# Patient Record
Sex: MALE | Race: WHITE | Employment: OTHER | URBAN - METROPOLITAN AREA
[De-identification: names, ages, dates, MRNs, and addresses within clinical notes are randomized per-mention and may not be internally consistent; named-entity substitution may affect disease eponyms.]

---

## 2017-02-02 DIAGNOSIS — I10 ESSENTIAL HYPERTENSION: ICD-10-CM

## 2017-02-02 RX ORDER — METOPROLOL TARTRATE 100 MG/1
TABLET ORAL
Qty: 180 TABLET | Refills: 3 | Status: SHIPPED | OUTPATIENT
Start: 2017-02-02 | End: 2018-07-17 | Stop reason: SDUPTHER

## 2017-02-07 ENCOUNTER — OFFICE VISIT (OUTPATIENT)
Dept: FAMILY MEDICINE CLINIC | Age: 67
End: 2017-02-07

## 2017-02-07 VITALS
DIASTOLIC BLOOD PRESSURE: 80 MMHG | HEART RATE: 59 BPM | BODY MASS INDEX: 32.47 KG/M2 | WEIGHT: 219.2 LBS | OXYGEN SATURATION: 99 % | SYSTOLIC BLOOD PRESSURE: 142 MMHG | RESPIRATION RATE: 17 BRPM | HEIGHT: 69 IN

## 2017-02-07 DIAGNOSIS — E11.9 TYPE 2 DIABETES MELLITUS WITHOUT COMPLICATION, WITHOUT LONG-TERM CURRENT USE OF INSULIN (HCC): Primary | ICD-10-CM

## 2017-02-07 DIAGNOSIS — I10 ESSENTIAL HYPERTENSION: ICD-10-CM

## 2017-02-07 LAB — HBA1C MFR BLD: 6.7 %

## 2017-02-07 PROCEDURE — G8598 ASA/ANTIPLAT THER USED: HCPCS | Performed by: FAMILY MEDICINE

## 2017-02-07 PROCEDURE — 4040F PNEUMOC VAC/ADMIN/RCVD: CPT | Performed by: FAMILY MEDICINE

## 2017-02-07 PROCEDURE — 3017F COLORECTAL CA SCREEN DOC REV: CPT | Performed by: FAMILY MEDICINE

## 2017-02-07 PROCEDURE — G8419 CALC BMI OUT NRM PARAM NOF/U: HCPCS | Performed by: FAMILY MEDICINE

## 2017-02-07 PROCEDURE — 3044F HG A1C LEVEL LT 7.0%: CPT | Performed by: FAMILY MEDICINE

## 2017-02-07 PROCEDURE — 1123F ACP DISCUSS/DSCN MKR DOCD: CPT | Performed by: FAMILY MEDICINE

## 2017-02-07 PROCEDURE — G8484 FLU IMMUNIZE NO ADMIN: HCPCS | Performed by: FAMILY MEDICINE

## 2017-02-07 PROCEDURE — 99213 OFFICE O/P EST LOW 20 MIN: CPT | Performed by: FAMILY MEDICINE

## 2017-02-07 PROCEDURE — G8427 DOCREV CUR MEDS BY ELIG CLIN: HCPCS | Performed by: FAMILY MEDICINE

## 2017-02-07 PROCEDURE — 83036 HEMOGLOBIN GLYCOSYLATED A1C: CPT | Performed by: FAMILY MEDICINE

## 2017-02-07 PROCEDURE — 1036F TOBACCO NON-USER: CPT | Performed by: FAMILY MEDICINE

## 2017-02-07 RX ORDER — LISINOPRIL 40 MG/1
40 TABLET ORAL DAILY
Qty: 90 TABLET | Refills: 3 | Status: SHIPPED | OUTPATIENT
Start: 2017-02-07 | End: 2018-03-13 | Stop reason: SDUPTHER

## 2017-02-07 RX ORDER — CLONIDINE HYDROCHLORIDE 0.3 MG/1
0.3 TABLET ORAL DAILY
Qty: 90 TABLET | Refills: 3 | Status: SHIPPED | OUTPATIENT
Start: 2017-02-07 | End: 2018-02-14 | Stop reason: SDUPTHER

## 2017-02-07 RX ORDER — HYDROCHLOROTHIAZIDE 25 MG/1
25 TABLET ORAL DAILY
Qty: 90 TABLET | Refills: 3 | Status: SHIPPED | OUTPATIENT
Start: 2017-02-07 | End: 2018-02-14 | Stop reason: SDUPTHER

## 2017-05-11 ENCOUNTER — OFFICE VISIT (OUTPATIENT)
Dept: FAMILY MEDICINE CLINIC | Age: 67
End: 2017-05-11

## 2017-05-11 VITALS
RESPIRATION RATE: 15 BRPM | HEART RATE: 94 BPM | OXYGEN SATURATION: 98 % | WEIGHT: 212 LBS | DIASTOLIC BLOOD PRESSURE: 74 MMHG | BODY MASS INDEX: 31.4 KG/M2 | SYSTOLIC BLOOD PRESSURE: 124 MMHG | HEIGHT: 69 IN

## 2017-05-11 DIAGNOSIS — E11.9 TYPE 2 DIABETES MELLITUS WITHOUT COMPLICATION, WITHOUT LONG-TERM CURRENT USE OF INSULIN (HCC): Primary | ICD-10-CM

## 2017-05-11 DIAGNOSIS — I10 ESSENTIAL HYPERTENSION: ICD-10-CM

## 2017-05-11 DIAGNOSIS — E08.21 DIABETES MELLITUS DUE TO UNDERLYING CONDITION WITH DIABETIC NEPHROPATHY, WITHOUT LONG-TERM CURRENT USE OF INSULIN (HCC): ICD-10-CM

## 2017-05-11 LAB — HBA1C MFR BLD: 6.9 %

## 2017-05-11 PROCEDURE — G8598 ASA/ANTIPLAT THER USED: HCPCS | Performed by: FAMILY MEDICINE

## 2017-05-11 PROCEDURE — 3017F COLORECTAL CA SCREEN DOC REV: CPT | Performed by: FAMILY MEDICINE

## 2017-05-11 PROCEDURE — 1123F ACP DISCUSS/DSCN MKR DOCD: CPT | Performed by: FAMILY MEDICINE

## 2017-05-11 PROCEDURE — 3044F HG A1C LEVEL LT 7.0%: CPT | Performed by: FAMILY MEDICINE

## 2017-05-11 PROCEDURE — 4040F PNEUMOC VAC/ADMIN/RCVD: CPT | Performed by: FAMILY MEDICINE

## 2017-05-11 PROCEDURE — 1036F TOBACCO NON-USER: CPT | Performed by: FAMILY MEDICINE

## 2017-05-11 PROCEDURE — G8417 CALC BMI ABV UP PARAM F/U: HCPCS | Performed by: FAMILY MEDICINE

## 2017-05-11 PROCEDURE — G8427 DOCREV CUR MEDS BY ELIG CLIN: HCPCS | Performed by: FAMILY MEDICINE

## 2017-05-11 PROCEDURE — 83036 HEMOGLOBIN GLYCOSYLATED A1C: CPT | Performed by: FAMILY MEDICINE

## 2017-05-11 PROCEDURE — 99213 OFFICE O/P EST LOW 20 MIN: CPT | Performed by: FAMILY MEDICINE

## 2017-05-11 ASSESSMENT — PATIENT HEALTH QUESTIONNAIRE - PHQ9
2. FEELING DOWN, DEPRESSED OR HOPELESS: 0
SUM OF ALL RESPONSES TO PHQ QUESTIONS 1-9: 0
SUM OF ALL RESPONSES TO PHQ9 QUESTIONS 1 & 2: 0
1. LITTLE INTEREST OR PLEASURE IN DOING THINGS: 0

## 2017-07-31 ENCOUNTER — CARE COORDINATION (OUTPATIENT)
Dept: CARE COORDINATION | Age: 67
End: 2017-07-31

## 2017-08-09 DIAGNOSIS — E11.9 TYPE 2 DIABETES MELLITUS WITHOUT COMPLICATION, WITHOUT LONG-TERM CURRENT USE OF INSULIN (HCC): ICD-10-CM

## 2017-08-18 ENCOUNTER — OFFICE VISIT (OUTPATIENT)
Dept: FAMILY MEDICINE CLINIC | Age: 67
End: 2017-08-18

## 2017-08-18 VITALS
HEART RATE: 66 BPM | DIASTOLIC BLOOD PRESSURE: 70 MMHG | OXYGEN SATURATION: 99 % | BODY MASS INDEX: 31.99 KG/M2 | RESPIRATION RATE: 14 BRPM | WEIGHT: 216 LBS | SYSTOLIC BLOOD PRESSURE: 130 MMHG | HEIGHT: 69 IN

## 2017-08-18 DIAGNOSIS — Z12.5 PROSTATE CANCER SCREENING: ICD-10-CM

## 2017-08-18 DIAGNOSIS — E78.2 MIXED HYPERLIPIDEMIA: ICD-10-CM

## 2017-08-18 DIAGNOSIS — Z11.59 NEED FOR HEPATITIS C SCREENING TEST: ICD-10-CM

## 2017-08-18 DIAGNOSIS — I10 ESSENTIAL HYPERTENSION: ICD-10-CM

## 2017-08-18 DIAGNOSIS — E11.9 TYPE 2 DIABETES MELLITUS WITHOUT COMPLICATION, WITHOUT LONG-TERM CURRENT USE OF INSULIN (HCC): Primary | ICD-10-CM

## 2017-08-18 LAB — HBA1C MFR BLD: 6.6 %

## 2017-08-18 PROCEDURE — 3046F HEMOGLOBIN A1C LEVEL >9.0%: CPT | Performed by: FAMILY MEDICINE

## 2017-08-18 PROCEDURE — 1036F TOBACCO NON-USER: CPT | Performed by: FAMILY MEDICINE

## 2017-08-18 PROCEDURE — 3017F COLORECTAL CA SCREEN DOC REV: CPT | Performed by: FAMILY MEDICINE

## 2017-08-18 PROCEDURE — G8598 ASA/ANTIPLAT THER USED: HCPCS | Performed by: FAMILY MEDICINE

## 2017-08-18 PROCEDURE — 4040F PNEUMOC VAC/ADMIN/RCVD: CPT | Performed by: FAMILY MEDICINE

## 2017-08-18 PROCEDURE — G8427 DOCREV CUR MEDS BY ELIG CLIN: HCPCS | Performed by: FAMILY MEDICINE

## 2017-08-18 PROCEDURE — 99213 OFFICE O/P EST LOW 20 MIN: CPT | Performed by: FAMILY MEDICINE

## 2017-08-18 PROCEDURE — G8417 CALC BMI ABV UP PARAM F/U: HCPCS | Performed by: FAMILY MEDICINE

## 2017-08-18 PROCEDURE — 83036 HEMOGLOBIN GLYCOSYLATED A1C: CPT | Performed by: FAMILY MEDICINE

## 2017-08-18 PROCEDURE — 1123F ACP DISCUSS/DSCN MKR DOCD: CPT | Performed by: FAMILY MEDICINE

## 2017-10-11 DIAGNOSIS — E08.00 DIABETES MELLITUS DUE TO UNDERLYING CONDITION WITH HYPEROSMOLARITY WITHOUT COMA (HCC): ICD-10-CM

## 2017-10-12 RX ORDER — GLYBURIDE 5 MG/1
TABLET ORAL
Qty: 360 TABLET | Refills: 3 | Status: SHIPPED | OUTPATIENT
Start: 2017-10-12 | End: 2018-10-15 | Stop reason: SDUPTHER

## 2017-10-12 NOTE — TELEPHONE ENCOUNTER
LOV 08/18/17   LR 09/29/16 #360 3 refills    Lab Results   Component Value Date    LABA1C 6.6 08/18/2017     Lab Results   Component Value Date    .4 11/01/2016

## 2017-12-01 ENCOUNTER — OFFICE VISIT (OUTPATIENT)
Dept: FAMILY MEDICINE CLINIC | Age: 67
End: 2017-12-01

## 2017-12-01 VITALS
HEIGHT: 69 IN | DIASTOLIC BLOOD PRESSURE: 80 MMHG | SYSTOLIC BLOOD PRESSURE: 120 MMHG | BODY MASS INDEX: 32.29 KG/M2 | OXYGEN SATURATION: 98 % | WEIGHT: 218 LBS | HEART RATE: 55 BPM | RESPIRATION RATE: 14 BRPM

## 2017-12-01 DIAGNOSIS — I10 ESSENTIAL HYPERTENSION: ICD-10-CM

## 2017-12-01 DIAGNOSIS — E78.2 MIXED HYPERLIPIDEMIA: ICD-10-CM

## 2017-12-01 DIAGNOSIS — Z11.59 NEED FOR HEPATITIS C SCREENING TEST: ICD-10-CM

## 2017-12-01 DIAGNOSIS — E11.9 TYPE 2 DIABETES MELLITUS WITHOUT COMPLICATION, WITHOUT LONG-TERM CURRENT USE OF INSULIN (HCC): Primary | ICD-10-CM

## 2017-12-01 DIAGNOSIS — Z12.5 PROSTATE CANCER SCREENING: ICD-10-CM

## 2017-12-01 DIAGNOSIS — E11.9 TYPE 2 DIABETES MELLITUS WITHOUT COMPLICATION, WITHOUT LONG-TERM CURRENT USE OF INSULIN (HCC): ICD-10-CM

## 2017-12-01 DIAGNOSIS — Z12.11 COLON CANCER SCREENING: ICD-10-CM

## 2017-12-01 LAB
A/G RATIO: 1.6 (ref 1.1–2.2)
ALBUMIN SERPL-MCNC: 4.6 G/DL (ref 3.4–5)
ALP BLD-CCNC: 81 U/L (ref 40–129)
ALT SERPL-CCNC: 29 U/L (ref 10–40)
ANION GAP SERPL CALCULATED.3IONS-SCNC: 18 MMOL/L (ref 3–16)
AST SERPL-CCNC: 21 U/L (ref 15–37)
BASOPHILS ABSOLUTE: 0.1 K/UL (ref 0–0.2)
BASOPHILS RELATIVE PERCENT: 1.2 %
BILIRUB SERPL-MCNC: 0.4 MG/DL (ref 0–1)
BUN BLDV-MCNC: 49 MG/DL (ref 7–20)
CALCIUM SERPL-MCNC: 9.8 MG/DL (ref 8.3–10.6)
CHLORIDE BLD-SCNC: 106 MMOL/L (ref 99–110)
CHOLESTEROL, TOTAL: 268 MG/DL (ref 0–199)
CO2: 20 MMOL/L (ref 21–32)
CREAT SERPL-MCNC: 1.6 MG/DL (ref 0.8–1.3)
CREATININE URINE POCT: NORMAL
EOSINOPHILS ABSOLUTE: 0.3 K/UL (ref 0–0.6)
EOSINOPHILS RELATIVE PERCENT: 4.6 %
GFR AFRICAN AMERICAN: 52
GFR NON-AFRICAN AMERICAN: 43
GLOBULIN: 2.9 G/DL
GLUCOSE BLD-MCNC: 128 MG/DL (ref 70–99)
HCT VFR BLD CALC: 44.2 % (ref 40.5–52.5)
HDLC SERPL-MCNC: 30 MG/DL (ref 40–60)
HEMOGLOBIN: 13.9 G/DL (ref 13.5–17.5)
HEPATITIS C ANTIBODY INTERPRETATION: NORMAL
LDL CHOLESTEROL CALCULATED: 193 MG/DL
LYMPHOCYTES ABSOLUTE: 1.5 K/UL (ref 1–5.1)
LYMPHOCYTES RELATIVE PERCENT: 21.2 %
MCH RBC QN AUTO: 28.2 PG (ref 26–34)
MCHC RBC AUTO-ENTMCNC: 31.5 G/DL (ref 31–36)
MCV RBC AUTO: 89.6 FL (ref 80–100)
MICROALBUMIN/CREAT 24H UR: NORMAL MG/G{CREAT}
MICROALBUMIN/CREAT UR-RTO: NORMAL
MONOCYTES ABSOLUTE: 0.8 K/UL (ref 0–1.3)
MONOCYTES RELATIVE PERCENT: 10.9 %
NEUTROPHILS ABSOLUTE: 4.4 K/UL (ref 1.7–7.7)
NEUTROPHILS RELATIVE PERCENT: 62.1 %
PDW BLD-RTO: 16.1 % (ref 12.4–15.4)
PLATELET # BLD: 150 K/UL (ref 135–450)
PMV BLD AUTO: 10.3 FL (ref 5–10.5)
POTASSIUM SERPL-SCNC: 5.2 MMOL/L (ref 3.5–5.1)
PROSTATE SPECIFIC ANTIGEN: 1.94 NG/ML (ref 0–4)
RBC # BLD: 4.93 M/UL (ref 4.2–5.9)
SODIUM BLD-SCNC: 144 MMOL/L (ref 136–145)
TOTAL PROTEIN: 7.5 G/DL (ref 6.4–8.2)
TRIGL SERPL-MCNC: 223 MG/DL (ref 0–150)
TSH REFLEX: 1.37 UIU/ML (ref 0.27–4.2)
VLDLC SERPL CALC-MCNC: 45 MG/DL
WBC # BLD: 7 K/UL (ref 4–11)

## 2017-12-01 PROCEDURE — 3044F HG A1C LEVEL LT 7.0%: CPT | Performed by: FAMILY MEDICINE

## 2017-12-01 PROCEDURE — 4040F PNEUMOC VAC/ADMIN/RCVD: CPT | Performed by: FAMILY MEDICINE

## 2017-12-01 PROCEDURE — 99214 OFFICE O/P EST MOD 30 MIN: CPT | Performed by: FAMILY MEDICINE

## 2017-12-01 PROCEDURE — 82044 UR ALBUMIN SEMIQUANTITATIVE: CPT | Performed by: FAMILY MEDICINE

## 2017-12-01 PROCEDURE — G8482 FLU IMMUNIZE ORDER/ADMIN: HCPCS | Performed by: FAMILY MEDICINE

## 2017-12-01 PROCEDURE — G8417 CALC BMI ABV UP PARAM F/U: HCPCS | Performed by: FAMILY MEDICINE

## 2017-12-01 PROCEDURE — 1036F TOBACCO NON-USER: CPT | Performed by: FAMILY MEDICINE

## 2017-12-01 PROCEDURE — G8427 DOCREV CUR MEDS BY ELIG CLIN: HCPCS | Performed by: FAMILY MEDICINE

## 2017-12-01 PROCEDURE — G8598 ASA/ANTIPLAT THER USED: HCPCS | Performed by: FAMILY MEDICINE

## 2017-12-01 PROCEDURE — 3017F COLORECTAL CA SCREEN DOC REV: CPT | Performed by: FAMILY MEDICINE

## 2017-12-01 PROCEDURE — 1123F ACP DISCUSS/DSCN MKR DOCD: CPT | Performed by: FAMILY MEDICINE

## 2017-12-01 NOTE — PROGRESS NOTES
SUBJECTIVE:  79 y.o. male for follow up of diabetes. medication compliance:  compliant all of the time, diabetic diet compliance:  compliant all of the time, home glucose monitoring: are not performed  Exercise:ADL's  Other symptoms and concerns: none feels well. Hypertension: Patient here for follow-up of elevated blood pressure. Blood pressure is not checked at home. Patient denies chest pain, dyspnea and irregular heart beat. He denies side effects from medication. Dyslipidemia: Patient presents for evaluation of lipids. Compliance with treatment thus far has been good. A repeat fasting lipid profile was done today.      Prostate Check: no issues PSA is pending  No components found for: CHLPL  Lab Results   Component Value Date    TRIG 276 (H) 11/01/2016    TRIG 335 (H) 07/20/2015    TRIG 280 (H) 08/22/2014     Lab Results   Component Value Date    HDL 24 (L) 11/01/2016    HDL 25 (L) 07/20/2015    HDL 27 (L) 08/22/2014     Lab Results   Component Value Date    LDLCALC 179 (H) 11/01/2016    LDLCALC see below 07/20/2015    LDLCALC 169 (H) 08/22/2014     Lab Results   Component Value Date    LABVLDL 55 11/01/2016    LABVLDL see below 07/20/2015     Lab Results   Component Value Date    ALT 23 11/01/2016    AST 20 11/01/2016                Outpatient Prescriptions Marked as Taking for the 12/1/17 encounter (Office Visit) with Amelia Donovan MD   Medication Sig Dispense Refill    glyBURIDE (DIABETA) 5 MG tablet TAKE TWO TABLETS BY MOUTHTWICE DAILY 360 tablet 3    metFORMIN (GLUCOPHAGE) 1000 MG tablet TAKE 1 TABLET BY MOUTH TWICE DAILY WITH MEALS 180 tablet 3    hydrochlorothiazide (HYDRODIURIL) 25 MG tablet Take 1 tablet by mouth daily 90 tablet 3    lisinopril (PRINIVIL;ZESTRIL) 40 MG tablet Take 1 tablet by mouth daily 90 tablet 3    cloNIDine (CATAPRES) 0.3 MG tablet Take 1 tablet by mouth daily 90 tablet 3    metoprolol (LOPRESSOR) 100 MG tablet TAKE 1 TABLET BY MOUTH 2 TIMES DAILY 180 tablet 3   

## 2017-12-01 NOTE — PATIENT INSTRUCTIONS
Patient Education        Learning About Diabetes Food Guidelines  Your Care Instructions  Meal planning is important to manage diabetes. It helps keep your blood sugar at a target level (which you set with your doctor). You don't have to eat special foods. You can eat what your family eats, including sweets once in a while. But you do have to pay attention to how often you eat and how much you eat of certain foods. You may want to work with a dietitian or a certified diabetes educator (CDE) to help you plan meals and snacks. A dietitian or CDE can also help you lose weight if that is one of your goals. What should you know about eating carbs? Managing the amount of carbohydrate (carbs) you eat is an important part of healthy meals when you have diabetes. Carbohydrate is found in many foods. · Learn which foods have carbs. And learn the amounts of carbs in different foods. ¨ Bread, cereal, pasta, and rice have about 15 grams of carbs in a serving. A serving is 1 slice of bread (1 ounce), ½ cup of cooked cereal, or 1/3 cup of cooked pasta or rice. ¨ Fruits have 15 grams of carbs in a serving. A serving is 1 small fresh fruit, such as an apple or orange; ½ of a banana; ½ cup of cooked or canned fruit; ½ cup of fruit juice; 1 cup of melon or raspberries; or 2 tablespoons of dried fruit. ¨ Milk and no-sugar-added yogurt have 15 grams of carbs in a serving. A serving is 1 cup of milk or 2/3 cup of no-sugar-added yogurt. ¨ Starchy vegetables have 15 grams of carbs in a serving. A serving is ½ cup of mashed potatoes or sweet potato; 1 cup winter squash; ½ of a small baked potato; ½ cup of cooked beans; or ½ cup cooked corn or green peas. · Learn how much carbs to eat each day and at each meal. A dietitian or CDE can teach you how to keep track of the amount of carbs you eat. This is called carbohydrate counting. · If you are not sure how to count carbohydrate grams, use the Plate Method to plan meals.  It is a good, quick way to make sure that you have a balanced meal. It also helps you spread carbs throughout the day. ¨ Divide your plate by types of foods. Put non-starchy vegetables on half the plate, meat or other protein food on one-quarter of the plate, and a grain or starchy vegetable in the final quarter of the plate. To this you can add a small piece of fruit and 1 cup of milk or yogurt, depending on how many carbs you are supposed to eat at a meal.  · Try to eat about the same amount of carbs at each meal. Do not \"save up\" your daily allowance of carbs to eat at one meal.  · Proteins have very little or no carbs per serving. Examples of proteins are beef, chicken, turkey, fish, eggs, tofu, cheese, cottage cheese, and peanut butter. A serving size of meat is 3 ounces, which is about the size of a deck of cards. Examples of meat substitute serving sizes (equal to 1 ounce of meat) are 1/4 cup of cottage cheese, 1 egg, 1 tablespoon of peanut butter, and ½ cup of tofu. How can you eat out and still eat healthy? · Learn to estimate the serving sizes of foods that have carbohydrate. If you measure food at home, it will be easier to estimate the amount in a serving of restaurant food. · If the meal you order has too much carbohydrate (such as potatoes, corn, or baked beans), ask to have a low-carbohydrate food instead. Ask for a salad or green vegetables. · If you use insulin, check your blood sugar before and after eating out to help you plan how much to eat in the future. · If you eat more carbohydrate at a meal than you had planned, take a walk or do other exercise. This will help lower your blood sugar. What else should you know? · Limit saturated fat, such as the fat from meat and dairy products. This is a healthy choice because people who have diabetes are at higher risk of heart disease. So choose lean cuts of meat and nonfat or low-fat dairy products.  Use olive or canola oil instead of butter or shortening

## 2017-12-02 LAB
ESTIMATED AVERAGE GLUCOSE: 151.3 MG/DL
HBA1C MFR BLD: 6.9 %

## 2018-02-14 DIAGNOSIS — I10 ESSENTIAL HYPERTENSION: ICD-10-CM

## 2018-02-14 RX ORDER — CLONIDINE HYDROCHLORIDE 0.3 MG/1
TABLET ORAL
Qty: 90 TABLET | Refills: 3 | Status: SHIPPED | OUTPATIENT
Start: 2018-02-14 | End: 2019-03-06 | Stop reason: SDUPTHER

## 2018-02-14 RX ORDER — HYDROCHLOROTHIAZIDE 25 MG/1
25 TABLET ORAL DAILY
Qty: 90 TABLET | Refills: 3 | Status: SHIPPED | OUTPATIENT
Start: 2018-02-14 | End: 2019-02-09 | Stop reason: SDUPTHER

## 2018-03-01 ENCOUNTER — OFFICE VISIT (OUTPATIENT)
Dept: FAMILY MEDICINE CLINIC | Age: 68
End: 2018-03-01

## 2018-03-01 VITALS
SYSTOLIC BLOOD PRESSURE: 120 MMHG | WEIGHT: 217.8 LBS | OXYGEN SATURATION: 98 % | BODY MASS INDEX: 32.16 KG/M2 | DIASTOLIC BLOOD PRESSURE: 82 MMHG | HEART RATE: 68 BPM

## 2018-03-01 DIAGNOSIS — E78.2 MIXED HYPERLIPIDEMIA: ICD-10-CM

## 2018-03-01 DIAGNOSIS — I10 ESSENTIAL HYPERTENSION: ICD-10-CM

## 2018-03-01 DIAGNOSIS — E11.9 TYPE 2 DIABETES MELLITUS WITHOUT COMPLICATION, WITHOUT LONG-TERM CURRENT USE OF INSULIN (HCC): Primary | ICD-10-CM

## 2018-03-01 LAB — HBA1C MFR BLD: 6.5 %

## 2018-03-01 PROCEDURE — G8417 CALC BMI ABV UP PARAM F/U: HCPCS | Performed by: FAMILY MEDICINE

## 2018-03-01 PROCEDURE — 1123F ACP DISCUSS/DSCN MKR DOCD: CPT | Performed by: FAMILY MEDICINE

## 2018-03-01 PROCEDURE — G8427 DOCREV CUR MEDS BY ELIG CLIN: HCPCS | Performed by: FAMILY MEDICINE

## 2018-03-01 PROCEDURE — G8482 FLU IMMUNIZE ORDER/ADMIN: HCPCS | Performed by: FAMILY MEDICINE

## 2018-03-01 PROCEDURE — 4040F PNEUMOC VAC/ADMIN/RCVD: CPT | Performed by: FAMILY MEDICINE

## 2018-03-01 PROCEDURE — 99214 OFFICE O/P EST MOD 30 MIN: CPT | Performed by: FAMILY MEDICINE

## 2018-03-01 PROCEDURE — 83036 HEMOGLOBIN GLYCOSYLATED A1C: CPT | Performed by: FAMILY MEDICINE

## 2018-03-01 PROCEDURE — 1036F TOBACCO NON-USER: CPT | Performed by: FAMILY MEDICINE

## 2018-03-01 PROCEDURE — G8598 ASA/ANTIPLAT THER USED: HCPCS | Performed by: FAMILY MEDICINE

## 2018-03-01 PROCEDURE — 3288F FALL RISK ASSESSMENT DOCD: CPT | Performed by: FAMILY MEDICINE

## 2018-03-01 PROCEDURE — 3044F HG A1C LEVEL LT 7.0%: CPT | Performed by: FAMILY MEDICINE

## 2018-03-01 PROCEDURE — 3017F COLORECTAL CA SCREEN DOC REV: CPT | Performed by: FAMILY MEDICINE

## 2018-03-13 DIAGNOSIS — I10 ESSENTIAL HYPERTENSION: ICD-10-CM

## 2018-03-13 DIAGNOSIS — E11.9 TYPE 2 DIABETES MELLITUS WITHOUT COMPLICATION, WITHOUT LONG-TERM CURRENT USE OF INSULIN (HCC): ICD-10-CM

## 2018-03-13 RX ORDER — LISINOPRIL 40 MG/1
TABLET ORAL
Qty: 90 TABLET | Refills: 3 | Status: SHIPPED | OUTPATIENT
Start: 2018-03-13 | End: 2019-03-06 | Stop reason: SDUPTHER

## 2018-06-25 ENCOUNTER — OFFICE VISIT (OUTPATIENT)
Dept: FAMILY MEDICINE CLINIC | Age: 68
End: 2018-06-25

## 2018-06-25 VITALS
WEIGHT: 209.8 LBS | DIASTOLIC BLOOD PRESSURE: 66 MMHG | HEART RATE: 79 BPM | OXYGEN SATURATION: 98 % | SYSTOLIC BLOOD PRESSURE: 110 MMHG | BODY MASS INDEX: 30.98 KG/M2

## 2018-06-25 DIAGNOSIS — I10 ESSENTIAL HYPERTENSION: ICD-10-CM

## 2018-06-25 DIAGNOSIS — Z12.11 ENCOUNTER FOR SCREENING COLONOSCOPY: ICD-10-CM

## 2018-06-25 DIAGNOSIS — E11.9 TYPE 2 DIABETES MELLITUS WITHOUT COMPLICATION, WITHOUT LONG-TERM CURRENT USE OF INSULIN (HCC): Primary | ICD-10-CM

## 2018-06-25 LAB — HBA1C MFR BLD: 6.8 %

## 2018-06-25 PROCEDURE — 83036 HEMOGLOBIN GLYCOSYLATED A1C: CPT | Performed by: FAMILY MEDICINE

## 2018-06-25 PROCEDURE — G8510 SCR DEP NEG, NO PLAN REQD: HCPCS | Performed by: FAMILY MEDICINE

## 2018-06-25 PROCEDURE — 4040F PNEUMOC VAC/ADMIN/RCVD: CPT | Performed by: FAMILY MEDICINE

## 2018-06-25 PROCEDURE — 1123F ACP DISCUSS/DSCN MKR DOCD: CPT | Performed by: FAMILY MEDICINE

## 2018-06-25 PROCEDURE — 3044F HG A1C LEVEL LT 7.0%: CPT | Performed by: FAMILY MEDICINE

## 2018-06-25 PROCEDURE — 3017F COLORECTAL CA SCREEN DOC REV: CPT | Performed by: FAMILY MEDICINE

## 2018-06-25 PROCEDURE — G8427 DOCREV CUR MEDS BY ELIG CLIN: HCPCS | Performed by: FAMILY MEDICINE

## 2018-06-25 PROCEDURE — 99213 OFFICE O/P EST LOW 20 MIN: CPT | Performed by: FAMILY MEDICINE

## 2018-06-25 PROCEDURE — 1036F TOBACCO NON-USER: CPT | Performed by: FAMILY MEDICINE

## 2018-06-25 PROCEDURE — G8598 ASA/ANTIPLAT THER USED: HCPCS | Performed by: FAMILY MEDICINE

## 2018-06-25 PROCEDURE — G8417 CALC BMI ABV UP PARAM F/U: HCPCS | Performed by: FAMILY MEDICINE

## 2018-06-25 PROCEDURE — 2022F DILAT RTA XM EVC RTNOPTHY: CPT | Performed by: FAMILY MEDICINE

## 2018-06-25 ASSESSMENT — PATIENT HEALTH QUESTIONNAIRE - PHQ9
SUM OF ALL RESPONSES TO PHQ QUESTIONS 1-9: 0
SUM OF ALL RESPONSES TO PHQ9 QUESTIONS 1 & 2: 0
1. LITTLE INTEREST OR PLEASURE IN DOING THINGS: 0
2. FEELING DOWN, DEPRESSED OR HOPELESS: 0

## 2018-07-17 DIAGNOSIS — I10 ESSENTIAL HYPERTENSION: ICD-10-CM

## 2018-07-17 RX ORDER — METOPROLOL TARTRATE 100 MG/1
TABLET ORAL
Qty: 180 TABLET | Refills: 3 | Status: SHIPPED | OUTPATIENT
Start: 2018-07-17 | End: 2020-01-13

## 2018-07-27 ENCOUNTER — TELEPHONE (OUTPATIENT)
Dept: FAMILY MEDICINE CLINIC | Age: 68
End: 2018-07-27

## 2018-07-27 NOTE — TELEPHONE ENCOUNTER
The patient received a FIT test on 06/25/18, in which he has not completed and returned to the office. I reached out to him as a reminder to take this test. He states that he has it and plans on completing it, but does not know when he will be able to.

## 2018-08-31 ENCOUNTER — TELEPHONE (OUTPATIENT)
Dept: FAMILY MEDICINE CLINIC | Age: 68
End: 2018-08-31

## 2018-09-27 ENCOUNTER — OFFICE VISIT (OUTPATIENT)
Dept: FAMILY MEDICINE CLINIC | Age: 68
End: 2018-09-27
Payer: MEDICARE

## 2018-09-27 VITALS
OXYGEN SATURATION: 64 % | DIASTOLIC BLOOD PRESSURE: 78 MMHG | SYSTOLIC BLOOD PRESSURE: 126 MMHG | HEART RATE: 99 BPM | BODY MASS INDEX: 31.57 KG/M2 | WEIGHT: 213.8 LBS

## 2018-09-27 DIAGNOSIS — E11.9 TYPE 2 DIABETES MELLITUS WITHOUT COMPLICATION, WITHOUT LONG-TERM CURRENT USE OF INSULIN (HCC): Primary | ICD-10-CM

## 2018-09-27 DIAGNOSIS — Z12.5 PROSTATE CANCER SCREENING: ICD-10-CM

## 2018-09-27 DIAGNOSIS — I10 ESSENTIAL HYPERTENSION: ICD-10-CM

## 2018-09-27 DIAGNOSIS — E78.2 MIXED HYPERLIPIDEMIA: ICD-10-CM

## 2018-09-27 LAB — HBA1C MFR BLD: 6.5 %

## 2018-09-27 PROCEDURE — 1123F ACP DISCUSS/DSCN MKR DOCD: CPT | Performed by: FAMILY MEDICINE

## 2018-09-27 PROCEDURE — G8417 CALC BMI ABV UP PARAM F/U: HCPCS | Performed by: FAMILY MEDICINE

## 2018-09-27 PROCEDURE — 3017F COLORECTAL CA SCREEN DOC REV: CPT | Performed by: FAMILY MEDICINE

## 2018-09-27 PROCEDURE — 2022F DILAT RTA XM EVC RTNOPTHY: CPT | Performed by: FAMILY MEDICINE

## 2018-09-27 PROCEDURE — 3044F HG A1C LEVEL LT 7.0%: CPT | Performed by: FAMILY MEDICINE

## 2018-09-27 PROCEDURE — 99213 OFFICE O/P EST LOW 20 MIN: CPT | Performed by: FAMILY MEDICINE

## 2018-09-27 PROCEDURE — 1036F TOBACCO NON-USER: CPT | Performed by: FAMILY MEDICINE

## 2018-09-27 PROCEDURE — 4040F PNEUMOC VAC/ADMIN/RCVD: CPT | Performed by: FAMILY MEDICINE

## 2018-09-27 PROCEDURE — 83036 HEMOGLOBIN GLYCOSYLATED A1C: CPT | Performed by: FAMILY MEDICINE

## 2018-09-27 PROCEDURE — G8598 ASA/ANTIPLAT THER USED: HCPCS | Performed by: FAMILY MEDICINE

## 2018-09-27 PROCEDURE — 1101F PT FALLS ASSESS-DOCD LE1/YR: CPT | Performed by: FAMILY MEDICINE

## 2018-09-27 PROCEDURE — G8427 DOCREV CUR MEDS BY ELIG CLIN: HCPCS | Performed by: FAMILY MEDICINE

## 2018-09-27 NOTE — PROGRESS NOTES
use of insulin (HCC)  -     POCT glycosylated hemoglobin (Hb A1C)  -     Hemoglobin A1C; Future  Stable/Improved  Continue current treatment   Life Style Modification with diet,exercise, weight control   Essential hypertension  -     CBC with Differential; Future  -     TSH with Reflex; Future  Stable/Controlled  Continue current treatment     Mixed hyperlipidemia  -     Lipid Panel; Future  -     Comprehensive Metabolic Panel; Future    Prostate cancer screening  -     Psa screening; Future     Health Maintenance reviewed with the patient: Shingrix was discussed and recommended. He declines colonoscopy, understanding the risks of by not doing,early colon cancer or even later stage colon cancer could go undetected  which could lead to significant morbidity and death in the future.  FOB given to him in lieu of above and also realizes this is not a substitute for colonoscopy

## 2018-09-27 NOTE — PATIENT INSTRUCTIONS
when cooking. · Don't skip meals. Your blood sugar may drop too low if you skip meals and take insulin or certain medicines for diabetes. · Check with your doctor before you drink alcohol. Alcohol can cause your blood sugar to drop too low. Alcohol can also cause a bad reaction if you take certain diabetes medicines. Follow-up care is a key part of your treatment and safety. Be sure to make and go to all appointments, and call your doctor if you are having problems. It's also a good idea to know your test results and keep a list of the medicines you take. Where can you learn more? Go to https://Macrotherapypepiceweb."[employer]". org and sign in to your [employer] account. Enter J188 in the Mapittrackit box to learn more about \"Learning About Diabetes Food Guidelines. \"     If you do not have an account, please click on the \"Sign Up Now\" link. Current as of: December 7, 2017  Content Version: 11.7  © 8170-8175 for; to [employer]. Care instructions adapted under license by [facility] ([facility]). If you have questions about a medical condition or this instruction, always ask your healthcare professional. [place] [address] any warranty or liability for your use of this information.

## 2018-10-15 DIAGNOSIS — E08.00 DIABETES MELLITUS DUE TO UNDERLYING CONDITION WITH HYPEROSMOLARITY WITHOUT COMA (HCC): ICD-10-CM

## 2018-10-15 RX ORDER — GLYBURIDE 5 MG/1
TABLET ORAL
Qty: 360 TABLET | Refills: 3 | Status: SHIPPED | OUTPATIENT
Start: 2018-10-15 | End: 2019-12-10 | Stop reason: SDUPTHER

## 2018-10-19 ENCOUNTER — TELEPHONE (OUTPATIENT)
Dept: FAMILY MEDICINE CLINIC | Age: 68
End: 2018-10-19

## 2018-11-16 ENCOUNTER — TELEPHONE (OUTPATIENT)
Dept: FAMILY MEDICINE CLINIC | Age: 68
End: 2018-11-16

## 2018-12-11 ENCOUNTER — OFFICE VISIT (OUTPATIENT)
Dept: FAMILY MEDICINE CLINIC | Age: 68
End: 2018-12-11
Payer: MEDICARE

## 2018-12-11 ENCOUNTER — TELEPHONE (OUTPATIENT)
Dept: FAMILY MEDICINE CLINIC | Age: 68
End: 2018-12-11

## 2018-12-11 VITALS
BODY MASS INDEX: 30.48 KG/M2 | HEIGHT: 69 IN | OXYGEN SATURATION: 99 % | SYSTOLIC BLOOD PRESSURE: 140 MMHG | HEART RATE: 83 BPM | WEIGHT: 205.8 LBS | DIASTOLIC BLOOD PRESSURE: 90 MMHG

## 2018-12-11 DIAGNOSIS — I10 ESSENTIAL HYPERTENSION: ICD-10-CM

## 2018-12-11 DIAGNOSIS — E11.9 TYPE 2 DIABETES MELLITUS WITHOUT COMPLICATION, WITHOUT LONG-TERM CURRENT USE OF INSULIN (HCC): ICD-10-CM

## 2018-12-11 DIAGNOSIS — N18.30 CHRONIC KIDNEY DISEASE, STAGE III (MODERATE) (HCC): ICD-10-CM

## 2018-12-11 DIAGNOSIS — E16.2 HYPOGLYCEMIA: Primary | ICD-10-CM

## 2018-12-11 LAB — HBA1C MFR BLD: 6.6 %

## 2018-12-11 PROCEDURE — 3044F HG A1C LEVEL LT 7.0%: CPT | Performed by: FAMILY MEDICINE

## 2018-12-11 PROCEDURE — 1036F TOBACCO NON-USER: CPT | Performed by: FAMILY MEDICINE

## 2018-12-11 PROCEDURE — 1123F ACP DISCUSS/DSCN MKR DOCD: CPT | Performed by: FAMILY MEDICINE

## 2018-12-11 PROCEDURE — 1101F PT FALLS ASSESS-DOCD LE1/YR: CPT | Performed by: FAMILY MEDICINE

## 2018-12-11 PROCEDURE — G8598 ASA/ANTIPLAT THER USED: HCPCS | Performed by: FAMILY MEDICINE

## 2018-12-11 PROCEDURE — 83036 HEMOGLOBIN GLYCOSYLATED A1C: CPT | Performed by: FAMILY MEDICINE

## 2018-12-11 PROCEDURE — 2022F DILAT RTA XM EVC RTNOPTHY: CPT | Performed by: FAMILY MEDICINE

## 2018-12-11 PROCEDURE — G8484 FLU IMMUNIZE NO ADMIN: HCPCS | Performed by: FAMILY MEDICINE

## 2018-12-11 PROCEDURE — 4040F PNEUMOC VAC/ADMIN/RCVD: CPT | Performed by: FAMILY MEDICINE

## 2018-12-11 PROCEDURE — 3017F COLORECTAL CA SCREEN DOC REV: CPT | Performed by: FAMILY MEDICINE

## 2018-12-11 PROCEDURE — G8427 DOCREV CUR MEDS BY ELIG CLIN: HCPCS | Performed by: FAMILY MEDICINE

## 2018-12-11 PROCEDURE — 99214 OFFICE O/P EST MOD 30 MIN: CPT | Performed by: FAMILY MEDICINE

## 2018-12-11 PROCEDURE — G8417 CALC BMI ABV UP PARAM F/U: HCPCS | Performed by: FAMILY MEDICINE

## 2018-12-11 ASSESSMENT — ENCOUNTER SYMPTOMS
RESPIRATORY NEGATIVE: 1
GASTROINTESTINAL NEGATIVE: 1

## 2018-12-11 NOTE — PROGRESS NOTES
Therefore if he takes this he must eat. Return 6 weeks  Type 2 diabetes mellitus without complication, without long-term current use of insulin (HCC)  -     POCT microalbumin  -     POCT glycosylated hemoglobin (Hb A1C)  Well controlled and essentially stable. Continue current medication with the above caveats. I did tell him to hold his glyburide for the next 2-3 days. 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 Continue current treatment plan and follow up at least yearly. Essential hypertension  Blood pressure was somewhat borderline today possibly in reaction to his hypoglycemic episode. He was advised to check his blood pressure at home and record. He should return in 6 weeks unless his blood pressure is significantly elevated and then should return sooner. Continue current treatment for now. Lifestyle modification. CKD:  Await lab to determine stability. Lab work is due at the end of December and has been ordered prior to his next visit. Visit Dx:  N18.3 - Chronic kidney disease, stage III (moderate) ([facility] [address]  Assessment and plan:  Stable based upon symptoms and exam. Continue current treatment plan and follow up at least yearly. Last edited 12/11/18 15:50 EST by Graciela Batista MD    Discussed and answered all questions from the patient, his wife, and his daughter. Office visit greater than 25 minutes of which greater than 50% was spent in face-to-face counseling and coronation of care.     Graciela Batista MD

## 2019-01-18 DIAGNOSIS — Z12.5 PROSTATE CANCER SCREENING: ICD-10-CM

## 2019-01-18 DIAGNOSIS — E78.2 MIXED HYPERLIPIDEMIA: ICD-10-CM

## 2019-01-18 DIAGNOSIS — E11.9 TYPE 2 DIABETES MELLITUS WITHOUT COMPLICATION, WITHOUT LONG-TERM CURRENT USE OF INSULIN (HCC): ICD-10-CM

## 2019-01-18 DIAGNOSIS — I10 ESSENTIAL HYPERTENSION: ICD-10-CM

## 2019-01-18 LAB
A/G RATIO: 2 (ref 1.1–2.2)
ALBUMIN SERPL-MCNC: 4.5 G/DL (ref 3.4–5)
ALP BLD-CCNC: 97 U/L (ref 40–129)
ALT SERPL-CCNC: 24 U/L (ref 10–40)
ANION GAP SERPL CALCULATED.3IONS-SCNC: 15 MMOL/L (ref 3–16)
AST SERPL-CCNC: 18 U/L (ref 15–37)
BILIRUB SERPL-MCNC: 0.8 MG/DL (ref 0–1)
BUN BLDV-MCNC: 30 MG/DL (ref 7–20)
CALCIUM SERPL-MCNC: 9.2 MG/DL (ref 8.3–10.6)
CHLORIDE BLD-SCNC: 103 MMOL/L (ref 99–110)
CHOLESTEROL, TOTAL: 249 MG/DL (ref 0–199)
CO2: 22 MMOL/L (ref 21–32)
CREAT SERPL-MCNC: 1.6 MG/DL (ref 0.8–1.3)
ESTIMATED AVERAGE GLUCOSE: 188.6 MG/DL
GFR AFRICAN AMERICAN: 52
GFR NON-AFRICAN AMERICAN: 43
GLOBULIN: 2.2 G/DL
GLUCOSE BLD-MCNC: 215 MG/DL (ref 70–99)
HBA1C MFR BLD: 8.2 %
HDLC SERPL-MCNC: 30 MG/DL (ref 40–60)
LDL CHOLESTEROL CALCULATED: 181 MG/DL
POTASSIUM SERPL-SCNC: 4.9 MMOL/L (ref 3.5–5.1)
PROSTATE SPECIFIC ANTIGEN: 2.31 NG/ML (ref 0–4)
SODIUM BLD-SCNC: 140 MMOL/L (ref 136–145)
TOTAL PROTEIN: 6.7 G/DL (ref 6.4–8.2)
TRIGL SERPL-MCNC: 189 MG/DL (ref 0–150)
TSH REFLEX: 1.45 UIU/ML (ref 0.27–4.2)
VLDLC SERPL CALC-MCNC: 38 MG/DL

## 2019-01-19 LAB
BASOPHILS ABSOLUTE: 0 K/UL (ref 0–0.2)
BASOPHILS RELATIVE PERCENT: 0.7 %
EOSINOPHILS ABSOLUTE: 0.3 K/UL (ref 0–0.6)
EOSINOPHILS RELATIVE PERCENT: 5.2 %
HCT VFR BLD CALC: 40.3 % (ref 40.5–52.5)
HEMOGLOBIN: 12.8 G/DL (ref 13.5–17.5)
LYMPHOCYTES ABSOLUTE: 1.1 K/UL (ref 1–5.1)
LYMPHOCYTES RELATIVE PERCENT: 17.5 %
MCH RBC QN AUTO: 27.9 PG (ref 26–34)
MCHC RBC AUTO-ENTMCNC: 31.6 G/DL (ref 31–36)
MCV RBC AUTO: 88.3 FL (ref 80–100)
MONOCYTES ABSOLUTE: 0.6 K/UL (ref 0–1.3)
MONOCYTES RELATIVE PERCENT: 9 %
NEUTROPHILS ABSOLUTE: 4.2 K/UL (ref 1.7–7.7)
NEUTROPHILS RELATIVE PERCENT: 67.6 %
PDW BLD-RTO: 17 % (ref 12.4–15.4)
PLATELET # BLD: 95 K/UL (ref 135–450)
PLATELET SLIDE REVIEW: ABNORMAL
PMV BLD AUTO: 9.8 FL (ref 5–10.5)
RBC # BLD: 4.57 M/UL (ref 4.2–5.9)
WBC # BLD: 6.3 K/UL (ref 4–11)

## 2019-01-25 ENCOUNTER — OFFICE VISIT (OUTPATIENT)
Dept: FAMILY MEDICINE CLINIC | Age: 69
End: 2019-01-25
Payer: MEDICARE

## 2019-01-25 VITALS
SYSTOLIC BLOOD PRESSURE: 138 MMHG | OXYGEN SATURATION: 99 % | WEIGHT: 204.2 LBS | HEART RATE: 65 BPM | BODY MASS INDEX: 30.16 KG/M2 | DIASTOLIC BLOOD PRESSURE: 80 MMHG

## 2019-01-25 DIAGNOSIS — I10 ESSENTIAL HYPERTENSION: ICD-10-CM

## 2019-01-25 DIAGNOSIS — L98.9 SKIN LESION: ICD-10-CM

## 2019-01-25 DIAGNOSIS — E78.2 MIXED HYPERLIPIDEMIA: ICD-10-CM

## 2019-01-25 DIAGNOSIS — Z12.5 PROSTATE CANCER SCREENING: ICD-10-CM

## 2019-01-25 DIAGNOSIS — E11.21 TYPE 2 DIABETES MELLITUS WITH DIABETIC NEPHROPATHY, WITHOUT LONG-TERM CURRENT USE OF INSULIN (HCC): Primary | ICD-10-CM

## 2019-01-25 LAB
CREATININE URINE POCT: 300
MICROALBUMIN/CREAT 24H UR: 150 MG/G{CREAT}
MICROALBUMIN/CREAT UR-RTO: NORMAL

## 2019-01-25 PROCEDURE — 1101F PT FALLS ASSESS-DOCD LE1/YR: CPT | Performed by: FAMILY MEDICINE

## 2019-01-25 PROCEDURE — 82044 UR ALBUMIN SEMIQUANTITATIVE: CPT | Performed by: FAMILY MEDICINE

## 2019-01-25 PROCEDURE — 1123F ACP DISCUSS/DSCN MKR DOCD: CPT | Performed by: FAMILY MEDICINE

## 2019-01-25 PROCEDURE — G8417 CALC BMI ABV UP PARAM F/U: HCPCS | Performed by: FAMILY MEDICINE

## 2019-01-25 PROCEDURE — 3017F COLORECTAL CA SCREEN DOC REV: CPT | Performed by: FAMILY MEDICINE

## 2019-01-25 PROCEDURE — 2022F DILAT RTA XM EVC RTNOPTHY: CPT | Performed by: FAMILY MEDICINE

## 2019-01-25 PROCEDURE — G8510 SCR DEP NEG, NO PLAN REQD: HCPCS | Performed by: FAMILY MEDICINE

## 2019-01-25 PROCEDURE — 99214 OFFICE O/P EST MOD 30 MIN: CPT | Performed by: FAMILY MEDICINE

## 2019-01-25 PROCEDURE — G8484 FLU IMMUNIZE NO ADMIN: HCPCS | Performed by: FAMILY MEDICINE

## 2019-01-25 PROCEDURE — 1036F TOBACCO NON-USER: CPT | Performed by: FAMILY MEDICINE

## 2019-01-25 PROCEDURE — G8598 ASA/ANTIPLAT THER USED: HCPCS | Performed by: FAMILY MEDICINE

## 2019-01-25 PROCEDURE — G0102 PROSTATE CA SCREENING; DRE: HCPCS | Performed by: FAMILY MEDICINE

## 2019-01-25 PROCEDURE — G8427 DOCREV CUR MEDS BY ELIG CLIN: HCPCS | Performed by: FAMILY MEDICINE

## 2019-01-25 PROCEDURE — 4040F PNEUMOC VAC/ADMIN/RCVD: CPT | Performed by: FAMILY MEDICINE

## 2019-01-25 PROCEDURE — 3045F PR MOST RECENT HEMOGLOBIN A1C LEVEL 7.0-9.0%: CPT | Performed by: FAMILY MEDICINE

## 2019-01-25 ASSESSMENT — PATIENT HEALTH QUESTIONNAIRE - PHQ9
SUM OF ALL RESPONSES TO PHQ QUESTIONS 1-9: 0
1. LITTLE INTEREST OR PLEASURE IN DOING THINGS: 0
2. FEELING DOWN, DEPRESSED OR HOPELESS: 0
SUM OF ALL RESPONSES TO PHQ9 QUESTIONS 1 & 2: 0
SUM OF ALL RESPONSES TO PHQ QUESTIONS 1-9: 0

## 2019-02-09 DIAGNOSIS — I10 ESSENTIAL HYPERTENSION: ICD-10-CM

## 2019-02-11 RX ORDER — HYDROCHLOROTHIAZIDE 25 MG/1
TABLET ORAL
Qty: 90 TABLET | Refills: 3 | Status: SHIPPED | OUTPATIENT
Start: 2019-02-11 | End: 2020-02-07

## 2019-03-06 DIAGNOSIS — I10 ESSENTIAL HYPERTENSION: ICD-10-CM

## 2019-03-06 DIAGNOSIS — E11.9 TYPE 2 DIABETES MELLITUS WITHOUT COMPLICATION, WITHOUT LONG-TERM CURRENT USE OF INSULIN (HCC): ICD-10-CM

## 2019-03-06 RX ORDER — CLONIDINE HYDROCHLORIDE 0.3 MG/1
TABLET ORAL
Qty: 90 TABLET | Refills: 3 | Status: SHIPPED | OUTPATIENT
Start: 2019-03-06 | End: 2020-03-05

## 2019-03-06 RX ORDER — LISINOPRIL 40 MG/1
TABLET ORAL
Qty: 90 TABLET | Refills: 3 | Status: SHIPPED | OUTPATIENT
Start: 2019-03-06 | End: 2020-03-05

## 2019-04-12 ENCOUNTER — TELEPHONE (OUTPATIENT)
Dept: FAMILY MEDICINE CLINIC | Age: 69
End: 2019-04-12

## 2019-04-12 NOTE — TELEPHONE ENCOUNTER
The patient received a FIT test on 06/25/18, in which he has not completed and returned to the office. I reached out to him as a reminder to take this test. I left a message, asking him to call the office. This was our third attempt to reach on him this topic.

## 2019-05-09 ENCOUNTER — OFFICE VISIT (OUTPATIENT)
Dept: FAMILY MEDICINE CLINIC | Age: 69
End: 2019-05-09
Payer: MEDICARE

## 2019-05-09 VITALS
WEIGHT: 198.6 LBS | SYSTOLIC BLOOD PRESSURE: 98 MMHG | HEART RATE: 63 BPM | OXYGEN SATURATION: 98 % | BODY MASS INDEX: 29.33 KG/M2 | DIASTOLIC BLOOD PRESSURE: 62 MMHG

## 2019-05-09 DIAGNOSIS — E11.9 TYPE 2 DIABETES MELLITUS WITHOUT COMPLICATION, WITHOUT LONG-TERM CURRENT USE OF INSULIN (HCC): Primary | ICD-10-CM

## 2019-05-09 DIAGNOSIS — I10 ESSENTIAL HYPERTENSION: ICD-10-CM

## 2019-05-09 DIAGNOSIS — E11.9 TYPE 2 DIABETES MELLITUS WITHOUT COMPLICATION, WITHOUT LONG-TERM CURRENT USE OF INSULIN (HCC): ICD-10-CM

## 2019-05-09 DIAGNOSIS — E78.2 MIXED HYPERLIPIDEMIA: ICD-10-CM

## 2019-05-09 LAB — HBA1C MFR BLD: 14 %

## 2019-05-09 PROCEDURE — 83036 HEMOGLOBIN GLYCOSYLATED A1C: CPT | Performed by: FAMILY MEDICINE

## 2019-05-09 PROCEDURE — 4040F PNEUMOC VAC/ADMIN/RCVD: CPT | Performed by: FAMILY MEDICINE

## 2019-05-09 PROCEDURE — 1036F TOBACCO NON-USER: CPT | Performed by: FAMILY MEDICINE

## 2019-05-09 PROCEDURE — 3017F COLORECTAL CA SCREEN DOC REV: CPT | Performed by: FAMILY MEDICINE

## 2019-05-09 PROCEDURE — 99214 OFFICE O/P EST MOD 30 MIN: CPT | Performed by: FAMILY MEDICINE

## 2019-05-09 PROCEDURE — 1123F ACP DISCUSS/DSCN MKR DOCD: CPT | Performed by: FAMILY MEDICINE

## 2019-05-09 PROCEDURE — G8427 DOCREV CUR MEDS BY ELIG CLIN: HCPCS | Performed by: FAMILY MEDICINE

## 2019-05-09 PROCEDURE — G8417 CALC BMI ABV UP PARAM F/U: HCPCS | Performed by: FAMILY MEDICINE

## 2019-05-09 PROCEDURE — G8598 ASA/ANTIPLAT THER USED: HCPCS | Performed by: FAMILY MEDICINE

## 2019-05-09 PROCEDURE — 3046F HEMOGLOBIN A1C LEVEL >9.0%: CPT | Performed by: FAMILY MEDICINE

## 2019-05-09 PROCEDURE — 2022F DILAT RTA XM EVC RTNOPTHY: CPT | Performed by: FAMILY MEDICINE

## 2019-05-09 NOTE — PROGRESS NOTES
SUBJECTIVE:  71 y.o. male for follow up of diabetes. medication compliance:  compliant most of the time, diabetic diet compliance:  compliant most of the time, home glucose monitoring: are not performed  Exercise:ADL's   Other symptoms and concerns: No further episodes of hypoglycemia. Denies any significant polyuria and polydipsia  Hypertension: Patient here for follow-up of elevated blood pressure.  Blood pressure is not checked at home. Patient denies chest pain, dyspnea and irregular heart beat. He denies side effects from medication. Dyslipidemia: Patient presents for evaluation of lipids. he is intolerant to statin medication and on no medication        Outpatient Medications Marked as Taking for the 5/9/19 encounter (Office Visit) with Rebeca Gomes MD   Medication Sig Dispense Refill    cloNIDine (CATAPRES) 0.3 MG tablet TAKE ONE TABLET BY MOUTH DAILY 90 tablet 3    lisinopril (PRINIVIL;ZESTRIL) 40 MG tablet TAKE ONE TABLET BY MOUTH DAILY 90 tablet 3    hydrochlorothiazide (HYDRODIURIL) 25 MG tablet TAKE ONE TABLET BY MOUTH DAILY 90 tablet 3    glyBURIDE (DIABETA) 5 MG tablet TAKE TWO TABLETS BY MOUTHTWICE DAILY 360 tablet 3    metFORMIN (GLUCOPHAGE) 1000 MG tablet TAKE 1 TABLET BY MOUTH TWICE DAILY WITH MEALS 180 tablet 3    metoprolol (LOPRESSOR) 100 MG tablet TAKE 1 TABLET BY MOUTH 2 TIMES DAILY 180 tablet 3    aspirin 81 MG EC tablet Take 81 mg by mouth daily. Lab Results   Component Value Date    LABA1C > 14 05/09/2019       OBJECTIVE:   BP 98/62   Pulse 63   Wt 198 lb 9.6 oz (90.1 kg)   SpO2 98%   BMI 29.33 kg/m²    Appearance alert, well appearing, and in no distress. Neck: No JVD, or bruits  Lungs: Chest is clear; no wheezes or rales. Heart: S1 and S2 normal, no murmurs, clicks, gallops or rubs. Regular rate and rhythm. Ext: No edema. Diabetic foot check per nurses note. Lab review: HbA1c as above. Assessment/Plan:    Debby Houston was seen today for follow-up.     Diagnoses and all orders for this visit:    Type 2 diabetes mellitus without complication, without long-term current use of insulin ([facility])  -     POCT glycosylated hemoglobin (Hb A1C): > 14. It seems surprising that his hemoglobin A1c would have jumped so dramatically in the last 3 months particularly since he does not seem to have any significant symptoms. I will recheck a venipuncture hemoglobin A1c. In the event that his A1c truly is this high we discussed treatment. He is opposed to injectable medication as well as brand name. We discussed any further oral medication such as Januvia would be brand name. He will check with his insurance to see what DPP-4 agents might be covered. -     Hemoglobin A1C; Future  Return visit depends upon repeat value of A1c and medication added. I discussed with him we would discuss this over the phone when the lab comes back. Essential hypertension  Stable/Controlled  Continue current treatment   Encouraged home blood pressure checks again. Return 3 months  Mixed hyperlipidemia  Probably unchanged. He has not changed his diet or exercise habits. He is intolerant to medication. I suspect if his hemoglobin A1c is as high as it is his lipids have probably elevated as well.

## 2019-05-09 NOTE — PATIENT INSTRUCTIONS
Patient Education        Home Blood Pressure Test: About This Test  What is it? A home blood pressure test allows you to keep track of your blood pressure at home. Blood pressure is a measure of the force of blood against the walls of your arteries. Blood pressure readings include two numbers, such as 130/80 (say \"130 over 80\"). The first number is the systolic pressure. The second number is the diastolic pressure. Why is this test done? You may do this test at home to:  · Find out if you have high blood pressure. · Track your blood pressure if you have high blood pressure. · Track how well medicine is working to reduce high blood pressure. · Check how lifestyle changes, such as weight loss and exercise, are affecting blood pressure. How can you prepare for the test?  · Do not use caffeine, tobacco, or medicines known to raise blood pressure (such as nasal decongestant sprays) for at least 30 minutes before taking your blood pressure. · Do not exercise for at least 30 minutes before taking your blood pressure. What happens before the test?  Take your blood pressure while you feel comfortable and relaxed. Sit quietly with both feet on the floor for at least 5 minutes before the test.  What happens during the test?  · Sit with your arm slightly bent and resting on a table so that your upper arm is at the same level as your heart. · Roll up your sleeve or take off your shirt to expose your upper arm. · Wrap the blood pressure cuff around your upper arm so that the lower edge of the cuff is about 1 inch above the bend of your elbow. Proceed with the following steps depending on if you are using an automatic or manual pressure monitor. Automatic blood pressure monitors  · Press the on/off button on the automatic monitor and wait until the ready-to-measure \"heart\" symbol appears next to zero in the display window. · Press the start button. The cuff will inflate and deflate by itself.   · Your blood July 22, 2018  Content Version: 12.0  © 4299-6315 [employer]wise, Incorporated. Care instructions adapted under license by [facility] ([facility]). If you have questions about a medical condition or this instruction, always ask your healthcare professional. [address] any warranty or liability for your use of this information.

## 2019-05-10 LAB
ESTIMATED AVERAGE GLUCOSE: 380.9 MG/DL
HBA1C MFR BLD: 14.9 %

## 2019-05-13 ENCOUNTER — TELEPHONE (OUTPATIENT)
Dept: FAMILY MEDICINE CLINIC | Age: 69
End: 2019-05-13

## 2019-06-10 ENCOUNTER — OFFICE VISIT (OUTPATIENT)
Dept: FAMILY MEDICINE CLINIC | Age: 69
End: 2019-06-10
Payer: MEDICARE

## 2019-06-10 VITALS
DIASTOLIC BLOOD PRESSURE: 74 MMHG | SYSTOLIC BLOOD PRESSURE: 122 MMHG | OXYGEN SATURATION: 99 % | WEIGHT: 207.4 LBS | HEART RATE: 59 BPM | BODY MASS INDEX: 30.63 KG/M2

## 2019-06-10 DIAGNOSIS — E11.9 TYPE 2 DIABETES MELLITUS WITHOUT COMPLICATION, WITHOUT LONG-TERM CURRENT USE OF INSULIN (HCC): Primary | ICD-10-CM

## 2019-06-10 LAB — HBA1C MFR BLD: 10.5 %

## 2019-06-10 PROCEDURE — 2022F DILAT RTA XM EVC RTNOPTHY: CPT | Performed by: FAMILY MEDICINE

## 2019-06-10 PROCEDURE — G8417 CALC BMI ABV UP PARAM F/U: HCPCS | Performed by: FAMILY MEDICINE

## 2019-06-10 PROCEDURE — 1123F ACP DISCUSS/DSCN MKR DOCD: CPT | Performed by: FAMILY MEDICINE

## 2019-06-10 PROCEDURE — G8598 ASA/ANTIPLAT THER USED: HCPCS | Performed by: FAMILY MEDICINE

## 2019-06-10 PROCEDURE — 3017F COLORECTAL CA SCREEN DOC REV: CPT | Performed by: FAMILY MEDICINE

## 2019-06-10 PROCEDURE — 99213 OFFICE O/P EST LOW 20 MIN: CPT | Performed by: FAMILY MEDICINE

## 2019-06-10 PROCEDURE — G8427 DOCREV CUR MEDS BY ELIG CLIN: HCPCS | Performed by: FAMILY MEDICINE

## 2019-06-10 PROCEDURE — 83036 HEMOGLOBIN GLYCOSYLATED A1C: CPT | Performed by: FAMILY MEDICINE

## 2019-06-10 PROCEDURE — 3046F HEMOGLOBIN A1C LEVEL >9.0%: CPT | Performed by: FAMILY MEDICINE

## 2019-06-10 PROCEDURE — 4040F PNEUMOC VAC/ADMIN/RCVD: CPT | Performed by: FAMILY MEDICINE

## 2019-06-10 PROCEDURE — 1036F TOBACCO NON-USER: CPT | Performed by: FAMILY MEDICINE

## 2019-06-10 ASSESSMENT — ENCOUNTER SYMPTOMS
SHORTNESS OF BREATH: 0
GASTROINTESTINAL NEGATIVE: 1

## 2019-06-10 NOTE — PROGRESS NOTES
Subjective:      Patient ID: Kriss Mckenna is a 71 y.o. male. HPI  Patient follows up for poorly controlled diabetes with a hemoglobin A1c of 14.9 on his last visit. This was a dramatic jump from hemoglobin A1c of 8.2 in January and in December his hemoglobin A1c was 6.6. He states he has not done anything differently with diet or exercise. Has not lost weight and in fact has gained  Review of Systems   Constitutional: Negative. Negative for activity change. Respiratory: Negative for shortness of breath. Cardiovascular: Negative for chest pain and leg swelling. Gastrointestinal: Negative. Endocrine: Negative for polydipsia, polyphagia and polyuria. Genitourinary: Negative. Negative for frequency. Musculoskeletal: Negative. Objective:   Physical Exam   Constitutional: He is oriented to person, place, and time. No distress. Neck: Neck supple. Carotid bruit is not present. No thyromegaly present. Cardiovascular: Normal rate and regular rhythm. Pulmonary/Chest: Effort normal and breath sounds normal. He has no wheezes. He has no rales. Musculoskeletal: He exhibits no edema. Lymphadenopathy:     He has no cervical adenopathy. Neurological: He is alert and oriented to person, place, and time. Skin: Skin is warm and dry. Psychiatric: He has a normal mood and affect. His behavior is normal. Judgment and thought content normal.       Assessment/Plan:    Fransisca Avalos was seen today for follow-up.     Diagnoses and all orders for this visit:    Type 2 diabetes mellitus without complication, without long-term current use of insulin ([facility])  -     POCT glycosylated hemoglobin (Hb A1C)  Improvement in HbA1c to 10.5   Continue current treatment   Return 2 months   Life Style Modification with diet,exercise, weight control          Judy Zeng MD

## 2019-06-26 ENCOUNTER — CARE COORDINATION (OUTPATIENT)
Dept: CARE COORDINATION | Age: 69
End: 2019-06-26

## 2019-06-26 NOTE — CARE COORDINATION
Pt is not active with ACM. Pt mailed pt Free DM Education Class Flyers for July at the following locations: [address] [place] [address] Care for July dates/times. Pt can call if interested to review and attend if interested. Last A1c:    Results for Airam [place] (MRN [id number]) as of 6/26/2019 14:25   Ref.  Range 6/10/2019 09:34   Hemoglobin A1C Latest Units: % 10.5       Graciela HANKSN, RN Care Manager  [address] [place] [address] &  [facility]   [phone]

## 2019-08-15 ENCOUNTER — OFFICE VISIT (OUTPATIENT)
Dept: FAMILY MEDICINE CLINIC | Age: 69
End: 2019-08-15
Payer: MEDICARE

## 2019-08-15 VITALS
OXYGEN SATURATION: 98 % | HEART RATE: 69 BPM | BODY MASS INDEX: 32.02 KG/M2 | WEIGHT: 216.8 LBS | SYSTOLIC BLOOD PRESSURE: 160 MMHG | DIASTOLIC BLOOD PRESSURE: 90 MMHG

## 2019-08-15 DIAGNOSIS — I10 ESSENTIAL HYPERTENSION: ICD-10-CM

## 2019-08-15 DIAGNOSIS — E11.21 TYPE 2 DIABETES MELLITUS WITH DIABETIC NEPHROPATHY, WITHOUT LONG-TERM CURRENT USE OF INSULIN (HCC): Primary | ICD-10-CM

## 2019-08-15 LAB — HBA1C MFR BLD: 6.7 %

## 2019-08-15 PROCEDURE — 1036F TOBACCO NON-USER: CPT | Performed by: FAMILY MEDICINE

## 2019-08-15 PROCEDURE — G8427 DOCREV CUR MEDS BY ELIG CLIN: HCPCS | Performed by: FAMILY MEDICINE

## 2019-08-15 PROCEDURE — 3017F COLORECTAL CA SCREEN DOC REV: CPT | Performed by: FAMILY MEDICINE

## 2019-08-15 PROCEDURE — G8417 CALC BMI ABV UP PARAM F/U: HCPCS | Performed by: FAMILY MEDICINE

## 2019-08-15 PROCEDURE — 3044F HG A1C LEVEL LT 7.0%: CPT | Performed by: FAMILY MEDICINE

## 2019-08-15 PROCEDURE — 83036 HEMOGLOBIN GLYCOSYLATED A1C: CPT | Performed by: FAMILY MEDICINE

## 2019-08-15 PROCEDURE — 99214 OFFICE O/P EST MOD 30 MIN: CPT | Performed by: FAMILY MEDICINE

## 2019-08-15 PROCEDURE — 4040F PNEUMOC VAC/ADMIN/RCVD: CPT | Performed by: FAMILY MEDICINE

## 2019-08-15 PROCEDURE — 1123F ACP DISCUSS/DSCN MKR DOCD: CPT | Performed by: FAMILY MEDICINE

## 2019-08-15 PROCEDURE — G8598 ASA/ANTIPLAT THER USED: HCPCS | Performed by: FAMILY MEDICINE

## 2019-08-15 PROCEDURE — 2022F DILAT RTA XM EVC RTNOPTHY: CPT | Performed by: FAMILY MEDICINE

## 2019-08-18 DIAGNOSIS — E11.9 TYPE 2 DIABETES MELLITUS WITHOUT COMPLICATION, WITHOUT LONG-TERM CURRENT USE OF INSULIN (HCC): ICD-10-CM

## 2019-11-07 ENCOUNTER — OFFICE VISIT (OUTPATIENT)
Dept: FAMILY MEDICINE CLINIC | Age: 69
End: 2019-11-07
Payer: MEDICARE

## 2019-11-07 ENCOUNTER — TELEPHONE (OUTPATIENT)
Dept: FAMILY MEDICINE CLINIC | Age: 69
End: 2019-11-07

## 2019-11-07 VITALS
BODY MASS INDEX: 32.5 KG/M2 | HEIGHT: 69 IN | SYSTOLIC BLOOD PRESSURE: 130 MMHG | HEART RATE: 73 BPM | OXYGEN SATURATION: 98 % | DIASTOLIC BLOOD PRESSURE: 70 MMHG | WEIGHT: 219.4 LBS

## 2019-11-07 DIAGNOSIS — Z00.00 MEDICARE ANNUAL WELLNESS VISIT, INITIAL: ICD-10-CM

## 2019-11-07 DIAGNOSIS — K21.9 GASTROESOPHAGEAL REFLUX DISEASE WITHOUT ESOPHAGITIS: Primary | ICD-10-CM

## 2019-11-07 DIAGNOSIS — R01.1 HEART MURMUR: ICD-10-CM

## 2019-11-07 DIAGNOSIS — Z12.5 PROSTATE CANCER SCREENING: ICD-10-CM

## 2019-11-07 DIAGNOSIS — Z00.00 ROUTINE GENERAL MEDICAL EXAMINATION AT A HEALTH CARE FACILITY: ICD-10-CM

## 2019-11-07 DIAGNOSIS — Z12.11 COLON CANCER SCREENING: ICD-10-CM

## 2019-11-07 DIAGNOSIS — I10 ESSENTIAL HYPERTENSION: ICD-10-CM

## 2019-11-07 DIAGNOSIS — E78.2 MIXED HYPERLIPIDEMIA: ICD-10-CM

## 2019-11-07 DIAGNOSIS — N18.30 CHRONIC KIDNEY DISEASE, STAGE 3 (MODERATE): ICD-10-CM

## 2019-11-07 DIAGNOSIS — E11.21 TYPE 2 DIABETES MELLITUS WITH DIABETIC NEPHROPATHY, WITHOUT LONG-TERM CURRENT USE OF INSULIN (HCC): Primary | ICD-10-CM

## 2019-11-07 LAB — HBA1C MFR BLD: 6.7 %

## 2019-11-07 PROCEDURE — 4040F PNEUMOC VAC/ADMIN/RCVD: CPT | Performed by: FAMILY MEDICINE

## 2019-11-07 PROCEDURE — 3044F HG A1C LEVEL LT 7.0%: CPT | Performed by: FAMILY MEDICINE

## 2019-11-07 PROCEDURE — G8598 ASA/ANTIPLAT THER USED: HCPCS | Performed by: FAMILY MEDICINE

## 2019-11-07 PROCEDURE — G8482 FLU IMMUNIZE ORDER/ADMIN: HCPCS | Performed by: FAMILY MEDICINE

## 2019-11-07 PROCEDURE — 83036 HEMOGLOBIN GLYCOSYLATED A1C: CPT | Performed by: FAMILY MEDICINE

## 2019-11-07 PROCEDURE — 3017F COLORECTAL CA SCREEN DOC REV: CPT | Performed by: FAMILY MEDICINE

## 2019-11-07 PROCEDURE — G0438 PPPS, INITIAL VISIT: HCPCS | Performed by: FAMILY MEDICINE

## 2019-11-07 PROCEDURE — 1123F ACP DISCUSS/DSCN MKR DOCD: CPT | Performed by: FAMILY MEDICINE

## 2019-11-07 RX ORDER — OMEPRAZOLE 20 MG/1
20 CAPSULE, DELAYED RELEASE ORAL
Qty: 30 CAPSULE | Refills: 5 | Status: SHIPPED | OUTPATIENT
Start: 2019-11-07 | End: 2020-01-01 | Stop reason: SDUPTHER

## 2019-11-07 ASSESSMENT — LIFESTYLE VARIABLES: HOW OFTEN DO YOU HAVE A DRINK CONTAINING ALCOHOL: 0

## 2019-11-07 ASSESSMENT — PATIENT HEALTH QUESTIONNAIRE - PHQ9
SUM OF ALL RESPONSES TO PHQ QUESTIONS 1-9: 0
SUM OF ALL RESPONSES TO PHQ QUESTIONS 1-9: 0

## 2019-12-10 DIAGNOSIS — E08.00 DIABETES MELLITUS DUE TO UNDERLYING CONDITION WITH HYPEROSMOLARITY WITHOUT COMA (HCC): ICD-10-CM

## 2019-12-10 RX ORDER — GLYBURIDE 5 MG/1
TABLET ORAL
Qty: 360 TABLET | Refills: 3 | Status: SHIPPED | OUTPATIENT
Start: 2019-12-10 | End: 2020-01-01

## 2020-01-01 ENCOUNTER — OFFICE VISIT (OUTPATIENT)
Dept: ENT CLINIC | Age: 70
End: 2020-01-01

## 2020-01-01 ENCOUNTER — OFFICE VISIT (OUTPATIENT)
Dept: FAMILY MEDICINE CLINIC | Age: 70
End: 2020-01-01
Payer: MEDICARE

## 2020-01-01 ENCOUNTER — OFFICE VISIT (OUTPATIENT)
Dept: ENT CLINIC | Age: 70
End: 2020-01-01
Payer: MEDICARE

## 2020-01-01 ENCOUNTER — OFFICE VISIT (OUTPATIENT)
Dept: PRIMARY CARE CLINIC | Age: 70
End: 2020-01-01
Payer: MEDICARE

## 2020-01-01 ENCOUNTER — ANESTHESIA EVENT (OUTPATIENT)
Dept: OPERATING ROOM | Age: 70
End: 2020-01-01
Payer: MEDICARE

## 2020-01-01 ENCOUNTER — TELEPHONE (OUTPATIENT)
Dept: FAMILY MEDICINE CLINIC | Age: 70
End: 2020-01-01

## 2020-01-01 ENCOUNTER — TELEPHONE (OUTPATIENT)
Dept: ENT CLINIC | Age: 70
End: 2020-01-01

## 2020-01-01 ENCOUNTER — HOSPITAL ENCOUNTER (OUTPATIENT)
Age: 70
Setting detail: OUTPATIENT SURGERY
Discharge: HOME | End: 2020-07-20
Attending: OTOLARYNGOLOGY | Admitting: OTOLARYNGOLOGY
Payer: MEDICARE

## 2020-01-01 ENCOUNTER — ANESTHESIA (OUTPATIENT)
Dept: OPERATING ROOM | Age: 70
End: 2020-01-01
Payer: MEDICARE

## 2020-01-01 VITALS
SYSTOLIC BLOOD PRESSURE: 160 MMHG | WEIGHT: 219 LBS | HEART RATE: 85 BPM | BODY MASS INDEX: 32.44 KG/M2 | DIASTOLIC BLOOD PRESSURE: 90 MMHG | HEIGHT: 69 IN | TEMPERATURE: 98.9 F

## 2020-01-01 VITALS
HEART RATE: 55 BPM | SYSTOLIC BLOOD PRESSURE: 136 MMHG | DIASTOLIC BLOOD PRESSURE: 84 MMHG | WEIGHT: 213.8 LBS | BODY MASS INDEX: 31.57 KG/M2 | OXYGEN SATURATION: 99 %

## 2020-01-01 VITALS
HEIGHT: 69 IN | TEMPERATURE: 97 F | BODY MASS INDEX: 32.44 KG/M2 | DIASTOLIC BLOOD PRESSURE: 86 MMHG | WEIGHT: 219 LBS | HEART RATE: 70 BPM | RESPIRATION RATE: 17 BRPM | OXYGEN SATURATION: 95 % | SYSTOLIC BLOOD PRESSURE: 135 MMHG

## 2020-01-01 VITALS
BODY MASS INDEX: 31.75 KG/M2 | TEMPERATURE: 98.4 F | DIASTOLIC BLOOD PRESSURE: 78 MMHG | SYSTOLIC BLOOD PRESSURE: 142 MMHG | HEART RATE: 77 BPM | OXYGEN SATURATION: 98 % | WEIGHT: 215 LBS

## 2020-01-01 VITALS
TEMPERATURE: 97.2 F | OXYGEN SATURATION: 99 % | RESPIRATION RATE: 7 BRPM | SYSTOLIC BLOOD PRESSURE: 100 MMHG | DIASTOLIC BLOOD PRESSURE: 52 MMHG

## 2020-01-01 VITALS
SYSTOLIC BLOOD PRESSURE: 166 MMHG | HEART RATE: 74 BPM | WEIGHT: 219 LBS | HEIGHT: 69 IN | TEMPERATURE: 98.9 F | DIASTOLIC BLOOD PRESSURE: 85 MMHG | BODY MASS INDEX: 32.44 KG/M2

## 2020-01-01 VITALS
WEIGHT: 219 LBS | DIASTOLIC BLOOD PRESSURE: 86 MMHG | WEIGHT: 219 LBS | HEART RATE: 70 BPM | SYSTOLIC BLOOD PRESSURE: 175 MMHG | BODY MASS INDEX: 32.44 KG/M2 | DIASTOLIC BLOOD PRESSURE: 90 MMHG | TEMPERATURE: 98.2 F | HEIGHT: 69 IN | SYSTOLIC BLOOD PRESSURE: 155 MMHG | HEIGHT: 69 IN | BODY MASS INDEX: 32.44 KG/M2 | HEART RATE: 71 BPM | TEMPERATURE: 98.6 F

## 2020-01-01 VITALS
HEART RATE: 74 BPM | WEIGHT: 218.4 LBS | SYSTOLIC BLOOD PRESSURE: 154 MMHG | DIASTOLIC BLOOD PRESSURE: 86 MMHG | BODY MASS INDEX: 32.25 KG/M2 | OXYGEN SATURATION: 97 %

## 2020-01-01 VITALS
BODY MASS INDEX: 32.44 KG/M2 | HEART RATE: 77 BPM | HEIGHT: 69 IN | DIASTOLIC BLOOD PRESSURE: 84 MMHG | DIASTOLIC BLOOD PRESSURE: 93 MMHG | SYSTOLIC BLOOD PRESSURE: 155 MMHG | WEIGHT: 219 LBS | TEMPERATURE: 98.7 F | HEART RATE: 91 BPM | TEMPERATURE: 98.6 F | SYSTOLIC BLOOD PRESSURE: 158 MMHG

## 2020-01-01 VITALS
WEIGHT: 219 LBS | SYSTOLIC BLOOD PRESSURE: 201 MMHG | TEMPERATURE: 97.5 F | HEIGHT: 69 IN | HEART RATE: 68 BPM | DIASTOLIC BLOOD PRESSURE: 91 MMHG | BODY MASS INDEX: 32.44 KG/M2

## 2020-01-01 VITALS — DIASTOLIC BLOOD PRESSURE: 90 MMHG | TEMPERATURE: 98.2 F | SYSTOLIC BLOOD PRESSURE: 170 MMHG | HEART RATE: 75 BPM

## 2020-01-01 VITALS
BODY MASS INDEX: 32.44 KG/M2 | HEIGHT: 69 IN | DIASTOLIC BLOOD PRESSURE: 85 MMHG | TEMPERATURE: 97.3 F | WEIGHT: 219 LBS | HEART RATE: 57 BPM | SYSTOLIC BLOOD PRESSURE: 161 MMHG

## 2020-01-01 VITALS
WEIGHT: 219 LBS | BODY MASS INDEX: 32.44 KG/M2 | DIASTOLIC BLOOD PRESSURE: 90 MMHG | HEIGHT: 69 IN | HEART RATE: 62 BPM | SYSTOLIC BLOOD PRESSURE: 156 MMHG | TEMPERATURE: 98 F

## 2020-01-01 VITALS — SYSTOLIC BLOOD PRESSURE: 136 MMHG | TEMPERATURE: 97.7 F | HEART RATE: 94 BPM | DIASTOLIC BLOOD PRESSURE: 99 MMHG

## 2020-01-01 VITALS — HEART RATE: 81 BPM | SYSTOLIC BLOOD PRESSURE: 150 MMHG | DIASTOLIC BLOOD PRESSURE: 71 MMHG | TEMPERATURE: 98.2 F

## 2020-01-01 LAB
ANION GAP SERPL CALCULATED.3IONS-SCNC: 11 MMOL/L (ref 3–16)
BUN BLDV-MCNC: 37 MG/DL (ref 7–20)
CALCIUM SERPL-MCNC: 9.2 MG/DL (ref 8.3–10.6)
CHLORIDE BLD-SCNC: 106 MMOL/L (ref 99–110)
CO2: 22 MMOL/L (ref 21–32)
CREAT SERPL-MCNC: 1.5 MG/DL (ref 0.8–1.3)
EKG ATRIAL RATE: 70 BPM
EKG DIAGNOSIS: NORMAL
EKG P AXIS: 53 DEGREES
EKG P-R INTERVAL: 160 MS
EKG Q-T INTERVAL: 402 MS
EKG QRS DURATION: 98 MS
EKG QTC CALCULATION (BAZETT): 434 MS
EKG R AXIS: -16 DEGREES
EKG T AXIS: 242 DEGREES
EKG VENTRICULAR RATE: 70 BPM
GFR AFRICAN AMERICAN: 56
GFR NON-AFRICAN AMERICAN: 46
GLUCOSE BLD-MCNC: 104 MG/DL (ref 70–99)
GLUCOSE BLD-MCNC: 111 MG/DL (ref 70–99)
GLUCOSE BLD-MCNC: 118 MG/DL (ref 70–99)
HBA1C MFR BLD: 6.5 %
HBA1C MFR BLD: 6.7 %
HCT VFR BLD CALC: 38.8 % (ref 40.5–52.5)
HEMOGLOBIN: 12.2 G/DL (ref 13.5–17.5)
MCH RBC QN AUTO: 27.1 PG (ref 26–34)
MCHC RBC AUTO-ENTMCNC: 31.5 G/DL (ref 31–36)
MCV RBC AUTO: 86 FL (ref 80–100)
PDW BLD-RTO: 16.7 % (ref 12.4–15.4)
PERFORMED ON: ABNORMAL
PERFORMED ON: ABNORMAL
PLATELET # BLD: 153 K/UL (ref 135–450)
PMV BLD AUTO: 9.2 FL (ref 5–10.5)
POTASSIUM SERPL-SCNC: 4.9 MMOL/L (ref 3.5–5.1)
RBC # BLD: 4.51 M/UL (ref 4.2–5.9)
SARS-COV-2: NOT DETECTED
SODIUM BLD-SCNC: 139 MMOL/L (ref 136–145)
SOURCE: NORMAL
WBC # BLD: 5.9 K/UL (ref 4–11)

## 2020-01-01 PROCEDURE — 99214 OFFICE O/P EST MOD 30 MIN: CPT | Performed by: FAMILY MEDICINE

## 2020-01-01 PROCEDURE — 3017F COLORECTAL CA SCREEN DOC REV: CPT | Performed by: FAMILY MEDICINE

## 2020-01-01 PROCEDURE — 3700000000 HC ANESTHESIA ATTENDED CARE: Performed by: OTOLARYNGOLOGY

## 2020-01-01 PROCEDURE — 99024 POSTOP FOLLOW-UP VISIT: CPT | Performed by: OTOLARYNGOLOGY

## 2020-01-01 PROCEDURE — 7100000000 HC PACU RECOVERY - FIRST 15 MIN: Performed by: OTOLARYNGOLOGY

## 2020-01-01 PROCEDURE — 3600000002 HC SURGERY LEVEL 2 BASE: Performed by: OTOLARYNGOLOGY

## 2020-01-01 PROCEDURE — 3600000012 HC SURGERY LEVEL 2 ADDTL 15MIN: Performed by: OTOLARYNGOLOGY

## 2020-01-01 PROCEDURE — G8417 CALC BMI ABV UP PARAM F/U: HCPCS | Performed by: FAMILY MEDICINE

## 2020-01-01 PROCEDURE — G8427 DOCREV CUR MEDS BY ELIG CLIN: HCPCS | Performed by: OTOLARYNGOLOGY

## 2020-01-01 PROCEDURE — G8427 DOCREV CUR MEDS BY ELIG CLIN: HCPCS | Performed by: FAMILY MEDICINE

## 2020-01-01 PROCEDURE — 99203 OFFICE O/P NEW LOW 30 MIN: CPT | Performed by: OTOLARYNGOLOGY

## 2020-01-01 PROCEDURE — 88331 PATH CONSLTJ SURG 1 BLK 1SPC: CPT

## 2020-01-01 PROCEDURE — 3044F HG A1C LEVEL LT 7.0%: CPT | Performed by: FAMILY MEDICINE

## 2020-01-01 PROCEDURE — 2500000003 HC RX 250 WO HCPCS: Performed by: NURSE ANESTHETIST, CERTIFIED REGISTERED

## 2020-01-01 PROCEDURE — 1123F ACP DISCUSS/DSCN MKR DOCD: CPT | Performed by: FAMILY MEDICINE

## 2020-01-01 PROCEDURE — 1036F TOBACCO NON-USER: CPT | Performed by: OTOLARYNGOLOGY

## 2020-01-01 PROCEDURE — G8417 CALC BMI ABV UP PARAM F/U: HCPCS | Performed by: OTOLARYNGOLOGY

## 2020-01-01 PROCEDURE — 2022F DILAT RTA XM EVC RTNOPTHY: CPT | Performed by: FAMILY MEDICINE

## 2020-01-01 PROCEDURE — 88332 PATH CONSLTJ SURG EA ADD BLK: CPT

## 2020-01-01 PROCEDURE — G0008 ADMIN INFLUENZA VIRUS VAC: HCPCS | Performed by: FAMILY MEDICINE

## 2020-01-01 PROCEDURE — 3700000001 HC ADD 15 MINUTES (ANESTHESIA): Performed by: OTOLARYNGOLOGY

## 2020-01-01 PROCEDURE — 90694 VACC AIIV4 NO PRSRV 0.5ML IM: CPT | Performed by: FAMILY MEDICINE

## 2020-01-01 PROCEDURE — 3017F COLORECTAL CA SCREEN DOC REV: CPT | Performed by: OTOLARYNGOLOGY

## 2020-01-01 PROCEDURE — 2500000003 HC RX 250 WO HCPCS: Performed by: OTOLARYNGOLOGY

## 2020-01-01 PROCEDURE — 2580000003 HC RX 258: Performed by: ANESTHESIOLOGY

## 2020-01-01 PROCEDURE — 7100000001 HC PACU RECOVERY - ADDTL 15 MIN: Performed by: OTOLARYNGOLOGY

## 2020-01-01 PROCEDURE — 6360000002 HC RX W HCPCS: Performed by: NURSE ANESTHETIST, CERTIFIED REGISTERED

## 2020-01-01 PROCEDURE — 83036 HEMOGLOBIN GLYCOSYLATED A1C: CPT | Performed by: FAMILY MEDICINE

## 2020-01-01 PROCEDURE — 1036F TOBACCO NON-USER: CPT | Performed by: FAMILY MEDICINE

## 2020-01-01 PROCEDURE — 93010 ELECTROCARDIOGRAM REPORT: CPT | Performed by: INTERNAL MEDICINE

## 2020-01-01 PROCEDURE — 2709999900 HC NON-CHARGEABLE SUPPLY: Performed by: OTOLARYNGOLOGY

## 2020-01-01 PROCEDURE — 1123F ACP DISCUSS/DSCN MKR DOCD: CPT | Performed by: OTOLARYNGOLOGY

## 2020-01-01 PROCEDURE — 4040F PNEUMOC VAC/ADMIN/RCVD: CPT | Performed by: FAMILY MEDICINE

## 2020-01-01 PROCEDURE — 7100000011 HC PHASE II RECOVERY - ADDTL 15 MIN: Performed by: OTOLARYNGOLOGY

## 2020-01-01 PROCEDURE — 93005 ELECTROCARDIOGRAM TRACING: CPT | Performed by: ANESTHESIOLOGY

## 2020-01-01 PROCEDURE — 85027 COMPLETE CBC AUTOMATED: CPT

## 2020-01-01 PROCEDURE — 88305 TISSUE EXAM BY PATHOLOGIST: CPT

## 2020-01-01 PROCEDURE — G0102 PROSTATE CA SCREENING; DRE: HCPCS | Performed by: FAMILY MEDICINE

## 2020-01-01 PROCEDURE — 99211 OFF/OP EST MAY X REQ PHY/QHP: CPT | Performed by: NURSE PRACTITIONER

## 2020-01-01 PROCEDURE — 14060 TIS TRNFR E/N/E/L 10 SQ CM/<: CPT | Performed by: OTOLARYNGOLOGY

## 2020-01-01 PROCEDURE — 7100000010 HC PHASE II RECOVERY - FIRST 15 MIN: Performed by: OTOLARYNGOLOGY

## 2020-01-01 PROCEDURE — 80048 BASIC METABOLIC PNL TOTAL CA: CPT

## 2020-01-01 PROCEDURE — 4040F PNEUMOC VAC/ADMIN/RCVD: CPT | Performed by: OTOLARYNGOLOGY

## 2020-01-01 PROCEDURE — G0439 PPPS, SUBSEQ VISIT: HCPCS | Performed by: FAMILY MEDICINE

## 2020-01-01 PROCEDURE — G8484 FLU IMMUNIZE NO ADMIN: HCPCS | Performed by: FAMILY MEDICINE

## 2020-01-01 RX ORDER — CEPHALEXIN 500 MG/1
500 CAPSULE ORAL 3 TIMES DAILY
Qty: 21 CAPSULE | Refills: 0 | Status: SHIPPED | OUTPATIENT
Start: 2020-01-01 | End: 2020-01-01

## 2020-01-01 RX ORDER — EPHEDRINE SULFATE/0.9% NACL/PF 50 MG/5 ML
SYRINGE (ML) INTRAVENOUS PRN
Status: DISCONTINUED | OUTPATIENT
Start: 2020-01-01 | End: 2020-01-01 | Stop reason: SDUPTHER

## 2020-01-01 RX ORDER — OMEPRAZOLE 40 MG/1
40 CAPSULE, DELAYED RELEASE ORAL
Qty: 30 CAPSULE | Refills: 5 | Status: SHIPPED | OUTPATIENT
Start: 2020-01-01 | End: 2021-01-01

## 2020-01-01 RX ORDER — SODIUM CHLORIDE 0.9 % (FLUSH) 0.9 %
10 SYRINGE (ML) INJECTION EVERY 12 HOURS SCHEDULED
Status: DISCONTINUED | OUTPATIENT
Start: 2020-01-01 | End: 2020-01-01 | Stop reason: HOSPADM

## 2020-01-01 RX ORDER — SODIUM CHLORIDE 0.9 % (FLUSH) 0.9 %
10 SYRINGE (ML) INJECTION PRN
Status: DISCONTINUED | OUTPATIENT
Start: 2020-01-01 | End: 2020-01-01 | Stop reason: HOSPADM

## 2020-01-01 RX ORDER — GLYCOPYRROLATE 1 MG/5 ML
SYRINGE (ML) INTRAVENOUS PRN
Status: DISCONTINUED | OUTPATIENT
Start: 2020-01-01 | End: 2020-01-01 | Stop reason: SDUPTHER

## 2020-01-01 RX ORDER — HYDROMORPHONE HCL 110MG/55ML
PATIENT CONTROLLED ANALGESIA SYRINGE INTRAVENOUS PRN
Status: DISCONTINUED | OUTPATIENT
Start: 2020-01-01 | End: 2020-01-01 | Stop reason: SDUPTHER

## 2020-01-01 RX ORDER — ONDANSETRON 2 MG/ML
4 INJECTION INTRAMUSCULAR; INTRAVENOUS EVERY 6 HOURS PRN
Status: CANCELLED | OUTPATIENT
Start: 2020-01-01

## 2020-01-01 RX ORDER — CLINDAMYCIN HYDROCHLORIDE 300 MG/1
300 CAPSULE ORAL 3 TIMES DAILY
Qty: 21 CAPSULE | Refills: 0 | Status: SHIPPED | OUTPATIENT
Start: 2020-01-01 | End: 2020-01-01

## 2020-01-01 RX ORDER — LIDOCAINE HYDROCHLORIDE 20 MG/ML
INJECTION, SOLUTION EPIDURAL; INFILTRATION; INTRACAUDAL; PERINEURAL PRN
Status: DISCONTINUED | OUTPATIENT
Start: 2020-01-01 | End: 2020-01-01 | Stop reason: SDUPTHER

## 2020-01-01 RX ORDER — PHENYLEPHRINE HCL IN 0.9% NACL 1 MG/10 ML
SYRINGE (ML) INTRAVENOUS PRN
Status: DISCONTINUED | OUTPATIENT
Start: 2020-01-01 | End: 2020-01-01 | Stop reason: SDUPTHER

## 2020-01-01 RX ORDER — LIDOCAINE HYDROCHLORIDE 10 MG/ML
1 INJECTION, SOLUTION EPIDURAL; INFILTRATION; INTRACAUDAL; PERINEURAL
Status: DISCONTINUED | OUTPATIENT
Start: 2020-01-01 | End: 2020-01-01 | Stop reason: HOSPADM

## 2020-01-01 RX ORDER — LABETALOL HYDROCHLORIDE 5 MG/ML
5 INJECTION, SOLUTION INTRAVENOUS EVERY 10 MIN PRN
Status: DISCONTINUED | OUTPATIENT
Start: 2020-01-01 | End: 2020-01-01 | Stop reason: HOSPADM

## 2020-01-01 RX ORDER — DEXAMETHASONE SODIUM PHOSPHATE 4 MG/ML
INJECTION, SOLUTION INTRA-ARTICULAR; INTRALESIONAL; INTRAMUSCULAR; INTRAVENOUS; SOFT TISSUE PRN
Status: DISCONTINUED | OUTPATIENT
Start: 2020-01-01 | End: 2020-01-01 | Stop reason: SDUPTHER

## 2020-01-01 RX ORDER — PROMETHAZINE HYDROCHLORIDE 25 MG/1
12.5 TABLET ORAL EVERY 6 HOURS PRN
Status: CANCELLED | OUTPATIENT
Start: 2020-01-01

## 2020-01-01 RX ORDER — ONDANSETRON 2 MG/ML
4 INJECTION INTRAMUSCULAR; INTRAVENOUS
Status: DISCONTINUED | OUTPATIENT
Start: 2020-01-01 | End: 2020-01-01 | Stop reason: HOSPADM

## 2020-01-01 RX ORDER — HYDROCODONE BITARTRATE AND ACETAMINOPHEN 5; 325 MG/1; MG/1
1 TABLET ORAL EVERY 4 HOURS PRN
Qty: 30 TABLET | Refills: 0 | Status: SHIPPED | OUTPATIENT
Start: 2020-01-01 | End: 2020-01-01

## 2020-01-01 RX ORDER — CLINDAMYCIN HYDROCHLORIDE 300 MG/1
300 CAPSULE ORAL 3 TIMES DAILY
Qty: 15 CAPSULE | Refills: 0 | Status: SHIPPED | OUTPATIENT
Start: 2020-01-01 | End: 2020-01-01

## 2020-01-01 RX ORDER — SODIUM CHLORIDE 0.9 % (FLUSH) 0.9 %
10 SYRINGE (ML) INJECTION PRN
Status: CANCELLED | OUTPATIENT
Start: 2020-01-01

## 2020-01-01 RX ORDER — PROPOFOL 10 MG/ML
INJECTION, EMULSION INTRAVENOUS PRN
Status: DISCONTINUED | OUTPATIENT
Start: 2020-01-01 | End: 2020-01-01 | Stop reason: SDUPTHER

## 2020-01-01 RX ORDER — LIDOCAINE HYDROCHLORIDE AND EPINEPHRINE 10; 10 MG/ML; UG/ML
INJECTION, SOLUTION INFILTRATION; PERINEURAL
Status: COMPLETED | OUTPATIENT
Start: 2020-01-01 | End: 2020-01-01

## 2020-01-01 RX ORDER — SODIUM CHLORIDE, SODIUM LACTATE, POTASSIUM CHLORIDE, CALCIUM CHLORIDE 600; 310; 30; 20 MG/100ML; MG/100ML; MG/100ML; MG/100ML
INJECTION, SOLUTION INTRAVENOUS CONTINUOUS
Status: DISCONTINUED | OUTPATIENT
Start: 2020-01-01 | End: 2020-01-01 | Stop reason: HOSPADM

## 2020-01-01 RX ORDER — ONDANSETRON 2 MG/ML
INJECTION INTRAMUSCULAR; INTRAVENOUS PRN
Status: DISCONTINUED | OUTPATIENT
Start: 2020-01-01 | End: 2020-01-01 | Stop reason: SDUPTHER

## 2020-01-01 RX ORDER — HYDRALAZINE HYDROCHLORIDE 20 MG/ML
5 INJECTION INTRAMUSCULAR; INTRAVENOUS EVERY 10 MIN PRN
Status: DISCONTINUED | OUTPATIENT
Start: 2020-01-01 | End: 2020-01-01 | Stop reason: HOSPADM

## 2020-01-01 RX ORDER — GLYBURIDE 5 MG/1
TABLET ORAL
Qty: 360 TABLET | Refills: 3 | Status: SHIPPED
Start: 2020-01-01 | End: 2021-01-01 | Stop reason: CLARIF

## 2020-01-01 RX ORDER — OMEPRAZOLE 20 MG/1
20 CAPSULE, DELAYED RELEASE ORAL
Qty: 30 CAPSULE | Refills: 5 | Status: SHIPPED | OUTPATIENT
Start: 2020-01-01 | End: 2020-01-01

## 2020-01-01 RX ORDER — SODIUM CHLORIDE 0.9 % (FLUSH) 0.9 %
10 SYRINGE (ML) INJECTION EVERY 12 HOURS SCHEDULED
Status: CANCELLED | OUTPATIENT
Start: 2020-01-01

## 2020-01-01 RX ORDER — CLINDAMYCIN HYDROCHLORIDE 300 MG/1
300 CAPSULE ORAL 3 TIMES DAILY
Qty: 21 CAPSULE | Refills: 0 | Status: SHIPPED | OUTPATIENT
Start: 2020-01-01 | End: 2021-01-01 | Stop reason: ALTCHOICE

## 2020-01-01 RX ADMIN — Medication 100 MCG: at 09:26

## 2020-01-01 RX ADMIN — Medication 200 MCG: at 09:36

## 2020-01-01 RX ADMIN — Medication 100 MCG: at 10:24

## 2020-01-01 RX ADMIN — Medication 300 MCG: at 09:50

## 2020-01-01 RX ADMIN — LIDOCAINE HYDROCHLORIDE 100 MG: 20 INJECTION, SOLUTION EPIDURAL; INFILTRATION; INTRACAUDAL; PERINEURAL at 09:16

## 2020-01-01 RX ADMIN — SODIUM CHLORIDE, POTASSIUM CHLORIDE, SODIUM LACTATE AND CALCIUM CHLORIDE: 600; 310; 30; 20 INJECTION, SOLUTION INTRAVENOUS at 07:43

## 2020-01-01 RX ADMIN — Medication 100 MCG: at 09:45

## 2020-01-01 RX ADMIN — Medication 100 MCG: at 09:40

## 2020-01-01 RX ADMIN — ONDANSETRON 4 MG: 2 INJECTION INTRAMUSCULAR; INTRAVENOUS at 09:35

## 2020-01-01 RX ADMIN — Medication 0.2 MG: at 09:38

## 2020-01-01 RX ADMIN — SODIUM CHLORIDE, POTASSIUM CHLORIDE, SODIUM LACTATE AND CALCIUM CHLORIDE: 600; 310; 30; 20 INJECTION, SOLUTION INTRAVENOUS at 10:07

## 2020-01-01 RX ADMIN — HYDROMORPHONE HYDROCHLORIDE 0.5 MG: 2 INJECTION, SOLUTION INTRAMUSCULAR; INTRAVENOUS; SUBCUTANEOUS at 09:54

## 2020-01-01 RX ADMIN — Medication 5 MG: at 10:00

## 2020-01-01 RX ADMIN — Medication 100 MCG: at 10:18

## 2020-01-01 RX ADMIN — DEXAMETHASONE SODIUM PHOSPHATE 12 MG: 4 INJECTION, SOLUTION INTRAMUSCULAR; INTRAVENOUS at 09:20

## 2020-01-01 RX ADMIN — PROPOFOL 150 MG: 10 INJECTION, EMULSION INTRAVENOUS at 09:16

## 2020-01-01 RX ADMIN — Medication 100 MCG: at 10:30

## 2020-01-01 RX ADMIN — Medication 10 MG: at 10:07

## 2020-01-01 RX ADMIN — Medication 100 MCG: at 10:37

## 2020-01-01 RX ADMIN — Medication 5 MG: at 09:50

## 2020-01-01 RX ADMIN — Medication 100 MCG: at 10:00

## 2020-01-01 ASSESSMENT — PULMONARY FUNCTION TESTS
PIF_VALUE: 11
PIF_VALUE: 12
PIF_VALUE: 11
PIF_VALUE: 12
PIF_VALUE: 1
PIF_VALUE: 12
PIF_VALUE: 11
PIF_VALUE: 4
PIF_VALUE: 12
PIF_VALUE: 11
PIF_VALUE: 4
PIF_VALUE: 11
PIF_VALUE: 11
PIF_VALUE: 12
PIF_VALUE: 12
PIF_VALUE: 1
PIF_VALUE: 12
PIF_VALUE: 2
PIF_VALUE: 11
PIF_VALUE: 3
PIF_VALUE: 12
PIF_VALUE: 4
PIF_VALUE: 4
PIF_VALUE: 12
PIF_VALUE: 11
PIF_VALUE: 12
PIF_VALUE: 11
PIF_VALUE: 11
PIF_VALUE: 12
PIF_VALUE: 4
PIF_VALUE: 12
PIF_VALUE: 11
PIF_VALUE: 3
PIF_VALUE: 11
PIF_VALUE: 12
PIF_VALUE: 7
PIF_VALUE: 11
PIF_VALUE: 12
PIF_VALUE: 11
PIF_VALUE: 4
PIF_VALUE: 12
PIF_VALUE: 5
PIF_VALUE: 0
PIF_VALUE: 12
PIF_VALUE: 4
PIF_VALUE: 12
PIF_VALUE: 2
PIF_VALUE: 5
PIF_VALUE: 12
PIF_VALUE: 4
PIF_VALUE: 12
PIF_VALUE: 3
PIF_VALUE: 12
PIF_VALUE: 11
PIF_VALUE: 12
PIF_VALUE: 2
PIF_VALUE: 4
PIF_VALUE: 4
PIF_VALUE: 12
PIF_VALUE: 4
PIF_VALUE: 11
PIF_VALUE: 12
PIF_VALUE: 4
PIF_VALUE: 12

## 2020-01-01 ASSESSMENT — PAIN SCALES - GENERAL
PAINLEVEL_OUTOF10: 0

## 2020-01-01 ASSESSMENT — PATIENT HEALTH QUESTIONNAIRE - PHQ9
SUM OF ALL RESPONSES TO PHQ QUESTIONS 1-9: 0
SUM OF ALL RESPONSES TO PHQ9 QUESTIONS 1 & 2: 0
2. FEELING DOWN, DEPRESSED OR HOPELESS: 0
1. LITTLE INTEREST OR PLEASURE IN DOING THINGS: 0

## 2020-01-01 ASSESSMENT — LIFESTYLE VARIABLES
HOW OFTEN DO YOU HAVE A DRINK CONTAINING ALCOHOL: 0
SMOKING_STATUS: 0

## 2020-01-01 ASSESSMENT — ENCOUNTER SYMPTOMS
ALLERGIC/IMMUNOLOGIC NEGATIVE: 1
EYES NEGATIVE: 1
FACIAL SWELLING: 0
RESPIRATORY NEGATIVE: 1

## 2020-01-01 ASSESSMENT — PAIN - FUNCTIONAL ASSESSMENT: PAIN_FUNCTIONAL_ASSESSMENT: 0-10

## 2020-02-04 DIAGNOSIS — N18.30 CHRONIC KIDNEY DISEASE, STAGE 3 (MODERATE): ICD-10-CM

## 2020-02-04 DIAGNOSIS — I10 ESSENTIAL HYPERTENSION: ICD-10-CM

## 2020-02-04 DIAGNOSIS — Z12.5 PROSTATE CANCER SCREENING: ICD-10-CM

## 2020-02-04 DIAGNOSIS — E11.21 TYPE 2 DIABETES MELLITUS WITH DIABETIC NEPHROPATHY, WITHOUT LONG-TERM CURRENT USE OF INSULIN (HCC): ICD-10-CM

## 2020-02-04 DIAGNOSIS — E78.2 MIXED HYPERLIPIDEMIA: ICD-10-CM

## 2020-02-04 LAB
A/G RATIO: 1.4 (ref 1.1–2.2)
ALBUMIN SERPL-MCNC: 4.2 G/DL (ref 3.4–5)
ALP BLD-CCNC: 87 U/L (ref 40–129)
ALT SERPL-CCNC: 29 U/L (ref 10–40)
ANION GAP SERPL CALCULATED.3IONS-SCNC: 14 MMOL/L (ref 3–16)
AST SERPL-CCNC: 20 U/L (ref 15–37)
BASOPHILS ABSOLUTE: 0.1 K/UL (ref 0–0.2)
BASOPHILS RELATIVE PERCENT: 0.8 %
BILIRUB SERPL-MCNC: 0.5 MG/DL (ref 0–1)
BUN BLDV-MCNC: 32 MG/DL (ref 7–20)
CALCIUM SERPL-MCNC: 9.7 MG/DL (ref 8.3–10.6)
CHLORIDE BLD-SCNC: 107 MMOL/L (ref 99–110)
CHOLESTEROL, TOTAL: 273 MG/DL (ref 0–199)
CO2: 23 MMOL/L (ref 21–32)
CREAT SERPL-MCNC: 1.6 MG/DL (ref 0.8–1.3)
EOSINOPHILS ABSOLUTE: 0.5 K/UL (ref 0–0.6)
EOSINOPHILS RELATIVE PERCENT: 7.1 %
ESTIMATED AVERAGE GLUCOSE: 145.6 MG/DL
GFR AFRICAN AMERICAN: 52
GFR NON-AFRICAN AMERICAN: 43
GLOBULIN: 2.9 G/DL
GLUCOSE BLD-MCNC: 85 MG/DL (ref 70–99)
HBA1C MFR BLD: 6.7 %
HCT VFR BLD CALC: 40 % (ref 40.5–52.5)
HDLC SERPL-MCNC: 31 MG/DL (ref 40–60)
HEMOGLOBIN: 12.7 G/DL (ref 13.5–17.5)
LDL CHOLESTEROL CALCULATED: 206 MG/DL
LYMPHOCYTES ABSOLUTE: 1.2 K/UL (ref 1–5.1)
LYMPHOCYTES RELATIVE PERCENT: 19.4 %
MCH RBC QN AUTO: 28.2 PG (ref 26–34)
MCHC RBC AUTO-ENTMCNC: 31.7 G/DL (ref 31–36)
MCV RBC AUTO: 89.1 FL (ref 80–100)
MONOCYTES ABSOLUTE: 0.7 K/UL (ref 0–1.3)
MONOCYTES RELATIVE PERCENT: 10.3 %
NEUTROPHILS ABSOLUTE: 4 K/UL (ref 1.7–7.7)
NEUTROPHILS RELATIVE PERCENT: 62.4 %
PDW BLD-RTO: 17.7 % (ref 12.4–15.4)
PLATELET # BLD: 141 K/UL (ref 135–450)
PMV BLD AUTO: 9.8 FL (ref 5–10.5)
POTASSIUM SERPL-SCNC: 5.7 MMOL/L (ref 3.5–5.1)
PROSTATE SPECIFIC ANTIGEN: 2.27 NG/ML (ref 0–4)
RBC # BLD: 4.49 M/UL (ref 4.2–5.9)
SODIUM BLD-SCNC: 144 MMOL/L (ref 136–145)
TOTAL PROTEIN: 7.1 G/DL (ref 6.4–8.2)
TRIGL SERPL-MCNC: 178 MG/DL (ref 0–150)
TSH REFLEX: 1.24 UIU/ML (ref 0.27–4.2)
VLDLC SERPL CALC-MCNC: 36 MG/DL
WBC # BLD: 6.4 K/UL (ref 4–11)

## 2020-02-17 ENCOUNTER — OFFICE VISIT (OUTPATIENT)
Dept: FAMILY MEDICINE CLINIC | Age: 70
End: 2020-02-17
Payer: MEDICARE

## 2020-02-17 VITALS
WEIGHT: 217.6 LBS | SYSTOLIC BLOOD PRESSURE: 148 MMHG | BODY MASS INDEX: 32.13 KG/M2 | HEART RATE: 74 BPM | DIASTOLIC BLOOD PRESSURE: 80 MMHG | OXYGEN SATURATION: 96 %

## 2020-02-17 PROCEDURE — G8510 SCR DEP NEG, NO PLAN REQD: HCPCS | Performed by: FAMILY MEDICINE

## 2020-02-17 PROCEDURE — G8427 DOCREV CUR MEDS BY ELIG CLIN: HCPCS | Performed by: FAMILY MEDICINE

## 2020-02-17 PROCEDURE — 99214 OFFICE O/P EST MOD 30 MIN: CPT | Performed by: FAMILY MEDICINE

## 2020-02-17 PROCEDURE — 1036F TOBACCO NON-USER: CPT | Performed by: FAMILY MEDICINE

## 2020-02-17 PROCEDURE — 4040F PNEUMOC VAC/ADMIN/RCVD: CPT | Performed by: FAMILY MEDICINE

## 2020-02-17 PROCEDURE — 1123F ACP DISCUSS/DSCN MKR DOCD: CPT | Performed by: FAMILY MEDICINE

## 2020-02-17 PROCEDURE — 3017F COLORECTAL CA SCREEN DOC REV: CPT | Performed by: FAMILY MEDICINE

## 2020-02-17 PROCEDURE — 3044F HG A1C LEVEL LT 7.0%: CPT | Performed by: FAMILY MEDICINE

## 2020-02-17 PROCEDURE — 2022F DILAT RTA XM EVC RTNOPTHY: CPT | Performed by: FAMILY MEDICINE

## 2020-02-17 PROCEDURE — G8417 CALC BMI ABV UP PARAM F/U: HCPCS | Performed by: FAMILY MEDICINE

## 2020-02-17 PROCEDURE — G8482 FLU IMMUNIZE ORDER/ADMIN: HCPCS | Performed by: FAMILY MEDICINE

## 2020-02-17 ASSESSMENT — PATIENT HEALTH QUESTIONNAIRE - PHQ9
SUM OF ALL RESPONSES TO PHQ QUESTIONS 1-9: 0
SUM OF ALL RESPONSES TO PHQ9 QUESTIONS 1 & 2: 0
2. FEELING DOWN, DEPRESSED OR HOPELESS: 0
1. LITTLE INTEREST OR PLEASURE IN DOING THINGS: 0
SUM OF ALL RESPONSES TO PHQ QUESTIONS 1-9: 0

## 2020-02-17 NOTE — PROGRESS NOTES
SUBJECTIVE:  79 y.o. male for follow up of diabetes. no TIA's, no chest pain at rest or on exertion, no SOB or dyspnea on exertion, no swelling of ankles or feet. medication compliance:  compliant most of the time, diabetic diet compliance:  compliant most of the time, home glucose monitoring: are not performed  Exercise:none  Other symptoms and concerns: Feels well. Hypertension: Patient here for follow-up of elevated blood pressure. Blood pressure is not checked at home. Patient denies chest pain, dyspnea and irregular heart beat. He denies side effects from medication. CKD: Patient is asymptomatic. His creatinine and GFR have remained stable at 1.6 and 43 respectively for the last several years. Dyslipidemia: Patient presents for evaluation of lipids. The patient is unable to take medication due to side effects.     No components found for: CHLPL  Lab Results   Component Value Date    TRIG 178 (H) 02/04/2020    TRIG 189 (H) 01/18/2019    TRIG 223 (H) 12/01/2017     Lab Results   Component Value Date    HDL 31 (L) 02/04/2020    HDL 30 (L) 01/18/2019    HDL 30 (L) 12/01/2017     Lab Results   Component Value Date    LDLCALC 206 (H) 02/04/2020    LDLCALC 181 (H) 01/18/2019    LDLCALC 193 (H) 12/01/2017     Lab Results   Component Value Date    LABVLDL 36 02/04/2020    LABVLDL 38 01/18/2019    LABVLDL 45 12/01/2017     Lab Results   Component Value Date    ALT 29 02/04/2020    AST 20 02/04/2020              Outpatient Medications Marked as Taking for the 2/17/20 encounter (Office Visit) with Conor Quinonez MD   Medication Sig Dispense Refill    hydrochlorothiazide (HYDRODIURIL) 25 MG tablet TAKE ONE TABLET BY MOUTH DAILY 90 tablet 3    metoprolol (LOPRESSOR) 100 MG tablet TAKE ONE TABLET BY MOUTH TWICE DAILY  180 tablet 3    glyBURIDE (DIABETA) 5 MG tablet TAKE TWO TABLETS BY MOUTH TWICE DAILY  360 tablet 3    omeprazole (PRILOSEC) 20 MG delayed release capsule Take 1 capsule by mouth every morning (before breakfast) 30 capsule 5    metFORMIN (GLUCOPHAGE) 1000 MG tablet TAKE ONE TABLET BY MOUTH TWICE A DAY WITH MEALS  180 tablet 3    cloNIDine (CATAPRES) 0.3 MG tablet TAKE ONE TABLET BY MOUTH DAILY 90 tablet 3    lisinopril (PRINIVIL;ZESTRIL) 40 MG tablet TAKE ONE TABLET BY MOUTH DAILY 90 tablet 3    aspirin 81 MG EC tablet Take 81 mg by mouth daily. Lab Results   Component Value Date    LABA1C 6.7 02/04/2020       OBJECTIVE:   BP (!) 140/80   Pulse 74   Wt 217 lb 9.6 oz (98.7 kg)   SpO2 96%   BMI 32.13 kg/m²    Appearance alert and oriented and in no distress. Skin: warm and dry  Eyes: PERRL  Neck: No JVD, or bruits. No adenopathy or thyromegaly  Lungs: Chest is clear; no wheezes or rales. Heart: S1 and S2 normal, no murmurs, clicks, gallops or rubs. Regular rate and rhythm. Ext: No edema. Diabetic foot check per nurses note. Lab review: 2/4/2020. Assessment/Plan:    Jennifer Murrell was seen today for diabetes. Diagnoses and all orders for this visit:    Type 2 diabetes mellitus with diabetic nephropathy, without long-term current use of insulin (HCC)  Stable/Controlled  Continue current treatment   Life Style Modification with diet,exercise, weight control   Return 3 months   He is due his retinal exam.  Essential hypertension  Borderline. Continue current treatment. Return 3 months. Home blood pressure checks again discussed with the patient. Lifestyle modification  Mixed hyperlipidemia  Since patient is unable to take medication and does not want to retry anything else. Lifestyle modification was stressed with him. Chronic kidney disease, stage 3 (moderate) (HCC)  We discussed the importance of blood pressure and diabetic control. He will avoid nephrotoxic medications such as NSAIDs, etc.  He did not see the need to see a nephrologist as we have discussed in the past.  Colon cancer screening  Patient did receive his Cologuard several months ago and states he will do it.

## 2020-02-17 NOTE — PATIENT INSTRUCTIONS
Patient Education        Learning About Meal Planning for Diabetes  Why plan your meals? Meal planning can be a key part of managing diabetes. Planning meals and snacks with the right balance of carbohydrate, protein, and fat can help you keep your blood sugar at the target level you set with your doctor. You don't have to eat special foods. You can eat what your family eats, including sweets once in a while. But you do have to pay attention to how often you eat and how much you eat of certain foods. You may want to work with a dietitian or a certified diabetes educator. He or she can give you tips and meal ideas and can answer your questions about meal planning. This health professional can also help you reach a healthy weight if that is one of your goals. What plan is right for you? Your dietitian or diabetes educator may suggest that you start with the plate format or carbohydrate counting. The plate format  The plate format is a simple way to help you manage how you eat. You plan meals by learning how much space each food should take on a plate. Using the plate format helps you spread carbohydrate throughout the day. It can make it easier to keep your blood sugar level within your target range. It also helps you see if you're eating healthy portion sizes. To use the plate format, you put non-starchy vegetables on half your plate. Add meat or meat substitutes on one-quarter of the plate. Put a grain or starchy vegetable (such as brown rice or a potato) on the final quarter of the plate. You can add a small piece of fruit and some low-fat or fat-free milk or yogurt, depending on your carbohydrate goal for each meal.  Here are some tips for using the plate format:  · Make sure that you are not using an oversized plate. A 9-inch plate is best. Many restaurants use larger plates. · Get used to using the plate format at home. Then you can use it when you eat out.   · Write down your questions about using rapid-acting insulin to take before you eat. If you use an insulin pump, you get a constant rate of insulin during the day. So the pump must be programmed at meals to give you extra insulin to cover the rise in blood sugar after meals. When you know how much carbohydrate you will eat, you can take the right amount of insulin. Or, if you always use the same amount of insulin, you need to make sure that you eat the same amount of carbohydrate at meals. If you need more help to understand carbohydrate counting and food labels, ask your doctor, dietitian, or diabetes educator. How do you get started with meal planning? Here are some tips to get started:  · Plan your meals a week at a time. Don't forget to include snacks too. · Use cookbooks or online recipes to plan several main meals. Plan some quick meals for busy nights. You also can double some recipes that freeze well. Then you can save half for other busy nights when you don't have time to cook. · Make sure you have the ingredients you need for your recipes. If you're running low on basic items, put these items on your shopping list too. · List foods that you use to make breakfasts, lunches, and snacks. List plenty of fruits and vegetables. · Post this list on the refrigerator. Add to it as you think of more things you need. · Take the list to the store to do your weekly shopping. Follow-up care is a key part of your treatment and safety. Be sure to make and go to all appointments, and call your doctor if you are having problems. It's also a good idea to know your test results and keep a list of the medicines you take. Where can you learn more? Go to [email or web address] and sign in to your [employer] account. Enter Z851 in the [employer] box to learn more about \"Learning About Meal Planning for Diabetes. \"     If you do not have an account, please click on the \"Sign Up Now\" link.   Current as of: April 16, 2019  Content

## 2020-03-05 RX ORDER — LISINOPRIL 40 MG/1
TABLET ORAL
Qty: 90 TABLET | Refills: 3 | Status: SHIPPED | OUTPATIENT
Start: 2020-03-05 | End: 2021-01-01

## 2020-03-05 RX ORDER — CLONIDINE HYDROCHLORIDE 0.3 MG/1
TABLET ORAL
Qty: 90 TABLET | Refills: 3 | Status: SHIPPED | OUTPATIENT
Start: 2020-03-05 | End: 2021-01-01

## 2020-04-29 ENCOUNTER — TELEPHONE (OUTPATIENT)
Dept: FAMILY MEDICINE CLINIC | Age: 70
End: 2020-04-29

## 2020-06-16 NOTE — PROGRESS NOTES
SUBJECTIVE:  79 y.o. male for follow up of diabetes. no TIA's, no chest pain at rest or on exertion, no SOB or dyspnea on exertion, no swelling of ankles or feet. medication compliance:  compliant most of the time, diabetic diet compliance:  compliant most of the time, home glucose monitoring: are not performed  Exercise:none  Other symptoms and concerns: Feels well. Hypertension: Patient here for follow-up of elevated blood pressure. Blood pressure is not checked at home. Patient denies chest pain, dyspnea and irregular heart beat. He denies side effects from medication. CKD: Patient is asymptomatic. His creatinine and GFR have remained stable at 1.6 and 43 respectively for the last several years. GERD: states the 20 mg Omeprazole does not last throughout the day and by dinner has symptoms. He does not see a Nephrologist.  Skin Lesion: The patient states he has not seen ENT regarding lesion behind his L ear      Outpatient Medications Marked as Taking for the 6/16/20 encounter (Office Visit) with Kailash Gaming MD   Medication Sig Dispense Refill    cloNIDine (CATAPRES) 0.3 MG tablet TAKE ONE TABLET BY MOUTH DAILY 90 tablet 3    lisinopril (PRINIVIL;ZESTRIL) 40 MG tablet TAKE ONE TABLET BY MOUTH DAILY 90 tablet 3    hydrochlorothiazide (HYDRODIURIL) 25 MG tablet TAKE ONE TABLET BY MOUTH DAILY 90 tablet 3    metoprolol (LOPRESSOR) 100 MG tablet TAKE ONE TABLET BY MOUTH TWICE DAILY  180 tablet 3    glyBURIDE (DIABETA) 5 MG tablet TAKE TWO TABLETS BY MOUTH TWICE DAILY  360 tablet 3    omeprazole (PRILOSEC) 20 MG delayed release capsule Take 1 capsule by mouth every morning (before breakfast) 30 capsule 5    metFORMIN (GLUCOPHAGE) 1000 MG tablet TAKE ONE TABLET BY MOUTH TWICE A DAY WITH MEALS  180 tablet 3    aspirin 81 MG EC tablet Take 81 mg by mouth daily.           Lab Results   Component Value Date    LABA1C 6.7 06/16/2020       OBJECTIVE:   BP (!) 142/78   Pulse 77   Temp 98.4 °F (36.9 °C)   Wt

## 2020-06-23 NOTE — TELEPHONE ENCOUNTER
Medication:   Requested Prescriptions     Pending Prescriptions Disp Refills    omeprazole (PRILOSEC) 20 MG delayed release capsule 30 capsule 5     Sig: Take 1 capsule by mouth every morning (before breakfast)        Last Filled:  11/17/19 #30 5rf    Patient Phone Number: [phone] (home) [phone] (work)    Last appt: 6/16/2020   Next appt: 9/21/2020    Last OARRS: No flowsheet data found.

## 2020-06-23 NOTE — TELEPHONE ENCOUNTER
Medication and Quantity requested:  omeprazole (PRILOSEC) 40 MG delayed release capsule       Last Visit  06/16/20 - Dr Ivett Silva and phone number updated in EPIC:  Yes    Ph[facility]    Pt needs this medication increased to 40 MG.

## 2020-07-07 NOTE — PROGRESS NOTES
SUBJECTIVE:    Chief Complaint   Patient presents with    Other     [place] cell on the Lt side of his ear. Zahra Corley is a 79 y.o. male    Over the course the past couple of years, the patient has been noticing an lesion behind his left ear which is been gradually increasing in size and occasionally bleeding. It requires covering with a dressing. No particular pain is been noted. He has had no other treatment to it. He is diabetic under oral medication treatment. He does not smoke. No constitutional symptoms including fevers been noted. Past Medical History:   Diagnosis Date    CAD (coronary artery disease)     Diabetes mellitus ([address]     Elevated LFT's     Hyperlipidemia     Hypertension     Post poliomyelitis syndrome     Type II or unspecified type diabetes mellitus without mention of complication, not stated as uncontrolled       No past surgical history on file. Family History   Problem Relation Age of Onset    High Blood Pressure Mother     Diabetes Mother     High Blood Pressure Father     High Blood Pressure Brother       Social History     Tobacco Use    Smoking status: Never Smoker    Smokeless tobacco: Never Used   Substance Use Topics    Alcohol use: No     Alcohol/week: 0.0 standard drinks        Review of Systems:  Review of Systems   Constitutional: Negative. Negative for fever and unexpected weight change. HENT: Negative. Negative for ear pain and facial swelling. Eyes: Negative. Respiratory: Negative. Cardiovascular: Negative. Endocrine: Negative. Skin:        Skin lesion present behind left ear   Allergic/Immunologic: Negative. Neurological: Negative. Hematological: Negative. OBJECTIVE:  BP (!) 170/90   Pulse 75   Temp 98.2 °F (36.8 °C)   Physical Exam  Vitals signs and nursing note reviewed. Constitutional:       General: He is not in acute distress. Appearance: Normal appearance. He is normal weight.  He is not

## 2020-07-13 NOTE — PATIENT INSTRUCTIONS
You have received a viral test for COVID-19. Below is education on quarantine per the CDC guidelines. For any symptoms, seek care from your PCP, call [phone] to establish care with a doctor, or go directly to an urgent care or the emergency room. Test results will take 2-7 days and will be sent to you in your [employer] account. If you test positive, you will be contacted via phone. If you test negative, the ONLY communication will be through [address] GO TO [employer] AND SIGN UP FOR [employer]  (LOWER LEFT OF THE HOME PAGE)  No test is 100%. If you have symptoms, you should follow the guidance of quarantine as previously stated. You can still be contagious if you have symptoms. Your [facility] will reach out to you if you have a positive result. They will provide you with a return to work date and note. If you were tested for a pre-op, then you should remain in quarantine until your procedure. How do I know if I need to be in quarantine? If you live in a community where COVID-19 is or might be spreading (currently, that is virtually everywhere in the [place])  Be alert for symptoms. Watch for fever, cough, shortness of breath, or other symptoms of COVID-19.  Take your temperature if symptoms develop.  Practice social distancing. Maintain 6 feet of distance from others and stay out of crowded places.  Follow CDC guidance if symptoms develop. If you feel healthy but:   Recently had close contact with a person with COVID-19 you need to Quarantine:   Stay home until 14 days after your last exposure.  Check your temperature twice a day and watch for symptoms of COVID-19.  If possible, stay away from people who are at higher-risk for getting very sick from COVID-19.   Stay Home and Monitor Your Health if you:   Have been diagnosed with COVID-19, or   Are waiting for test results, or   Have cough, fever, or shortness of breath, or symptoms of COVID-19      When You Can

## 2020-07-13 NOTE — PROGRESS NOTES
Celia Bolivar received a viral test for COVID-19. They were educated on isolation and quarantine as appropriate. For any symptoms, they were directed to seek care from their PCP, given contact information to establish with a doctor, directed to an urgent care or the emergency room. Patient was seen today for pre op Covid testing.

## 2020-07-16 NOTE — PROGRESS NOTES
Name_______________________________________Printed:____________________  Date and time of surgery________________________Arrival Time:____7-20-20 0715 [facility]____________   1. The instructions given regarding when and if a patient needs to stop oral intake prior to surgery varies. Follow the specific instructions you were given                  __X_Nothing to eat or to drink after Midnight the night before.                             ____Endoscopy patient follow your DRS instructions-generally you will be doing a part of the prep after Midnight                   ____Carbo loading or ERAS instructions will be given to select patients-if you have been given those instructions -please do the following                           The evening before your surgery after dinner before midnight drink 40 ounces of gatorade. If you are diabetic use sugar free. The morning of surgery drink 40 ounces of water. This needs to be finished 3 hours prior to your surgery start time. 2. Take the following pills with a small sip of water on the morning of surgery_____METOPROLOL, OMEPRAZOLE______________________________________________                  Do not take blood pressure medications ending in pril or sartan the ron prior to surgery or the morning of surgery_   3. Aspirin, Ibuprofen, Advil, Naproxen, Vitamin E and other Anti-inflammatory products and supplements should be stopped for 5 -7days before surgery or as directed by your physician. 4. Check with your Doctor regarding stopping Plavix, Coumadin,Eliquis, Lovenox,Effient,Pradaxa,Xarelto, Fragmin or other blood thinners and follow their instructions. 5. Do not smoke, and do not drink any alcoholic beverages 24 hours prior to surgery. This includes NA Beer. Refrain from the usage of any recreational drugs. 6. You may brush your teeth and gargle the morning of surgery. DO NOT SWALLOW WATER   7.  You MUST make arrangements for a responsible adult to stay on site while you are here and take you home after your surgery. You will not be allowed to leave alone or drive yourself home. It is strongly suggested someone stay with you the first 24 hrs. Your surgery will be cancelled if you do not have a ride home. 8. A parent/legal guardian must accompany a child scheduled for surgery and plan to stay at the hospital until the child is discharged. Please do not bring other children with you. 9. Please wear simple, loose fitting clothing to the hospital.  Mishel Porch not bring valuables (money, credit cards, checkbooks, etc.) Do not wear any makeup (including no eye makeup) or nail polish on your fingers or toes. 10. DO NOT wear any jewelry or piercings on day of surgery. All body piercing jewelry must be removed. 11. If you have ___dentures, they will be removed before going to the OR; we will provide you a container. If you wear ___contact lenses or ___glasses, they will be removed; please bring a case for them. 12. Please see your family doctor/pediatrician for a history & physical and/or concerning medications. Bring any test results/reports from your physician's office. PCP__________________Phone___________H&P Appt. Date________             13 If you  have a Living Will and Durable Power of  for Healthcare, please bring in a copy. 15. Notify your Surgeon if you develop any illness between now and surgery  time, cough, cold, fever, sore throat, nausea, vomiting, etc.  Please notify your surgeon if you experience dizziness, shortness of breath or blurred vision between now & the time of your surgery             15. DO NOT shave your operative site 96 hours prior to surgery. For face & neck surgery, men may use an electric razor 48 hours prior to surgery. 16. Shower the night before or morning of surgery using an antibacterial soap or as you have been instructed.              17. To provide excellent care visitors will be [facility]       All above information reviewed with patient in person or by phone. Patient verbalizes understanding. All questions and concerns addressed.                                                                                                  Patient/Rep_PT___________________                                                                                                                                    PRE OP INSTRUCTIONS

## 2020-07-20 NOTE — PROGRESS NOTES
Received in PACU Phase 1 from OR. Asleep. Respirations easy. Airway in place. O2 continued at 6L/mask. VSS.

## 2020-07-20 NOTE — BRIEF OP NOTE
Brief Postoperative Note      Patient: Tim Amen  YOB: 1950  MRN: [id number]    Date of Procedure: 7/20/2020    Pre-Op Diagnosis: L98.8  LEFT SKIN LESION, POSTAURICULAR    Post-Op Diagnosis: 4 cm. Basal cell carcinoma       Procedure(s):  EXCISE SKIN LESION LEFT POST AURICULAR WITH FLAP, FROZEN SECTIONS    Surgeon(s):  Torey Ulloa MD    Assistant:  First Assistant: Champ Mcdonald RN; Lisandra Postal    Anesthesia: General    Estimated Blood Loss (mL): 50 ml. Complications: none    Specimens:   ID Type Source Tests Collected by Time Destination   A : A. LEFT POSTAURICULAR LESION, STITCH MARKS MEDIAL MARGIN Tissue Tissue SURGICAL PATHOLOGY Torey Ulloa MD 7/20/2020 1705    B : B.  LEFT POSTAURICULAR SKIN LESION, STITCH MARKS DEEP MARGIN Tissue Tissue SURGICAL PATHOLOGY Torey Ulloa MD 7/20/2020 3978        Implants:  none      Drains: none    Findings: as above    Electronically signed by Johnathan Lester MD on 7/20/2020 at 10:54 AM

## 2020-07-20 NOTE — PROGRESS NOTES
Dr. Rhea Valdez to see patient. Tegaderm dressing reinforced with 4x4s. Doctor states drainage to be expected. Follow up appointment for 7/21 instead of 7/28. Patient instructed to call doctor if any concerns prior to appointment.

## 2020-07-20 NOTE — PROGRESS NOTES
Doing well. Taking ice chips without difficulty. VSS. Respirations easy. Denies any discomfort. Moved to Phase 2 Care.

## 2020-07-20 NOTE — PROGRESS NOTES
Wife assisted patient to dress. Discharged per wheelchair with dentures, instructions and belongings. Has prescriptions for pain and antibiotic. All questions answered.

## 2020-07-20 NOTE — PROGRESS NOTES
Dr. Jessika Garland in to see patient and talk to patient and patient's wife. Patient to follow up tomorrow or Wednesday. Per MD reinforced oozing incision with 4x4 and tape. Discharge instructions reviewed with patient and patient's wife. Prescriptions with patient.     Electronically signed by Mayur Melton RN on 7/20/2020 at 12:19 PM

## 2020-07-20 NOTE — ANESTHESIA POSTPROCEDURE EVALUATION
Department of Anesthesiology  Postprocedure Note    Patient: Ant Vides  MRN: [id number]  YOB: 1950  Date of evaluation: 7/20/2020  Time:  11:15 AM     Procedure Summary     Date:  07/20/20 Room / Location:  [address]    Anesthesia Start:  6318 Anesthesia Stop:  1059    Procedure:  EXCISE SKIN LESION LEFT POST AURICULAR WITH FLAP, FROZEN SECTIONS (Left Ear) Diagnosis:  (L98.8  LEFT SKIN LESION, POSTAURICULAR)    Surgeon:  Don Stern MD Responsible Provider:  Nicole Santiago MD    Anesthesia Type:  general ASA Status:  3          Anesthesia Type: general    Paige Phase I: Paige Score: 8    Paige Phase II:      Last vitals: Reviewed and per EMR flowsheets.        Anesthesia Post Evaluation    Patient location during evaluation: PACU  Patient participation: complete - patient participated  Level of consciousness: awake and alert  Airway patency: patent  Nausea & Vomiting: no vomiting and no nausea  Complications: no  Cardiovascular status: hemodynamically stable  Respiratory status: acceptable  Hydration status: stable

## 2020-07-20 NOTE — ANESTHESIA PRE PROCEDURE
Department of Anesthesiology  Preprocedure Note       Name:  Frantz Stevenson   Age:  79 y.o.  :  1950                                          MRN:  [id number]         Date:  2020      Surgeon: Renata Jaffe):  Jonas Pepper MD    Procedure: Procedure(s):  EXCISE SKIN LESION LEFT POST AURICULAR; FROZEN SECTIONS    Medications prior to admission:   Prior to Admission medications    Medication Sig Start Date End Date Taking? Authorizing Provider   omeprazole (PRILOSEC) 40 MG delayed release capsule Take 1 capsule by mouth every morning (before breakfast) 20   Alonzo Prasad MD   cloNIDine (CATAPRES) 0.3 MG tablet TAKE ONE TABLET BY MOUTH DAILY  Patient taking differently: TAKES AT HS 3/5/20   Alonzo Prasad MD   lisinopril (PRINIVIL;ZESTRIL) 40 MG tablet TAKE ONE TABLET BY MOUTH DAILY 3/5/20   Alonzo Prasad MD   hydrochlorothiazide (HYDRODIURIL) 25 MG tablet TAKE ONE TABLET BY MOUTH DAILY 20   Alonzo Prasad MD   metoprolol (LOPRESSOR) 100 MG tablet TAKE ONE TABLET BY MOUTH TWICE DAILY  20   Alonzo Prasad MD   glyBURIDE (DIABETA) 5 MG tablet TAKE TWO TABLETS BY MOUTH TWICE DAILY  12/10/19   Alonzo Prasad MD   metFORMIN (GLUCOPHAGE) 1000 MG tablet TAKE ONE TABLET BY MOUTH TWICE A DAY WITH MEALS  19   Alonzo Prasad MD   aspirin 81 MG EC tablet Take 81 mg by mouth daily. Historical Provider, MD       Current medications:    Current Facility-Administered Medications   Medication Dose Route Frequency Provider Last Rate Last Dose    ondansetron (ZOFRAN) injection 4 mg  4 mg Intravenous Once PRN Sophia Medrano MD        labetalol (NORMODYNE;TRANDATE) injection 5 mg  5 mg Intravenous Q10 Min PRN Sophia Medrano MD        hydrALAZINE (APRESOLINE) injection 5 mg  5 mg Intravenous Q10 Min PRN Sophia Medrano MD           Allergies:     Allergies   Allergen Reactions    Livalo [Pitavastatin Calcium]      Muscle weakness       Problem List:    Patient Active Problem List Diagnosis Code    CAD (coronary artery disease) I25.10    Elevated LFTs R94.5    Post poliomyelitis syndrome G14    Gout M10.9    Type 2 diabetes mellitus with diabetic nephropathy, without long-term current use of insulin ([facility]) E11.21    Essential hypertension I10    Mixed hyperlipidemia E78.2       Past Medical History:        Diagnosis Date    CAD (coronary artery disease)     Diabetes mellitus ([facility] [address]     Elevated LFT's     Hyperlipidemia     Hypertension     Post poliomyelitis syndrome     Type II or unspecified type diabetes mellitus without mention of complication, not stated as uncontrolled        Past Surgical History:        Procedure Laterality Date    CLEFT PALATE REPAIR         Social History:    Social History     Tobacco Use    Smoking status: Never Smoker    Smokeless tobacco: Never Used   Substance Use Topics    Alcohol use: No     Alcohol/week: 0.0 standard drinks                                Counseling given: Not Answered      Vital Signs (Current):   Vitals:    07/16/20 1231   Weight: 217 lb (98.4 kg)   Height: 5' 9\" (1.753 m)                                              BP Readings from Last 3 Encounters:   07/07/20 (!) 170/90   06/16/20 (!) 142/78   02/17/20 (!) 148/80       NPO Status:                                                                                 BMI:   Wt Readings from Last 3 Encounters:   07/16/20 217 lb (98.4 kg)   06/16/20 215 lb (97.5 kg)   02/17/20 217 lb 9.6 oz (98.7 kg)     Body mass index is 32.05 kg/m².     CBC:   Lab Results   Component Value Date    WBC 6.4 02/04/2020    RBC 4.49 02/04/2020    HGB 12.7 02/04/2020    HCT 40.0 02/04/2020    MCV 89.1 02/04/2020    RDW 17.7 02/04/2020     02/04/2020       CMP:   Lab Results   Component Value Date     02/04/2020    K 5.7 02/04/2020     02/04/2020    CO2 23 02/04/2020    BUN 32 02/04/2020    CREATININE 1.6 02/04/2020    GFRAA 52 02/04/2020    AGRATIO 1.4 02/04/2020    LABGLOM 43 02/04/2020    LABGLOM 52 08/22/2014    GLUCOSE 85 02/04/2020    GLUCOSE 124 05/05/2012    PROT 7.1 02/04/2020    PROT 7.3 11/21/2012    CALCIUM 9.7 02/04/2020    BILITOT 0.5 02/04/2020    ALKPHOS 87 02/04/2020    AST 20 02/04/2020    ALT 29 02/04/2020       POC Tests: No results for input(s): POCGLU, POCNA, POCK, POCCL, POCBUN, POCHEMO, POCHCT in the last 72 hours.     Coags: No results found for: PROTIME, INR, APTT    HCG (If Applicable): No results found for: PREGTESTUR, PREGSERUM, HCG, HCGQUANT     ABGs: No results found for: PHART, PO2ART, AJG4SWO, FCI2WJJ, BEART, M1FWYYCW     Type & Screen (If Applicable):  No results found for: LABABO, LABRH    Drug/Infectious Status (If Applicable):  No results found for: HIV, HEPCAB    COVID-19 Screening (If Applicable):   Lab Results   Component Value Date    COVID19 Not Detected 07/13/2020         Anesthesia Evaluation  Patient summary reviewed and Nursing notes reviewed no history of anesthetic complications:   Airway: Mallampati: III  TM distance: >3 FB   Neck ROM: full  Mouth opening: > = 3 FB Dental:    (+) upper dentures and lower dentures      Pulmonary:normal exam  breath sounds clear to auscultation  (+) sleep apnea (poss dx, snores a lot per pt):      (-) COPD, asthma and not a current smoker                           Cardiovascular:    (+) hypertension:, past MI (per pt, MI approx 12 yrs ago):, CAD (per pt, neg cath at time of MI, does not follow cards, no recent issues/symptoms/ntg use):, hyperlipidemia    (-) CABG/stent, dysrhythmias,  angina and  CHF    ECG reviewed  Rhythm: regular  Rate: normal           Beta Blocker:  Dose within 24 Hrs         Neuro/Psych:   Negative Neuro/Psych ROS     (-) seizures, TIA and CVA           GI/Hepatic/Renal:   (+) GERD: well controlled, renal disease: CRI,      (-) liver disease       Endo/Other:    (+) Diabetes (a1c 6.7)Type II DM, , .    (-) hypothyroidism, hyperthyroidism               Abdominal:   (+) obese, Vascular:                                      Anesthesia Plan      general     ASA 3       Induction: intravenous. MIPS: Postoperative opioids intended and Prophylactic antiemetics administered. Anesthetic plan and risks discussed with patient. Plan discussed with CRNA.                 Skye Pope MD   7/20/2020

## 2020-07-20 NOTE — PROGRESS NOTES
Patient underwent excision of a basal cell carcinoma by Dr. Jessika Garland. He is concerned regarding bleeding from the operative site. On examination, there was no active bleeding. Dressing over the post auricular suboccipital area was not disturbed. Neck incisions and repair appeared to be intact. Mastoid dressing left intact. Guaze pad over neck incisions removed and there was no active bleeding. Impression: Normal postoperative course with minor bleeding. Plan: Follow-up with Dr. Jessika Garland per his instructions.

## 2020-07-20 NOTE — PROGRESS NOTES
Increased pink, red drainage under tegaderm. Will have Dr. Joy Carver check before patient discharge.

## 2020-07-21 NOTE — OP NOTE
[place] [address]                     [facility], [address] [place] [address]                                OPERATIVE REPORT    PATIENT NAME: Kylah Mendez                    :        1950  MED REC NO:   [id number]                          ROOM:  ACCOUNT NO:   [de-identified]                           ADMIT DATE: 2020  PROVIDER:     Robert Martinez. Rhea Valdez MD    DATE OF PROCEDURE:  2020    PREOPERATIVE DIAGNOSIS:  A 4 cm mass, left postauricular. POSTOPERATIVE DIAGNOSIS:  A 4 cm mass, left postauricular with basal  cell carcinoma as diagnosis. OPERATION PERFORMED:  Wide excision of 4 cm mass, left postauricular  with rotational flap superiorly based and plastic repair. SURGEON:  Robert Martinez. Rhea Valdez MD    ANESTHESIA:   General with endotracheal tube. OPERATIVE PROCEDURE:  The adequately premedicated patient was brought to  the operating room and placed in the supine position. Anesthesia was  induced to the satisfactory level with endotracheal tube in position and  the patient was draped in the usual fashion. The patient has a 4 cm  mass that was bloody and growing and exophytic. The area was injected  with 1% Xylocaine with epinephrine 1:100,000 with 10 mL utilized. The  area was then shaved, prepped and draped in the usual fashion. Planned  excision was to give wide margins with some extension noted superiorly,  the widest part was anterior and posterior. This was outlined as  described with methylene blue corresponding to skin crease line as much  as possible and considering rotational flap either inferiorly or  superiorly based. The wide excision was then accomplished with good  margins including a deep margin. The specimen was then marked with  medial margin and sent to Pathology for frozen section. A deep tissue  biopsy was likewise sent for frozen section as a deep margin.   The  specimen having been removed, went all the way down to the muscles of  the posterior portion of the neck. Any bleeding that was encountered  was treated with electrocoagulation. With the frozen section returning  a basal cell carcinoma with good margins and the deep margin being  negative, it was then determined that a flap needed to be used to close  the defect. After consideration, it was determined that a superiorly  based flap on the occipital artery would be best and this was outlined  with methylene blue and then raised. It was then swung into position to  close mostly the upper and mid portions. This closure was then  accomplished with interrupted 4-0 Vicryl subcutaneous sutures. All of  the defect was then closed with the same and also closed with 4-0 nylon. A dry sterile fluff pressure dressing was then fashioned after topping  the defect with Xeroform and the dry fluff pressure dressing was applied  with mastoid type anchoring. There was a small segment inferiorly that  was covered with Tegaderm. The patient tolerated the procedure well. Estimated blood loss was 50 mL. He was sent to the recovery room in  good condition.         Cristina Londono MD    D: 07/20/2020 11:09:40       T: 07/20/2020 12:23:16     [email or web address]  Job#: [id number]     Doc#: [id number]    CC:

## 2020-07-22 NOTE — PROGRESS NOTES
Patient is here following extensive removal of a large basal cell carcinoma the postauricular area requiring flap repair on Monday. The dressing is removed. Healing looks fairly good except for the very distal end of the flap looks tenuous to some degree about an inch from the distal end. It is all cleaned carefully, and Xeroform gauze applied. Tegaderm dressing is applied. He is instructed to use local ice therapy and avoid lying on it. I will be seeing him again on Tuesday. If there is any problem he is to contact the office. He will complete his current course of medication.

## 2020-07-31 NOTE — PROGRESS NOTES
I have changed the bandage. The superior portion of the flap is somewhat necrotic and I have debrided it. However, the area is much smaller in size and is healing by secondary intention. It was redressed with Xeroform and a dry sterile dressing over the areas involved which require little further healing. I will see him again on Tuesday. He will continue clindamycin.

## 2020-08-04 NOTE — PROGRESS NOTES
Subjectively, the patient is doing well with no fever. The distal end of the flap is debrided and the dogear is likewise treated and and debrided. There is some drainage underneath dogear that is cleaned. Iodoform was placed into the area and iodoform was placed over the debrided tissue from the distal portion of the flap. Xeroform gauze is placed over this and a dry sterile dressing applied. He will continue the clindamycin. I will see him again on Friday for further treatment.

## 2020-08-07 NOTE — TELEPHONE ENCOUNTER
Patient called, forget to ask if patient should continue taking clindamycin, patient has one last capsule left. please advise.

## 2020-08-07 NOTE — PROGRESS NOTES
There seems to be reasonable healing occurring. Some further debridement is performed. Iodoform is placed once again and a dry dressing applied. Patient will continue his clindamycin until I see him on Tuesday.

## 2020-08-11 NOTE — PROGRESS NOTES
The wound seems to be healing in by secondary intention appropriately. Replaced iodoform dressing and dry sterile dressing in place. I will see him once again on Friday.

## 2020-08-14 NOTE — PROGRESS NOTES
Healing is continuing by secondary intention quite nicely. No infection is apparent. The wound is cleaned and redressed. He will return on Tuesday. We will continue the clindamycin for now.

## 2020-08-18 NOTE — PROGRESS NOTES
Patient is making a lot of progress now. The area is granulating in quite nicely. Is now to be treated with cleaning with peroxide and following with Polysporin ointment once a day with dry sterile dressing to be applied by his wife. I will see him again on Friday. He stopped his antibiotics today.

## 2020-09-18 NOTE — PROGRESS NOTES
Patient is here following checkup for healing of his removal of large basal cell carcinoma behind his left ear. It is now healed in completely. I have asked that he return in 6 months or sooner if something occurs. There are no other lesions on his face that are consistent with carcinoma.

## 2020-09-21 NOTE — PROGRESS NOTES
Vaccine Information Sheet, \"Influenza - Inactivated\"  given to Hanane Garcia, or parent/legal guardian of  Hanane Garcia and verbalized understanding. Patient responses:    Have you ever had a reaction to a flu vaccine? No  Do you have any current illness? No  Have you ever had Guillian [place] Syndrome? No  Do you have a serious allergy to any of the follow: Neomycin, Polymyxin, Thimerosal, eggs or egg products? No    Flu vaccine given per order. Please see immunization tab. Risks and benefits explained. Current VIS given.

## 2020-09-21 NOTE — PROGRESS NOTES
SUBJECTIVE:  79 y.o. male for follow up of diabetes. no TIA's, no chest pain at rest or on exertion, no SOB or dyspnea on exertion, no swelling of ankles or feet. medication compliance:  compliant most of the time, diabetic diet compliance:  compliant most of the time, home glucose monitoring: are not performed  Exercise:none  Other symptoms and concerns: Feels well. He states he quit his job 3 days ago due to lack of social distancing  Hypertension: Patient here for follow-up of elevated blood pressure.  Blood pressure is not checked at home but states he has seen Dr. Scot Olvera several times and it has been checked there. Patient denies chest pain, dyspnea and irregular heart beat. He denies side effects from medication. CKD: Patient is asymptomatic.  His creatinine and GFR done on  were 1.5 and 46 which is generally where the patient has resided for the last several years. As noted in the past he does not see a nephrologist.  GERD:  Due to persistent symptoms his omeprazole was increased to 40 mg on the last visit. He states   Skin Lesion. Patient states he finally had this addressed in late July and subsequently turned out to be a basal cell. Colon Cancer Screen : The patient states he received a Cologuard several months ago but has never done this. He will check to see if this is  and if not he states he will do it however if it is  he will call me and I will reorder another test.  He states he is not going to do a colonoscopy however.         Outpatient Medications Marked as Taking for the 20 encounter (Office Visit) with Baljinder Masters MD   Medication Sig Dispense Refill    clindamycin (CLEOCIN) 300 MG capsule Take 1 capsule by mouth 3 times daily 21 capsule 0    metFORMIN (GLUCOPHAGE) 1000 MG tablet TAKE 1 TABLET BY MOUTH TWICE A DAY WITH MEALS 180 tablet 3    omeprazole (PRILOSEC) 40 MG delayed release capsule Take 1 capsule by mouth every morning (before breakfast) 30 capsule 5  cloNIDine (CATAPRES) 0.3 MG tablet TAKE ONE TABLET BY MOUTH DAILY (Patient taking differently: TAKES AT HS) 90 tablet 3    lisinopril (PRINIVIL;ZESTRIL) 40 MG tablet TAKE ONE TABLET BY MOUTH DAILY 90 tablet 3    hydrochlorothiazide (HYDRODIURIL) 25 MG tablet TAKE ONE TABLET BY MOUTH DAILY 90 tablet 3    metoprolol (LOPRESSOR) 100 MG tablet TAKE ONE TABLET BY MOUTH TWICE DAILY  180 tablet 3    glyBURIDE (DIABETA) 5 MG tablet TAKE TWO TABLETS BY MOUTH TWICE DAILY  360 tablet 3    aspirin 81 MG EC tablet Take 81 mg by mouth daily. Lab Results   Component Value Date    LABA1C 6.5 09/21/2020       OBJECTIVE:   /84   Pulse 55   Wt 213 lb 12.8 oz (97 kg)   SpO2 99%   BMI 31.57 kg/m²    /90  Appearance alert and oriented and in no distress. Skin: warm and dry  Eyes: PERRL  Neck: No JVD, or bruits. No adenopathy or thyromegaly  Lungs: Chest is clear; no wheezes or rales. Heart: S1 and S2 normal, no murmurs, clicks, gallops or rubs. Regular rate and rhythm. Ext: No edema. Diabetic foot check per nurses note. Lab review: HbA1c as above. Assessment/Plan:    Bianca Cuevas was seen today for diabetes and hypertension. Diagnoses and all orders for this visit:    Type 2 diabetes mellitus without complication, without long-term current use of insulin (HCC)  -     POCT glycosylated hemoglobin (Hb A1C)  Stable with even slight improvement from 6.7 --> 6.5  Continue current treatment. Lifestyle modification recommended again. Essential hypertension  Borderline/reasonable control. Continue current treatment. Home blood pressure checks. Return after 11/7 for Medicare wellness  Chronic kidney disease, stage 3 (moderate) ([facility])  Generally stable. He declined seeing a nephrologist  Gastroesophageal reflux disease without esophagitis  Patient states improvement after doubling the omeprazole. Skin lesion  He finally had this removed. This turned out to be a basal cell carcinoma.   He remains under the care of Dr. Blake Mendez cancer screening  See above discussion  Need for vaccination  -     INFLUENZA, QUADV, ADJUVANTED, 72 YRS =, IM, PF, PREFILL SYR, 0.5ML (FLUAD)

## 2020-10-22 NOTE — TELEPHONE ENCOUNTER
Pt is keeping appt for 11/9 and he is aware of the cologuard he would like for Dr. Edmund Tam to reorder it at his upcoming appt

## 2020-10-22 NOTE — TELEPHONE ENCOUNTER
Tried to call patient to remind him to have his cologuard test done. Unable to leave a message at this time. Will try again later.

## 2020-10-22 NOTE — TELEPHONE ENCOUNTER
----- Message from Ismael Doyle sent at 10/22/2020 12:47 PM EDT -----  Subject: Message to Provider    QUESTIONS  Information for Provider? Patient is wanting to reschedule awv appt that   is scheduled for 11/9    tried to schedule into the beginning of the year there was no   availability .   ---------------------------------------------------------------------------  --------------  CALL BACK INFO  What is the best way for the office to contact you? OK to leave message on   voicemail  Preferred Call Back Phone Number? [phone]  ---------------------------------------------------------------------------  --------------  SCRIPT ANSWERS  Relationship to Patient?  Self

## 2020-11-09 NOTE — PROGRESS NOTES
Surgical History:   Procedure Laterality Date    CLEFT PALATE REPAIR      SKIN BIOPSY Left 7/20/2020    EXCISE SKIN LESION LEFT POST AURICULAR WITH FLAP, FROZEN SECTIONS performed by Kevin Harada, MD at [facility]           Family History   Problem Relation Age of Onset    High Blood Pressure Mother     Diabetes Mother     High Blood Pressure Father     High Blood Pressure Brother        CareTeam (Including outside providers/suppliers regularly involved in providing care):   Patient Care Team:  Natalia Morse MD as PCP - General (Family Medicine)  Natalia Morse MD as PCP - [facility] Provider    Wt Readings from Last 3 Encounters:   11/09/20 218 lb 6.4 oz (99.1 kg)   09/21/20 213 lb 12.8 oz (97 kg)   08/21/20 219 lb (99.3 kg)     Vitals:    11/09/20 1021 11/09/20 1028   BP: (!) 154/86 (!) 154/86   Pulse: 74    SpO2: 97%    Weight: 218 lb 6.4 oz (99.1 kg)      Body mass index is 32.25 kg/m². Based upon direct observation of the patient, evaluation of cognition reveals recent and remote memory intact. Vitals:    11/09/20 1021 11/09/20 1028   BP: (!) 154/86 (!) 154/86   Pulse: 74    SpO2: 97%    Weight: 218 lb 6.4 oz (99.1 kg)      Body mass index is 32.25 kg/m².    General Appearance: alert and oriented, in no acute distress  Skin: warm and dry, no rash or erythema  Head: normocephalic and atraumatic  Eyes: pupils equal, round, and reactive to light, extraocular eye movements intact, conjunctivae normal  ENT: tympanic membrane, external ear and ear canal normal bilaterally, nose without deformity,   Neck: supple and non-tender without mass, no thyromegaly or thyroid nodules, no cervical lymphadenopathy  Pulmonary/Chest: clear to auscultation bilaterally- no wheezes, rales or rhonchi, normal air movement, no respiratory distress  Cardiovascular: normal rate, regular rhythm, normal S1 and S2, no rubs, clicks, or gallops, Grade 2/6 systolic murmur no carotid bruits Abdomen: soft, non-tender, non-distended, normal bowel sounds, no masses or organomegaly  Genitourinary/Rectal: genitals normal without hernia or inguinal adenopathy, rectal normal without masses, prostate normal in size without masses or nodules   Extremities: no cyanosis, clubbing or edema. Bursal cyst on Left elbow  Neurologic: reflexes normal and symmetric, no cranial nerve deficit,speech normal      Patient's complete Health Risk Assessment and screening values have been reviewed and are found in Flowsheets. The following problems were reviewed today and where indicated follow up appointments were made and/or referrals ordered. Positive Risk Factor Screenings with Interventions:       General Health and ACP:  General  In general, how would you say your health is?: Good  In the past 7 days, have you experienced any of the following?  New or Increased Pain, New or Increased Fatigue, Loneliness, Social Isolation, Stress or Anger?: None of These  Do you get the social and emotional support that you need?: Yes  Do you have a Living Will?: (!) No  Advance Directives     Power of [address] [place] [address] Will ACP-Advance Directive ACP-Power of     Not on File Not on File Not on File Not on File      General Health Risk Interventions:  · no new issues    Health Habits/Nutrition:  Health Habits/Nutrition  Do you exercise for at least 20 minutes 2-3 times per week?: (!) No  Have you lost any weight without trying in the past 3 months?: (!) Yes  Do you eat fewer than 2 meals per day?: No  Have you seen a dentist within the past year?: (!) No     Health Habits/Nutrition Interventions:  · walking intermittently but essentially no formal exercise    Safety:  Safety  Do you have working smoke detectors?: (!) No  Have all throw rugs been removed or fastened?: (!) No  Do you have non-slip mats or surfaces in all bathtubs/showers?: (!) No  Do all of your stairways have a railing or banister?: Yes  Are your doorways, halls EXAM  -     Hemoglobin A1C;  HbA1c was stable on last visit   Continue current treatment    Return 3 months   Life Style Modification with diet,exercise, weight control     Essential hypertension  -     CBC Auto Differential; Future  -     Comprehensive Metabolic Panel; Future  -     TSH with Reflex; Future  Stable/Controlled  Continue current treatment  Home BP checks  Return 3 months    Life Style Modification with diet,exercise, weight control     Mixed hyperlipidemia  -     Comprehensive Metabolic Panel; Future  -     Lipid Panel; Future  Lab in February. The patient has been intolerant to statins  Gastroesophageal reflux disease without esophagitis  Continue current treatment  with omeprazole  Prostate cancer screening  -     PA PROSTATE CA SCREENING; [place]  -     Psa screening; Future    Colon cancer screening  -     Cologuard (For External Results Only); Future          Health Maintenance reviewed with the patient: Shingrix was discussed and recommended.

## 2020-11-10 NOTE — TELEPHONE ENCOUNTER
Office has been notified that pt is requiring Prior Authorization for the following medication:  -- glyburide 5 mg    Please initiate this request through CoverMyMeds, contacting the following Payor/Insurance:  -- dx [id number]    Please see below, or the documentation attached to this encounter for any additional information that may assist in processing PA:  -- form scanned    Thank you!

## 2020-11-11 NOTE — TELEPHONE ENCOUNTER
The pharmacy is correct, there are tons of diabetic medications. However his glyburide is in a specific class called sulfonylureas Therefore if this sulfonylurea i.e. glyburide is not covered, then which sulfonylurea is covered. Rather than have me guess what sulfonylurea to pick if they eliminated glyburide from his plan then another 1 most likely has been added. Which 1 would that be? If the pharmacy is not helpful with knowing what is now covered on his plan then the patient may need to check with his insurance to see what they substitute for the glyburide in the sulfonylurea class.

## 2020-11-11 NOTE — TELEPHONE ENCOUNTER
Submitted PA for glyberide  Via CMM Key: [id number]  STATUS: denied letter attached    . Please notify patient, thank you.

## 2020-11-11 NOTE — TELEPHONE ENCOUNTER
Spoke with pharmacy and they states there are many diabetes medication and Dr Noelle Plascencia have to decide which one.  Also spoke with patient and he states he still have enough of medication and will call when needed, he also want to call back tomorrow and speak with XANDER FRANKS

## 2020-11-12 NOTE — TELEPHONE ENCOUNTER
Let him know the glipizide is similar enough to glyburide and if that is the one that will be covered that would be okay to switch to.

## 2020-11-12 NOTE — TELEPHONE ENCOUNTER
Spoke with pt he states he called his insurance and was told glipizide would be covered.  Pt says he has refills on his glyburide and is good till March 2021 and will talk to you about the glipizide at his appt in Feb, 2021

## 2020-11-12 NOTE — TELEPHONE ENCOUNTER
Patient has been informed of previous message and will contact his insurance company. However, he has several other questions and would like to speak with Umberto Maurice.

## 2020-12-07 NOTE — TELEPHONE ENCOUNTER
Medication:   Requested Prescriptions     Pending Prescriptions Disp Refills    glyBURIDE (DIABETA) 5 MG tablet [Pharmacy Med Name: glyBURIDE Oral Tablet 5 MG] 360 tablet 0     Sig: TAKE TWO TABLETS BY MOUTH TWICE DAILY       Last Filled:  12/10/19 #360, 3 RF     Patient Phone Number: [phone] (home) [phone] (work)    Last appt: 11/9/2020 AWV   Next appt: 2/11/2021    Last Labs DM:   Lab Results   Component Value Date    LABA1C 6.5 09/21/2020

## 2021-01-01 ENCOUNTER — OFFICE VISIT (OUTPATIENT)
Dept: FAMILY MEDICINE CLINIC | Age: 71
End: 2021-01-01
Payer: MEDICARE

## 2021-01-01 ENCOUNTER — APPOINTMENT (OUTPATIENT)
Dept: GENERAL RADIOLOGY | Age: 71
DRG: 233 | End: 2021-01-01
Payer: MEDICARE

## 2021-01-01 ENCOUNTER — HOSPITAL ENCOUNTER (INPATIENT)
Age: 71
LOS: 24 days | DRG: 233 | End: 2021-06-14
Attending: INTERNAL MEDICINE | Admitting: INTERNAL MEDICINE
Payer: MEDICARE

## 2021-01-01 ENCOUNTER — APPOINTMENT (OUTPATIENT)
Dept: ULTRASOUND IMAGING | Age: 71
DRG: 233 | End: 2021-01-01
Payer: MEDICARE

## 2021-01-01 ENCOUNTER — APPOINTMENT (OUTPATIENT)
Dept: CT IMAGING | Age: 71
DRG: 233 | End: 2021-01-01
Payer: MEDICARE

## 2021-01-01 ENCOUNTER — OFFICE VISIT (OUTPATIENT)
Dept: ENT CLINIC | Age: 71
End: 2021-01-01

## 2021-01-01 ENCOUNTER — ANESTHESIA (OUTPATIENT)
Dept: OPERATING ROOM | Age: 71
DRG: 233 | End: 2021-01-01
Payer: MEDICARE

## 2021-01-01 ENCOUNTER — TELEPHONE (OUTPATIENT)
Dept: CARDIOLOGY CLINIC | Age: 71
End: 2021-01-01

## 2021-01-01 ENCOUNTER — ANESTHESIA EVENT (OUTPATIENT)
Dept: OPERATING ROOM | Age: 71
DRG: 233 | End: 2021-01-01
Payer: MEDICARE

## 2021-01-01 ENCOUNTER — TELEPHONE (OUTPATIENT)
Dept: FAMILY MEDICINE CLINIC | Age: 71
End: 2021-01-01

## 2021-01-01 VITALS
HEART RATE: 74 BPM | DIASTOLIC BLOOD PRESSURE: 95 MMHG | TEMPERATURE: 97.1 F | WEIGHT: 216 LBS | BODY MASS INDEX: 31.9 KG/M2 | SYSTOLIC BLOOD PRESSURE: 177 MMHG

## 2021-01-01 VITALS
WEIGHT: 223.6 LBS | SYSTOLIC BLOOD PRESSURE: 134 MMHG | DIASTOLIC BLOOD PRESSURE: 80 MMHG | OXYGEN SATURATION: 99 % | BODY MASS INDEX: 33.02 KG/M2 | HEART RATE: 77 BPM

## 2021-01-01 VITALS
SYSTOLIC BLOOD PRESSURE: 100 MMHG | OXYGEN SATURATION: 93 % | RESPIRATION RATE: 12 BRPM | TEMPERATURE: 99 F | DIASTOLIC BLOOD PRESSURE: 50 MMHG

## 2021-01-01 VITALS
BODY MASS INDEX: 32.02 KG/M2 | SYSTOLIC BLOOD PRESSURE: 132 MMHG | HEART RATE: 74 BPM | DIASTOLIC BLOOD PRESSURE: 84 MMHG | OXYGEN SATURATION: 93 % | WEIGHT: 216.8 LBS

## 2021-01-01 VITALS
BODY MASS INDEX: 35.89 KG/M2 | TEMPERATURE: 98.3 F | HEIGHT: 69 IN | WEIGHT: 242.29 LBS | SYSTOLIC BLOOD PRESSURE: 118 MMHG | OXYGEN SATURATION: 93 % | DIASTOLIC BLOOD PRESSURE: 64 MMHG

## 2021-01-01 DIAGNOSIS — E78.2 MIXED HYPERLIPIDEMIA: ICD-10-CM

## 2021-01-01 DIAGNOSIS — E11.9 TYPE 2 DIABETES MELLITUS WITHOUT COMPLICATION, WITHOUT LONG-TERM CURRENT USE OF INSULIN (HCC): ICD-10-CM

## 2021-01-01 DIAGNOSIS — E11.9 TYPE 2 DIABETES MELLITUS WITHOUT COMPLICATION, WITHOUT LONG-TERM CURRENT USE OF INSULIN (HCC): Primary | ICD-10-CM

## 2021-01-01 DIAGNOSIS — R07.89 OTHER CHEST PAIN: ICD-10-CM

## 2021-01-01 DIAGNOSIS — I10 ESSENTIAL HYPERTENSION: ICD-10-CM

## 2021-01-01 DIAGNOSIS — C44.211 BASAL CELL CARCINOMA (BCC) OF SKIN OF EAR, UNSPECIFIED LATERALITY: Primary | ICD-10-CM

## 2021-01-01 DIAGNOSIS — R07.9 CHEST PAIN, UNSPECIFIED TYPE: Primary | ICD-10-CM

## 2021-01-01 DIAGNOSIS — N18.31 STAGE 3A CHRONIC KIDNEY DISEASE (HCC): ICD-10-CM

## 2021-01-01 DIAGNOSIS — R06.02 SOB (SHORTNESS OF BREATH): ICD-10-CM

## 2021-01-01 DIAGNOSIS — Z12.5 PROSTATE CANCER SCREENING: ICD-10-CM

## 2021-01-01 LAB
A/G RATIO: 1.3 (ref 1.1–2.2)
A/G RATIO: 1.3 (ref 1.1–2.2)
A/G RATIO: 1.5 (ref 1.1–2.2)
A/G RATIO: 1.6 (ref 1.1–2.2)
A/G RATIO: 1.8 (ref 1.1–2.2)
A/G RATIO: 1.9 (ref 1.1–2.2)
A/G RATIO: 2 (ref 1.1–2.2)
A/G RATIO: 2 (ref 1.1–2.2)
A/G RATIO: 2.1 (ref 1.1–2.2)
A/G RATIO: 2.2 (ref 1.1–2.2)
A/G RATIO: 2.3 (ref 1.1–2.2)
ABO/RH: NORMAL
ACTIVATED CLOTTING TIME: 109 SEC (ref 99–130)
ACTIVATED CLOTTING TIME: 111 SEC (ref 99–130)
ACTIVATED CLOTTING TIME: 403 SEC (ref 99–130)
ACTIVATED CLOTTING TIME: 417 SEC (ref 99–130)
ACTIVATED CLOTTING TIME: 456 SEC (ref 99–130)
ACTIVATED CLOTTING TIME: 456 SEC (ref 99–130)
ACTIVATED CLOTTING TIME: 473 SEC (ref 99–130)
ACTIVATED CLOTTING TIME: 481 SEC (ref 99–130)
ACTIVATED CLOTTING TIME: 493 SEC (ref 99–130)
ACTIVATED CLOTTING TIME: 494 SEC (ref 99–130)
ACTIVATED CLOTTING TIME: 501 SEC (ref 99–130)
ALBUMIN SERPL-MCNC: 2.8 G/DL (ref 3.4–5)
ALBUMIN SERPL-MCNC: 2.8 G/DL (ref 3.4–5)
ALBUMIN SERPL-MCNC: 2.9 G/DL (ref 3.4–5)
ALBUMIN SERPL-MCNC: 2.9 G/DL (ref 3.4–5)
ALBUMIN SERPL-MCNC: 3 G/DL (ref 3.4–5)
ALBUMIN SERPL-MCNC: 3 G/DL (ref 3.4–5)
ALBUMIN SERPL-MCNC: 3.1 G/DL (ref 3.4–5)
ALBUMIN SERPL-MCNC: 3.1 G/DL (ref 3.4–5)
ALBUMIN SERPL-MCNC: 3.2 G/DL (ref 3.4–5)
ALBUMIN SERPL-MCNC: 3.4 G/DL (ref 3.4–5)
ALBUMIN SERPL-MCNC: 3.5 G/DL (ref 3.4–5)
ALBUMIN SERPL-MCNC: 3.5 G/DL (ref 3.4–5)
ALBUMIN SERPL-MCNC: 3.7 G/DL (ref 3.4–5)
ALBUMIN SERPL-MCNC: 3.8 G/DL (ref 3.4–5)
ALBUMIN SERPL-MCNC: 3.9 G/DL (ref 3.4–5)
ALBUMIN SERPL-MCNC: 4 G/DL (ref 3.4–5)
ALBUMIN SERPL-MCNC: 4.3 G/DL (ref 3.4–5)
ALBUMIN SERPL-MCNC: 4.4 G/DL (ref 3.4–5)
ALP BLD-CCNC: 190 U/L (ref 40–129)
ALP BLD-CCNC: 190 U/L (ref 40–129)
ALP BLD-CCNC: 193 U/L (ref 40–129)
ALP BLD-CCNC: 227 U/L (ref 40–129)
ALP BLD-CCNC: 237 U/L (ref 40–129)
ALP BLD-CCNC: 240 U/L (ref 40–129)
ALP BLD-CCNC: 243 U/L (ref 40–129)
ALP BLD-CCNC: 246 U/L (ref 40–129)
ALP BLD-CCNC: 251 U/L (ref 40–129)
ALP BLD-CCNC: 253 U/L (ref 40–129)
ALP BLD-CCNC: 256 U/L (ref 40–129)
ALP BLD-CCNC: 266 U/L (ref 40–129)
ALP BLD-CCNC: 273 U/L (ref 40–129)
ALP BLD-CCNC: 277 U/L (ref 40–129)
ALP BLD-CCNC: 280 U/L (ref 40–129)
ALP BLD-CCNC: 310 U/L (ref 40–129)
ALP BLD-CCNC: 329 U/L (ref 40–129)
ALP BLD-CCNC: 330 U/L (ref 40–129)
ALP BLD-CCNC: 333 U/L (ref 40–129)
ALP BLD-CCNC: 85 U/L (ref 40–129)
ALP BLD-CCNC: 88 U/L (ref 40–129)
ALP BLD-CCNC: 91 U/L (ref 40–129)
ALT SERPL-CCNC: 142 U/L (ref 10–40)
ALT SERPL-CCNC: 165 U/L (ref 10–40)
ALT SERPL-CCNC: 192 U/L (ref 10–40)
ALT SERPL-CCNC: 296 U/L (ref 10–40)
ALT SERPL-CCNC: 31 U/L (ref 10–40)
ALT SERPL-CCNC: 31 U/L (ref 10–40)
ALT SERPL-CCNC: 317 U/L (ref 10–40)
ALT SERPL-CCNC: 32 U/L (ref 10–40)
ALT SERPL-CCNC: 350 U/L (ref 10–40)
ALT SERPL-CCNC: 430 U/L (ref 10–40)
ALT SERPL-CCNC: 450 U/L (ref 10–40)
ALT SERPL-CCNC: 485 U/L (ref 10–40)
ALT SERPL-CCNC: 546 U/L (ref 10–40)
ALT SERPL-CCNC: 623 U/L (ref 10–40)
ALT SERPL-CCNC: 657 U/L (ref 10–40)
ALT SERPL-CCNC: 685 U/L (ref 10–40)
ALT SERPL-CCNC: 698 U/L (ref 10–40)
ALT SERPL-CCNC: 775 U/L (ref 10–40)
ALT SERPL-CCNC: 84 U/L (ref 10–40)
ALT SERPL-CCNC: 88 U/L (ref 10–40)
ALT SERPL-CCNC: 933 U/L (ref 10–40)
ALT SERPL-CCNC: 944 U/L (ref 10–40)
ANION GAP SERPL CALCULATED.3IONS-SCNC: 10 MMOL/L (ref 3–16)
ANION GAP SERPL CALCULATED.3IONS-SCNC: 11 MMOL/L (ref 3–16)
ANION GAP SERPL CALCULATED.3IONS-SCNC: 12 MMOL/L (ref 3–16)
ANION GAP SERPL CALCULATED.3IONS-SCNC: 13 MMOL/L (ref 3–16)
ANION GAP SERPL CALCULATED.3IONS-SCNC: 14 MMOL/L (ref 3–16)
ANION GAP SERPL CALCULATED.3IONS-SCNC: 15 MMOL/L (ref 3–16)
ANION GAP SERPL CALCULATED.3IONS-SCNC: 15 MMOL/L (ref 3–16)
ANION GAP SERPL CALCULATED.3IONS-SCNC: 16 MMOL/L (ref 3–16)
ANION GAP SERPL CALCULATED.3IONS-SCNC: 17 MMOL/L (ref 3–16)
ANION GAP SERPL CALCULATED.3IONS-SCNC: 19 MMOL/L (ref 3–16)
ANION GAP SERPL CALCULATED.3IONS-SCNC: 20 MMOL/L (ref 3–16)
ANION GAP SERPL CALCULATED.3IONS-SCNC: 21 MMOL/L (ref 3–16)
ANION GAP SERPL CALCULATED.3IONS-SCNC: 21 MMOL/L (ref 3–16)
ANION GAP SERPL CALCULATED.3IONS-SCNC: 28 MMOL/L (ref 3–16)
ANION GAP SERPL CALCULATED.3IONS-SCNC: 30 MMOL/L (ref 3–16)
ANION GAP SERPL CALCULATED.3IONS-SCNC: 8 MMOL/L (ref 3–16)
ANION GAP SERPL CALCULATED.3IONS-SCNC: 9 MMOL/L (ref 3–16)
ANISOCYTOSIS: ABNORMAL
ANTIBODY SCREEN: NORMAL
APTT: 30.6 SEC (ref 24.2–36.2)
APTT: 44.5 SEC (ref 24.2–36.2)
APTT: 52.4 SEC (ref 24.2–36.2)
APTT: 52.9 SEC (ref 24.2–36.2)
APTT: 54 SEC (ref 24.2–36.2)
APTT: 54.2 SEC (ref 24.2–36.2)
APTT: 54.5 SEC (ref 24.2–36.2)
APTT: 57.2 SEC (ref 24.2–36.2)
APTT: 60.4 SEC (ref 24.2–36.2)
APTT: 60.5 SEC (ref 24.2–36.2)
APTT: 61 SEC (ref 24.2–36.2)
APTT: 61.1 SEC (ref 24.2–36.2)
APTT: 64.6 SEC (ref 24.2–36.2)
APTT: 67.6 SEC (ref 24.2–36.2)
APTT: 68.7 SEC (ref 24.2–36.2)
APTT: 69.8 SEC (ref 24.2–36.2)
APTT: 71.9 SEC (ref 24.2–36.2)
APTT: 73.5 SEC (ref 24.2–36.2)
APTT: 76.7 SEC (ref 24.2–36.2)
APTT: 78.1 SEC (ref 24.2–36.2)
APTT: 78.7 SEC (ref 24.2–36.2)
APTT: 80.9 SEC (ref 24.2–36.2)
APTT: 88.7 SEC (ref 24.2–36.2)
AST SERPL-CCNC: 1008 U/L (ref 15–37)
AST SERPL-CCNC: 104 U/L (ref 15–37)
AST SERPL-CCNC: 112 U/L (ref 15–37)
AST SERPL-CCNC: 120 U/L (ref 15–37)
AST SERPL-CCNC: 127 U/L (ref 15–37)
AST SERPL-CCNC: 144 U/L (ref 15–37)
AST SERPL-CCNC: 1537 U/L (ref 15–37)
AST SERPL-CCNC: 1626 U/L (ref 15–37)
AST SERPL-CCNC: 194 U/L (ref 15–37)
AST SERPL-CCNC: 2097 U/L (ref 15–37)
AST SERPL-CCNC: 216 U/L (ref 15–37)
AST SERPL-CCNC: 2282 U/L (ref 15–37)
AST SERPL-CCNC: 267 U/L (ref 15–37)
AST SERPL-CCNC: 27 U/L (ref 15–37)
AST SERPL-CCNC: 28 U/L (ref 15–37)
AST SERPL-CCNC: 30 U/L (ref 15–37)
AST SERPL-CCNC: 3249 U/L (ref 15–37)
AST SERPL-CCNC: 3613 U/L (ref 15–37)
AST SERPL-CCNC: 372 U/L (ref 15–37)
AST SERPL-CCNC: 401 U/L (ref 15–37)
AST SERPL-CCNC: 536 U/L (ref 15–37)
AST SERPL-CCNC: 709 U/L (ref 15–37)
BASE EXCESS ARTERIAL: -1 (ref -3–3)
BASE EXCESS ARTERIAL: -10 (ref -3–3)
BASE EXCESS ARTERIAL: -10 (ref -3–3)
BASE EXCESS ARTERIAL: -11 (ref -3–3)
BASE EXCESS ARTERIAL: -14 (ref -3–3)
BASE EXCESS ARTERIAL: -16 (ref -3–3)
BASE EXCESS ARTERIAL: -2 (ref -3–3)
BASE EXCESS ARTERIAL: -2.1 MMOL/L (ref -3–3)
BASE EXCESS ARTERIAL: -3 (ref -3–3)
BASE EXCESS ARTERIAL: -4 (ref -3–3)
BASE EXCESS ARTERIAL: -5 (ref -3–3)
BASE EXCESS ARTERIAL: -6 (ref -3–3)
BASE EXCESS ARTERIAL: -6 (ref -3–3)
BASE EXCESS ARTERIAL: -7 (ref -3–3)
BASE EXCESS ARTERIAL: -7 (ref -3–3)
BASE EXCESS ARTERIAL: -8 (ref -3–3)
BASE EXCESS ARTERIAL: -9.6 MMOL/L (ref -3–3)
BASE EXCESS ARTERIAL: 0 (ref -3–3)
BASE EXCESS ARTERIAL: 0 (ref -3–3)
BASE EXCESS ARTERIAL: 1 (ref -3–3)
BASE EXCESS ARTERIAL: 1 (ref -3–3)
BASE EXCESS ARTERIAL: 2 (ref -3–3)
BASE EXCESS ARTERIAL: 3 (ref -3–3)
BASE EXCESS ARTERIAL: 5 (ref -3–3)
BASE EXCESS VENOUS: -2 (ref -3–3)
BASOPHILS ABSOLUTE: 0 K/UL (ref 0–0.2)
BASOPHILS ABSOLUTE: 0 K/UL (ref 0–0.2)
BASOPHILS ABSOLUTE: 0.1 K/UL (ref 0–0.2)
BASOPHILS RELATIVE PERCENT: 0 %
BASOPHILS RELATIVE PERCENT: 0.7 %
BASOPHILS RELATIVE PERCENT: 0.9 %
BILIRUB SERPL-MCNC: 0.4 MG/DL (ref 0–1)
BILIRUB SERPL-MCNC: 0.5 MG/DL (ref 0–1)
BILIRUB SERPL-MCNC: 0.6 MG/DL (ref 0–1)
BILIRUB SERPL-MCNC: 10.2 MG/DL (ref 0–1)
BILIRUB SERPL-MCNC: 10.4 MG/DL (ref 0–1)
BILIRUB SERPL-MCNC: 10.7 MG/DL (ref 0–1)
BILIRUB SERPL-MCNC: 11 MG/DL (ref 0–1)
BILIRUB SERPL-MCNC: 11.4 MG/DL (ref 0–1)
BILIRUB SERPL-MCNC: 4.1 MG/DL (ref 0–1)
BILIRUB SERPL-MCNC: 4.6 MG/DL (ref 0–1)
BILIRUB SERPL-MCNC: 7 MG/DL (ref 0–1)
BILIRUB SERPL-MCNC: 7.4 MG/DL (ref 0–1)
BILIRUB SERPL-MCNC: 7.8 MG/DL (ref 0–1)
BILIRUB SERPL-MCNC: 7.9 MG/DL (ref 0–1)
BILIRUB SERPL-MCNC: 8 MG/DL (ref 0–1)
BILIRUB SERPL-MCNC: 8.4 MG/DL (ref 0–1)
BILIRUB SERPL-MCNC: 9.5 MG/DL (ref 0–1)
BILIRUB SERPL-MCNC: 9.6 MG/DL (ref 0–1)
BILIRUB SERPL-MCNC: 9.6 MG/DL (ref 0–1)
BILIRUB SERPL-MCNC: 9.9 MG/DL (ref 0–1)
BILIRUBIN DIRECT: 1.5 MG/DL (ref 0–0.3)
BILIRUBIN DIRECT: 6.8 MG/DL (ref 0–0.3)
BILIRUBIN DIRECT: 7.1 MG/DL (ref 0–0.3)
BILIRUBIN DIRECT: 7.5 MG/DL (ref 0–0.3)
BILIRUBIN DIRECT: 7.8 MG/DL (ref 0–0.3)
BILIRUBIN DIRECT: <0.2 MG/DL (ref 0–0.3)
BILIRUBIN URINE: NEGATIVE
BILIRUBIN URINE: NEGATIVE
BILIRUBIN, INDIRECT: 2.1 MG/DL (ref 0–1)
BILIRUBIN, INDIRECT: 2.4 MG/DL (ref 0–1)
BILIRUBIN, INDIRECT: 2.6 MG/DL (ref 0–1)
BILIRUBIN, INDIRECT: 2.9 MG/DL (ref 0–1)
BILIRUBIN, INDIRECT: 4.6 MG/DL (ref 0–1)
BILIRUBIN, INDIRECT: NORMAL MG/DL (ref 0–1)
BLOOD BANK DISPENSE STATUS: NORMAL
BLOOD BANK PRODUCT CODE: NORMAL
BLOOD CULTURE, ROUTINE: ABNORMAL
BLOOD CULTURE, ROUTINE: ABNORMAL
BLOOD CULTURE, ROUTINE: NORMAL
BLOOD, URINE: ABNORMAL
BLOOD, URINE: NEGATIVE
BPU ID: NORMAL
BUN BLDV-MCNC: 101 MG/DL (ref 7–20)
BUN BLDV-MCNC: 104 MG/DL (ref 7–20)
BUN BLDV-MCNC: 105 MG/DL (ref 7–20)
BUN BLDV-MCNC: 106 MG/DL (ref 7–20)
BUN BLDV-MCNC: 11 MG/DL (ref 7–20)
BUN BLDV-MCNC: 118 MG/DL (ref 7–20)
BUN BLDV-MCNC: 12 MG/DL (ref 7–20)
BUN BLDV-MCNC: 120 MG/DL (ref 7–20)
BUN BLDV-MCNC: 122 MG/DL (ref 7–20)
BUN BLDV-MCNC: 124 MG/DL (ref 7–20)
BUN BLDV-MCNC: 125 MG/DL (ref 7–20)
BUN BLDV-MCNC: 14 MG/DL (ref 7–20)
BUN BLDV-MCNC: 15 MG/DL (ref 7–20)
BUN BLDV-MCNC: 16 MG/DL (ref 7–20)
BUN BLDV-MCNC: 19 MG/DL (ref 7–20)
BUN BLDV-MCNC: 21 MG/DL (ref 7–20)
BUN BLDV-MCNC: 23 MG/DL (ref 7–20)
BUN BLDV-MCNC: 23 MG/DL (ref 7–20)
BUN BLDV-MCNC: 27 MG/DL (ref 7–20)
BUN BLDV-MCNC: 27 MG/DL (ref 7–20)
BUN BLDV-MCNC: 30 MG/DL (ref 7–20)
BUN BLDV-MCNC: 30 MG/DL (ref 7–20)
BUN BLDV-MCNC: 32 MG/DL (ref 7–20)
BUN BLDV-MCNC: 35 MG/DL (ref 7–20)
BUN BLDV-MCNC: 37 MG/DL (ref 7–20)
BUN BLDV-MCNC: 38 MG/DL (ref 7–20)
BUN BLDV-MCNC: 39 MG/DL (ref 7–20)
BUN BLDV-MCNC: 41 MG/DL (ref 7–20)
BUN BLDV-MCNC: 44 MG/DL (ref 7–20)
BUN BLDV-MCNC: 59 MG/DL (ref 7–20)
BUN BLDV-MCNC: 61 MG/DL (ref 7–20)
BUN BLDV-MCNC: 61 MG/DL (ref 7–20)
BUN BLDV-MCNC: 67 MG/DL (ref 7–20)
BUN BLDV-MCNC: 70 MG/DL (ref 7–20)
BUN BLDV-MCNC: 74 MG/DL (ref 7–20)
BUN BLDV-MCNC: 75 MG/DL (ref 7–20)
BUN BLDV-MCNC: 75 MG/DL (ref 7–20)
BUN BLDV-MCNC: 91 MG/DL (ref 7–20)
BURR CELLS: ABNORMAL
CALCIUM IONIZED: 0.87 MMOL/L (ref 1.12–1.32)
CALCIUM IONIZED: 0.89 MMOL/L (ref 1.12–1.32)
CALCIUM IONIZED: 0.97 MMOL/L (ref 1.12–1.32)
CALCIUM IONIZED: 0.98 MMOL/L (ref 1.12–1.32)
CALCIUM IONIZED: 0.98 MMOL/L (ref 1.12–1.32)
CALCIUM IONIZED: 0.99 MMOL/L (ref 1.12–1.32)
CALCIUM IONIZED: 1 MMOL/L (ref 1.12–1.32)
CALCIUM IONIZED: 1.01 MMOL/L (ref 1.12–1.32)
CALCIUM IONIZED: 1.02 MMOL/L (ref 1.12–1.32)
CALCIUM IONIZED: 1.03 MMOL/L (ref 1.12–1.32)
CALCIUM IONIZED: 1.04 MMOL/L (ref 1.12–1.32)
CALCIUM IONIZED: 1.05 MMOL/L (ref 1.12–1.32)
CALCIUM IONIZED: 1.06 MMOL/L (ref 1.12–1.32)
CALCIUM IONIZED: 1.07 MMOL/L (ref 1.12–1.32)
CALCIUM IONIZED: 1.08 MMOL/L (ref 1.12–1.32)
CALCIUM IONIZED: 1.08 MMOL/L (ref 1.12–1.32)
CALCIUM IONIZED: 1.09 MMOL/L (ref 1.12–1.32)
CALCIUM IONIZED: 1.1 MMOL/L (ref 1.12–1.32)
CALCIUM IONIZED: 1.1 MMOL/L (ref 1.12–1.32)
CALCIUM IONIZED: 1.11 MMOL/L (ref 1.12–1.32)
CALCIUM IONIZED: 1.12 MMOL/L (ref 1.12–1.32)
CALCIUM IONIZED: 1.13 MMOL/L (ref 1.12–1.32)
CALCIUM IONIZED: 1.14 MMOL/L (ref 1.12–1.32)
CALCIUM IONIZED: 1.15 MMOL/L (ref 1.12–1.32)
CALCIUM IONIZED: 1.16 MMOL/L (ref 1.12–1.32)
CALCIUM IONIZED: 1.17 MMOL/L (ref 1.12–1.32)
CALCIUM IONIZED: 1.18 MMOL/L (ref 1.12–1.32)
CALCIUM IONIZED: 1.2 MMOL/L (ref 1.12–1.32)
CALCIUM IONIZED: 1.28 MMOL/L (ref 1.12–1.32)
CALCIUM SERPL-MCNC: 6.2 MG/DL (ref 8.3–10.6)
CALCIUM SERPL-MCNC: 7.7 MG/DL (ref 8.3–10.6)
CALCIUM SERPL-MCNC: 7.8 MG/DL (ref 8.3–10.6)
CALCIUM SERPL-MCNC: 7.9 MG/DL (ref 8.3–10.6)
CALCIUM SERPL-MCNC: 8 MG/DL (ref 8.3–10.6)
CALCIUM SERPL-MCNC: 8.1 MG/DL (ref 8.3–10.6)
CALCIUM SERPL-MCNC: 8.1 MG/DL (ref 8.3–10.6)
CALCIUM SERPL-MCNC: 8.2 MG/DL (ref 8.3–10.6)
CALCIUM SERPL-MCNC: 8.3 MG/DL (ref 8.3–10.6)
CALCIUM SERPL-MCNC: 8.4 MG/DL (ref 8.3–10.6)
CALCIUM SERPL-MCNC: 8.5 MG/DL (ref 8.3–10.6)
CALCIUM SERPL-MCNC: 8.7 MG/DL (ref 8.3–10.6)
CALCIUM SERPL-MCNC: 8.8 MG/DL (ref 8.3–10.6)
CALCIUM SERPL-MCNC: 8.8 MG/DL (ref 8.3–10.6)
CALCIUM SERPL-MCNC: 9 MG/DL (ref 8.3–10.6)
CALCIUM SERPL-MCNC: 9.1 MG/DL (ref 8.3–10.6)
CALCIUM SERPL-MCNC: 9.2 MG/DL (ref 8.3–10.6)
CALCIUM SERPL-MCNC: 9.2 MG/DL (ref 8.3–10.6)
CALCIUM SERPL-MCNC: 9.3 MG/DL (ref 8.3–10.6)
CALCIUM SERPL-MCNC: 9.5 MG/DL (ref 8.3–10.6)
CALCIUM SERPL-MCNC: 9.7 MG/DL (ref 8.3–10.6)
CARBOXYHEMOGLOBIN ARTERIAL: 0.9 % (ref 0–1.5)
CARBOXYHEMOGLOBIN ARTERIAL: 1.3 % (ref 0–1.5)
CHLORIDE BLD-SCNC: 100 MMOL/L (ref 99–110)
CHLORIDE BLD-SCNC: 101 MMOL/L (ref 99–110)
CHLORIDE BLD-SCNC: 102 MMOL/L (ref 99–110)
CHLORIDE BLD-SCNC: 103 MMOL/L (ref 99–110)
CHLORIDE BLD-SCNC: 104 MMOL/L (ref 99–110)
CHLORIDE BLD-SCNC: 105 MMOL/L (ref 99–110)
CHLORIDE BLD-SCNC: 106 MMOL/L (ref 99–110)
CHLORIDE BLD-SCNC: 107 MMOL/L (ref 99–110)
CHLORIDE BLD-SCNC: 108 MMOL/L (ref 99–110)
CHLORIDE BLD-SCNC: 111 MMOL/L (ref 99–110)
CHLORIDE BLD-SCNC: 115 MMOL/L (ref 99–110)
CHLORIDE BLD-SCNC: 87 MMOL/L (ref 99–110)
CHLORIDE BLD-SCNC: 87 MMOL/L (ref 99–110)
CHLORIDE BLD-SCNC: 88 MMOL/L (ref 99–110)
CHLORIDE BLD-SCNC: 89 MMOL/L (ref 99–110)
CHLORIDE BLD-SCNC: 91 MMOL/L (ref 99–110)
CHLORIDE BLD-SCNC: 91 MMOL/L (ref 99–110)
CHLORIDE BLD-SCNC: 92 MMOL/L (ref 99–110)
CHLORIDE BLD-SCNC: 92 MMOL/L (ref 99–110)
CHLORIDE BLD-SCNC: 95 MMOL/L (ref 99–110)
CHLORIDE BLD-SCNC: 97 MMOL/L (ref 99–110)
CHLORIDE BLD-SCNC: 98 MMOL/L (ref 99–110)
CHLORIDE BLD-SCNC: 98 MMOL/L (ref 99–110)
CHLORIDE BLD-SCNC: 99 MMOL/L (ref 99–110)
CHOLESTEROL, TOTAL: 254 MG/DL (ref 0–199)
CHOLESTEROL, TOTAL: 291 MG/DL (ref 0–199)
CLARITY: CLEAR
CLARITY: CLEAR
CO2: 13 MMOL/L (ref 21–32)
CO2: 14 MMOL/L (ref 21–32)
CO2: 16 MMOL/L (ref 21–32)
CO2: 18 MMOL/L (ref 21–32)
CO2: 19 MMOL/L (ref 21–32)
CO2: 19 MMOL/L (ref 21–32)
CO2: 20 MMOL/L (ref 21–32)
CO2: 21 MMOL/L (ref 21–32)
CO2: 22 MMOL/L (ref 21–32)
CO2: 22 MMOL/L (ref 21–32)
CO2: 23 MMOL/L (ref 21–32)
CO2: 24 MMOL/L (ref 21–32)
CO2: 25 MMOL/L (ref 21–32)
CO2: 26 MMOL/L (ref 21–32)
COLOR: YELLOW
COLOR: YELLOW
CREAT SERPL-MCNC: 0.6 MG/DL (ref 0.8–1.3)
CREAT SERPL-MCNC: 0.6 MG/DL (ref 0.8–1.3)
CREAT SERPL-MCNC: 0.7 MG/DL (ref 0.8–1.3)
CREAT SERPL-MCNC: 0.7 MG/DL (ref 0.8–1.3)
CREAT SERPL-MCNC: 0.9 MG/DL (ref 0.8–1.3)
CREAT SERPL-MCNC: 1 MG/DL (ref 0.8–1.3)
CREAT SERPL-MCNC: 1.2 MG/DL (ref 0.8–1.3)
CREAT SERPL-MCNC: 1.3 MG/DL (ref 0.8–1.3)
CREAT SERPL-MCNC: 1.4 MG/DL (ref 0.8–1.3)
CREAT SERPL-MCNC: 1.4 MG/DL (ref 0.8–1.3)
CREAT SERPL-MCNC: 1.5 MG/DL (ref 0.8–1.3)
CREAT SERPL-MCNC: 1.6 MG/DL (ref 0.8–1.3)
CREAT SERPL-MCNC: 1.7 MG/DL (ref 0.8–1.3)
CREAT SERPL-MCNC: 1.8 MG/DL (ref 0.8–1.3)
CREAT SERPL-MCNC: 1.9 MG/DL (ref 0.8–1.3)
CREAT SERPL-MCNC: 2.1 MG/DL (ref 0.8–1.3)
CREAT SERPL-MCNC: 2.2 MG/DL (ref 0.8–1.3)
CREAT SERPL-MCNC: 2.4 MG/DL (ref 0.8–1.3)
CREAT SERPL-MCNC: 3 MG/DL (ref 0.8–1.3)
CREAT SERPL-MCNC: 3.2 MG/DL (ref 0.8–1.3)
CREAT SERPL-MCNC: 3.3 MG/DL (ref 0.8–1.3)
CREAT SERPL-MCNC: 3.4 MG/DL (ref 0.8–1.3)
CREAT SERPL-MCNC: 3.4 MG/DL (ref 0.8–1.3)
CREAT SERPL-MCNC: 3.6 MG/DL (ref 0.8–1.3)
CREAT SERPL-MCNC: 3.9 MG/DL (ref 0.8–1.3)
CREAT SERPL-MCNC: 4 MG/DL (ref 0.8–1.3)
CREAT SERPL-MCNC: 4 MG/DL (ref 0.8–1.3)
CULTURE CATHETER TIP: NORMAL
CULTURE, BLOOD 2: NORMAL
CULTURE, BLOOD 2: NORMAL
CULTURE, RESPIRATORY: ABNORMAL
CULTURE, RESPIRATORY: ABNORMAL
DESCRIPTION BLOOD BANK: NORMAL
EKG ATRIAL RATE: 81 BPM
EKG DIAGNOSIS: NORMAL
EKG P AXIS: 28 DEGREES
EKG P-R INTERVAL: 122 MS
EKG Q-T INTERVAL: 372 MS
EKG QRS DURATION: 104 MS
EKG QTC CALCULATION (BAZETT): 432 MS
EKG R AXIS: -18 DEGREES
EKG T AXIS: 173 DEGREES
EKG VENTRICULAR RATE: 81 BPM
EOSINOPHILS ABSOLUTE: 0.2 K/UL (ref 0–0.6)
EOSINOPHILS ABSOLUTE: 0.2 K/UL (ref 0–0.6)
EOSINOPHILS ABSOLUTE: 0.5 K/UL (ref 0–0.6)
EOSINOPHILS RELATIVE PERCENT: 2 %
EOSINOPHILS RELATIVE PERCENT: 3.7 %
EOSINOPHILS RELATIVE PERCENT: 7.5 %
EPITHELIAL CELLS, UA: 0 /HPF (ref 0–5)
EPITHELIAL CELLS, UA: 1 /HPF (ref 0–5)
ESTIMATED AVERAGE GLUCOSE: 157.1 MG/DL
ESTIMATED AVERAGE GLUCOSE: 171.4 MG/DL
FIBRINOGEN: 191 MG/DL (ref 200–397)
GFR AFRICAN AMERICAN: 18
GFR AFRICAN AMERICAN: 18
GFR AFRICAN AMERICAN: 19
GFR AFRICAN AMERICAN: 20
GFR AFRICAN AMERICAN: 22
GFR AFRICAN AMERICAN: 23
GFR AFRICAN AMERICAN: 25
GFR AFRICAN AMERICAN: 32
GFR AFRICAN AMERICAN: 36
GFR AFRICAN AMERICAN: 38
GFR AFRICAN AMERICAN: 42
GFR AFRICAN AMERICAN: 45
GFR AFRICAN AMERICAN: 48
GFR AFRICAN AMERICAN: 52
GFR AFRICAN AMERICAN: 56
GFR AFRICAN AMERICAN: >60
GFR NON-AFRICAN AMERICAN: 15
GFR NON-AFRICAN AMERICAN: 17
GFR NON-AFRICAN AMERICAN: 18
GFR NON-AFRICAN AMERICAN: 18
GFR NON-AFRICAN AMERICAN: 19
GFR NON-AFRICAN AMERICAN: 19
GFR NON-AFRICAN AMERICAN: 21
GFR NON-AFRICAN AMERICAN: 27
GFR NON-AFRICAN AMERICAN: 30
GFR NON-AFRICAN AMERICAN: 31
GFR NON-AFRICAN AMERICAN: 35
GFR NON-AFRICAN AMERICAN: 37
GFR NON-AFRICAN AMERICAN: 40
GFR NON-AFRICAN AMERICAN: 43
GFR NON-AFRICAN AMERICAN: 46
GFR NON-AFRICAN AMERICAN: 50
GFR NON-AFRICAN AMERICAN: 50
GFR NON-AFRICAN AMERICAN: 54
GFR NON-AFRICAN AMERICAN: 60
GFR NON-AFRICAN AMERICAN: >60
GLOBULIN: 1.7 G/DL
GLOBULIN: 1.8 G/DL
GLOBULIN: 1.8 G/DL
GLOBULIN: 1.9 G/DL
GLOBULIN: 2 G/DL
GLOBULIN: 2.1 G/DL
GLOBULIN: 2.7 G/DL
GLOBULIN: 2.9 G/DL
GLUCOSE BLD-MCNC: 100 MG/DL (ref 70–99)
GLUCOSE BLD-MCNC: 102 MG/DL (ref 70–99)
GLUCOSE BLD-MCNC: 103 MG/DL (ref 70–99)
GLUCOSE BLD-MCNC: 104 MG/DL (ref 70–99)
GLUCOSE BLD-MCNC: 105 MG/DL (ref 70–99)
GLUCOSE BLD-MCNC: 106 MG/DL (ref 70–99)
GLUCOSE BLD-MCNC: 107 MG/DL (ref 70–99)
GLUCOSE BLD-MCNC: 108 MG/DL (ref 70–99)
GLUCOSE BLD-MCNC: 108 MG/DL (ref 70–99)
GLUCOSE BLD-MCNC: 110 MG/DL (ref 70–99)
GLUCOSE BLD-MCNC: 111 MG/DL (ref 70–99)
GLUCOSE BLD-MCNC: 112 MG/DL (ref 70–99)
GLUCOSE BLD-MCNC: 112 MG/DL (ref 70–99)
GLUCOSE BLD-MCNC: 113 MG/DL (ref 70–99)
GLUCOSE BLD-MCNC: 115 MG/DL (ref 70–99)
GLUCOSE BLD-MCNC: 117 MG/DL (ref 70–99)
GLUCOSE BLD-MCNC: 117 MG/DL (ref 70–99)
GLUCOSE BLD-MCNC: 118 MG/DL (ref 70–99)
GLUCOSE BLD-MCNC: 118 MG/DL (ref 70–99)
GLUCOSE BLD-MCNC: 119 MG/DL (ref 70–99)
GLUCOSE BLD-MCNC: 120 MG/DL (ref 70–99)
GLUCOSE BLD-MCNC: 120 MG/DL (ref 70–99)
GLUCOSE BLD-MCNC: 121 MG/DL (ref 70–99)
GLUCOSE BLD-MCNC: 122 MG/DL (ref 70–99)
GLUCOSE BLD-MCNC: 122 MG/DL (ref 70–99)
GLUCOSE BLD-MCNC: 123 MG/DL (ref 70–99)
GLUCOSE BLD-MCNC: 125 MG/DL (ref 70–99)
GLUCOSE BLD-MCNC: 126 MG/DL (ref 70–99)
GLUCOSE BLD-MCNC: 128 MG/DL (ref 70–99)
GLUCOSE BLD-MCNC: 128 MG/DL (ref 70–99)
GLUCOSE BLD-MCNC: 130 MG/DL (ref 70–99)
GLUCOSE BLD-MCNC: 132 MG/DL (ref 70–99)
GLUCOSE BLD-MCNC: 132 MG/DL (ref 70–99)
GLUCOSE BLD-MCNC: 134 MG/DL (ref 70–99)
GLUCOSE BLD-MCNC: 135 MG/DL (ref 70–99)
GLUCOSE BLD-MCNC: 136 MG/DL (ref 70–99)
GLUCOSE BLD-MCNC: 137 MG/DL (ref 70–99)
GLUCOSE BLD-MCNC: 137 MG/DL (ref 70–99)
GLUCOSE BLD-MCNC: 138 MG/DL (ref 70–99)
GLUCOSE BLD-MCNC: 139 MG/DL (ref 70–99)
GLUCOSE BLD-MCNC: 139 MG/DL (ref 70–99)
GLUCOSE BLD-MCNC: 140 MG/DL (ref 70–99)
GLUCOSE BLD-MCNC: 141 MG/DL (ref 70–99)
GLUCOSE BLD-MCNC: 142 MG/DL (ref 70–99)
GLUCOSE BLD-MCNC: 144 MG/DL (ref 70–99)
GLUCOSE BLD-MCNC: 147 MG/DL (ref 70–99)
GLUCOSE BLD-MCNC: 148 MG/DL (ref 70–99)
GLUCOSE BLD-MCNC: 149 MG/DL (ref 70–99)
GLUCOSE BLD-MCNC: 150 MG/DL (ref 70–99)
GLUCOSE BLD-MCNC: 153 MG/DL (ref 70–99)
GLUCOSE BLD-MCNC: 154 MG/DL (ref 70–99)
GLUCOSE BLD-MCNC: 156 MG/DL (ref 70–99)
GLUCOSE BLD-MCNC: 158 MG/DL (ref 70–99)
GLUCOSE BLD-MCNC: 158 MG/DL (ref 70–99)
GLUCOSE BLD-MCNC: 159 MG/DL (ref 70–99)
GLUCOSE BLD-MCNC: 160 MG/DL (ref 70–99)
GLUCOSE BLD-MCNC: 161 MG/DL (ref 70–99)
GLUCOSE BLD-MCNC: 161 MG/DL (ref 70–99)
GLUCOSE BLD-MCNC: 163 MG/DL (ref 70–99)
GLUCOSE BLD-MCNC: 164 MG/DL (ref 70–99)
GLUCOSE BLD-MCNC: 165 MG/DL (ref 70–99)
GLUCOSE BLD-MCNC: 166 MG/DL (ref 70–99)
GLUCOSE BLD-MCNC: 167 MG/DL (ref 70–99)
GLUCOSE BLD-MCNC: 168 MG/DL (ref 70–99)
GLUCOSE BLD-MCNC: 169 MG/DL (ref 70–99)
GLUCOSE BLD-MCNC: 169 MG/DL (ref 70–99)
GLUCOSE BLD-MCNC: 172 MG/DL (ref 70–99)
GLUCOSE BLD-MCNC: 174 MG/DL (ref 70–99)
GLUCOSE BLD-MCNC: 175 MG/DL (ref 70–99)
GLUCOSE BLD-MCNC: 177 MG/DL (ref 70–99)
GLUCOSE BLD-MCNC: 177 MG/DL (ref 70–99)
GLUCOSE BLD-MCNC: 178 MG/DL (ref 70–99)
GLUCOSE BLD-MCNC: 180 MG/DL (ref 70–99)
GLUCOSE BLD-MCNC: 181 MG/DL (ref 70–99)
GLUCOSE BLD-MCNC: 181 MG/DL (ref 70–99)
GLUCOSE BLD-MCNC: 182 MG/DL (ref 70–99)
GLUCOSE BLD-MCNC: 183 MG/DL (ref 70–99)
GLUCOSE BLD-MCNC: 184 MG/DL (ref 70–99)
GLUCOSE BLD-MCNC: 186 MG/DL (ref 70–99)
GLUCOSE BLD-MCNC: 186 MG/DL (ref 70–99)
GLUCOSE BLD-MCNC: 188 MG/DL (ref 70–99)
GLUCOSE BLD-MCNC: 189 MG/DL (ref 70–99)
GLUCOSE BLD-MCNC: 190 MG/DL (ref 70–99)
GLUCOSE BLD-MCNC: 190 MG/DL (ref 70–99)
GLUCOSE BLD-MCNC: 191 MG/DL (ref 70–99)
GLUCOSE BLD-MCNC: 192 MG/DL (ref 70–99)
GLUCOSE BLD-MCNC: 199 MG/DL (ref 70–99)
GLUCOSE BLD-MCNC: 200 MG/DL (ref 70–99)
GLUCOSE BLD-MCNC: 201 MG/DL (ref 70–99)
GLUCOSE BLD-MCNC: 201 MG/DL (ref 70–99)
GLUCOSE BLD-MCNC: 203 MG/DL (ref 70–99)
GLUCOSE BLD-MCNC: 205 MG/DL (ref 70–99)
GLUCOSE BLD-MCNC: 205 MG/DL (ref 70–99)
GLUCOSE BLD-MCNC: 208 MG/DL (ref 70–99)
GLUCOSE BLD-MCNC: 210 MG/DL (ref 70–99)
GLUCOSE BLD-MCNC: 210 MG/DL (ref 70–99)
GLUCOSE BLD-MCNC: 212 MG/DL (ref 70–99)
GLUCOSE BLD-MCNC: 242 MG/DL (ref 70–99)
GLUCOSE BLD-MCNC: 243 MG/DL (ref 70–99)
GLUCOSE BLD-MCNC: 259 MG/DL (ref 70–99)
GLUCOSE BLD-MCNC: 276 MG/DL (ref 70–99)
GLUCOSE BLD-MCNC: 351 MG/DL (ref 70–99)
GLUCOSE BLD-MCNC: 365 MG/DL (ref 70–99)
GLUCOSE BLD-MCNC: 45 MG/DL (ref 70–99)
GLUCOSE BLD-MCNC: 50 MG/DL (ref 70–99)
GLUCOSE BLD-MCNC: 58 MG/DL (ref 70–99)
GLUCOSE BLD-MCNC: 61 MG/DL (ref 70–99)
GLUCOSE BLD-MCNC: 63 MG/DL (ref 70–99)
GLUCOSE BLD-MCNC: 63 MG/DL (ref 70–99)
GLUCOSE BLD-MCNC: 64 MG/DL (ref 70–99)
GLUCOSE BLD-MCNC: 65 MG/DL (ref 70–99)
GLUCOSE BLD-MCNC: 66 MG/DL (ref 70–99)
GLUCOSE BLD-MCNC: 67 MG/DL (ref 70–99)
GLUCOSE BLD-MCNC: 68 MG/DL (ref 70–99)
GLUCOSE BLD-MCNC: 68 MG/DL (ref 70–99)
GLUCOSE BLD-MCNC: 69 MG/DL (ref 70–99)
GLUCOSE BLD-MCNC: 70 MG/DL (ref 70–99)
GLUCOSE BLD-MCNC: 71 MG/DL (ref 70–99)
GLUCOSE BLD-MCNC: 72 MG/DL (ref 70–99)
GLUCOSE BLD-MCNC: 73 MG/DL (ref 70–99)
GLUCOSE BLD-MCNC: 73 MG/DL (ref 70–99)
GLUCOSE BLD-MCNC: 74 MG/DL (ref 70–99)
GLUCOSE BLD-MCNC: 75 MG/DL (ref 70–99)
GLUCOSE BLD-MCNC: 75 MG/DL (ref 70–99)
GLUCOSE BLD-MCNC: 76 MG/DL (ref 70–99)
GLUCOSE BLD-MCNC: 77 MG/DL (ref 70–99)
GLUCOSE BLD-MCNC: 78 MG/DL (ref 70–99)
GLUCOSE BLD-MCNC: 79 MG/DL (ref 70–99)
GLUCOSE BLD-MCNC: 80 MG/DL (ref 70–99)
GLUCOSE BLD-MCNC: 80 MG/DL (ref 70–99)
GLUCOSE BLD-MCNC: 81 MG/DL (ref 70–99)
GLUCOSE BLD-MCNC: 81 MG/DL (ref 70–99)
GLUCOSE BLD-MCNC: 82 MG/DL (ref 70–99)
GLUCOSE BLD-MCNC: 83 MG/DL (ref 70–99)
GLUCOSE BLD-MCNC: 84 MG/DL (ref 70–99)
GLUCOSE BLD-MCNC: 85 MG/DL (ref 70–99)
GLUCOSE BLD-MCNC: 85 MG/DL (ref 70–99)
GLUCOSE BLD-MCNC: 87 MG/DL (ref 70–99)
GLUCOSE BLD-MCNC: 88 MG/DL (ref 70–99)
GLUCOSE BLD-MCNC: 89 MG/DL (ref 70–99)
GLUCOSE BLD-MCNC: 90 MG/DL (ref 70–99)
GLUCOSE BLD-MCNC: 90 MG/DL (ref 70–99)
GLUCOSE BLD-MCNC: 91 MG/DL (ref 70–99)
GLUCOSE BLD-MCNC: 92 MG/DL (ref 70–99)
GLUCOSE BLD-MCNC: 94 MG/DL (ref 70–99)
GLUCOSE BLD-MCNC: 94 MG/DL (ref 70–99)
GLUCOSE BLD-MCNC: 95 MG/DL (ref 70–99)
GLUCOSE BLD-MCNC: 96 MG/DL (ref 70–99)
GLUCOSE BLD-MCNC: 98 MG/DL (ref 70–99)
GLUCOSE BLD-MCNC: 98 MG/DL (ref 70–99)
GLUCOSE BLD-MCNC: 99 MG/DL (ref 70–99)
GLUCOSE URINE: 100 MG/DL
GLUCOSE URINE: NEGATIVE MG/DL
GRAM STAIN RESULT: ABNORMAL
HAV IGM SER IA-ACNC: NORMAL
HAV IGM SER IA-ACNC: NORMAL
HBA1C MFR BLD: 7.1 %
HBA1C MFR BLD: 7.6 %
HBA1C MFR BLD: 7.6 %
HCO3 ARTERIAL: 13.1 MMOL/L (ref 21–29)
HCO3 ARTERIAL: 13.3 MMOL/L (ref 21–29)
HCO3 ARTERIAL: 14.5 MMOL/L (ref 21–29)
HCO3 ARTERIAL: 15.7 MMOL/L (ref 21–29)
HCO3 ARTERIAL: 16 MMOL/L (ref 21–29)
HCO3 ARTERIAL: 16.4 MMOL/L (ref 21–29)
HCO3 ARTERIAL: 17.4 MMOL/L (ref 21–29)
HCO3 ARTERIAL: 18.9 MMOL/L (ref 21–29)
HCO3 ARTERIAL: 19.8 MMOL/L (ref 21–29)
HCO3 ARTERIAL: 20.1 MMOL/L (ref 21–29)
HCO3 ARTERIAL: 20.4 MMOL/L (ref 21–29)
HCO3 ARTERIAL: 20.5 MMOL/L (ref 21–29)
HCO3 ARTERIAL: 20.6 MMOL/L (ref 21–29)
HCO3 ARTERIAL: 20.7 MMOL/L (ref 21–29)
HCO3 ARTERIAL: 21.1 MMOL/L (ref 21–29)
HCO3 ARTERIAL: 21.3 MMOL/L (ref 21–29)
HCO3 ARTERIAL: 21.4 MMOL/L (ref 21–29)
HCO3 ARTERIAL: 21.8 MMOL/L (ref 21–29)
HCO3 ARTERIAL: 21.9 MMOL/L (ref 21–29)
HCO3 ARTERIAL: 22.5 MMOL/L (ref 21–29)
HCO3 ARTERIAL: 22.6 MMOL/L (ref 21–29)
HCO3 ARTERIAL: 22.6 MMOL/L (ref 21–29)
HCO3 ARTERIAL: 22.9 MMOL/L (ref 21–29)
HCO3 ARTERIAL: 23 MMOL/L (ref 21–29)
HCO3 ARTERIAL: 23.2 MMOL/L (ref 21–29)
HCO3 ARTERIAL: 23.2 MMOL/L (ref 21–29)
HCO3 ARTERIAL: 23.3 MMOL/L (ref 21–29)
HCO3 ARTERIAL: 23.8 MMOL/L (ref 21–29)
HCO3 ARTERIAL: 23.9 MMOL/L (ref 21–29)
HCO3 ARTERIAL: 24 MMOL/L (ref 21–29)
HCO3 ARTERIAL: 24 MMOL/L (ref 21–29)
HCO3 ARTERIAL: 24.5 MMOL/L (ref 21–29)
HCO3 ARTERIAL: 24.7 MMOL/L (ref 21–29)
HCO3 ARTERIAL: 25.2 MMOL/L (ref 21–29)
HCO3 ARTERIAL: 25.4 MMOL/L (ref 21–29)
HCO3 ARTERIAL: 26 MMOL/L (ref 21–29)
HCO3 ARTERIAL: 26 MMOL/L (ref 21–29)
HCO3 ARTERIAL: 26.2 MMOL/L (ref 21–29)
HCO3 ARTERIAL: 26.3 MMOL/L (ref 21–29)
HCO3 ARTERIAL: 26.5 MMOL/L (ref 21–29)
HCO3 ARTERIAL: 26.7 MMOL/L (ref 21–29)
HCO3 ARTERIAL: 27.3 MMOL/L (ref 21–29)
HCO3 ARTERIAL: 27.8 MMOL/L (ref 21–29)
HCO3 ARTERIAL: 29.6 MMOL/L (ref 21–29)
HCO3 VENOUS: 22.5 MMOL/L (ref 23–29)
HCT VFR BLD CALC: 21.9 % (ref 40.5–52.5)
HCT VFR BLD CALC: 25.2 % (ref 40.5–52.5)
HCT VFR BLD CALC: 25.5 % (ref 40.5–52.5)
HCT VFR BLD CALC: 25.8 % (ref 40.5–52.5)
HCT VFR BLD CALC: 26.3 % (ref 40.5–52.5)
HCT VFR BLD CALC: 26.5 % (ref 40.5–52.5)
HCT VFR BLD CALC: 26.7 % (ref 40.5–52.5)
HCT VFR BLD CALC: 27.6 % (ref 40.5–52.5)
HCT VFR BLD CALC: 27.8 % (ref 40.5–52.5)
HCT VFR BLD CALC: 28.1 % (ref 40.5–52.5)
HCT VFR BLD CALC: 28.4 % (ref 40.5–52.5)
HCT VFR BLD CALC: 28.4 % (ref 40.5–52.5)
HCT VFR BLD CALC: 29.5 % (ref 40.5–52.5)
HCT VFR BLD CALC: 29.7 % (ref 40.5–52.5)
HCT VFR BLD CALC: 29.9 % (ref 40.5–52.5)
HCT VFR BLD CALC: 30.1 % (ref 40.5–52.5)
HCT VFR BLD CALC: 30.2 % (ref 40.5–52.5)
HCT VFR BLD CALC: 30.3 % (ref 40.5–52.5)
HCT VFR BLD CALC: 30.9 % (ref 40.5–52.5)
HCT VFR BLD CALC: 31.3 % (ref 40.5–52.5)
HCT VFR BLD CALC: 31.6 % (ref 40.5–52.5)
HCT VFR BLD CALC: 32.1 % (ref 40.5–52.5)
HCT VFR BLD CALC: 32.1 % (ref 40.5–52.5)
HCT VFR BLD CALC: 32.2 % (ref 40.5–52.5)
HCT VFR BLD CALC: 32.7 % (ref 40.5–52.5)
HCT VFR BLD CALC: 33.6 % (ref 40.5–52.5)
HCT VFR BLD CALC: 33.7 % (ref 40.5–52.5)
HCT VFR BLD CALC: 35.5 % (ref 40.5–52.5)
HCT VFR BLD CALC: 35.5 % (ref 40.5–52.5)
HCT VFR BLD CALC: 35.9 % (ref 40.5–52.5)
HCT VFR BLD CALC: 37.3 % (ref 40.5–52.5)
HCT VFR BLD CALC: 38.3 % (ref 40.5–52.5)
HCT VFR BLD CALC: 38.6 % (ref 40.5–52.5)
HCT VFR BLD CALC: 39.3 % (ref 40.5–52.5)
HDLC SERPL-MCNC: 30 MG/DL (ref 40–60)
HDLC SERPL-MCNC: 31 MG/DL (ref 40–60)
HEMOGLOBIN, ART, EXTENDED: 11.4 G/DL (ref 13.5–17.5)
HEMOGLOBIN, ART, EXTENDED: 9 G/DL (ref 13.5–17.5)
HEMOGLOBIN: 10 G/DL (ref 13.5–17.5)
HEMOGLOBIN: 10 GM/DL (ref 13.5–17.5)
HEMOGLOBIN: 10.1 G/DL (ref 13.5–17.5)
HEMOGLOBIN: 10.2 G/DL (ref 13.5–17.5)
HEMOGLOBIN: 10.2 GM/DL (ref 13.5–17.5)
HEMOGLOBIN: 10.3 G/DL (ref 13.5–17.5)
HEMOGLOBIN: 10.4 G/DL (ref 13.5–17.5)
HEMOGLOBIN: 10.4 GM/DL (ref 13.5–17.5)
HEMOGLOBIN: 10.6 G/DL (ref 13.5–17.5)
HEMOGLOBIN: 10.6 GM/DL (ref 13.5–17.5)
HEMOGLOBIN: 10.9 G/DL (ref 13.5–17.5)
HEMOGLOBIN: 10.9 GM/DL (ref 13.5–17.5)
HEMOGLOBIN: 11.1 GM/DL (ref 13.5–17.5)
HEMOGLOBIN: 11.3 G/DL (ref 13.5–17.5)
HEMOGLOBIN: 11.3 G/DL (ref 13.5–17.5)
HEMOGLOBIN: 11.6 G/DL (ref 13.5–17.5)
HEMOGLOBIN: 12 G/DL (ref 13.5–17.5)
HEMOGLOBIN: 12.3 G/DL (ref 13.5–17.5)
HEMOGLOBIN: 12.4 G/DL (ref 13.5–17.5)
HEMOGLOBIN: 12.4 GM/DL (ref 13.5–17.5)
HEMOGLOBIN: 12.5 G/DL (ref 13.5–17.5)
HEMOGLOBIN: 6.9 G/DL (ref 13.5–17.5)
HEMOGLOBIN: 7.5 GM/DL (ref 13.5–17.5)
HEMOGLOBIN: 7.7 GM/DL (ref 13.5–17.5)
HEMOGLOBIN: 7.9 GM/DL (ref 13.5–17.5)
HEMOGLOBIN: 7.9 GM/DL (ref 13.5–17.5)
HEMOGLOBIN: 8 G/DL (ref 13.5–17.5)
HEMOGLOBIN: 8 G/DL (ref 13.5–17.5)
HEMOGLOBIN: 8 GM/DL (ref 13.5–17.5)
HEMOGLOBIN: 8.1 GM/DL (ref 13.5–17.5)
HEMOGLOBIN: 8.2 G/DL (ref 13.5–17.5)
HEMOGLOBIN: 8.4 G/DL (ref 13.5–17.5)
HEMOGLOBIN: 8.5 G/DL (ref 13.5–17.5)
HEMOGLOBIN: 8.7 G/DL (ref 13.5–17.5)
HEMOGLOBIN: 8.7 G/DL (ref 13.5–17.5)
HEMOGLOBIN: 8.9 G/DL (ref 13.5–17.5)
HEMOGLOBIN: 8.9 G/DL (ref 13.5–17.5)
HEMOGLOBIN: 9 G/DL (ref 13.5–17.5)
HEMOGLOBIN: 9 G/DL (ref 13.5–17.5)
HEMOGLOBIN: 9.1 G/DL (ref 13.5–17.5)
HEMOGLOBIN: 9.2 G/DL (ref 13.5–17.5)
HEMOGLOBIN: 9.4 G/DL (ref 13.5–17.5)
HEMOGLOBIN: 9.4 G/DL (ref 13.5–17.5)
HEMOGLOBIN: 9.4 GM/DL (ref 13.5–17.5)
HEMOGLOBIN: 9.5 G/DL (ref 13.5–17.5)
HEMOGLOBIN: 9.6 GM/DL (ref 13.5–17.5)
HEMOGLOBIN: 9.7 G/DL (ref 13.5–17.5)
HEMOGLOBIN: 9.9 GM/DL (ref 13.5–17.5)
HEPARIN INDUCED PLATELET ANTIBODY: POSITIVE
HEPARIN UNFRACTIONATED: NEGATIVE
HEPATITIS B CORE IGM ANTIBODY: NORMAL
HEPATITIS B CORE IGM ANTIBODY: NORMAL
HEPATITIS B SURFACE ANTIGEN INTERPRETATION: NORMAL
HEPATITIS B SURFACE ANTIGEN INTERPRETATION: NORMAL
HEPATITIS C ANTIBODY INTERPRETATION: NORMAL
HEPATITIS C ANTIBODY INTERPRETATION: NORMAL
HYALINE CASTS: 2 /LPF (ref 0–8)
HYALINE CASTS: ABNORMAL /LPF (ref 0–2)
INR BLD: 1.09 (ref 0.86–1.14)
INR BLD: 2.4 (ref 0.86–1.14)
INR BLD: 2.55 (ref 0.86–1.14)
INR BLD: 2.65 (ref 0.86–1.14)
INR BLD: 2.67 (ref 0.86–1.14)
INR BLD: 2.71 (ref 0.86–1.14)
INR BLD: 2.84 (ref 0.86–1.14)
INR BLD: 3.03 (ref 0.86–1.14)
INR BLD: 3.03 (ref 0.86–1.14)
INR BLD: 3.36 (ref 0.86–1.14)
INR BLD: 3.45 (ref 0.86–1.14)
KETONES, URINE: ABNORMAL MG/DL
KETONES, URINE: NEGATIVE MG/DL
LACTATE: 0.85 MMOL/L (ref 0.4–2)
LACTATE: 1.18 MMOL/L (ref 0.4–2)
LACTATE: 1.21 MMOL/L (ref 0.4–2)
LACTATE: 1.41 MMOL/L (ref 0.4–2)
LACTATE: 1.51 MMOL/L (ref 0.4–2)
LACTATE: 1.67 MMOL/L (ref 0.4–2)
LACTATE: 1.77 MMOL/L (ref 0.4–2)
LACTATE: 12.54 MMOL/L (ref 0.4–2)
LACTATE: 2.13 MMOL/L (ref 0.4–2)
LACTATE: 2.81 MMOL/L (ref 0.4–2)
LACTATE: 3.47 MMOL/L (ref 0.4–2)
LACTATE: 4.99 MMOL/L (ref 0.4–2)
LACTATE: 9.05 MMOL/L (ref 0.4–2)
LDL CHOLESTEROL CALCULATED: 187 MG/DL
LDL CHOLESTEROL CALCULATED: 220 MG/DL
LEFT VENTRICULAR EJECTION FRACTION MODE: NORMAL
LEUKOCYTE ESTERASE, URINE: ABNORMAL
LEUKOCYTE ESTERASE, URINE: NEGATIVE
LV EF: 50 %
LV EF: 50 %
LVEF MODALITY: NORMAL
LYMPHOCYTES ABSOLUTE: 1 K/UL (ref 1–5.1)
LYMPHOCYTES ABSOLUTE: 1.5 K/UL (ref 1–5.1)
LYMPHOCYTES ABSOLUTE: 1.5 K/UL (ref 1–5.1)
LYMPHOCYTES RELATIVE PERCENT: 12 %
LYMPHOCYTES RELATIVE PERCENT: 14.8 %
LYMPHOCYTES RELATIVE PERCENT: 22.6 %
MAGNESIUM: 2 MG/DL (ref 1.8–2.4)
MAGNESIUM: 2 MG/DL (ref 1.8–2.4)
MAGNESIUM: 2.1 MG/DL (ref 1.8–2.4)
MAGNESIUM: 2.2 MG/DL (ref 1.8–2.4)
MAGNESIUM: 2.3 MG/DL (ref 1.8–2.4)
MAGNESIUM: 2.3 MG/DL (ref 1.8–2.4)
MAGNESIUM: 2.5 MG/DL (ref 1.8–2.4)
MAGNESIUM: 2.5 MG/DL (ref 1.8–2.4)
MAGNESIUM: 2.6 MG/DL (ref 1.8–2.4)
MCH RBC QN AUTO: 27.3 PG (ref 26–34)
MCH RBC QN AUTO: 27.6 PG (ref 26–34)
MCH RBC QN AUTO: 27.8 PG (ref 26–34)
MCH RBC QN AUTO: 27.9 PG (ref 26–34)
MCH RBC QN AUTO: 28 PG (ref 26–34)
MCH RBC QN AUTO: 28.1 PG (ref 26–34)
MCH RBC QN AUTO: 28.2 PG (ref 26–34)
MCH RBC QN AUTO: 28.2 PG (ref 26–34)
MCH RBC QN AUTO: 28.3 PG (ref 26–34)
MCH RBC QN AUTO: 28.4 PG (ref 26–34)
MCH RBC QN AUTO: 28.4 PG (ref 26–34)
MCH RBC QN AUTO: 28.5 PG (ref 26–34)
MCH RBC QN AUTO: 28.5 PG (ref 26–34)
MCH RBC QN AUTO: 28.6 PG (ref 26–34)
MCH RBC QN AUTO: 28.6 PG (ref 26–34)
MCH RBC QN AUTO: 28.7 PG (ref 26–34)
MCH RBC QN AUTO: 28.8 PG (ref 26–34)
MCH RBC QN AUTO: 28.8 PG (ref 26–34)
MCH RBC QN AUTO: 28.9 PG (ref 26–34)
MCH RBC QN AUTO: 29.2 PG (ref 26–34)
MCH RBC QN AUTO: 29.3 PG (ref 26–34)
MCHC RBC AUTO-ENTMCNC: 29.7 G/DL (ref 31–36)
MCHC RBC AUTO-ENTMCNC: 30.4 G/DL (ref 31–36)
MCHC RBC AUTO-ENTMCNC: 31.3 G/DL (ref 31–36)
MCHC RBC AUTO-ENTMCNC: 31.3 G/DL (ref 31–36)
MCHC RBC AUTO-ENTMCNC: 31.4 G/DL (ref 31–36)
MCHC RBC AUTO-ENTMCNC: 31.4 G/DL (ref 31–36)
MCHC RBC AUTO-ENTMCNC: 31.6 G/DL (ref 31–36)
MCHC RBC AUTO-ENTMCNC: 31.7 G/DL (ref 31–36)
MCHC RBC AUTO-ENTMCNC: 31.7 G/DL (ref 31–36)
MCHC RBC AUTO-ENTMCNC: 31.8 G/DL (ref 31–36)
MCHC RBC AUTO-ENTMCNC: 31.9 G/DL (ref 31–36)
MCHC RBC AUTO-ENTMCNC: 32 G/DL (ref 31–36)
MCHC RBC AUTO-ENTMCNC: 32.1 G/DL (ref 31–36)
MCHC RBC AUTO-ENTMCNC: 32.1 G/DL (ref 31–36)
MCHC RBC AUTO-ENTMCNC: 32.2 G/DL (ref 31–36)
MCHC RBC AUTO-ENTMCNC: 32.3 G/DL (ref 31–36)
MCHC RBC AUTO-ENTMCNC: 32.4 G/DL (ref 31–36)
MCHC RBC AUTO-ENTMCNC: 32.5 G/DL (ref 31–36)
MCHC RBC AUTO-ENTMCNC: 32.5 G/DL (ref 31–36)
MCV RBC AUTO: 85.7 FL (ref 80–100)
MCV RBC AUTO: 85.8 FL (ref 80–100)
MCV RBC AUTO: 86.9 FL (ref 80–100)
MCV RBC AUTO: 86.9 FL (ref 80–100)
MCV RBC AUTO: 87 FL (ref 80–100)
MCV RBC AUTO: 87 FL (ref 80–100)
MCV RBC AUTO: 87.1 FL (ref 80–100)
MCV RBC AUTO: 87.3 FL (ref 80–100)
MCV RBC AUTO: 87.5 FL (ref 80–100)
MCV RBC AUTO: 87.8 FL (ref 80–100)
MCV RBC AUTO: 88.3 FL (ref 80–100)
MCV RBC AUTO: 88.5 FL (ref 80–100)
MCV RBC AUTO: 88.5 FL (ref 80–100)
MCV RBC AUTO: 88.6 FL (ref 80–100)
MCV RBC AUTO: 88.8 FL (ref 80–100)
MCV RBC AUTO: 89.1 FL (ref 80–100)
MCV RBC AUTO: 89.2 FL (ref 80–100)
MCV RBC AUTO: 89.3 FL (ref 80–100)
MCV RBC AUTO: 89.4 FL (ref 80–100)
MCV RBC AUTO: 90 FL (ref 80–100)
MCV RBC AUTO: 90.6 FL (ref 80–100)
MCV RBC AUTO: 90.7 FL (ref 80–100)
MCV RBC AUTO: 91.8 FL (ref 80–100)
MCV RBC AUTO: 92.3 FL (ref 80–100)
MCV RBC AUTO: 94.1 FL (ref 80–100)
MCV RBC AUTO: 96.1 FL (ref 80–100)
METAMYELOCYTES RELATIVE PERCENT: 1 %
METHEMOGLOBIN ARTERIAL: 0 %
METHEMOGLOBIN ARTERIAL: 0.4 %
MICROSCOPIC EXAMINATION: YES
MICROSCOPIC EXAMINATION: YES
MONOCYTES ABSOLUTE: 0.7 K/UL (ref 0–1.3)
MONOCYTES ABSOLUTE: 0.7 K/UL (ref 0–1.3)
MONOCYTES ABSOLUTE: 1 K/UL (ref 0–1.3)
MONOCYTES RELATIVE PERCENT: 10 %
MONOCYTES RELATIVE PERCENT: 11.5 %
MONOCYTES RELATIVE PERCENT: 8 %
MRSA SCREEN RT-PCR: NORMAL
NEUTROPHILS ABSOLUTE: 3.7 K/UL (ref 1.7–7.7)
NEUTROPHILS ABSOLUTE: 4.6 K/UL (ref 1.7–7.7)
NEUTROPHILS ABSOLUTE: 9.5 K/UL (ref 1.7–7.7)
NEUTROPHILS RELATIVE PERCENT: 57.5 %
NEUTROPHILS RELATIVE PERCENT: 70.8 %
NEUTROPHILS RELATIVE PERCENT: 77 %
NITRITE, URINE: NEGATIVE
NITRITE, URINE: NEGATIVE
O2 CONTENT ARTERIAL: 13 ML/DL
O2 CONTENT ARTERIAL: 16 ML/DL
O2 SAT, ARTERIAL: 100 %
O2 SAT, ARTERIAL: 100 % (ref 93–100)
O2 SAT, ARTERIAL: 87 % (ref 93–100)
O2 SAT, ARTERIAL: 89 % (ref 93–100)
O2 SAT, ARTERIAL: 89 % (ref 93–100)
O2 SAT, ARTERIAL: 90 % (ref 93–100)
O2 SAT, ARTERIAL: 91 % (ref 93–100)
O2 SAT, ARTERIAL: 92 % (ref 93–100)
O2 SAT, ARTERIAL: 93 % (ref 93–100)
O2 SAT, ARTERIAL: 94 % (ref 93–100)
O2 SAT, ARTERIAL: 95 % (ref 93–100)
O2 SAT, ARTERIAL: 96 % (ref 93–100)
O2 SAT, ARTERIAL: 97 % (ref 93–100)
O2 SAT, ARTERIAL: 97.5 %
O2 SAT, ARTERIAL: 98 % (ref 93–100)
O2 SAT, VEN: 55 %
O2 THERAPY: ABNORMAL
O2 THERAPY: ABNORMAL
ORGANISM: ABNORMAL
OVALOCYTES: ABNORMAL
PCO2 ARTERIAL: 24.2 MM HG (ref 35–45)
PCO2 ARTERIAL: 25.3 MMHG (ref 35–45)
PCO2 ARTERIAL: 27.6 MM HG (ref 35–45)
PCO2 ARTERIAL: 29.2 MM HG (ref 35–45)
PCO2 ARTERIAL: 29.3 MM HG (ref 35–45)
PCO2 ARTERIAL: 29.8 MM HG (ref 35–45)
PCO2 ARTERIAL: 30.4 MM HG (ref 35–45)
PCO2 ARTERIAL: 33 MM HG (ref 35–45)
PCO2 ARTERIAL: 33.3 MM HG (ref 35–45)
PCO2 ARTERIAL: 33.9 MM HG (ref 35–45)
PCO2 ARTERIAL: 34 MM HG (ref 35–45)
PCO2 ARTERIAL: 34 MM HG (ref 35–45)
PCO2 ARTERIAL: 35.7 MM HG (ref 35–45)
PCO2 ARTERIAL: 35.8 MM HG (ref 35–45)
PCO2 ARTERIAL: 37.2 MM HG (ref 35–45)
PCO2 ARTERIAL: 37.6 MMHG (ref 35–45)
PCO2 ARTERIAL: 37.8 MM HG (ref 35–45)
PCO2 ARTERIAL: 38 MM HG (ref 35–45)
PCO2 ARTERIAL: 38.1 MM HG (ref 35–45)
PCO2 ARTERIAL: 38.6 MM HG (ref 35–45)
PCO2 ARTERIAL: 38.6 MM HG (ref 35–45)
PCO2 ARTERIAL: 38.9 MM HG (ref 35–45)
PCO2 ARTERIAL: 38.9 MM HG (ref 35–45)
PCO2 ARTERIAL: 39.6 MM HG (ref 35–45)
PCO2 ARTERIAL: 39.9 MM HG (ref 35–45)
PCO2 ARTERIAL: 40 MM HG (ref 35–45)
PCO2 ARTERIAL: 41.3 MM HG (ref 35–45)
PCO2 ARTERIAL: 41.9 MM HG (ref 35–45)
PCO2 ARTERIAL: 42 MM HG (ref 35–45)
PCO2 ARTERIAL: 42.2 MM HG (ref 35–45)
PCO2 ARTERIAL: 42.3 MM HG (ref 35–45)
PCO2 ARTERIAL: 42.3 MM HG (ref 35–45)
PCO2 ARTERIAL: 42.4 MM HG (ref 35–45)
PCO2 ARTERIAL: 43.1 MM HG (ref 35–45)
PCO2 ARTERIAL: 43.7 MM HG (ref 35–45)
PCO2 ARTERIAL: 44.4 MM HG (ref 35–45)
PCO2 ARTERIAL: 44.9 MM HG (ref 35–45)
PCO2 ARTERIAL: 45.2 MM HG (ref 35–45)
PCO2 ARTERIAL: 45.7 MM HG (ref 35–45)
PCO2 ARTERIAL: 46.4 MM HG (ref 35–45)
PCO2 ARTERIAL: 47.6 MM HG (ref 35–45)
PCO2 ARTERIAL: 47.9 MM HG (ref 35–45)
PCO2 ARTERIAL: 50.7 MM HG (ref 35–45)
PCO2 ARTERIAL: 52.4 MM HG (ref 35–45)
PCO2 ARTERIAL: 56.7 MM HG (ref 35–45)
PCO2 ARTERIAL: 67.5 MM HG (ref 35–45)
PCO2, VEN: 36.1 MM HG (ref 40–50)
PDW BLD-RTO: 16 % (ref 12.4–15.4)
PDW BLD-RTO: 16.1 % (ref 12.4–15.4)
PDW BLD-RTO: 16.2 % (ref 12.4–15.4)
PDW BLD-RTO: 16.2 % (ref 12.4–15.4)
PDW BLD-RTO: 16.3 % (ref 12.4–15.4)
PDW BLD-RTO: 16.3 % (ref 12.4–15.4)
PDW BLD-RTO: 16.4 % (ref 12.4–15.4)
PDW BLD-RTO: 16.5 % (ref 12.4–15.4)
PDW BLD-RTO: 16.9 % (ref 12.4–15.4)
PDW BLD-RTO: 17 % (ref 12.4–15.4)
PDW BLD-RTO: 17.2 % (ref 12.4–15.4)
PDW BLD-RTO: 17.5 % (ref 12.4–15.4)
PDW BLD-RTO: 17.5 % (ref 12.4–15.4)
PDW BLD-RTO: 17.6 % (ref 12.4–15.4)
PDW BLD-RTO: 17.6 % (ref 12.4–15.4)
PDW BLD-RTO: 17.9 % (ref 12.4–15.4)
PDW BLD-RTO: 18 % (ref 12.4–15.4)
PDW BLD-RTO: 18.1 % (ref 12.4–15.4)
PDW BLD-RTO: 18.2 % (ref 12.4–15.4)
PDW BLD-RTO: 18.6 % (ref 12.4–15.4)
PDW BLD-RTO: 18.7 % (ref 12.4–15.4)
PDW BLD-RTO: 18.8 % (ref 12.4–15.4)
PDW BLD-RTO: 19.1 % (ref 12.4–15.4)
PDW BLD-RTO: 19.2 % (ref 12.4–15.4)
PDW BLD-RTO: 20.3 % (ref 12.4–15.4)
PDW BLD-RTO: 20.4 % (ref 12.4–15.4)
PDW BLD-RTO: 20.7 % (ref 12.4–15.4)
PDW BLD-RTO: 20.9 % (ref 12.4–15.4)
PERFORMED ON: ABNORMAL
PERFORMED ON: NORMAL
PH ARTERIAL: 7.14 (ref 7.35–7.45)
PH ARTERIAL: 7.2 (ref 7.35–7.45)
PH ARTERIAL: 7.25 (ref 7.35–7.45)
PH ARTERIAL: 7.26 (ref 7.35–7.45)
PH ARTERIAL: 7.28 (ref 7.35–7.45)
PH ARTERIAL: 7.29 (ref 7.35–7.45)
PH ARTERIAL: 7.32 (ref 7.35–7.45)
PH ARTERIAL: 7.33 (ref 7.35–7.45)
PH ARTERIAL: 7.34 (ref 7.35–7.45)
PH ARTERIAL: 7.35 (ref 7.35–7.45)
PH ARTERIAL: 7.36 (ref 7.35–7.45)
PH ARTERIAL: 7.37 (ref 7.35–7.45)
PH ARTERIAL: 7.38 (ref 7.35–7.45)
PH ARTERIAL: 7.39 (ref 7.35–7.45)
PH ARTERIAL: 7.4 (ref 7.35–7.45)
PH ARTERIAL: 7.4 (ref 7.35–7.45)
PH ARTERIAL: 7.41 (ref 7.35–7.45)
PH ARTERIAL: 7.43 (ref 7.35–7.45)
PH ARTERIAL: 7.45 (ref 7.35–7.45)
PH ARTERIAL: 7.46 (ref 7.35–7.45)
PH ARTERIAL: 7.47 (ref 7.35–7.45)
PH ARTERIAL: 7.52 (ref 7.35–7.45)
PH UA: 5 (ref 5–8)
PH UA: 5.5 (ref 5–8)
PH VENOUS: 7.23 (ref 7.35–7.45)
PH VENOUS: 7.31 (ref 7.35–7.45)
PH VENOUS: 7.32 (ref 7.35–7.45)
PH VENOUS: 7.35 (ref 7.35–7.45)
PH VENOUS: 7.37 (ref 7.35–7.45)
PH VENOUS: 7.37 (ref 7.35–7.45)
PH VENOUS: 7.38 (ref 7.35–7.45)
PH VENOUS: 7.39 (ref 7.35–7.45)
PH VENOUS: 7.4 (ref 7.35–7.45)
PH VENOUS: 7.41 (ref 7.35–7.45)
PH VENOUS: 7.43 (ref 7.35–7.45)
PH VENOUS: 7.46 (ref 7.35–7.45)
PH VENOUS: 7.46 (ref 7.35–7.45)
PH VENOUS: 7.55 (ref 7.35–7.45)
PHOSPHORUS: 1.6 MG/DL (ref 2.5–4.9)
PHOSPHORUS: 1.9 MG/DL (ref 2.5–4.9)
PHOSPHORUS: 2.3 MG/DL (ref 2.5–4.9)
PHOSPHORUS: 2.3 MG/DL (ref 2.5–4.9)
PHOSPHORUS: 2.4 MG/DL (ref 2.5–4.9)
PHOSPHORUS: 2.5 MG/DL (ref 2.5–4.9)
PHOSPHORUS: 2.8 MG/DL (ref 2.5–4.9)
PHOSPHORUS: 3.8 MG/DL (ref 2.5–4.9)
PHOSPHORUS: 4.6 MG/DL (ref 2.5–4.9)
PHOSPHORUS: 5.2 MG/DL (ref 2.5–4.9)
PHOSPHORUS: 6.7 MG/DL (ref 2.5–4.9)
PHOSPHORUS: 7.5 MG/DL (ref 2.5–4.9)
PLATELET # BLD: 103 K/UL (ref 135–450)
PLATELET # BLD: 111 K/UL (ref 135–450)
PLATELET # BLD: 111 K/UL (ref 135–450)
PLATELET # BLD: 113 K/UL (ref 135–450)
PLATELET # BLD: 124 K/UL (ref 135–450)
PLATELET # BLD: 132 K/UL (ref 135–450)
PLATELET # BLD: 140 K/UL (ref 135–450)
PLATELET # BLD: 150 K/UL (ref 135–450)
PLATELET # BLD: 154 K/UL (ref 135–450)
PLATELET # BLD: 155 K/UL (ref 135–450)
PLATELET # BLD: 159 K/UL (ref 135–450)
PLATELET # BLD: 159 K/UL (ref 135–450)
PLATELET # BLD: 160 K/UL (ref 135–450)
PLATELET # BLD: 162 K/UL (ref 135–450)
PLATELET # BLD: 176 K/UL (ref 135–450)
PLATELET # BLD: 182 K/UL (ref 135–450)
PLATELET # BLD: 183 K/UL (ref 135–450)
PLATELET # BLD: 208 K/UL (ref 135–450)
PLATELET # BLD: 219 K/UL (ref 135–450)
PLATELET # BLD: 220 K/UL (ref 135–450)
PLATELET # BLD: 226 K/UL (ref 135–450)
PLATELET # BLD: 240 K/UL (ref 135–450)
PLATELET # BLD: 250 K/UL (ref 135–450)
PLATELET # BLD: 90 K/UL (ref 135–450)
PLATELET # BLD: 95 K/UL (ref 135–450)
PLATELET # BLD: 97 K/UL (ref 135–450)
PLATELET SLIDE REVIEW: ABNORMAL
PMV BLD AUTO: 10 FL (ref 5–10.5)
PMV BLD AUTO: 10.1 FL (ref 5–10.5)
PMV BLD AUTO: 10.2 FL (ref 5–10.5)
PMV BLD AUTO: 10.2 FL (ref 5–10.5)
PMV BLD AUTO: 10.3 FL (ref 5–10.5)
PMV BLD AUTO: 10.6 FL (ref 5–10.5)
PMV BLD AUTO: 10.9 FL (ref 5–10.5)
PMV BLD AUTO: 9.2 FL (ref 5–10.5)
PMV BLD AUTO: 9.2 FL (ref 5–10.5)
PMV BLD AUTO: 9.3 FL (ref 5–10.5)
PMV BLD AUTO: 9.4 FL (ref 5–10.5)
PMV BLD AUTO: 9.5 FL (ref 5–10.5)
PMV BLD AUTO: 9.5 FL (ref 5–10.5)
PMV BLD AUTO: 9.6 FL (ref 5–10.5)
PMV BLD AUTO: 9.6 FL (ref 5–10.5)
PMV BLD AUTO: 9.7 FL (ref 5–10.5)
PMV BLD AUTO: 9.8 FL (ref 5–10.5)
PMV BLD AUTO: 9.9 FL (ref 5–10.5)
PO2 ARTERIAL: 101.1 MM HG (ref 75–108)
PO2 ARTERIAL: 102.1 MM HG (ref 75–108)
PO2 ARTERIAL: 103.1 MM HG (ref 75–108)
PO2 ARTERIAL: 106.3 MM HG (ref 75–108)
PO2 ARTERIAL: 110 MM HG (ref 75–108)
PO2 ARTERIAL: 110.2 MM HG (ref 75–108)
PO2 ARTERIAL: 111.6 MM HG (ref 75–108)
PO2 ARTERIAL: 114.7 MM HG (ref 75–108)
PO2 ARTERIAL: 177.2 MM HG (ref 75–108)
PO2 ARTERIAL: 270.2 MM HG (ref 75–108)
PO2 ARTERIAL: 325 MMHG (ref 75–108)
PO2 ARTERIAL: 352.3 MM HG (ref 75–108)
PO2 ARTERIAL: 394.2 MM HG (ref 75–108)
PO2 ARTERIAL: 469.8 MM HG (ref 75–108)
PO2 ARTERIAL: 494.5 MM HG (ref 75–108)
PO2 ARTERIAL: 498.1 MM HG (ref 75–108)
PO2 ARTERIAL: 514.2 MM HG (ref 75–108)
PO2 ARTERIAL: 554 MM HG (ref 75–108)
PO2 ARTERIAL: 57.8 MM HG (ref 75–108)
PO2 ARTERIAL: 57.9 MM HG (ref 75–108)
PO2 ARTERIAL: 59.5 MM HG (ref 75–108)
PO2 ARTERIAL: 59.8 MM HG (ref 75–108)
PO2 ARTERIAL: 66.6 MM HG (ref 75–108)
PO2 ARTERIAL: 67.6 MM HG (ref 75–108)
PO2 ARTERIAL: 67.6 MM HG (ref 75–108)
PO2 ARTERIAL: 68.3 MM HG (ref 75–108)
PO2 ARTERIAL: 68.7 MM HG (ref 75–108)
PO2 ARTERIAL: 70.4 MM HG (ref 75–108)
PO2 ARTERIAL: 70.9 MM HG (ref 75–108)
PO2 ARTERIAL: 75.1 MM HG (ref 75–108)
PO2 ARTERIAL: 75.4 MM HG (ref 75–108)
PO2 ARTERIAL: 76.2 MM HG (ref 75–108)
PO2 ARTERIAL: 76.7 MM HG (ref 75–108)
PO2 ARTERIAL: 77.8 MM HG (ref 75–108)
PO2 ARTERIAL: 79.5 MM HG (ref 75–108)
PO2 ARTERIAL: 82.4 MM HG (ref 75–108)
PO2 ARTERIAL: 83 MM HG (ref 75–108)
PO2 ARTERIAL: 83.5 MM HG (ref 75–108)
PO2 ARTERIAL: 89.5 MM HG (ref 75–108)
PO2 ARTERIAL: 89.6 MM HG (ref 75–108)
PO2 ARTERIAL: 89.6 MMHG (ref 75–108)
PO2 ARTERIAL: 89.8 MM HG (ref 75–108)
PO2 ARTERIAL: 90.1 MM HG (ref 75–108)
PO2 ARTERIAL: 94.7 MM HG (ref 75–108)
PO2 ARTERIAL: 95.1 MM HG (ref 75–108)
PO2 ARTERIAL: 98.8 MM HG (ref 75–108)
PO2, VEN: 29 MM HG
POC ACT LR: 151 SEC
POC FIO2: 100
POC FIO2: 70
POC FIO2: 80
POC HEMATOCRIT: 22 % (ref 40.5–52.5)
POC HEMATOCRIT: 23 % (ref 40.5–52.5)
POC HEMATOCRIT: 24 % (ref 40.5–52.5)
POC HEMATOCRIT: 24 % (ref 40.5–52.5)
POC HEMATOCRIT: 28 % (ref 40.5–52.5)
POC HEMATOCRIT: 28 % (ref 40.5–52.5)
POC HEMATOCRIT: 29 % (ref 40.5–52.5)
POC HEMATOCRIT: 29 % (ref 40.5–52.5)
POC HEMATOCRIT: 30 % (ref 40.5–52.5)
POC HEMATOCRIT: 31 % (ref 40.5–52.5)
POC HEMATOCRIT: 31 % (ref 40.5–52.5)
POC HEMATOCRIT: 32 % (ref 40.5–52.5)
POC HEMATOCRIT: 33 % (ref 40.5–52.5)
POC HEMATOCRIT: 37 % (ref 40.5–52.5)
POC PATIENT TEMP: 96.4
POC PATIENT TEMP: 97
POC PATIENT TEMP: 97
POC PATIENT TEMP: 97.2
POC PATIENT TEMP: 97.3
POC PATIENT TEMP: 97.3
POC PATIENT TEMP: 97.5
POC PATIENT TEMP: 97.9
POC PATIENT TEMP: 98
POC PATIENT TEMP: 98.3
POC PATIENT TEMP: 99
POC PATIENT TEMP: 99.1
POC PATIENT TEMP: 99.4
POC PATIENT TEMP: 99.5
POC POTASSIUM: 4 MMOL/L (ref 3.5–5.1)
POC POTASSIUM: 4 MMOL/L (ref 3.5–5.1)
POC POTASSIUM: 4.1 MMOL/L (ref 3.5–5.1)
POC POTASSIUM: 4.1 MMOL/L (ref 3.5–5.1)
POC POTASSIUM: 4.2 MMOL/L (ref 3.5–5.1)
POC POTASSIUM: 4.3 MMOL/L (ref 3.5–5.1)
POC POTASSIUM: 4.6 MMOL/L (ref 3.5–5.1)
POC POTASSIUM: 4.6 MMOL/L (ref 3.5–5.1)
POC POTASSIUM: 4.8 MMOL/L (ref 3.5–5.1)
POC POTASSIUM: 4.8 MMOL/L (ref 3.5–5.1)
POC POTASSIUM: 4.9 MMOL/L (ref 3.5–5.1)
POC POTASSIUM: 4.9 MMOL/L (ref 3.5–5.1)
POC POTASSIUM: 5 MMOL/L (ref 3.5–5.1)
POC POTASSIUM: 5.3 MMOL/L (ref 3.5–5.1)
POC POTASSIUM: 5.3 MMOL/L (ref 3.5–5.1)
POC POTASSIUM: 5.9 MMOL/L (ref 3.5–5.1)
POC SAMPLE TYPE: ABNORMAL
POC SAMPLE TYPE: NORMAL
POC SODIUM: 129 MMOL/L (ref 136–145)
POC SODIUM: 130 MMOL/L (ref 136–145)
POC SODIUM: 132 MMOL/L (ref 136–145)
POC SODIUM: 133 MMOL/L (ref 136–145)
POC SODIUM: 134 MMOL/L (ref 136–145)
POC SODIUM: 137 MMOL/L (ref 136–145)
POC SODIUM: 137 MMOL/L (ref 136–145)
POC SODIUM: 138 MMOL/L (ref 136–145)
POC SODIUM: 138 MMOL/L (ref 136–145)
POC SODIUM: 139 MMOL/L (ref 136–145)
POC SODIUM: 140 MMOL/L (ref 136–145)
POC SODIUM: 141 MMOL/L (ref 136–145)
POC SODIUM: 142 MMOL/L (ref 136–145)
POLYCHROMASIA: ABNORMAL
POTASSIUM REFLEX MAGNESIUM: 3.9 MMOL/L (ref 3.5–5.1)
POTASSIUM REFLEX MAGNESIUM: 4 MMOL/L (ref 3.5–5.1)
POTASSIUM REFLEX MAGNESIUM: 4 MMOL/L (ref 3.5–5.1)
POTASSIUM REFLEX MAGNESIUM: 4.1 MMOL/L (ref 3.5–5.1)
POTASSIUM REFLEX MAGNESIUM: 4.1 MMOL/L (ref 3.5–5.1)
POTASSIUM REFLEX MAGNESIUM: 4.2 MMOL/L (ref 3.5–5.1)
POTASSIUM REFLEX MAGNESIUM: 4.3 MMOL/L (ref 3.5–5.1)
POTASSIUM REFLEX MAGNESIUM: 4.3 MMOL/L (ref 3.5–5.1)
POTASSIUM REFLEX MAGNESIUM: 4.4 MMOL/L (ref 3.5–5.1)
POTASSIUM REFLEX MAGNESIUM: 4.5 MMOL/L (ref 3.5–5.1)
POTASSIUM REFLEX MAGNESIUM: 4.7 MMOL/L (ref 3.5–5.1)
POTASSIUM REFLEX MAGNESIUM: 4.7 MMOL/L (ref 3.5–5.1)
POTASSIUM REFLEX MAGNESIUM: 4.8 MMOL/L (ref 3.5–5.1)
POTASSIUM REFLEX MAGNESIUM: 4.8 MMOL/L (ref 3.5–5.1)
POTASSIUM SERPL-SCNC: 3.9 MMOL/L (ref 3.5–5.1)
POTASSIUM SERPL-SCNC: 4.1 MMOL/L (ref 3.5–5.1)
POTASSIUM SERPL-SCNC: 4.2 MMOL/L (ref 3.5–5.1)
POTASSIUM SERPL-SCNC: 4.2 MMOL/L (ref 3.5–5.1)
POTASSIUM SERPL-SCNC: 4.4 MMOL/L (ref 3.5–5.1)
POTASSIUM SERPL-SCNC: 4.4 MMOL/L (ref 3.5–5.1)
POTASSIUM SERPL-SCNC: 4.5 MMOL/L (ref 3.5–5.1)
POTASSIUM SERPL-SCNC: 4.5 MMOL/L (ref 3.5–5.1)
POTASSIUM SERPL-SCNC: 4.6 MMOL/L (ref 3.5–5.1)
POTASSIUM SERPL-SCNC: 4.8 MMOL/L (ref 3.5–5.1)
POTASSIUM SERPL-SCNC: 4.9 MMOL/L (ref 3.5–5.1)
POTASSIUM SERPL-SCNC: 5 MMOL/L (ref 3.5–5.1)
POTASSIUM SERPL-SCNC: 5.1 MMOL/L (ref 3.5–5.1)
POTASSIUM SERPL-SCNC: 5.1 MMOL/L (ref 3.5–5.1)
POTASSIUM SERPL-SCNC: 5.2 MMOL/L (ref 3.5–5.1)
POTASSIUM SERPL-SCNC: 5.2 MMOL/L (ref 3.5–5.1)
POTASSIUM SERPL-SCNC: 5.3 MMOL/L (ref 3.5–5.1)
POTASSIUM SERPL-SCNC: 5.8 MMOL/L (ref 3.5–5.1)
POTASSIUM SERPL-SCNC: 5.8 MMOL/L (ref 3.5–5.1)
PRO-BNP: 3828 PG/ML (ref 0–124)
PRO-BNP: ABNORMAL PG/ML (ref 0–124)
PROCALCITONIN: 1.46 NG/ML (ref 0–0.15)
PROCALCITONIN: 2.5 NG/ML (ref 0–0.15)
PROSTATE SPECIFIC ANTIGEN: 2.15 NG/ML (ref 0–4)
PROTEIN UA: ABNORMAL MG/DL
PROTEIN UA: NEGATIVE MG/DL
PROTHROMBIN TIME: 12.6 SEC (ref 10–13.2)
PROTHROMBIN TIME: 28.1 SEC (ref 10–13.2)
PROTHROMBIN TIME: 29.9 SEC (ref 10–13.2)
PROTHROMBIN TIME: 31.1 SEC (ref 10–13.2)
PROTHROMBIN TIME: 31.3 SEC (ref 10–13.2)
PROTHROMBIN TIME: 31.7 SEC (ref 10–13.2)
PROTHROMBIN TIME: 33.3 SEC (ref 10–13.2)
PROTHROMBIN TIME: 35.5 SEC (ref 10–13.2)
PROTHROMBIN TIME: 35.6 SEC (ref 10–13.2)
PROTHROMBIN TIME: 39.5 SEC (ref 10–13.2)
PROTHROMBIN TIME: 40.5 SEC (ref 10–13.2)
RBC # BLD: 2.47 M/UL (ref 4.2–5.9)
RBC # BLD: 2.84 M/UL (ref 4.2–5.9)
RBC # BLD: 2.9 M/UL (ref 4.2–5.9)
RBC # BLD: 2.95 M/UL (ref 4.2–5.9)
RBC # BLD: 2.99 M/UL (ref 4.2–5.9)
RBC # BLD: 3.08 M/UL (ref 4.2–5.9)
RBC # BLD: 3.1 M/UL (ref 4.2–5.9)
RBC # BLD: 3.11 M/UL (ref 4.2–5.9)
RBC # BLD: 3.11 M/UL (ref 4.2–5.9)
RBC # BLD: 3.13 M/UL (ref 4.2–5.9)
RBC # BLD: 3.26 M/UL (ref 4.2–5.9)
RBC # BLD: 3.38 M/UL (ref 4.2–5.9)
RBC # BLD: 3.4 M/UL (ref 4.2–5.9)
RBC # BLD: 3.46 M/UL (ref 4.2–5.9)
RBC # BLD: 3.48 M/UL (ref 4.2–5.9)
RBC # BLD: 3.5 M/UL (ref 4.2–5.9)
RBC # BLD: 3.51 M/UL (ref 4.2–5.9)
RBC # BLD: 3.54 M/UL (ref 4.2–5.9)
RBC # BLD: 3.61 M/UL (ref 4.2–5.9)
RBC # BLD: 3.74 M/UL (ref 4.2–5.9)
RBC # BLD: 3.85 M/UL (ref 4.2–5.9)
RBC # BLD: 4.02 M/UL (ref 4.2–5.9)
RBC # BLD: 4.05 M/UL (ref 4.2–5.9)
RBC # BLD: 4.13 M/UL (ref 4.2–5.9)
RBC # BLD: 4.26 M/UL (ref 4.2–5.9)
RBC # BLD: 4.41 M/UL (ref 4.2–5.9)
RBC # BLD: 4.42 M/UL (ref 4.2–5.9)
RBC # BLD: 4.58 M/UL (ref 4.2–5.9)
RBC UA: 0 /HPF (ref 0–4)
RBC UA: 8 /HPF (ref 0–4)
REASON FOR REJECTION: NORMAL
REASON FOR REJECTION: NORMAL
REJECTED TEST: NORMAL
REJECTED TEST: NORMAL
REPORT: NORMAL
SCHISTOCYTES: ABNORMAL
SLIDE REVIEW: ABNORMAL
SODIUM BLD-SCNC: 124 MMOL/L (ref 136–145)
SODIUM BLD-SCNC: 129 MMOL/L (ref 136–145)
SODIUM BLD-SCNC: 130 MMOL/L (ref 136–145)
SODIUM BLD-SCNC: 131 MMOL/L (ref 136–145)
SODIUM BLD-SCNC: 133 MMOL/L (ref 136–145)
SODIUM BLD-SCNC: 133 MMOL/L (ref 136–145)
SODIUM BLD-SCNC: 134 MMOL/L (ref 136–145)
SODIUM BLD-SCNC: 135 MMOL/L (ref 136–145)
SODIUM BLD-SCNC: 136 MMOL/L (ref 136–145)
SODIUM BLD-SCNC: 137 MMOL/L (ref 136–145)
SODIUM BLD-SCNC: 137 MMOL/L (ref 136–145)
SODIUM BLD-SCNC: 138 MMOL/L (ref 136–145)
SODIUM BLD-SCNC: 139 MMOL/L (ref 136–145)
SODIUM BLD-SCNC: 141 MMOL/L (ref 136–145)
SPECIFIC GRAVITY UA: 1.01 (ref 1–1.03)
SPECIFIC GRAVITY UA: 1.01 (ref 1–1.03)
SRA, UNFRACTIONATED HEPARIN INTERPRETATION: NORMAL
SRA, UNFRACTIONATED HEPARIN, HIGH DOSE: 0 %
SRA, UNFRACTIONATED HEPARIN, LOW DOSE: 0 %
TCO2 ARTERIAL: 14 MMOL/L
TCO2 ARTERIAL: 15 MMOL/L
TCO2 ARTERIAL: 17 MMOL/L
TCO2 ARTERIAL: 18 MMOL/L
TCO2 ARTERIAL: 20 MMOL/L
TCO2 ARTERIAL: 21 MMOL/L
TCO2 ARTERIAL: 21 MMOL/L
TCO2 ARTERIAL: 22 MMOL/L
TCO2 ARTERIAL: 23 MMOL/L
TCO2 ARTERIAL: 24 MMOL/L
TCO2 ARTERIAL: 25 MMOL/L
TCO2 ARTERIAL: 26 MMOL/L
TCO2 ARTERIAL: 26 MMOL/L
TCO2 ARTERIAL: 27 MMOL/L
TCO2 ARTERIAL: 28 MMOL/L
TCO2 ARTERIAL: 29 MMOL/L
TCO2 ARTERIAL: 29 MMOL/L
TCO2 ARTERIAL: 31 MMOL/L
TCO2 ARTERIAL: 34.2 MMOL/L
TCO2 ARTERIAL: 53.2 MMOL/L
TCO2 CALC VENOUS: 24 MMOL/L
TOTAL PROTEIN: 4.8 G/DL (ref 6.4–8.2)
TOTAL PROTEIN: 4.9 G/DL (ref 6.4–8.2)
TOTAL PROTEIN: 4.9 G/DL (ref 6.4–8.2)
TOTAL PROTEIN: 5 G/DL (ref 6.4–8.2)
TOTAL PROTEIN: 5 G/DL (ref 6.4–8.2)
TOTAL PROTEIN: 5.2 G/DL (ref 6.4–8.2)
TOTAL PROTEIN: 5.3 G/DL (ref 6.4–8.2)
TOTAL PROTEIN: 5.3 G/DL (ref 6.4–8.2)
TOTAL PROTEIN: 5.4 G/DL (ref 6.4–8.2)
TOTAL PROTEIN: 5.6 G/DL (ref 6.4–8.2)
TOTAL PROTEIN: 5.7 G/DL (ref 6.4–8.2)
TOTAL PROTEIN: 5.8 G/DL (ref 6.4–8.2)
TOTAL PROTEIN: 5.8 G/DL (ref 6.4–8.2)
TOTAL PROTEIN: 5.9 G/DL (ref 6.4–8.2)
TOTAL PROTEIN: 5.9 G/DL (ref 6.4–8.2)
TOTAL PROTEIN: 6.5 G/DL (ref 6.4–8.2)
TOTAL PROTEIN: 6.6 G/DL (ref 6.4–8.2)
TOTAL PROTEIN: 6.7 G/DL (ref 6.4–8.2)
TOTAL PROTEIN: 7.1 G/DL (ref 6.4–8.2)
TOTAL PROTEIN: 7.2 G/DL (ref 6.4–8.2)
TRIGL SERPL-MCNC: 184 MG/DL (ref 0–150)
TRIGL SERPL-MCNC: 202 MG/DL (ref 0–150)
TROPONIN: 0.02 NG/ML
TSH REFLEX: 2.08 UIU/ML (ref 0.27–4.2)
URINE CULTURE, ROUTINE: NORMAL
URINE CULTURE, ROUTINE: NORMAL
URINE REFLEX TO CULTURE: ABNORMAL
URINE REFLEX TO CULTURE: YES
URINE TYPE: ABNORMAL
URINE TYPE: ABNORMAL
UROBILINOGEN, URINE: 0.2 E.U./DL
UROBILINOGEN, URINE: 0.2 E.U./DL
VLDLC SERPL CALC-MCNC: 37 MG/DL
VLDLC SERPL CALC-MCNC: 40 MG/DL
WBC # BLD: 10.1 K/UL (ref 4–11)
WBC # BLD: 10.3 K/UL (ref 4–11)
WBC # BLD: 10.8 K/UL (ref 4–11)
WBC # BLD: 12.2 K/UL (ref 4–11)
WBC # BLD: 13 K/UL (ref 4–11)
WBC # BLD: 13.2 K/UL (ref 4–11)
WBC # BLD: 14.6 K/UL (ref 4–11)
WBC # BLD: 14.7 K/UL (ref 4–11)
WBC # BLD: 14.9 K/UL (ref 4–11)
WBC # BLD: 15.9 K/UL (ref 4–11)
WBC # BLD: 16 K/UL (ref 4–11)
WBC # BLD: 16.2 K/UL (ref 4–11)
WBC # BLD: 16.5 K/UL (ref 4–11)
WBC # BLD: 16.6 K/UL (ref 4–11)
WBC # BLD: 16.8 K/UL (ref 4–11)
WBC # BLD: 17.1 K/UL (ref 4–11)
WBC # BLD: 17.3 K/UL (ref 4–11)
WBC # BLD: 17.6 K/UL (ref 4–11)
WBC # BLD: 17.7 K/UL (ref 4–11)
WBC # BLD: 17.9 K/UL (ref 4–11)
WBC # BLD: 19.5 K/UL (ref 4–11)
WBC # BLD: 20.9 K/UL (ref 4–11)
WBC # BLD: 5.3 K/UL (ref 4–11)
WBC # BLD: 6.5 K/UL (ref 4–11)
WBC # BLD: 6.5 K/UL (ref 4–11)
WBC # BLD: 7.1 K/UL (ref 4–11)
WBC # BLD: 7.5 K/UL (ref 4–11)
WBC # BLD: 8.5 K/UL (ref 4–11)
WBC UA: 0 /HPF (ref 0–5)
WBC UA: 20 /HPF (ref 0–5)

## 2021-01-01 PROCEDURE — 6370000000 HC RX 637 (ALT 250 FOR IP): Performed by: INTERNAL MEDICINE

## 2021-01-01 PROCEDURE — 2700000000 HC OXYGEN THERAPY PER DAY

## 2021-01-01 PROCEDURE — 4040F PNEUMOC VAC/ADMIN/RCVD: CPT | Performed by: FAMILY MEDICINE

## 2021-01-01 PROCEDURE — 06HY33Z INSERTION OF INFUSION DEVICE INTO LOWER VEIN, PERCUTANEOUS APPROACH: ICD-10-PCS | Performed by: NURSE PRACTITIONER

## 2021-01-01 PROCEDURE — 6360000002 HC RX W HCPCS: Performed by: THORACIC SURGERY (CARDIOTHORACIC VASCULAR SURGERY)

## 2021-01-01 PROCEDURE — 87086 URINE CULTURE/COLONY COUNT: CPT

## 2021-01-01 PROCEDURE — 2580000003 HC RX 258: Performed by: THORACIC SURGERY (CARDIOTHORACIC VASCULAR SURGERY)

## 2021-01-01 PROCEDURE — 2500000003 HC RX 250 WO HCPCS: Performed by: THORACIC SURGERY (CARDIOTHORACIC VASCULAR SURGERY)

## 2021-01-01 PROCEDURE — 99024 POSTOP FOLLOW-UP VISIT: CPT | Performed by: THORACIC SURGERY (CARDIOTHORACIC VASCULAR SURGERY)

## 2021-01-01 PROCEDURE — 33519 CABG ARTERY-VEIN THREE: CPT | Performed by: THORACIC SURGERY (CARDIOTHORACIC VASCULAR SURGERY)

## 2021-01-01 PROCEDURE — 97116 GAIT TRAINING THERAPY: CPT

## 2021-01-01 PROCEDURE — 82803 BLOOD GASES ANY COMBINATION: CPT

## 2021-01-01 PROCEDURE — 6370000000 HC RX 637 (ALT 250 FOR IP): Performed by: PHYSICIAN ASSISTANT

## 2021-01-01 PROCEDURE — 2500000003 HC RX 250 WO HCPCS: Performed by: INTERNAL MEDICINE

## 2021-01-01 PROCEDURE — C9113 INJ PANTOPRAZOLE SODIUM, VIA: HCPCS | Performed by: THORACIC SURGERY (CARDIOTHORACIC VASCULAR SURGERY)

## 2021-01-01 PROCEDURE — 85610 PROTHROMBIN TIME: CPT

## 2021-01-01 PROCEDURE — 83735 ASSAY OF MAGNESIUM: CPT

## 2021-01-01 PROCEDURE — C1769 GUIDE WIRE: HCPCS

## 2021-01-01 PROCEDURE — 94761 N-INVAS EAR/PLS OXIMETRY MLT: CPT

## 2021-01-01 PROCEDURE — 3700000001 HC ADD 15 MINUTES (ANESTHESIA): Performed by: THORACIC SURGERY (CARDIOTHORACIC VASCULAR SURGERY)

## 2021-01-01 PROCEDURE — 85027 COMPLETE CBC AUTOMATED: CPT

## 2021-01-01 PROCEDURE — 80053 COMPREHEN METABOLIC PANEL: CPT

## 2021-01-01 PROCEDURE — 82947 ASSAY GLUCOSE BLOOD QUANT: CPT

## 2021-01-01 PROCEDURE — 99152 MOD SED SAME PHYS/QHP 5/>YRS: CPT

## 2021-01-01 PROCEDURE — 2709999900 HC NON-CHARGEABLE SUPPLY: Performed by: THORACIC SURGERY (CARDIOTHORACIC VASCULAR SURGERY)

## 2021-01-01 PROCEDURE — 99291 CRITICAL CARE FIRST HOUR: CPT | Performed by: INTERNAL MEDICINE

## 2021-01-01 PROCEDURE — 74018 RADEX ABDOMEN 1 VIEW: CPT

## 2021-01-01 PROCEDURE — 5A1221Z PERFORMANCE OF CARDIAC OUTPUT, CONTINUOUS: ICD-10-PCS | Performed by: THORACIC SURGERY (CARDIOTHORACIC VASCULAR SURGERY)

## 2021-01-01 PROCEDURE — 6360000002 HC RX W HCPCS: Performed by: INTERNAL MEDICINE

## 2021-01-01 PROCEDURE — 93010 ELECTROCARDIOGRAM REPORT: CPT | Performed by: INTERNAL MEDICINE

## 2021-01-01 PROCEDURE — P9045 ALBUMIN (HUMAN), 5%, 250 ML: HCPCS

## 2021-01-01 PROCEDURE — 76705 ECHO EXAM OF ABDOMEN: CPT

## 2021-01-01 PROCEDURE — APPSS15 APP SPLIT SHARED TIME 0-15 MINUTES: Performed by: NURSE PRACTITIONER

## 2021-01-01 PROCEDURE — 2580000003 HC RX 258: Performed by: INTERNAL MEDICINE

## 2021-01-01 PROCEDURE — 83880 ASSAY OF NATRIURETIC PEPTIDE: CPT

## 2021-01-01 PROCEDURE — 2000000000 HC ICU R&B

## 2021-01-01 PROCEDURE — P9047 ALBUMIN (HUMAN), 25%, 50ML: HCPCS | Performed by: INTERNAL MEDICINE

## 2021-01-01 PROCEDURE — 93325 DOPPLER ECHO COLOR FLOW MAPG: CPT

## 2021-01-01 PROCEDURE — 84484 ASSAY OF TROPONIN QUANT: CPT

## 2021-01-01 PROCEDURE — 90945 DIALYSIS ONE EVALUATION: CPT

## 2021-01-01 PROCEDURE — 80048 BASIC METABOLIC PNL TOTAL CA: CPT

## 2021-01-01 PROCEDURE — 93306 TTE W/DOPPLER COMPLETE: CPT

## 2021-01-01 PROCEDURE — 94640 AIRWAY INHALATION TREATMENT: CPT

## 2021-01-01 PROCEDURE — 87077 CULTURE AEROBIC IDENTIFY: CPT

## 2021-01-01 PROCEDURE — APPNB30 APP NON BILLABLE TIME 0-30 MINS: Performed by: NURSE PRACTITIONER

## 2021-01-01 PROCEDURE — 36592 COLLECT BLOOD FROM PICC: CPT

## 2021-01-01 PROCEDURE — 2140000000 HC CCU INTERMEDIATE R&B

## 2021-01-01 PROCEDURE — 6370000000 HC RX 637 (ALT 250 FOR IP): Performed by: THORACIC SURGERY (CARDIOTHORACIC VASCULAR SURGERY)

## 2021-01-01 PROCEDURE — C1751 CATH, INF, PER/CENT/MIDLINE: HCPCS | Performed by: THORACIC SURGERY (CARDIOTHORACIC VASCULAR SURGERY)

## 2021-01-01 PROCEDURE — 83605 ASSAY OF LACTIC ACID: CPT

## 2021-01-01 PROCEDURE — 81001 URINALYSIS AUTO W/SCOPE: CPT

## 2021-01-01 PROCEDURE — 97530 THERAPEUTIC ACTIVITIES: CPT

## 2021-01-01 PROCEDURE — 99233 SBSQ HOSP IP/OBS HIGH 50: CPT | Performed by: INTERNAL MEDICINE

## 2021-01-01 PROCEDURE — 86022 PLATELET ANTIBODIES: CPT

## 2021-01-01 PROCEDURE — 99232 SBSQ HOSP IP/OBS MODERATE 35: CPT | Performed by: NURSE PRACTITIONER

## 2021-01-01 PROCEDURE — 86850 RBC ANTIBODY SCREEN: CPT

## 2021-01-01 PROCEDURE — 1036F TOBACCO NON-USER: CPT | Performed by: FAMILY MEDICINE

## 2021-01-01 PROCEDURE — 94010 BREATHING CAPACITY TEST: CPT

## 2021-01-01 PROCEDURE — 86900 BLOOD TYPING SEROLOGIC ABO: CPT

## 2021-01-01 PROCEDURE — 36415 COLL VENOUS BLD VENIPUNCTURE: CPT

## 2021-01-01 PROCEDURE — 84100 ASSAY OF PHOSPHORUS: CPT

## 2021-01-01 PROCEDURE — 2022F DILAT RTA XM EVC RTNOPTHY: CPT | Performed by: FAMILY MEDICINE

## 2021-01-01 PROCEDURE — 6370000000 HC RX 637 (ALT 250 FOR IP): Performed by: NURSE PRACTITIONER

## 2021-01-01 PROCEDURE — 96365 THER/PROPH/DIAG IV INF INIT: CPT

## 2021-01-01 PROCEDURE — 93321 DOPPLER ECHO F-UP/LMTD STD: CPT

## 2021-01-01 PROCEDURE — G8417 CALC BMI ABV UP PARAM F/U: HCPCS | Performed by: FAMILY MEDICINE

## 2021-01-01 PROCEDURE — 99223 1ST HOSP IP/OBS HIGH 75: CPT | Performed by: INTERNAL MEDICINE

## 2021-01-01 PROCEDURE — 36430 TRANSFUSION BLD/BLD COMPNT: CPT

## 2021-01-01 PROCEDURE — 87070 CULTURE OTHR SPECIMN AEROBIC: CPT

## 2021-01-01 PROCEDURE — 85730 THROMBOPLASTIN TIME PARTIAL: CPT

## 2021-01-01 PROCEDURE — 97162 PT EVAL MOD COMPLEX 30 MIN: CPT

## 2021-01-01 PROCEDURE — 2100000000 HC CCU R&B

## 2021-01-01 PROCEDURE — C1729 CATH, DRAINAGE: HCPCS | Performed by: THORACIC SURGERY (CARDIOTHORACIC VASCULAR SURGERY)

## 2021-01-01 PROCEDURE — 93458 L HRT ARTERY/VENTRICLE ANGIO: CPT

## 2021-01-01 PROCEDURE — 93308 TTE F-UP OR LMTD: CPT

## 2021-01-01 PROCEDURE — 71045 X-RAY EXAM CHEST 1 VIEW: CPT

## 2021-01-01 PROCEDURE — 6360000002 HC RX W HCPCS: Performed by: ANESTHESIOLOGY

## 2021-01-01 PROCEDURE — 85014 HEMATOCRIT: CPT

## 2021-01-01 PROCEDURE — 3051F HG A1C>EQUAL 7.0%<8.0%: CPT | Performed by: FAMILY MEDICINE

## 2021-01-01 PROCEDURE — 2580000003 HC RX 258

## 2021-01-01 PROCEDURE — P9016 RBC LEUKOCYTES REDUCED: HCPCS

## 2021-01-01 PROCEDURE — 86901 BLOOD TYPING SEROLOGIC RH(D): CPT

## 2021-01-01 PROCEDURE — 85384 FIBRINOGEN ACTIVITY: CPT

## 2021-01-01 PROCEDURE — 2580000003 HC RX 258: Performed by: HOSPITALIST

## 2021-01-01 PROCEDURE — 94760 N-INVAS EAR/PLS OXIMETRY 1: CPT

## 2021-01-01 PROCEDURE — 6360000002 HC RX W HCPCS

## 2021-01-01 PROCEDURE — 84132 ASSAY OF SERUM POTASSIUM: CPT

## 2021-01-01 PROCEDURE — 3600000018 HC SURGERY OHS ADDTL 15MIN: Performed by: THORACIC SURGERY (CARDIOTHORACIC VASCULAR SURGERY)

## 2021-01-01 PROCEDURE — 87040 BLOOD CULTURE FOR BACTERIA: CPT

## 2021-01-01 PROCEDURE — 82330 ASSAY OF CALCIUM: CPT

## 2021-01-01 PROCEDURE — 6360000002 HC RX W HCPCS: Performed by: NURSE PRACTITIONER

## 2021-01-01 PROCEDURE — P9045 ALBUMIN (HUMAN), 5%, 250 ML: HCPCS | Performed by: THORACIC SURGERY (CARDIOTHORACIC VASCULAR SURGERY)

## 2021-01-01 PROCEDURE — 90947 DIALYSIS REPEATED EVAL: CPT | Performed by: INTERNAL MEDICINE

## 2021-01-01 PROCEDURE — G0378 HOSPITAL OBSERVATION PER HR: HCPCS

## 2021-01-01 PROCEDURE — 84145 PROCALCITONIN (PCT): CPT

## 2021-01-01 PROCEDURE — 02100Z9 BYPASS CORONARY ARTERY, ONE ARTERY FROM LEFT INTERNAL MAMMARY, OPEN APPROACH: ICD-10-PCS | Performed by: THORACIC SURGERY (CARDIOTHORACIC VASCULAR SURGERY)

## 2021-01-01 PROCEDURE — 82040 ASSAY OF SERUM ALBUMIN: CPT

## 2021-01-01 PROCEDURE — 97110 THERAPEUTIC EXERCISES: CPT

## 2021-01-01 PROCEDURE — 71046 X-RAY EXAM CHEST 2 VIEWS: CPT

## 2021-01-01 PROCEDURE — 4A023N7 MEASUREMENT OF CARDIAC SAMPLING AND PRESSURE, LEFT HEART, PERCUTANEOUS APPROACH: ICD-10-PCS | Performed by: THORACIC SURGERY (CARDIOTHORACIC VASCULAR SURGERY)

## 2021-01-01 PROCEDURE — 2500000003 HC RX 250 WO HCPCS: Performed by: HOSPITALIST

## 2021-01-01 PROCEDURE — 97163 PT EVAL HIGH COMPLEX 45 MIN: CPT

## 2021-01-01 PROCEDURE — 94003 VENT MGMT INPAT SUBQ DAY: CPT

## 2021-01-01 PROCEDURE — 80074 ACUTE HEPATITIS PANEL: CPT

## 2021-01-01 PROCEDURE — 99232 SBSQ HOSP IP/OBS MODERATE 35: CPT | Performed by: HOSPITALIST

## 2021-01-01 PROCEDURE — 3017F COLORECTAL CA SCREEN DOC REV: CPT | Performed by: FAMILY MEDICINE

## 2021-01-01 PROCEDURE — 71250 CT THORAX DX C-: CPT

## 2021-01-01 PROCEDURE — 2709999900 HC NON-CHARGEABLE SUPPLY

## 2021-01-01 PROCEDURE — 2720000010 HC SURG SUPPLY STERILE: Performed by: THORACIC SURGERY (CARDIOTHORACIC VASCULAR SURGERY)

## 2021-01-01 PROCEDURE — 86923 COMPATIBILITY TEST ELECTRIC: CPT

## 2021-01-01 PROCEDURE — 51798 US URINE CAPACITY MEASURE: CPT

## 2021-01-01 PROCEDURE — C1751 CATH, INF, PER/CENT/MIDLINE: HCPCS

## 2021-01-01 PROCEDURE — 33508 ENDOSCOPIC VEIN HARVEST: CPT | Performed by: THORACIC SURGERY (CARDIOTHORACIC VASCULAR SURGERY)

## 2021-01-01 PROCEDURE — 6360000004 HC RX CONTRAST MEDICATION: Performed by: INTERNAL MEDICINE

## 2021-01-01 PROCEDURE — 87205 SMEAR GRAM STAIN: CPT

## 2021-01-01 PROCEDURE — 7100000011 HC PHASE II RECOVERY - ADDTL 15 MIN

## 2021-01-01 PROCEDURE — P9047 ALBUMIN (HUMAN), 25%, 50ML: HCPCS | Performed by: THORACIC SURGERY (CARDIOTHORACIC VASCULAR SURGERY)

## 2021-01-01 PROCEDURE — 87150 DNA/RNA AMPLIFIED PROBE: CPT

## 2021-01-01 PROCEDURE — 85018 HEMOGLOBIN: CPT

## 2021-01-01 PROCEDURE — B24BZZ4 ULTRASONOGRAPHY OF HEART WITH AORTA, TRANSESOPHAGEAL: ICD-10-PCS | Performed by: THORACIC SURGERY (CARDIOTHORACIC VASCULAR SURGERY)

## 2021-01-01 PROCEDURE — B2151ZZ FLUOROSCOPY OF LEFT HEART USING LOW OSMOLAR CONTRAST: ICD-10-PCS | Performed by: THORACIC SURGERY (CARDIOTHORACIC VASCULAR SURGERY)

## 2021-01-01 PROCEDURE — 99232 SBSQ HOSP IP/OBS MODERATE 35: CPT | Performed by: INTERNAL MEDICINE

## 2021-01-01 PROCEDURE — 2580000003 HC RX 258: Performed by: NURSE PRACTITIONER

## 2021-01-01 PROCEDURE — 99233 SBSQ HOSP IP/OBS HIGH 50: CPT | Performed by: NURSE PRACTITIONER

## 2021-01-01 PROCEDURE — 93458 L HRT ARTERY/VENTRICLE ANGIO: CPT | Performed by: INTERNAL MEDICINE

## 2021-01-01 PROCEDURE — 06BQ4ZZ EXCISION OF LEFT SAPHENOUS VEIN, PERCUTANEOUS ENDOSCOPIC APPROACH: ICD-10-PCS | Performed by: THORACIC SURGERY (CARDIOTHORACIC VASCULAR SURGERY)

## 2021-01-01 PROCEDURE — 99024 POSTOP FOLLOW-UP VISIT: CPT | Performed by: OTOLARYNGOLOGY

## 2021-01-01 PROCEDURE — 1123F ACP DISCUSS/DSCN MKR DOCD: CPT | Performed by: FAMILY MEDICINE

## 2021-01-01 PROCEDURE — 84295 ASSAY OF SERUM SODIUM: CPT

## 2021-01-01 PROCEDURE — 99292 CRITICAL CARE ADDL 30 MIN: CPT | Performed by: INTERNAL MEDICINE

## 2021-01-01 PROCEDURE — 36556 INSERT NON-TUNNEL CV CATH: CPT | Performed by: THORACIC SURGERY (CARDIOTHORACIC VASCULAR SURGERY)

## 2021-01-01 PROCEDURE — 36556 INSERT NON-TUNNEL CV CATH: CPT | Performed by: INTERNAL MEDICINE

## 2021-01-01 PROCEDURE — 83036 HEMOGLOBIN GLYCOSYLATED A1C: CPT

## 2021-01-01 PROCEDURE — 7100000010 HC PHASE II RECOVERY - FIRST 15 MIN

## 2021-01-01 PROCEDURE — P9047 ALBUMIN (HUMAN), 25%, 50ML: HCPCS | Performed by: NURSE PRACTITIONER

## 2021-01-01 PROCEDURE — 93000 ELECTROCARDIOGRAM COMPLETE: CPT | Performed by: FAMILY MEDICINE

## 2021-01-01 PROCEDURE — 94799 UNLISTED PULMONARY SVC/PX: CPT

## 2021-01-01 PROCEDURE — 99153 MOD SED SAME PHYS/QHP EA: CPT

## 2021-01-01 PROCEDURE — 85025 COMPLETE CBC W/AUTO DIFF WBC: CPT

## 2021-01-01 PROCEDURE — 99233 SBSQ HOSP IP/OBS HIGH 50: CPT | Performed by: HOSPITALIST

## 2021-01-01 PROCEDURE — 0BH17EZ INSERTION OF ENDOTRACHEAL AIRWAY INTO TRACHEA, VIA NATURAL OR ARTIFICIAL OPENING: ICD-10-PCS | Performed by: INTERNAL MEDICINE

## 2021-01-01 PROCEDURE — 99214 OFFICE O/P EST MOD 30 MIN: CPT | Performed by: FAMILY MEDICINE

## 2021-01-01 PROCEDURE — 97167 OT EVAL HIGH COMPLEX 60 MIN: CPT

## 2021-01-01 PROCEDURE — 33519 CABG ARTERY-VEIN THREE: CPT | Performed by: PHYSICIAN ASSISTANT

## 2021-01-01 PROCEDURE — 2500000003 HC RX 250 WO HCPCS

## 2021-01-01 PROCEDURE — 80061 LIPID PANEL: CPT

## 2021-01-01 PROCEDURE — G8427 DOCREV CUR MEDS BY ELIG CLIN: HCPCS | Performed by: FAMILY MEDICINE

## 2021-01-01 PROCEDURE — C1889 IMPLANT/INSERT DEVICE, NOC: HCPCS | Performed by: THORACIC SURGERY (CARDIOTHORACIC VASCULAR SURGERY)

## 2021-01-01 PROCEDURE — 2500000003 HC RX 250 WO HCPCS: Performed by: ANESTHESIOLOGY

## 2021-01-01 PROCEDURE — 87186 SC STD MICRODIL/AGAR DIL: CPT

## 2021-01-01 PROCEDURE — 021209W BYPASS CORONARY ARTERY, THREE ARTERIES FROM AORTA WITH AUTOLOGOUS VENOUS TISSUE, OPEN APPROACH: ICD-10-PCS | Performed by: THORACIC SURGERY (CARDIOTHORACIC VASCULAR SURGERY)

## 2021-01-01 PROCEDURE — 94002 VENT MGMT INPAT INIT DAY: CPT

## 2021-01-01 PROCEDURE — 33533 CABG ARTERIAL SINGLE: CPT | Performed by: THORACIC SURGERY (CARDIOTHORACIC VASCULAR SURGERY)

## 2021-01-01 PROCEDURE — 96366 THER/PROPH/DIAG IV INF ADDON: CPT

## 2021-01-01 PROCEDURE — 6370000000 HC RX 637 (ALT 250 FOR IP): Performed by: FAMILY MEDICINE

## 2021-01-01 PROCEDURE — 36569 INSJ PICC 5 YR+ W/O IMAGING: CPT

## 2021-01-01 PROCEDURE — 93971 EXTREMITY STUDY: CPT

## 2021-01-01 PROCEDURE — 33508 ENDOSCOPIC VEIN HARVEST: CPT | Performed by: PHYSICIAN ASSISTANT

## 2021-01-01 PROCEDURE — 3600000008 HC SURGERY OHS BASE: Performed by: THORACIC SURGERY (CARDIOTHORACIC VASCULAR SURGERY)

## 2021-01-01 PROCEDURE — 33533 CABG ARTERIAL SINGLE: CPT | Performed by: PHYSICIAN ASSISTANT

## 2021-01-01 PROCEDURE — 93880 EXTRACRANIAL BILAT STUDY: CPT

## 2021-01-01 PROCEDURE — 2500000003 HC RX 250 WO HCPCS: Performed by: PHYSICIAN ASSISTANT

## 2021-01-01 PROCEDURE — 05HY33Z INSERTION OF INFUSION DEVICE INTO UPPER VEIN, PERCUTANEOUS APPROACH: ICD-10-PCS | Performed by: NURSE PRACTITIONER

## 2021-01-01 PROCEDURE — G8484 FLU IMMUNIZE NO ADMIN: HCPCS | Performed by: FAMILY MEDICINE

## 2021-01-01 PROCEDURE — 97535 SELF CARE MNGMENT TRAINING: CPT

## 2021-01-01 PROCEDURE — 99284 EMERGENCY DEPT VISIT MOD MDM: CPT

## 2021-01-01 PROCEDURE — 5A1955Z RESPIRATORY VENTILATION, GREATER THAN 96 CONSECUTIVE HOURS: ICD-10-PCS | Performed by: INTERNAL MEDICINE

## 2021-01-01 PROCEDURE — C9113 INJ PANTOPRAZOLE SODIUM, VIA: HCPCS | Performed by: NURSE PRACTITIONER

## 2021-01-01 PROCEDURE — 87641 MR-STAPH DNA AMP PROBE: CPT

## 2021-01-01 PROCEDURE — 96375 TX/PRO/DX INJ NEW DRUG ADDON: CPT

## 2021-01-01 PROCEDURE — 05HM33Z INSERTION OF INFUSION DEVICE INTO RIGHT INTERNAL JUGULAR VEIN, PERCUTANEOUS APPROACH: ICD-10-PCS | Performed by: ORTHOPAEDIC SURGERY

## 2021-01-01 PROCEDURE — 85347 COAGULATION TIME ACTIVATED: CPT

## 2021-01-01 PROCEDURE — B2111ZZ FLUOROSCOPY OF MULTIPLE CORONARY ARTERIES USING LOW OSMOLAR CONTRAST: ICD-10-PCS | Performed by: THORACIC SURGERY (CARDIOTHORACIC VASCULAR SURGERY)

## 2021-01-01 PROCEDURE — P9017 PLASMA 1 DONOR FRZ W/IN 8 HR: HCPCS

## 2021-01-01 PROCEDURE — 6370000000 HC RX 637 (ALT 250 FOR IP): Performed by: HOSPITALIST

## 2021-01-01 PROCEDURE — 80076 HEPATIC FUNCTION PANEL: CPT

## 2021-01-01 PROCEDURE — 93005 ELECTROCARDIOGRAM TRACING: CPT | Performed by: EMERGENCY MEDICINE

## 2021-01-01 PROCEDURE — 2580000003 HC RX 258: Performed by: ANESTHESIOLOGY

## 2021-01-01 PROCEDURE — 94660 CPAP INITIATION&MGMT: CPT

## 2021-01-01 PROCEDURE — 3700000000 HC ANESTHESIA ATTENDED CARE: Performed by: THORACIC SURGERY (CARDIOTHORACIC VASCULAR SURGERY)

## 2021-01-01 PROCEDURE — 99291 CRITICAL CARE FIRST HOUR: CPT | Performed by: HOSPITALIST

## 2021-01-01 PROCEDURE — 02L70CK OCCLUSION OF LEFT ATRIAL APPENDAGE WITH EXTRALUMINAL DEVICE, OPEN APPROACH: ICD-10-PCS | Performed by: THORACIC SURGERY (CARDIOTHORACIC VASCULAR SURGERY)

## 2021-01-01 PROCEDURE — 6370000000 HC RX 637 (ALT 250 FOR IP)

## 2021-01-01 PROCEDURE — C1894 INTRO/SHEATH, NON-LASER: HCPCS

## 2021-01-01 DEVICE — Z INACTIVE USE 2424443 DEVICE OCCL CLP L40MM SM FOOTPRINT 1 HND APPL SUTURELESS W/: Type: IMPLANTABLE DEVICE | Site: HEART | Status: FUNCTIONAL

## 2021-01-01 RX ORDER — INSULIN LISPRO 100 [IU]/ML
0-12 INJECTION, SOLUTION INTRAVENOUS; SUBCUTANEOUS
Status: DISCONTINUED | OUTPATIENT
Start: 2021-01-01 | End: 2021-01-01

## 2021-01-01 RX ORDER — TRAMADOL HYDROCHLORIDE 50 MG/1
50 TABLET ORAL EVERY 6 HOURS PRN
Status: DISCONTINUED | OUTPATIENT
Start: 2021-01-01 | End: 2021-01-01 | Stop reason: HOSPADM

## 2021-01-01 RX ORDER — LABETALOL HYDROCHLORIDE 5 MG/ML
10 INJECTION, SOLUTION INTRAVENOUS ONCE
Status: COMPLETED | OUTPATIENT
Start: 2021-01-01 | End: 2021-01-01

## 2021-01-01 RX ORDER — SODIUM CHLORIDE 9 MG/ML
INJECTION, SOLUTION INTRAVENOUS
Status: COMPLETED
Start: 2021-01-01 | End: 2021-01-01

## 2021-01-01 RX ORDER — EPINEPHRINE 0.1 MG/ML
SYRINGE (ML) INJECTION
Status: COMPLETED | OUTPATIENT
Start: 2021-01-01 | End: 2021-01-01

## 2021-01-01 RX ORDER — VERAPAMIL HYDROCHLORIDE 80 MG/1
40 TABLET ORAL EVERY 8 HOURS SCHEDULED
Status: DISCONTINUED | OUTPATIENT
Start: 2021-01-01 | End: 2021-01-01 | Stop reason: HOSPADM

## 2021-01-01 RX ORDER — SODIUM CHLORIDE 9 MG/ML
10 INJECTION INTRAVENOUS DAILY
Status: DISCONTINUED | OUTPATIENT
Start: 2021-01-01 | End: 2021-01-01

## 2021-01-01 RX ORDER — PROMETHAZINE HYDROCHLORIDE 25 MG/ML
12.5 INJECTION, SOLUTION INTRAMUSCULAR; INTRAVENOUS EVERY 6 HOURS PRN
Status: DISCONTINUED | OUTPATIENT
Start: 2021-01-01 | End: 2021-01-01

## 2021-01-01 RX ORDER — 0.9 % SODIUM CHLORIDE 0.9 %
500 INTRAVENOUS SOLUTION INTRAVENOUS ONCE
Status: COMPLETED | OUTPATIENT
Start: 2021-01-01 | End: 2021-01-01

## 2021-01-01 RX ORDER — SODIUM CHLORIDE 9 MG/ML
INJECTION, SOLUTION INTRAVENOUS
Status: DISPENSED
Start: 2021-01-01 | End: 2021-01-01

## 2021-01-01 RX ORDER — VASOPRESSIN 20 U/ML
INJECTION PARENTERAL
Status: DISPENSED
Start: 2021-01-01 | End: 2021-01-01

## 2021-01-01 RX ORDER — METOPROLOL TARTRATE 50 MG/1
100 TABLET, FILM COATED ORAL 2 TIMES DAILY
Status: DISCONTINUED | OUTPATIENT
Start: 2021-01-01 | End: 2021-01-01

## 2021-01-01 RX ORDER — ALBUMIN (HUMAN) 12.5 G/50ML
25 SOLUTION INTRAVENOUS EVERY 6 HOURS
Status: DISCONTINUED | OUTPATIENT
Start: 2021-01-01 | End: 2021-01-01

## 2021-01-01 RX ORDER — ROCURONIUM BROMIDE 10 MG/ML
INJECTION, SOLUTION INTRAVENOUS
Status: DISPENSED
Start: 2021-01-01 | End: 2021-01-01

## 2021-01-01 RX ORDER — ATROPINE SULFATE 0.1 MG/ML
INJECTION INTRAVENOUS
Status: DISPENSED
Start: 2021-01-01 | End: 2021-01-01

## 2021-01-01 RX ORDER — SODIUM CHLORIDE 0.9 % (FLUSH) 0.9 %
5-40 SYRINGE (ML) INJECTION PRN
Status: DISCONTINUED | OUTPATIENT
Start: 2021-01-01 | End: 2021-01-01

## 2021-01-01 RX ORDER — POTASSIUM CHLORIDE 29.8 MG/ML
20 INJECTION INTRAVENOUS PRN
Status: DISCONTINUED | OUTPATIENT
Start: 2021-01-01 | End: 2021-01-01 | Stop reason: HOSPADM

## 2021-01-01 RX ORDER — SODIUM CHLORIDE 9 MG/ML
INJECTION, SOLUTION INTRAVENOUS PRN
Status: DISCONTINUED | OUTPATIENT
Start: 2021-01-01 | End: 2021-01-01

## 2021-01-01 RX ORDER — 0.9 % SODIUM CHLORIDE 0.9 %
500 INTRAVENOUS SOLUTION INTRAVENOUS ONCE
Status: DISCONTINUED | OUTPATIENT
Start: 2021-01-01 | End: 2021-01-01

## 2021-01-01 RX ORDER — MIDODRINE HYDROCHLORIDE 5 MG/1
10 TABLET ORAL
Status: DISCONTINUED | OUTPATIENT
Start: 2021-01-01 | End: 2021-01-01

## 2021-01-01 RX ORDER — ASPIRIN 81 MG/1
324 TABLET, CHEWABLE ORAL ONCE
Status: COMPLETED | OUTPATIENT
Start: 2021-01-01 | End: 2021-01-01

## 2021-01-01 RX ORDER — DIPHENHYDRAMINE HCL 25 MG
25 TABLET ORAL NIGHTLY PRN
Status: DISCONTINUED | OUTPATIENT
Start: 2021-01-01 | End: 2021-01-01 | Stop reason: HOSPADM

## 2021-01-01 RX ORDER — ALBUMIN, HUMAN INJ 5% 5 %
25 SOLUTION INTRAVENOUS ONCE
Status: COMPLETED | OUTPATIENT
Start: 2021-01-01 | End: 2021-01-01

## 2021-01-01 RX ORDER — ESMOLOL HYDROCHLORIDE 10 MG/ML
50-300 INJECTION, SOLUTION INTRAVENOUS CONTINUOUS
Status: DISCONTINUED | OUTPATIENT
Start: 2021-01-01 | End: 2021-01-01

## 2021-01-01 RX ORDER — DEXTROSE MONOHYDRATE 50 MG/ML
100 INJECTION, SOLUTION INTRAVENOUS PRN
Status: DISCONTINUED | OUTPATIENT
Start: 2021-01-01 | End: 2021-01-01

## 2021-01-01 RX ORDER — CLONIDINE HYDROCHLORIDE 0.3 MG/1
0.3 TABLET ORAL DAILY
Status: DISCONTINUED | OUTPATIENT
Start: 2021-01-01 | End: 2021-01-01

## 2021-01-01 RX ORDER — DEXTROSE MONOHYDRATE 25 G/50ML
12.5 INJECTION, SOLUTION INTRAVENOUS PRN
Status: DISCONTINUED | OUTPATIENT
Start: 2021-01-01 | End: 2021-01-01

## 2021-01-01 RX ORDER — INSULIN LISPRO 100 [IU]/ML
10 INJECTION, SOLUTION INTRAVENOUS; SUBCUTANEOUS
Status: DISCONTINUED | OUTPATIENT
Start: 2021-01-01 | End: 2021-01-01

## 2021-01-01 RX ORDER — SODIUM CHLORIDE 0.9 % (FLUSH) 0.9 %
5-40 SYRINGE (ML) INJECTION EVERY 12 HOURS SCHEDULED
Status: DISCONTINUED | OUTPATIENT
Start: 2021-01-01 | End: 2021-01-01

## 2021-01-01 RX ORDER — SODIUM CHLORIDE 0.9 % (FLUSH) 0.9 %
10 SYRINGE (ML) INJECTION EVERY 12 HOURS SCHEDULED
Status: DISCONTINUED | OUTPATIENT
Start: 2021-01-01 | End: 2021-01-01 | Stop reason: SDUPTHER

## 2021-01-01 RX ORDER — SODIUM CHLORIDE 9 MG/ML
25 INJECTION, SOLUTION INTRAVENOUS PRN
Status: DISCONTINUED | OUTPATIENT
Start: 2021-01-01 | End: 2021-01-01

## 2021-01-01 RX ORDER — OMEPRAZOLE 40 MG/1
CAPSULE, DELAYED RELEASE ORAL
Qty: 30 CAPSULE | Refills: 5 | Status: SHIPPED | OUTPATIENT
Start: 2021-01-01

## 2021-01-01 RX ORDER — SODIUM CHLORIDE 9 MG/ML
INJECTION, SOLUTION INTRAVENOUS CONTINUOUS PRN
Status: DISCONTINUED | OUTPATIENT
Start: 2021-01-01 | End: 2021-01-01 | Stop reason: SDUPTHER

## 2021-01-01 RX ORDER — POLYETHYLENE GLYCOL 3350 17 G/17G
17 POWDER, FOR SOLUTION ORAL DAILY PRN
Status: DISCONTINUED | OUTPATIENT
Start: 2021-01-01 | End: 2021-01-01

## 2021-01-01 RX ORDER — FUROSEMIDE 10 MG/ML
20 INJECTION INTRAMUSCULAR; INTRAVENOUS ONCE
Status: COMPLETED | OUTPATIENT
Start: 2021-01-01 | End: 2021-01-01

## 2021-01-01 RX ORDER — ALBUMIN (HUMAN) 12.5 G/50ML
25 SOLUTION INTRAVENOUS ONCE
Status: COMPLETED | OUTPATIENT
Start: 2021-01-01 | End: 2021-01-01

## 2021-01-01 RX ORDER — CEFAZOLIN SODIUM 1 G/3ML
INJECTION, POWDER, FOR SOLUTION INTRAMUSCULAR; INTRAVENOUS PRN
Status: DISCONTINUED | OUTPATIENT
Start: 2021-01-01 | End: 2021-01-01 | Stop reason: SDUPTHER

## 2021-01-01 RX ORDER — PANTOPRAZOLE SODIUM 40 MG/10ML
40 INJECTION, POWDER, LYOPHILIZED, FOR SOLUTION INTRAVENOUS DAILY
Status: DISCONTINUED | OUTPATIENT
Start: 2021-01-01 | End: 2021-01-01 | Stop reason: HOSPADM

## 2021-01-01 RX ORDER — DEXMEDETOMIDINE HYDROCHLORIDE 4 UG/ML
.2-1.4 INJECTION, SOLUTION INTRAVENOUS CONTINUOUS
Status: DISCONTINUED | OUTPATIENT
Start: 2021-01-01 | End: 2021-01-01 | Stop reason: HOSPADM

## 2021-01-01 RX ORDER — BUPIVACAINE HYDROCHLORIDE 2.5 MG/ML
INJECTION, SOLUTION EPIDURAL; INFILTRATION; INTRACAUDAL
Status: COMPLETED | OUTPATIENT
Start: 2021-01-01 | End: 2021-01-01

## 2021-01-01 RX ORDER — CLONIDINE HYDROCHLORIDE 0.3 MG/1
TABLET ORAL
Qty: 90 TABLET | Refills: 3 | Status: SHIPPED | OUTPATIENT
Start: 2021-01-01

## 2021-01-01 RX ORDER — INSULIN LISPRO 100 [IU]/ML
0.08 INJECTION, SOLUTION INTRAVENOUS; SUBCUTANEOUS
Status: DISCONTINUED | OUTPATIENT
Start: 2021-01-01 | End: 2021-01-01

## 2021-01-01 RX ORDER — FLUDROCORTISONE ACETATE 0.1 MG/1
0.1 TABLET ORAL DAILY
Status: DISCONTINUED | OUTPATIENT
Start: 2021-01-01 | End: 2021-01-01 | Stop reason: HOSPADM

## 2021-01-01 RX ORDER — LANCETS 30 GAUGE
1 EACH MISCELLANEOUS DAILY
Qty: 100 EACH | Refills: 3 | Status: SHIPPED | OUTPATIENT
Start: 2021-01-01

## 2021-01-01 RX ORDER — HEPARIN SODIUM 5000 [USP'U]/ML
5000 INJECTION, SOLUTION INTRAVENOUS; SUBCUTANEOUS PRN
Status: DISCONTINUED | OUTPATIENT
Start: 2021-01-01 | End: 2021-01-01

## 2021-01-01 RX ORDER — ALBUTEROL SULFATE 90 UG/1
2 AEROSOL, METERED RESPIRATORY (INHALATION) EVERY 6 HOURS PRN
Status: DISCONTINUED | OUTPATIENT
Start: 2021-01-01 | End: 2021-01-01 | Stop reason: HOSPADM

## 2021-01-01 RX ORDER — ALPRAZOLAM 0.25 MG/1
0.25 TABLET ORAL
Status: DISPENSED | OUTPATIENT
Start: 2021-01-01 | End: 2021-01-01

## 2021-01-01 RX ORDER — TRAMADOL HYDROCHLORIDE 50 MG/1
100 TABLET ORAL EVERY 6 HOURS PRN
Status: DISCONTINUED | OUTPATIENT
Start: 2021-01-01 | End: 2021-01-01 | Stop reason: HOSPADM

## 2021-01-01 RX ORDER — FUROSEMIDE 10 MG/ML
40 INJECTION INTRAMUSCULAR; INTRAVENOUS ONCE
Status: DISCONTINUED | OUTPATIENT
Start: 2021-01-01 | End: 2021-01-01 | Stop reason: HOSPADM

## 2021-01-01 RX ORDER — FONDAPARINUX SODIUM 2.5 MG/.5ML
2.5 INJECTION SUBCUTANEOUS DAILY
Status: DISCONTINUED | OUTPATIENT
Start: 2021-01-01 | End: 2021-01-01

## 2021-01-01 RX ORDER — SODIUM CHLORIDE 9 MG/ML
INJECTION, SOLUTION INTRAVENOUS PRN
Status: COMPLETED | OUTPATIENT
Start: 2021-01-01 | End: 2021-01-01

## 2021-01-01 RX ORDER — ACETAMINOPHEN 325 MG/1
650 TABLET ORAL EVERY 6 HOURS PRN
Status: DISCONTINUED | OUTPATIENT
Start: 2021-01-01 | End: 2021-01-01

## 2021-01-01 RX ORDER — PROPOFOL 10 MG/ML
5-50 INJECTION, EMULSION INTRAVENOUS
Status: DISCONTINUED | OUTPATIENT
Start: 2021-01-01 | End: 2021-01-01 | Stop reason: HOSPADM

## 2021-01-01 RX ORDER — GLIPIZIDE 10 MG/1
10 TABLET ORAL 2 TIMES DAILY
Qty: 180 TABLET | Refills: 3 | Status: SHIPPED | OUTPATIENT
Start: 2021-01-01

## 2021-01-01 RX ORDER — LIDOCAINE HYDROCHLORIDE 10 MG/ML
5 INJECTION, SOLUTION EPIDURAL; INFILTRATION; INTRACAUDAL; PERINEURAL ONCE
Status: DISCONTINUED | OUTPATIENT
Start: 2021-01-01 | End: 2021-01-01 | Stop reason: HOSPADM

## 2021-01-01 RX ORDER — FUROSEMIDE 10 MG/ML
20 INJECTION INTRAMUSCULAR; INTRAVENOUS DAILY
Status: DISCONTINUED | OUTPATIENT
Start: 2021-01-01 | End: 2021-01-01

## 2021-01-01 RX ORDER — ACETAMINOPHEN 500 MG
1000 TABLET ORAL EVERY 6 HOURS PRN
Status: DISCONTINUED | OUTPATIENT
Start: 2021-01-01 | End: 2021-01-01

## 2021-01-01 RX ORDER — POTASSIUM CHLORIDE 29.8 MG/ML
20 INJECTION INTRAVENOUS PRN
Status: DISCONTINUED | OUTPATIENT
Start: 2021-01-01 | End: 2021-01-01

## 2021-01-01 RX ORDER — ALBUMIN, HUMAN INJ 5% 5 %
25 SOLUTION INTRAVENOUS PRN
Status: DISCONTINUED | OUTPATIENT
Start: 2021-01-01 | End: 2021-01-01

## 2021-01-01 RX ORDER — CALCIUM GLUCONATE 20 MG/ML
2000 INJECTION, SOLUTION INTRAVENOUS PRN
Status: DISCONTINUED | OUTPATIENT
Start: 2021-01-01 | End: 2021-01-01 | Stop reason: HOSPADM

## 2021-01-01 RX ORDER — CALCIUM GLUCONATE 20 MG/ML
1000 INJECTION, SOLUTION INTRAVENOUS PRN
Status: DISCONTINUED | OUTPATIENT
Start: 2021-01-01 | End: 2021-01-01 | Stop reason: HOSPADM

## 2021-01-01 RX ORDER — INSULIN LISPRO 100 [IU]/ML
0-6 INJECTION, SOLUTION INTRAVENOUS; SUBCUTANEOUS EVERY 4 HOURS
Status: DISCONTINUED | OUTPATIENT
Start: 2021-01-01 | End: 2021-01-01 | Stop reason: HOSPADM

## 2021-01-01 RX ORDER — MIDAZOLAM HYDROCHLORIDE 5 MG/ML
INJECTION INTRAMUSCULAR; INTRAVENOUS PRN
Status: DISCONTINUED | OUTPATIENT
Start: 2021-01-01 | End: 2021-01-01 | Stop reason: SDUPTHER

## 2021-01-01 RX ORDER — ALBUMIN, HUMAN INJ 5% 5 %
SOLUTION INTRAVENOUS
Status: COMPLETED
Start: 2021-01-01 | End: 2021-01-01

## 2021-01-01 RX ORDER — ALBUTEROL SULFATE 2.5 MG/3ML
2.5 SOLUTION RESPIRATORY (INHALATION) 4 TIMES DAILY
Status: DISCONTINUED | OUTPATIENT
Start: 2021-01-01 | End: 2021-01-01 | Stop reason: HOSPADM

## 2021-01-01 RX ORDER — POLYETHYLENE GLYCOL 3350 17 G/17G
17 POWDER, FOR SOLUTION ORAL DAILY
Status: DISCONTINUED | OUTPATIENT
Start: 2021-01-01 | End: 2021-01-01

## 2021-01-01 RX ORDER — HEPARIN SODIUM 5000 [USP'U]/ML
5500 INJECTION, SOLUTION INTRAVENOUS; SUBCUTANEOUS PRN
Status: DISCONTINUED | OUTPATIENT
Start: 2021-01-01 | End: 2021-01-01 | Stop reason: HOSPADM

## 2021-01-01 RX ORDER — MAGNESIUM SULFATE IN WATER 40 MG/ML
2000 INJECTION, SOLUTION INTRAVENOUS PRN
Status: DISCONTINUED | OUTPATIENT
Start: 2021-01-01 | End: 2021-01-01

## 2021-01-01 RX ORDER — LISINOPRIL 40 MG/1
TABLET ORAL
Qty: 90 TABLET | Refills: 3 | Status: SHIPPED | OUTPATIENT
Start: 2021-01-01

## 2021-01-01 RX ORDER — NICOTINE POLACRILEX 4 MG
15 LOZENGE BUCCAL PRN
Status: DISCONTINUED | OUTPATIENT
Start: 2021-01-01 | End: 2021-01-01

## 2021-01-01 RX ORDER — PROTAMINE SULFATE 10 MG/ML
50 INJECTION, SOLUTION INTRAVENOUS
Status: DISPENSED | OUTPATIENT
Start: 2021-01-01 | End: 2021-01-01

## 2021-01-01 RX ORDER — SODIUM CHLORIDE 9 MG/ML
INJECTION, SOLUTION INTRAVENOUS
Status: DISCONTINUED
Start: 2021-01-01 | End: 2021-01-01 | Stop reason: HOSPADM

## 2021-01-01 RX ORDER — ACETAMINOPHEN 325 MG/1
650 TABLET ORAL EVERY 4 HOURS PRN
Status: DISCONTINUED | OUTPATIENT
Start: 2021-01-01 | End: 2021-01-01

## 2021-01-01 RX ORDER — SODIUM CHLORIDE 0.9 % (FLUSH) 0.9 %
5-40 SYRINGE (ML) INJECTION EVERY 12 HOURS SCHEDULED
Status: DISCONTINUED | OUTPATIENT
Start: 2021-01-01 | End: 2021-01-01 | Stop reason: HOSPADM

## 2021-01-01 RX ORDER — ALBUMIN, HUMAN INJ 5% 5 %
12.5 SOLUTION INTRAVENOUS ONCE
Status: COMPLETED | OUTPATIENT
Start: 2021-01-01 | End: 2021-01-01

## 2021-01-01 RX ORDER — FUROSEMIDE 10 MG/ML
20 INJECTION INTRAMUSCULAR; INTRAVENOUS 2 TIMES DAILY
Status: DISCONTINUED | OUTPATIENT
Start: 2021-01-01 | End: 2021-01-01

## 2021-01-01 RX ORDER — PROPOFOL 10 MG/ML
10 INJECTION, EMULSION INTRAVENOUS
Status: DISCONTINUED | OUTPATIENT
Start: 2021-01-01 | End: 2021-01-01

## 2021-01-01 RX ORDER — ROCURONIUM BROMIDE 10 MG/ML
INJECTION, SOLUTION INTRAVENOUS PRN
Status: DISCONTINUED | OUTPATIENT
Start: 2021-01-01 | End: 2021-01-01 | Stop reason: SDUPTHER

## 2021-01-01 RX ORDER — DOPAMINE HYDROCHLORIDE 160 MG/100ML
2-20 INJECTION, SOLUTION INTRAVENOUS CONTINUOUS
Status: DISCONTINUED | OUTPATIENT
Start: 2021-01-01 | End: 2021-01-01 | Stop reason: HOSPADM

## 2021-01-01 RX ORDER — ASPIRIN 81 MG/1
81 TABLET ORAL DAILY
Status: DISCONTINUED | OUTPATIENT
Start: 2021-01-01 | End: 2021-01-01

## 2021-01-01 RX ORDER — INSULIN LISPRO 100 [IU]/ML
0-6 INJECTION, SOLUTION INTRAVENOUS; SUBCUTANEOUS NIGHTLY
Status: DISCONTINUED | OUTPATIENT
Start: 2021-01-01 | End: 2021-01-01

## 2021-01-01 RX ORDER — POTASSIUM CHLORIDE 750 MG/1
10 TABLET, FILM COATED, EXTENDED RELEASE ORAL
Status: DISCONTINUED | OUTPATIENT
Start: 2021-01-01 | End: 2021-01-01

## 2021-01-01 RX ORDER — PANTOPRAZOLE SODIUM 40 MG/1
40 TABLET, DELAYED RELEASE ORAL DAILY
Status: DISCONTINUED | OUTPATIENT
Start: 2021-01-01 | End: 2021-01-01

## 2021-01-01 RX ORDER — ALBUMIN (HUMAN) 12.5 G/50ML
25 SOLUTION INTRAVENOUS EVERY 8 HOURS
Status: DISPENSED | OUTPATIENT
Start: 2021-01-01 | End: 2021-01-01

## 2021-01-01 RX ORDER — PROMETHAZINE HYDROCHLORIDE 25 MG/1
12.5 TABLET ORAL EVERY 6 HOURS PRN
Status: DISCONTINUED | OUTPATIENT
Start: 2021-01-01 | End: 2021-01-01

## 2021-01-01 RX ORDER — ONDANSETRON 2 MG/ML
4 INJECTION INTRAMUSCULAR; INTRAVENOUS EVERY 6 HOURS PRN
Status: DISCONTINUED | OUTPATIENT
Start: 2021-01-01 | End: 2021-01-01

## 2021-01-01 RX ORDER — CHLORHEXIDINE GLUCONATE 4 G/100ML
SOLUTION TOPICAL SEE ADMIN INSTRUCTIONS
Status: DISCONTINUED | OUTPATIENT
Start: 2021-01-01 | End: 2021-01-01 | Stop reason: HOSPADM

## 2021-01-01 RX ORDER — ASPIRIN 300 MG/1
300 SUPPOSITORY RECTAL ONCE
Status: COMPLETED | OUTPATIENT
Start: 2021-01-01 | End: 2021-01-01

## 2021-01-01 RX ORDER — AMLODIPINE BESYLATE 5 MG/1
5 TABLET ORAL ONCE
Status: COMPLETED | OUTPATIENT
Start: 2021-01-01 | End: 2021-01-01

## 2021-01-01 RX ORDER — DEXTROSE, SODIUM CHLORIDE, AND POTASSIUM CHLORIDE 5; .45; .15 G/100ML; G/100ML; G/100ML
1000 INJECTION INTRAVENOUS CONTINUOUS
Status: DISCONTINUED | OUTPATIENT
Start: 2021-01-01 | End: 2021-01-01

## 2021-01-01 RX ORDER — DEXTROSE MONOHYDRATE 25 G/50ML
12.5 INJECTION, SOLUTION INTRAVENOUS PRN
Status: DISCONTINUED | OUTPATIENT
Start: 2021-01-01 | End: 2021-01-01 | Stop reason: HOSPADM

## 2021-01-01 RX ORDER — NICOTINE POLACRILEX 4 MG
15 LOZENGE BUCCAL PRN
Status: DISCONTINUED | OUTPATIENT
Start: 2021-01-01 | End: 2021-01-01 | Stop reason: HOSPADM

## 2021-01-01 RX ORDER — FUROSEMIDE 10 MG/ML
40 INJECTION INTRAMUSCULAR; INTRAVENOUS ONCE
Status: DISCONTINUED | OUTPATIENT
Start: 2021-01-01 | End: 2021-01-01 | Stop reason: SDUPTHER

## 2021-01-01 RX ORDER — FENTANYL CITRATE 50 UG/ML
50 INJECTION, SOLUTION INTRAMUSCULAR; INTRAVENOUS ONCE
Status: DISCONTINUED | OUTPATIENT
Start: 2021-01-01 | End: 2021-01-01

## 2021-01-01 RX ORDER — ACETAMINOPHEN 500 MG
1000 TABLET ORAL EVERY 6 HOURS
Status: DISCONTINUED | OUTPATIENT
Start: 2021-01-01 | End: 2021-01-01

## 2021-01-01 RX ORDER — SUCCINYLCHOLINE CHLORIDE 20 MG/ML
INJECTION INTRAMUSCULAR; INTRAVENOUS PRN
Status: DISCONTINUED | OUTPATIENT
Start: 2021-01-01 | End: 2021-01-01 | Stop reason: SDUPTHER

## 2021-01-01 RX ORDER — SODIUM CHLORIDE 0.9 % (FLUSH) 0.9 %
5-40 SYRINGE (ML) INJECTION PRN
Status: DISCONTINUED | OUTPATIENT
Start: 2021-01-01 | End: 2021-01-01 | Stop reason: HOSPADM

## 2021-01-01 RX ORDER — HEPARIN SODIUM 1000 [USP'U]/ML
2000 INJECTION, SOLUTION INTRAVENOUS; SUBCUTANEOUS PRN
Status: DISCONTINUED | OUTPATIENT
Start: 2021-01-01 | End: 2021-01-01

## 2021-01-01 RX ORDER — MAGNESIUM SULFATE IN WATER 40 MG/ML
2000 INJECTION, SOLUTION INTRAVENOUS PRN
Status: DISCONTINUED | OUTPATIENT
Start: 2021-01-01 | End: 2021-01-01 | Stop reason: HOSPADM

## 2021-01-01 RX ORDER — MORPHINE SULFATE 2 MG/ML
2 INJECTION, SOLUTION INTRAMUSCULAR; INTRAVENOUS
Status: DISCONTINUED | OUTPATIENT
Start: 2021-01-01 | End: 2021-01-01 | Stop reason: HOSPADM

## 2021-01-01 RX ORDER — EVOLOCUMAB 140 MG/ML
140 INJECTION, SOLUTION SUBCUTANEOUS
Qty: 2.1 ML | Refills: 5 | Status: SHIPPED | OUTPATIENT
Start: 2021-01-01 | End: 2021-01-01

## 2021-01-01 RX ORDER — ZOLPIDEM TARTRATE 5 MG/1
5 TABLET ORAL NIGHTLY PRN
Status: DISCONTINUED | OUTPATIENT
Start: 2021-01-01 | End: 2021-01-01 | Stop reason: HOSPADM

## 2021-01-01 RX ORDER — ARGATROBAN 1 MG/ML
0.5 INJECTION, SOLUTION INTRAVENOUS CONTINUOUS
Status: DISCONTINUED | OUTPATIENT
Start: 2021-01-01 | End: 2021-01-01 | Stop reason: ALTCHOICE

## 2021-01-01 RX ORDER — FLUCONAZOLE 2 MG/ML
200 INJECTION, SOLUTION INTRAVENOUS ONCE
Status: COMPLETED | OUTPATIENT
Start: 2021-01-01 | End: 2021-01-01

## 2021-01-01 RX ORDER — ETOMIDATE 2 MG/ML
INJECTION INTRAVENOUS PRN
Status: DISCONTINUED | OUTPATIENT
Start: 2021-01-01 | End: 2021-01-01 | Stop reason: SDUPTHER

## 2021-01-01 RX ORDER — ALBUMIN (HUMAN) 12.5 G/50ML
12.5 SOLUTION INTRAVENOUS ONCE
Status: COMPLETED | OUTPATIENT
Start: 2021-01-01 | End: 2021-01-01

## 2021-01-01 RX ORDER — DEXTROSE MONOHYDRATE 50 MG/ML
100 INJECTION, SOLUTION INTRAVENOUS PRN
Status: DISCONTINUED | OUTPATIENT
Start: 2021-01-01 | End: 2021-01-01 | Stop reason: HOSPADM

## 2021-01-01 RX ORDER — MIDODRINE HYDROCHLORIDE 5 MG/1
5 TABLET ORAL
Status: DISCONTINUED | OUTPATIENT
Start: 2021-01-01 | End: 2021-01-01

## 2021-01-01 RX ORDER — METOPROLOL TARTRATE 50 MG/1
50 TABLET, FILM COATED ORAL 2 TIMES DAILY
Status: DISCONTINUED | OUTPATIENT
Start: 2021-01-01 | End: 2021-01-01 | Stop reason: HOSPADM

## 2021-01-01 RX ORDER — ROCURONIUM BROMIDE 10 MG/ML
50 INJECTION, SOLUTION INTRAVENOUS ONCE
Status: DISCONTINUED | OUTPATIENT
Start: 2021-01-01 | End: 2021-01-01

## 2021-01-01 RX ORDER — CALCIUM GLUCONATE 20 MG/ML
1000 INJECTION, SOLUTION INTRAVENOUS ONCE
Status: COMPLETED | OUTPATIENT
Start: 2021-01-01 | End: 2021-01-01

## 2021-01-01 RX ORDER — METOCLOPRAMIDE HYDROCHLORIDE 5 MG/ML
10 INJECTION INTRAMUSCULAR; INTRAVENOUS EVERY 6 HOURS PRN
Status: DISCONTINUED | OUTPATIENT
Start: 2021-01-01 | End: 2021-01-01 | Stop reason: HOSPADM

## 2021-01-01 RX ORDER — MAGNESIUM HYDROXIDE 1200 MG/15ML
LIQUID ORAL CONTINUOUS PRN
Status: COMPLETED | OUTPATIENT
Start: 2021-01-01 | End: 2021-01-01

## 2021-01-01 RX ORDER — CALCIUM CHLORIDE 100 MG/ML
INJECTION INTRAVENOUS; INTRAVENTRICULAR
Status: COMPLETED | OUTPATIENT
Start: 2021-01-01 | End: 2021-01-01

## 2021-01-01 RX ORDER — METOPROLOL TARTRATE 50 MG/1
150 TABLET, FILM COATED ORAL 2 TIMES DAILY
Status: DISCONTINUED | OUTPATIENT
Start: 2021-01-01 | End: 2021-01-01

## 2021-01-01 RX ORDER — CALCIUM GLUCONATE 20 MG/ML
1000 INJECTION, SOLUTION INTRAVENOUS ONCE
Status: DISCONTINUED | OUTPATIENT
Start: 2021-01-01 | End: 2021-01-01

## 2021-01-01 RX ORDER — SODIUM CHLORIDE 0.9 % (FLUSH) 0.9 %
10 SYRINGE (ML) INJECTION PRN
Status: DISCONTINUED | OUTPATIENT
Start: 2021-01-01 | End: 2021-01-01 | Stop reason: SDUPTHER

## 2021-01-01 RX ORDER — MIDODRINE HYDROCHLORIDE 5 MG/1
15 TABLET ORAL
Status: DISCONTINUED | OUTPATIENT
Start: 2021-01-01 | End: 2021-01-01 | Stop reason: HOSPADM

## 2021-01-01 RX ORDER — LISINOPRIL 5 MG/1
2.5 TABLET ORAL
Status: DISCONTINUED | OUTPATIENT
Start: 2021-01-01 | End: 2021-01-01

## 2021-01-01 RX ORDER — ALBUMIN (HUMAN) 12.5 G/50ML
25 SOLUTION INTRAVENOUS EVERY 6 HOURS
Status: COMPLETED | OUTPATIENT
Start: 2021-01-01 | End: 2021-01-01

## 2021-01-01 RX ORDER — LORAZEPAM 2 MG/ML
0.5 INJECTION INTRAMUSCULAR EVERY 6 HOURS PRN
Status: DISCONTINUED | OUTPATIENT
Start: 2021-01-01 | End: 2021-01-01

## 2021-01-01 RX ORDER — DOPAMINE HYDROCHLORIDE 160 MG/100ML
INJECTION, SOLUTION INTRAVENOUS
Status: DISPENSED
Start: 2021-01-01 | End: 2021-01-01

## 2021-01-01 RX ORDER — ACETAMINOPHEN 650 MG/1
650 SUPPOSITORY RECTAL EVERY 6 HOURS PRN
Status: DISCONTINUED | OUTPATIENT
Start: 2021-01-01 | End: 2021-01-01

## 2021-01-01 RX ORDER — HEPARIN SODIUM 1000 [USP'U]/ML
4000 INJECTION, SOLUTION INTRAVENOUS; SUBCUTANEOUS PRN
Status: DISCONTINUED | OUTPATIENT
Start: 2021-01-01 | End: 2021-01-01

## 2021-01-01 RX ORDER — INSULIN LISPRO 100 [IU]/ML
0-18 INJECTION, SOLUTION INTRAVENOUS; SUBCUTANEOUS
Status: DISCONTINUED | OUTPATIENT
Start: 2021-01-01 | End: 2021-01-01

## 2021-01-01 RX ORDER — INSULIN LISPRO 100 [IU]/ML
0-9 INJECTION, SOLUTION INTRAVENOUS; SUBCUTANEOUS NIGHTLY
Status: DISCONTINUED | OUTPATIENT
Start: 2021-01-01 | End: 2021-01-01

## 2021-01-01 RX ORDER — DIPHENHYDRAMINE HYDROCHLORIDE 25 MG/1
1 CAPSULE, LIQUID FILLED ORAL ONCE
Qty: 1 KIT | Refills: 0 | Status: SHIPPED | OUTPATIENT
Start: 2021-01-01 | End: 2021-01-01

## 2021-01-01 RX ORDER — NITROGLYCERIN 40 MG/1
1 PATCH TRANSDERMAL DAILY
Status: DISCONTINUED | OUTPATIENT
Start: 2021-01-01 | End: 2021-01-01

## 2021-01-01 RX ORDER — INSULIN LISPRO 100 [IU]/ML
0-3 INJECTION, SOLUTION INTRAVENOUS; SUBCUTANEOUS NIGHTLY
Status: DISCONTINUED | OUTPATIENT
Start: 2021-01-01 | End: 2021-01-01

## 2021-01-01 RX ORDER — NITROGLYCERIN 20 MG/100ML
10 INJECTION INTRAVENOUS CONTINUOUS PRN
Status: DISCONTINUED | OUTPATIENT
Start: 2021-01-01 | End: 2021-01-01

## 2021-01-01 RX ORDER — METOPROLOL TARTRATE 50 MG/1
200 TABLET, FILM COATED ORAL 2 TIMES DAILY
Status: DISCONTINUED | OUTPATIENT
Start: 2021-01-01 | End: 2021-01-01

## 2021-01-01 RX ORDER — PANTOPRAZOLE SODIUM 40 MG/10ML
40 INJECTION, POWDER, LYOPHILIZED, FOR SOLUTION INTRAVENOUS
Status: COMPLETED | OUTPATIENT
Start: 2021-01-01 | End: 2021-01-01

## 2021-01-01 RX ORDER — FENTANYL CITRATE 50 UG/ML
INJECTION, SOLUTION INTRAMUSCULAR; INTRAVENOUS PRN
Status: DISCONTINUED | OUTPATIENT
Start: 2021-01-01 | End: 2021-01-01 | Stop reason: SDUPTHER

## 2021-01-01 RX ORDER — MIDAZOLAM HYDROCHLORIDE 2 MG/2ML
2 INJECTION, SOLUTION INTRAMUSCULAR; INTRAVENOUS
Status: DISCONTINUED | OUTPATIENT
Start: 2021-01-01 | End: 2021-01-01 | Stop reason: HOSPADM

## 2021-01-01 RX ORDER — SENNA AND DOCUSATE SODIUM 50; 8.6 MG/1; MG/1
1 TABLET, FILM COATED ORAL 2 TIMES DAILY
Status: DISCONTINUED | OUTPATIENT
Start: 2021-01-01 | End: 2021-01-01 | Stop reason: HOSPADM

## 2021-01-01 RX ORDER — SODIUM CHLORIDE 9 MG/ML
INJECTION, SOLUTION INTRAVENOUS CONTINUOUS
Status: DISCONTINUED | OUTPATIENT
Start: 2021-01-01 | End: 2021-01-01

## 2021-01-01 RX ORDER — LIDOCAINE HYDROCHLORIDE 20 MG/ML
INJECTION, SOLUTION INTRAVENOUS
Status: COMPLETED
Start: 2021-01-01 | End: 2021-01-01

## 2021-01-01 RX ORDER — FLUCONAZOLE 2 MG/ML
200 INJECTION, SOLUTION INTRAVENOUS EVERY 24 HOURS
Status: DISCONTINUED | OUTPATIENT
Start: 2021-01-01 | End: 2021-01-01

## 2021-01-01 RX ORDER — ETOMIDATE 2 MG/ML
INJECTION INTRAVENOUS
Status: DISPENSED
Start: 2021-01-01 | End: 2021-01-01

## 2021-01-01 RX ORDER — AMLODIPINE BESYLATE 5 MG/1
5 TABLET ORAL DAILY
Status: DISCONTINUED | OUTPATIENT
Start: 2021-01-01 | End: 2021-01-01

## 2021-01-01 RX ORDER — AMIODARONE HYDROCHLORIDE 200 MG/1
400 TABLET ORAL 2 TIMES DAILY
Status: DISCONTINUED | OUTPATIENT
Start: 2021-01-01 | End: 2021-01-01 | Stop reason: HOSPADM

## 2021-01-01 RX ORDER — ATROPINE SULFATE 0.1 MG/ML
INJECTION INTRAVENOUS
Status: COMPLETED
Start: 2021-01-01 | End: 2021-01-01

## 2021-01-01 RX ORDER — POLYETHYLENE GLYCOL 3350 17 G/17G
17 POWDER, FOR SOLUTION ORAL 2 TIMES DAILY
Status: DISCONTINUED | OUTPATIENT
Start: 2021-01-01 | End: 2021-01-01 | Stop reason: HOSPADM

## 2021-01-01 RX ORDER — MEPERIDINE HYDROCHLORIDE 50 MG/ML
25 INJECTION INTRAMUSCULAR; INTRAVENOUS; SUBCUTANEOUS
Status: DISPENSED | OUTPATIENT
Start: 2021-01-01 | End: 2021-01-01

## 2021-01-01 RX ORDER — INSULIN LISPRO 100 [IU]/ML
0-6 INJECTION, SOLUTION INTRAVENOUS; SUBCUTANEOUS
Status: DISCONTINUED | OUTPATIENT
Start: 2021-01-01 | End: 2021-01-01

## 2021-01-01 RX ORDER — AMLODIPINE BESYLATE 5 MG/1
10 TABLET ORAL DAILY
Status: DISCONTINUED | OUTPATIENT
Start: 2021-01-01 | End: 2021-01-01

## 2021-01-01 RX ORDER — HEPARIN SODIUM 1000 [USP'U]/ML
INJECTION, SOLUTION INTRAVENOUS; SUBCUTANEOUS PRN
Status: DISCONTINUED | OUTPATIENT
Start: 2021-01-01 | End: 2021-01-01 | Stop reason: SDUPTHER

## 2021-01-01 RX ORDER — PANTOPRAZOLE SODIUM 40 MG/10ML
40 INJECTION, POWDER, LYOPHILIZED, FOR SOLUTION INTRAVENOUS DAILY
Status: DISCONTINUED | OUTPATIENT
Start: 2021-01-01 | End: 2021-01-01

## 2021-01-01 RX ORDER — GLUCOSAMINE HCL/CHONDROITIN SU 500-400 MG
1 CAPSULE ORAL DAILY
Qty: 100 STRIP | Refills: 5 | Status: SHIPPED | OUTPATIENT
Start: 2021-01-01

## 2021-01-01 RX ORDER — ONDANSETRON 2 MG/ML
4 INJECTION INTRAMUSCULAR; INTRAVENOUS EVERY 6 HOURS PRN
Status: DISCONTINUED | OUTPATIENT
Start: 2021-01-01 | End: 2021-01-01 | Stop reason: HOSPADM

## 2021-01-01 RX ORDER — FUROSEMIDE 10 MG/ML
20 INJECTION INTRAMUSCULAR; INTRAVENOUS PRN
Status: DISCONTINUED | OUTPATIENT
Start: 2021-01-01 | End: 2021-01-01 | Stop reason: HOSPADM

## 2021-01-01 RX ORDER — HEPARIN SODIUM 1000 [USP'U]/ML
4000 INJECTION, SOLUTION INTRAVENOUS; SUBCUTANEOUS ONCE
Status: COMPLETED | OUTPATIENT
Start: 2021-01-01 | End: 2021-01-01

## 2021-01-01 RX ORDER — SODIUM CHLORIDE, SODIUM LACTATE, POTASSIUM CHLORIDE, CALCIUM CHLORIDE 600; 310; 30; 20 MG/100ML; MG/100ML; MG/100ML; MG/100ML
250 INJECTION, SOLUTION INTRAVENOUS CONTINUOUS PRN
Status: DISCONTINUED | OUTPATIENT
Start: 2021-01-01 | End: 2021-01-01

## 2021-01-01 RX ORDER — SODIUM CHLORIDE 9 MG/ML
25 INJECTION, SOLUTION INTRAVENOUS PRN
Status: DISCONTINUED | OUTPATIENT
Start: 2021-01-01 | End: 2021-01-01 | Stop reason: HOSPADM

## 2021-01-01 RX ORDER — ACETAMINOPHEN 325 MG/1
650 TABLET ORAL EVERY 6 HOURS PRN
Status: DISCONTINUED | OUTPATIENT
Start: 2021-01-01 | End: 2021-01-01 | Stop reason: ALTCHOICE

## 2021-01-01 RX ORDER — HEPARIN SODIUM 10000 [USP'U]/100ML
5-30 INJECTION, SOLUTION INTRAVENOUS CONTINUOUS
Status: DISCONTINUED | OUTPATIENT
Start: 2021-01-01 | End: 2021-01-01

## 2021-01-01 RX ORDER — METOPROLOL TARTRATE 50 MG/1
50 TABLET, FILM COATED ORAL 2 TIMES DAILY
Status: DISCONTINUED | OUTPATIENT
Start: 2021-01-01 | End: 2021-01-01

## 2021-01-01 RX ORDER — FLUCONAZOLE 2 MG/ML
400 INJECTION, SOLUTION INTRAVENOUS EVERY 24 HOURS
Status: DISCONTINUED | OUTPATIENT
Start: 2021-01-01 | End: 2021-01-01 | Stop reason: HOSPADM

## 2021-01-01 RX ADMIN — Medication 10 ML: at 10:06

## 2021-01-01 RX ADMIN — STANDARDIZED SENNA CONCENTRATE AND DOCUSATE SODIUM 1 TABLET: 8.6; 5 TABLET ORAL at 08:55

## 2021-01-01 RX ADMIN — Medication 10 ML: at 09:26

## 2021-01-01 RX ADMIN — MIDODRINE HYDROCHLORIDE 15 MG: 5 TABLET ORAL at 12:37

## 2021-01-01 RX ADMIN — CLONIDINE HYDROCHLORIDE 0.3 MG: 0.3 TABLET ORAL at 07:42

## 2021-01-01 RX ADMIN — PANTOPRAZOLE SODIUM 40 MG: 40 TABLET, DELAYED RELEASE ORAL at 08:54

## 2021-01-01 RX ADMIN — INSULIN LISPRO 10 UNITS: 100 INJECTION, SOLUTION INTRAVENOUS; SUBCUTANEOUS at 12:37

## 2021-01-01 RX ADMIN — DEXMEDETOMIDINE HYDROCHLORIDE 1.4 MCG/KG/HR: 4 INJECTION, SOLUTION INTRAVENOUS at 19:10

## 2021-01-01 RX ADMIN — MIDAZOLAM 5 MG: 5 INJECTION INTRAMUSCULAR; INTRAVENOUS at 13:05

## 2021-01-01 RX ADMIN — MIDODRINE HYDROCHLORIDE 10 MG: 5 TABLET ORAL at 14:59

## 2021-01-01 RX ADMIN — ASPIRIN 325 MG: 325 TABLET, COATED ORAL at 08:58

## 2021-01-01 RX ADMIN — ASPIRIN 325 MG: 325 TABLET, COATED ORAL at 09:25

## 2021-01-01 RX ADMIN — MIDODRINE HYDROCHLORIDE 15 MG: 5 TABLET ORAL at 08:21

## 2021-01-01 RX ADMIN — PHENYLEPHRINE HYDROCHLORIDE 300 MCG/MIN: 10 INJECTION INTRAVENOUS at 20:34

## 2021-01-01 RX ADMIN — ASPIRIN 325 MG: 325 TABLET, COATED ORAL at 08:00

## 2021-01-01 RX ADMIN — POLYETHYLENE GLYCOL 3350 17 G: 17 POWDER, FOR SOLUTION ORAL at 09:41

## 2021-01-01 RX ADMIN — NITROGLYCERIN 0.5 INCH: 20 OINTMENT TOPICAL at 14:25

## 2021-01-01 RX ADMIN — ASPIRIN 325 MG: 325 TABLET, COATED ORAL at 09:03

## 2021-01-01 RX ADMIN — Medication: at 03:53

## 2021-01-01 RX ADMIN — METOPROLOL TARTRATE 100 MG: 50 TABLET, FILM COATED ORAL at 20:48

## 2021-01-01 RX ADMIN — AMINOCAPROIC ACID 1 G/HR: 250 INJECTION, SOLUTION INTRAVENOUS at 13:05

## 2021-01-01 RX ADMIN — PHENYLEPHRINE HYDROCHLORIDE 100 MCG/MIN: 10 INJECTION INTRAVENOUS at 17:16

## 2021-01-01 RX ADMIN — METOPROLOL TARTRATE 150 MG: 50 TABLET, FILM COATED ORAL at 20:20

## 2021-01-01 RX ADMIN — DEXMEDETOMIDINE HYDROCHLORIDE 1.4 MCG/KG/HR: 4 INJECTION, SOLUTION INTRAVENOUS at 13:49

## 2021-01-01 RX ADMIN — ALBUTEROL SULFATE 2.5 MG: 2.5 SOLUTION RESPIRATORY (INHALATION) at 08:36

## 2021-01-01 RX ADMIN — DOPAMINE HYDROCHLORIDE 8 MCG/KG/MIN: 160 INJECTION, SOLUTION INTRAVENOUS at 23:05

## 2021-01-01 RX ADMIN — SODIUM BICARBONATE 50 MEQ: 84 INJECTION INTRAVENOUS at 22:01

## 2021-01-01 RX ADMIN — ONDANSETRON 4 MG: 2 INJECTION INTRAMUSCULAR; INTRAVENOUS at 05:20

## 2021-01-01 RX ADMIN — DILTIAZEM HYDROCHLORIDE 30 MG: 30 TABLET, FILM COATED ORAL at 00:57

## 2021-01-01 RX ADMIN — ALBUMIN, HUMAN INJ 5% 12.5 G: 5 SOLUTION at 04:38

## 2021-01-01 RX ADMIN — ALBUTEROL SULFATE 2.5 MG: 2.5 SOLUTION RESPIRATORY (INHALATION) at 18:04

## 2021-01-01 RX ADMIN — METOPROLOL TARTRATE 200 MG: 50 TABLET, FILM COATED ORAL at 20:03

## 2021-01-01 RX ADMIN — INSULIN LISPRO 3 UNITS: 100 INJECTION, SOLUTION INTRAVENOUS; SUBCUTANEOUS at 08:43

## 2021-01-01 RX ADMIN — METOPROLOL TARTRATE 150 MG: 50 TABLET, FILM COATED ORAL at 22:55

## 2021-01-01 RX ADMIN — MIDAZOLAM 2 MG: 1 INJECTION INTRAMUSCULAR; INTRAVENOUS at 19:59

## 2021-01-01 RX ADMIN — Medication 10 ML: at 21:13

## 2021-01-01 RX ADMIN — MUPIROCIN: 20 OINTMENT TOPICAL at 20:18

## 2021-01-01 RX ADMIN — Medication: at 03:44

## 2021-01-01 RX ADMIN — ESMOLOL HYDROCHLORIDE 50 MCG/KG/MIN: 10 INJECTION INTRAVENOUS at 07:14

## 2021-01-01 RX ADMIN — Medication 10 ML: at 08:23

## 2021-01-01 RX ADMIN — Medication: at 21:29

## 2021-01-01 RX ADMIN — AMIODARONE HYDROCHLORIDE 0.5 MG/MIN: 50 INJECTION, SOLUTION INTRAVENOUS at 23:40

## 2021-01-01 RX ADMIN — INSULIN LISPRO 4 UNITS: 100 INJECTION, SOLUTION INTRAVENOUS; SUBCUTANEOUS at 11:21

## 2021-01-01 RX ADMIN — Medication 400 MG: at 20:57

## 2021-01-01 RX ADMIN — Medication 2 PUFF: at 08:55

## 2021-01-01 RX ADMIN — IOPAMIDOL 66 ML: 755 INJECTION, SOLUTION INTRAVENOUS at 11:42

## 2021-01-01 RX ADMIN — CALCIUM GLUCONATE 1000 MG: 98 INJECTION, SOLUTION INTRAVENOUS at 06:35

## 2021-01-01 RX ADMIN — PANTOPRAZOLE SODIUM 40 MG: 40 TABLET, DELAYED RELEASE ORAL at 08:34

## 2021-01-01 RX ADMIN — METOPROLOL TARTRATE 100 MG: 50 TABLET, FILM COATED ORAL at 09:42

## 2021-01-01 RX ADMIN — PANTOPRAZOLE SODIUM 40 MG: 40 INJECTION, POWDER, FOR SOLUTION INTRAVENOUS at 17:19

## 2021-01-01 RX ADMIN — CEFAZOLIN 2000 MG: 1 INJECTION, POWDER, FOR SOLUTION INTRAVENOUS at 13:05

## 2021-01-01 RX ADMIN — PANTOPRAZOLE SODIUM 40 MG: 40 TABLET, DELAYED RELEASE ORAL at 08:31

## 2021-01-01 RX ADMIN — INSULIN LISPRO 1 UNITS: 100 INJECTION, SOLUTION INTRAVENOUS; SUBCUTANEOUS at 09:08

## 2021-01-01 RX ADMIN — DOPAMINE HYDROCHLORIDE 25 MCG/KG/MIN: 160 INJECTION, SOLUTION INTRAVENOUS at 01:36

## 2021-01-01 RX ADMIN — CALCIUM GLUCONATE 1000 MG: 98 INJECTION, SOLUTION INTRAVENOUS at 09:03

## 2021-01-01 RX ADMIN — SODIUM PHOSPHATE, MONOBASIC, MONOHYDRATE 6 MMOL: 276; 142 INJECTION, SOLUTION INTRAVENOUS at 21:45

## 2021-01-01 RX ADMIN — PHENYLEPHRINE HYDROCHLORIDE 150 MCG/MIN: 10 INJECTION INTRAVENOUS at 08:27

## 2021-01-01 RX ADMIN — POLYETHYLENE GLYCOL 3350 17 G: 17 POWDER, FOR SOLUTION ORAL at 08:24

## 2021-01-01 RX ADMIN — POLYETHYLENE GLYCOL 3350 17 G: 17 POWDER, FOR SOLUTION ORAL at 16:51

## 2021-01-01 RX ADMIN — Medication: at 00:57

## 2021-01-01 RX ADMIN — PANTOPRAZOLE SODIUM 40 MG: 40 INJECTION, POWDER, FOR SOLUTION INTRAVENOUS at 12:05

## 2021-01-01 RX ADMIN — Medication 10 ML: at 10:17

## 2021-01-01 RX ADMIN — ACETAMINOPHEN 1000 MG: 500 TABLET ORAL at 11:58

## 2021-01-01 RX ADMIN — ASPIRIN 81 MG: 81 TABLET, COATED ORAL at 08:13

## 2021-01-01 RX ADMIN — INSULIN LISPRO 3 UNITS: 100 INJECTION, SOLUTION INTRAVENOUS; SUBCUTANEOUS at 16:36

## 2021-01-01 RX ADMIN — ALBUTEROL SULFATE 2.5 MG: 2.5 SOLUTION RESPIRATORY (INHALATION) at 20:00

## 2021-01-01 RX ADMIN — CALCIUM GLUCONATE 1000 MG: 98 INJECTION, SOLUTION INTRAVENOUS at 05:28

## 2021-01-01 RX ADMIN — METRONIDAZOLE 500 MG: 500 INJECTION, SOLUTION INTRAVENOUS at 00:17

## 2021-01-01 RX ADMIN — DEXMEDETOMIDINE HYDROCHLORIDE 1.4 MCG/KG/HR: 4 INJECTION, SOLUTION INTRAVENOUS at 16:17

## 2021-01-01 RX ADMIN — INSULIN LISPRO 1 UNITS: 100 INJECTION, SOLUTION INTRAVENOUS; SUBCUTANEOUS at 20:37

## 2021-01-01 RX ADMIN — DEXMEDETOMIDINE HYDROCHLORIDE 1.3 MCG/KG/HR: 4 INJECTION, SOLUTION INTRAVENOUS at 23:35

## 2021-01-01 RX ADMIN — ACETAMINOPHEN 1000 MG: 500 TABLET ORAL at 20:57

## 2021-01-01 RX ADMIN — Medication 400 MG: at 09:13

## 2021-01-01 RX ADMIN — INSULIN LISPRO 10 UNITS: 100 INJECTION, SOLUTION INTRAVENOUS; SUBCUTANEOUS at 08:34

## 2021-01-01 RX ADMIN — STANDARDIZED SENNA CONCENTRATE AND DOCUSATE SODIUM 1 TABLET: 8.6; 5 TABLET ORAL at 09:26

## 2021-01-01 RX ADMIN — INSULIN LISPRO 2 UNITS: 100 INJECTION, SOLUTION INTRAVENOUS; SUBCUTANEOUS at 08:21

## 2021-01-01 RX ADMIN — METOPROLOL TARTRATE 200 MG: 50 TABLET, FILM COATED ORAL at 20:20

## 2021-01-01 RX ADMIN — ALBUTEROL SULFATE 2.5 MG: 2.5 SOLUTION RESPIRATORY (INHALATION) at 17:10

## 2021-01-01 RX ADMIN — INSULIN LISPRO 1 UNITS: 100 INJECTION, SOLUTION INTRAVENOUS; SUBCUTANEOUS at 20:27

## 2021-01-01 RX ADMIN — Medication 400 MG: at 08:02

## 2021-01-01 RX ADMIN — MIDAZOLAM 2 MG: 1 INJECTION INTRAMUSCULAR; INTRAVENOUS at 20:21

## 2021-01-01 RX ADMIN — SODIUM BICARBONATE 50 MEQ: 84 INJECTION INTRAVENOUS at 19:32

## 2021-01-01 RX ADMIN — Medication: at 11:00

## 2021-01-01 RX ADMIN — MIDAZOLAM 5 MG: 5 INJECTION INTRAMUSCULAR; INTRAVENOUS at 15:56

## 2021-01-01 RX ADMIN — DEXMEDETOMIDINE HYDROCHLORIDE 1.4 MCG/KG/HR: 4 INJECTION, SOLUTION INTRAVENOUS at 00:46

## 2021-01-01 RX ADMIN — ASPIRIN 325 MG: 325 TABLET, COATED ORAL at 08:02

## 2021-01-01 RX ADMIN — ASPIRIN 81 MG: 81 TABLET, COATED ORAL at 09:42

## 2021-01-01 RX ADMIN — Medication: at 13:15

## 2021-01-01 RX ADMIN — CALCIUM GLUCONATE 1000 MG: 98 INJECTION, SOLUTION INTRAVENOUS at 11:29

## 2021-01-01 RX ADMIN — Medication: at 14:25

## 2021-01-01 RX ADMIN — METOPROLOL TARTRATE 100 MG: 50 TABLET, FILM COATED ORAL at 08:36

## 2021-01-01 RX ADMIN — DEXTROSE MONOHYDRATE 12.5 G: 25 INJECTION, SOLUTION INTRAVENOUS at 23:19

## 2021-01-01 RX ADMIN — INSULIN LISPRO 8 UNITS: 100 INJECTION, SOLUTION INTRAVENOUS; SUBCUTANEOUS at 18:00

## 2021-01-01 RX ADMIN — ASPIRIN 325 MG: 325 TABLET, COATED ORAL at 08:23

## 2021-01-01 RX ADMIN — INSULIN LISPRO 10 UNITS: 100 INJECTION, SOLUTION INTRAVENOUS; SUBCUTANEOUS at 16:35

## 2021-01-01 RX ADMIN — INSULIN LISPRO 10 UNITS: 100 INJECTION, SOLUTION INTRAVENOUS; SUBCUTANEOUS at 12:45

## 2021-01-01 RX ADMIN — INSULIN LISPRO 3 UNITS: 100 INJECTION, SOLUTION INTRAVENOUS; SUBCUTANEOUS at 08:59

## 2021-01-01 RX ADMIN — PHENYLEPHRINE HYDROCHLORIDE 300 MCG/MIN: 10 INJECTION INTRAVENOUS at 00:32

## 2021-01-01 RX ADMIN — MIDODRINE HYDROCHLORIDE 15 MG: 5 TABLET ORAL at 16:46

## 2021-01-01 RX ADMIN — Medication 1 PACKET: at 09:40

## 2021-01-01 RX ADMIN — SODIUM CHLORIDE 10 ML: 9 INJECTION, SOLUTION INTRAVENOUS at 11:30

## 2021-01-01 RX ADMIN — DEXMEDETOMIDINE HYDROCHLORIDE 1.41 MCG/KG/HR: 4 INJECTION, SOLUTION INTRAVENOUS at 10:04

## 2021-01-01 RX ADMIN — Medication 10 ML: at 20:29

## 2021-01-01 RX ADMIN — STANDARDIZED SENNA CONCENTRATE AND DOCUSATE SODIUM 1 TABLET: 8.6; 5 TABLET ORAL at 20:24

## 2021-01-01 RX ADMIN — SODIUM BICARBONATE 100 MEQ: 84 INJECTION, SOLUTION INTRAVENOUS at 22:47

## 2021-01-01 RX ADMIN — METOPROLOL TARTRATE 150 MG: 50 TABLET, FILM COATED ORAL at 08:43

## 2021-01-01 RX ADMIN — FLUDROCORTISONE ACETATE 0.1 MG: 0.1 TABLET ORAL at 08:32

## 2021-01-01 RX ADMIN — PHENYLEPHRINE HYDROCHLORIDE 115 MCG/MIN: 10 INJECTION INTRAVENOUS at 09:31

## 2021-01-01 RX ADMIN — METRONIDAZOLE 500 MG: 500 INJECTION, SOLUTION INTRAVENOUS at 09:20

## 2021-01-01 RX ADMIN — DEXMEDETOMIDINE HYDROCHLORIDE 1.4 MCG/KG/HR: 4 INJECTION, SOLUTION INTRAVENOUS at 05:26

## 2021-01-01 RX ADMIN — METRONIDAZOLE 500 MG: 500 INJECTION, SOLUTION INTRAVENOUS at 01:11

## 2021-01-01 RX ADMIN — PANTOPRAZOLE SODIUM 40 MG: 40 TABLET, DELAYED RELEASE ORAL at 12:33

## 2021-01-01 RX ADMIN — DOPAMINE HYDROCHLORIDE 25 MCG/KG/MIN: 160 INJECTION, SOLUTION INTRAVENOUS at 22:29

## 2021-01-01 RX ADMIN — METOPROLOL TARTRATE 200 MG: 50 TABLET, FILM COATED ORAL at 07:59

## 2021-01-01 RX ADMIN — INSULIN LISPRO 1 UNITS: 100 INJECTION, SOLUTION INTRAVENOUS; SUBCUTANEOUS at 20:45

## 2021-01-01 RX ADMIN — ROCURONIUM BROMIDE 50 MG: 10 INJECTION, SOLUTION INTRAVENOUS at 15:56

## 2021-01-01 RX ADMIN — PANTOPRAZOLE SODIUM 40 MG: 40 INJECTION, POWDER, FOR SOLUTION INTRAVENOUS at 09:22

## 2021-01-01 RX ADMIN — ETOMIDATE 20 MG: 20 INJECTION, SOLUTION INTRAVENOUS at 13:05

## 2021-01-01 RX ADMIN — DILTIAZEM HYDROCHLORIDE 30 MG: 30 TABLET, FILM COATED ORAL at 12:00

## 2021-01-01 RX ADMIN — Medication 400 MG: at 21:14

## 2021-01-01 RX ADMIN — FLUDROCORTISONE ACETATE 0.1 MG: 0.1 TABLET ORAL at 09:26

## 2021-01-01 RX ADMIN — INSULIN LISPRO 8 UNITS: 100 INJECTION, SOLUTION INTRAVENOUS; SUBCUTANEOUS at 13:14

## 2021-01-01 RX ADMIN — POLYETHYLENE GLYCOL 3350 17 G: 17 POWDER, FOR SOLUTION ORAL at 16:45

## 2021-01-01 RX ADMIN — FLUDROCORTISONE ACETATE 0.1 MG: 0.1 TABLET ORAL at 14:31

## 2021-01-01 RX ADMIN — ACETAMINOPHEN 1000 MG: 500 TABLET ORAL at 00:58

## 2021-01-01 RX ADMIN — AMPICILLIN SODIUM AND SULBACTAM SODIUM 3000 MG: 2; 1 INJECTION, POWDER, FOR SOLUTION INTRAMUSCULAR; INTRAVENOUS at 03:15

## 2021-01-01 RX ADMIN — SODIUM CHLORIDE 1 UNITS/HR: 9 INJECTION, SOLUTION INTRAVENOUS at 19:34

## 2021-01-01 RX ADMIN — INSULIN LISPRO 2 UNITS: 100 INJECTION, SOLUTION INTRAVENOUS; SUBCUTANEOUS at 09:12

## 2021-01-01 RX ADMIN — MUPIROCIN: 20 OINTMENT TOPICAL at 20:13

## 2021-01-01 RX ADMIN — ARGATROBAN 0.5 MCG/KG/MIN: 50 INJECTION INTRAVENOUS at 07:38

## 2021-01-01 RX ADMIN — FLUDROCORTISONE ACETATE 0.1 MG: 0.1 TABLET ORAL at 09:27

## 2021-01-01 RX ADMIN — MUPIROCIN: 20 OINTMENT TOPICAL at 09:27

## 2021-01-01 RX ADMIN — STANDARDIZED SENNA CONCENTRATE AND DOCUSATE SODIUM 1 TABLET: 8.6; 5 TABLET ORAL at 08:36

## 2021-01-01 RX ADMIN — CALCIUM GLUCONATE 1000 MG: 98 INJECTION, SOLUTION INTRAVENOUS at 01:31

## 2021-01-01 RX ADMIN — METOPROLOL TARTRATE 200 MG: 50 TABLET, FILM COATED ORAL at 20:18

## 2021-01-01 RX ADMIN — AMPICILLIN SODIUM AND SULBACTAM SODIUM 3000 MG: 2; 1 INJECTION, POWDER, FOR SOLUTION INTRAMUSCULAR; INTRAVENOUS at 10:58

## 2021-01-01 RX ADMIN — ALBUMIN (HUMAN) 12.5 G: 12.5 INJECTION, SOLUTION INTRAVENOUS at 11:33

## 2021-01-01 RX ADMIN — AMIODARONE HYDROCHLORIDE 1 MG/MIN: 50 INJECTION, SOLUTION INTRAVENOUS at 08:15

## 2021-01-01 RX ADMIN — INSULIN LISPRO 1 UNITS: 100 INJECTION, SOLUTION INTRAVENOUS; SUBCUTANEOUS at 20:18

## 2021-01-01 RX ADMIN — SODIUM CHLORIDE 2.76 UNITS/HR: 9 INJECTION, SOLUTION INTRAVENOUS at 06:30

## 2021-01-01 RX ADMIN — ESMOLOL HYDROCHLORIDE 100 MCG/KG/MIN: 10 INJECTION INTRAVENOUS at 20:10

## 2021-01-01 RX ADMIN — Medication: at 07:40

## 2021-01-01 RX ADMIN — DILTIAZEM HYDROCHLORIDE 30 MG: 30 TABLET, FILM COATED ORAL at 17:37

## 2021-01-01 RX ADMIN — METOPROLOL TARTRATE 12.5 MG: 25 TABLET, FILM COATED ORAL at 22:14

## 2021-01-01 RX ADMIN — MIDODRINE HYDROCHLORIDE 15 MG: 5 TABLET ORAL at 08:23

## 2021-01-01 RX ADMIN — STANDARDIZED SENNA CONCENTRATE AND DOCUSATE SODIUM 1 TABLET: 8.6; 5 TABLET ORAL at 08:23

## 2021-01-01 RX ADMIN — Medication: at 02:34

## 2021-01-01 RX ADMIN — MAGNESIUM HYDROXIDE 30 ML: 400 SUSPENSION ORAL at 07:58

## 2021-01-01 RX ADMIN — INSULIN LISPRO 10 UNITS: 100 INJECTION, SOLUTION INTRAVENOUS; SUBCUTANEOUS at 09:07

## 2021-01-01 RX ADMIN — CALCIUM GLUCONATE 1000 MG: 98 INJECTION, SOLUTION INTRAVENOUS at 21:57

## 2021-01-01 RX ADMIN — METRONIDAZOLE 500 MG: 500 INJECTION, SOLUTION INTRAVENOUS at 17:34

## 2021-01-01 RX ADMIN — INSULIN LISPRO 10 UNITS: 100 INJECTION, SOLUTION INTRAVENOUS; SUBCUTANEOUS at 16:32

## 2021-01-01 RX ADMIN — ALBUTEROL SULFATE 2.5 MG: 2.5 SOLUTION RESPIRATORY (INHALATION) at 10:08

## 2021-01-01 RX ADMIN — INSULIN LISPRO 3 UNITS: 100 INJECTION, SOLUTION INTRAVENOUS; SUBCUTANEOUS at 12:46

## 2021-01-01 RX ADMIN — METOPROLOL TARTRATE 100 MG: 50 TABLET, FILM COATED ORAL at 08:22

## 2021-01-01 RX ADMIN — DEXMEDETOMIDINE HYDROCHLORIDE 1.4 MCG/KG/HR: 4 INJECTION, SOLUTION INTRAVENOUS at 06:24

## 2021-01-01 RX ADMIN — POLYETHYLENE GLYCOL 3350 17 G: 17 POWDER, FOR SOLUTION ORAL at 07:57

## 2021-01-01 RX ADMIN — DEXMEDETOMIDINE HYDROCHLORIDE 1.4 MCG/KG/HR: 4 INJECTION, SOLUTION INTRAVENOUS at 08:05

## 2021-01-01 RX ADMIN — MORPHINE SULFATE 2 MG: 2 INJECTION, SOLUTION INTRAMUSCULAR; INTRAVENOUS at 21:07

## 2021-01-01 RX ADMIN — STANDARDIZED SENNA CONCENTRATE AND DOCUSATE SODIUM 1 TABLET: 8.6; 5 TABLET ORAL at 09:27

## 2021-01-01 RX ADMIN — INSULIN GLARGINE 15 UNITS: 100 INJECTION, SOLUTION SUBCUTANEOUS at 21:10

## 2021-01-01 RX ADMIN — Medication 5 MCG/MIN: at 22:16

## 2021-01-01 RX ADMIN — HEPARIN SODIUM 12.02 UNITS/KG/HR: 10000 INJECTION, SOLUTION INTRAVENOUS at 08:07

## 2021-01-01 RX ADMIN — Medication: at 06:45

## 2021-01-01 RX ADMIN — MORPHINE SULFATE 2 MG: 2 INJECTION, SOLUTION INTRAMUSCULAR; INTRAVENOUS at 06:06

## 2021-01-01 RX ADMIN — METOPROLOL TARTRATE 200 MG: 50 TABLET, FILM COATED ORAL at 09:25

## 2021-01-01 RX ADMIN — SODIUM CHLORIDE 250 ML: 9 INJECTION, SOLUTION INTRAVENOUS at 17:38

## 2021-01-01 RX ADMIN — MORPHINE SULFATE 2 MG: 2 INJECTION, SOLUTION INTRAMUSCULAR; INTRAVENOUS at 23:04

## 2021-01-01 RX ADMIN — DEXMEDETOMIDINE HYDROCHLORIDE 1.2 MCG/KG/HR: 4 INJECTION, SOLUTION INTRAVENOUS at 05:02

## 2021-01-01 RX ADMIN — ALBUMIN, HUMAN INJ 5% 12.5 G: 5 SOLUTION at 09:09

## 2021-01-01 RX ADMIN — MUPIROCIN: 20 OINTMENT TOPICAL at 09:15

## 2021-01-01 RX ADMIN — Medication 10 ML: at 08:22

## 2021-01-01 RX ADMIN — PHENYLEPHRINE HYDROCHLORIDE 225 MCG/MIN: 10 INJECTION INTRAVENOUS at 02:11

## 2021-01-01 RX ADMIN — TRAMADOL HYDROCHLORIDE 50 MG: 50 TABLET, FILM COATED ORAL at 10:50

## 2021-01-01 RX ADMIN — VASOPRESSIN 0.04 UNITS/MIN: 20 INJECTION INTRAVENOUS at 03:58

## 2021-01-01 RX ADMIN — INSULIN LISPRO 10 UNITS: 100 INJECTION, SOLUTION INTRAVENOUS; SUBCUTANEOUS at 08:59

## 2021-01-01 RX ADMIN — ALBUTEROL SULFATE 2.5 MG: 2.5 SOLUTION RESPIRATORY (INHALATION) at 18:03

## 2021-01-01 RX ADMIN — SODIUM BICARBONATE 100 MEQ: 84 INJECTION INTRAVENOUS at 18:30

## 2021-01-01 RX ADMIN — METRONIDAZOLE 500 MG: 500 INJECTION, SOLUTION INTRAVENOUS at 00:14

## 2021-01-01 RX ADMIN — INSULIN LISPRO 3 UNITS: 100 INJECTION, SOLUTION INTRAVENOUS; SUBCUTANEOUS at 18:00

## 2021-01-01 RX ADMIN — Medication 10 ML: at 20:14

## 2021-01-01 RX ADMIN — CEFAZOLIN 2000 MG: 10 INJECTION, POWDER, FOR SOLUTION INTRAVENOUS at 16:40

## 2021-01-01 RX ADMIN — FLUCONAZOLE 400 MG: 2 INJECTION, SOLUTION INTRAVENOUS at 00:40

## 2021-01-01 RX ADMIN — AMIODARONE HYDROCHLORIDE 400 MG: 200 TABLET ORAL at 21:25

## 2021-01-01 RX ADMIN — Medication: at 17:52

## 2021-01-01 RX ADMIN — Medication 10 ML: at 09:00

## 2021-01-01 RX ADMIN — ASPIRIN 325 MG: 325 TABLET, COATED ORAL at 09:00

## 2021-01-01 RX ADMIN — VASOPRESSIN 0.04 UNITS/MIN: 20 INJECTION INTRAVENOUS at 12:15

## 2021-01-01 RX ADMIN — STANDARDIZED SENNA CONCENTRATE AND DOCUSATE SODIUM 1 TABLET: 8.6; 5 TABLET ORAL at 20:03

## 2021-01-01 RX ADMIN — PHENYLEPHRINE HYDROCHLORIDE 90 MCG/MIN: 10 INJECTION INTRAVENOUS at 17:43

## 2021-01-01 RX ADMIN — ACETAMINOPHEN 1000 MG: 500 TABLET ORAL at 09:01

## 2021-01-01 RX ADMIN — SODIUM CHLORIDE: 9 INJECTION, SOLUTION INTRAVENOUS at 21:20

## 2021-01-01 RX ADMIN — HEPARIN SODIUM 12 UNITS/KG/HR: 10000 INJECTION, SOLUTION INTRAVENOUS at 21:23

## 2021-01-01 RX ADMIN — PHENYLEPHRINE HYDROCHLORIDE 20 MCG/MIN: 10 INJECTION INTRAVENOUS at 18:10

## 2021-01-01 RX ADMIN — MIDAZOLAM 2 MG: 1 INJECTION INTRAMUSCULAR; INTRAVENOUS at 14:07

## 2021-01-01 RX ADMIN — ARGATROBAN 0.5 MCG/KG/MIN: 50 INJECTION INTRAVENOUS at 02:59

## 2021-01-01 RX ADMIN — POLYETHYLENE GLYCOL 3350 17 G: 17 POWDER, FOR SOLUTION ORAL at 12:06

## 2021-01-01 RX ADMIN — DEXMEDETOMIDINE HYDROCHLORIDE 1.1 MCG/KG/HR: 4 INJECTION, SOLUTION INTRAVENOUS at 08:24

## 2021-01-01 RX ADMIN — Medication 2 PUFF: at 09:16

## 2021-01-01 RX ADMIN — SODIUM CHLORIDE 25 ML: 9 INJECTION, SOLUTION INTRAVENOUS at 06:51

## 2021-01-01 RX ADMIN — TRAMADOL HYDROCHLORIDE 100 MG: 50 TABLET, FILM COATED ORAL at 15:33

## 2021-01-01 RX ADMIN — FUROSEMIDE 20 MG: 10 INJECTION, SOLUTION INTRAVENOUS at 09:09

## 2021-01-01 RX ADMIN — INSULIN LISPRO 10 UNITS: 100 INJECTION, SOLUTION INTRAVENOUS; SUBCUTANEOUS at 16:45

## 2021-01-01 RX ADMIN — METRONIDAZOLE 500 MG: 500 INJECTION, SOLUTION INTRAVENOUS at 15:18

## 2021-01-01 RX ADMIN — DEXMEDETOMIDINE HYDROCHLORIDE 1.4 MCG/KG/HR: 4 INJECTION, SOLUTION INTRAVENOUS at 03:35

## 2021-01-01 RX ADMIN — CALCIUM GLUCONATE 1000 MG: 98 INJECTION, SOLUTION INTRAVENOUS at 02:35

## 2021-01-01 RX ADMIN — HEPARIN SODIUM 40000 UNITS: 1000 INJECTION INTRAVENOUS; SUBCUTANEOUS at 14:11

## 2021-01-01 RX ADMIN — ASPIRIN 325 MG: 325 TABLET, COATED ORAL at 08:34

## 2021-01-01 RX ADMIN — ALBUTEROL SULFATE 2.5 MG: 2.5 SOLUTION RESPIRATORY (INHALATION) at 12:21

## 2021-01-01 RX ADMIN — ARGATROBAN 0.5 MCG/KG/MIN: 50 INJECTION INTRAVENOUS at 16:42

## 2021-01-01 RX ADMIN — MIDODRINE HYDROCHLORIDE 15 MG: 5 TABLET ORAL at 09:49

## 2021-01-01 RX ADMIN — MORPHINE SULFATE 2 MG: 2 INJECTION, SOLUTION INTRAMUSCULAR; INTRAVENOUS at 23:36

## 2021-01-01 RX ADMIN — ALBUMIN (HUMAN) 25 G: 0.25 INJECTION, SOLUTION INTRAVENOUS at 23:14

## 2021-01-01 RX ADMIN — Medication 400 MG: at 20:21

## 2021-01-01 RX ADMIN — STANDARDIZED SENNA CONCENTRATE AND DOCUSATE SODIUM 1 TABLET: 8.6; 5 TABLET ORAL at 08:44

## 2021-01-01 RX ADMIN — SODIUM PHOSPHATE, MONOBASIC, MONOHYDRATE 12 MMOL: 276; 142 INJECTION, SOLUTION INTRAVENOUS at 18:10

## 2021-01-01 RX ADMIN — ACETAMINOPHEN 1000 MG: 500 TABLET ORAL at 20:03

## 2021-01-01 RX ADMIN — FUROSEMIDE 20 MG: 10 INJECTION, SOLUTION INTRAMUSCULAR; INTRAVENOUS at 08:33

## 2021-01-01 RX ADMIN — MIDODRINE HYDROCHLORIDE 15 MG: 5 TABLET ORAL at 16:31

## 2021-01-01 RX ADMIN — ACETAMINOPHEN 1000 MG: 500 TABLET ORAL at 20:20

## 2021-01-01 RX ADMIN — METOPROLOL TARTRATE 100 MG: 50 TABLET, FILM COATED ORAL at 21:36

## 2021-01-01 RX ADMIN — SODIUM BICARBONATE 100 MEQ: 84 INJECTION, SOLUTION INTRAVENOUS at 22:48

## 2021-01-01 RX ADMIN — Medication 2 PUFF: at 20:45

## 2021-01-01 RX ADMIN — POLYETHYLENE GLYCOL 3350 17 G: 17 POWDER, FOR SOLUTION ORAL at 20:14

## 2021-01-01 RX ADMIN — INSULIN GLARGINE 15 UNITS: 100 INJECTION, SOLUTION SUBCUTANEOUS at 20:49

## 2021-01-01 RX ADMIN — ALBUMIN (HUMAN) 12.5 G: 12.5 INJECTION, SOLUTION INTRAVENOUS at 04:08

## 2021-01-01 RX ADMIN — ALBUMIN (HUMAN) 12.5 G: 0.25 INJECTION, SOLUTION INTRAVENOUS at 09:41

## 2021-01-01 RX ADMIN — MIDODRINE HYDROCHLORIDE 15 MG: 5 TABLET ORAL at 14:12

## 2021-01-01 RX ADMIN — DEXMEDETOMIDINE HYDROCHLORIDE 1.4 MCG/KG/HR: 4 INJECTION, SOLUTION INTRAVENOUS at 21:33

## 2021-01-01 RX ADMIN — AMPICILLIN SODIUM AND SULBACTAM SODIUM 3000 MG: 2; 1 INJECTION, POWDER, FOR SOLUTION INTRAMUSCULAR; INTRAVENOUS at 18:55

## 2021-01-01 RX ADMIN — FLUCONAZOLE 400 MG: 2 INJECTION, SOLUTION INTRAVENOUS at 22:54

## 2021-01-01 RX ADMIN — ALBUMIN (HUMAN) 12.5 G: 12.5 INJECTION, SOLUTION INTRAVENOUS at 09:09

## 2021-01-01 RX ADMIN — Medication 10 ML: at 20:46

## 2021-01-01 RX ADMIN — VASOPRESSIN 0.04 UNITS/MIN: 20 INJECTION INTRAVENOUS at 21:11

## 2021-01-01 RX ADMIN — INSULIN LISPRO 10 UNITS: 100 INJECTION, SOLUTION INTRAVENOUS; SUBCUTANEOUS at 08:54

## 2021-01-01 RX ADMIN — PHENYLEPHRINE HYDROCHLORIDE 70 MCG/MIN: 10 INJECTION INTRAVENOUS at 04:48

## 2021-01-01 RX ADMIN — Medication 50 MCG/HR: at 00:07

## 2021-01-01 RX ADMIN — Medication 10 ML: at 20:11

## 2021-01-01 RX ADMIN — METRONIDAZOLE 500 MG: 500 INJECTION, SOLUTION INTRAVENOUS at 09:54

## 2021-01-01 RX ADMIN — DEXTROSE MONOHYDRATE 12.5 G: 25 INJECTION, SOLUTION INTRAVENOUS at 19:49

## 2021-01-01 RX ADMIN — Medication 10 ML: at 08:26

## 2021-01-01 RX ADMIN — MIDODRINE HYDROCHLORIDE 15 MG: 5 TABLET ORAL at 20:17

## 2021-01-01 RX ADMIN — Medication: at 21:18

## 2021-01-01 RX ADMIN — INSULIN LISPRO 1 UNITS: 100 INJECTION, SOLUTION INTRAVENOUS; SUBCUTANEOUS at 20:47

## 2021-01-01 RX ADMIN — VERAPAMIL HYDROCHLORIDE 40 MG: 80 TABLET, FILM COATED ORAL at 08:59

## 2021-01-01 RX ADMIN — ASPIRIN 325 MG: 325 TABLET, COATED ORAL at 09:27

## 2021-01-01 RX ADMIN — ASPIRIN 81 MG: 81 TABLET, COATED ORAL at 08:22

## 2021-01-01 RX ADMIN — FLUDROCORTISONE ACETATE 0.1 MG: 0.1 TABLET ORAL at 08:21

## 2021-01-01 RX ADMIN — METOPROLOL TARTRATE 200 MG: 50 TABLET, FILM COATED ORAL at 08:27

## 2021-01-01 RX ADMIN — INSULIN LISPRO 10 UNITS: 100 INJECTION, SOLUTION INTRAVENOUS; SUBCUTANEOUS at 16:42

## 2021-01-01 RX ADMIN — Medication: at 03:43

## 2021-01-01 RX ADMIN — CEFTRIAXONE 2000 MG: 2 INJECTION, POWDER, FOR SOLUTION INTRAMUSCULAR; INTRAVENOUS at 12:03

## 2021-01-01 RX ADMIN — ALBUMIN (HUMAN) 25 G: 0.25 INJECTION, SOLUTION INTRAVENOUS at 21:00

## 2021-01-01 RX ADMIN — STANDARDIZED SENNA CONCENTRATE AND DOCUSATE SODIUM 1 TABLET: 8.6; 5 TABLET ORAL at 09:41

## 2021-01-01 RX ADMIN — SODIUM CHLORIDE 25 ML: 9 INJECTION, SOLUTION INTRAVENOUS at 17:34

## 2021-01-01 RX ADMIN — VASOPRESSIN 0.03 UNITS/MIN: 20 INJECTION INTRAVENOUS at 03:52

## 2021-01-01 RX ADMIN — CALCIUM CHLORIDE 1000 MG: 100 INJECTION, SOLUTION INTRAVENOUS at 10:54

## 2021-01-01 RX ADMIN — Medication: at 11:20

## 2021-01-01 RX ADMIN — SODIUM CHLORIDE 500 ML: 9 INJECTION, SOLUTION INTRAVENOUS at 15:45

## 2021-01-01 RX ADMIN — CALCIUM GLUCONATE 1000 MG: 98 INJECTION, SOLUTION INTRAVENOUS at 08:57

## 2021-01-01 RX ADMIN — PHENYLEPHRINE HYDROCHLORIDE 300 MCG/MIN: 10 INJECTION INTRAVENOUS at 07:38

## 2021-01-01 RX ADMIN — METOPROLOL TARTRATE 200 MG: 50 TABLET, FILM COATED ORAL at 09:03

## 2021-01-01 RX ADMIN — DEXMEDETOMIDINE HYDROCHLORIDE 1.2 MCG/KG/HR: 4 INJECTION, SOLUTION INTRAVENOUS at 20:32

## 2021-01-01 RX ADMIN — ALBUMIN (HUMAN) 25 G: 0.25 INJECTION, SOLUTION INTRAVENOUS at 20:19

## 2021-01-01 RX ADMIN — Medication 10 ML: at 20:49

## 2021-01-01 RX ADMIN — INSULIN GLARGINE 10 UNITS: 100 INJECTION, SOLUTION SUBCUTANEOUS at 18:50

## 2021-01-01 RX ADMIN — PHENYLEPHRINE HYDROCHLORIDE 300 MCG/MIN: 10 INJECTION INTRAVENOUS at 19:37

## 2021-01-01 RX ADMIN — AMLODIPINE BESYLATE 5 MG: 5 TABLET ORAL at 17:36

## 2021-01-01 RX ADMIN — INSULIN LISPRO 3 UNITS: 100 INJECTION, SOLUTION INTRAVENOUS; SUBCUTANEOUS at 14:51

## 2021-01-01 RX ADMIN — Medication: at 22:53

## 2021-01-01 RX ADMIN — SODIUM CHLORIDE: 9 INJECTION, SOLUTION INTRAVENOUS at 13:55

## 2021-01-01 RX ADMIN — METRONIDAZOLE 500 MG: 500 INJECTION, SOLUTION INTRAVENOUS at 22:54

## 2021-01-01 RX ADMIN — DOPAMINE HYDROCHLORIDE 10 MCG/KG/MIN: 160 INJECTION, SOLUTION INTRAVENOUS at 10:00

## 2021-01-01 RX ADMIN — SODIUM PHOSPHATE, MONOBASIC, MONOHYDRATE 6 MMOL: 276; 142 INJECTION, SOLUTION INTRAVENOUS at 16:53

## 2021-01-01 RX ADMIN — Medication 10 ML: at 12:08

## 2021-01-01 RX ADMIN — AMIODARONE HYDROCHLORIDE 400 MG: 200 TABLET ORAL at 22:14

## 2021-01-01 RX ADMIN — CALCIUM GLUCONATE 2000 MG: 20 INJECTION, SOLUTION INTRAVENOUS at 23:31

## 2021-01-01 RX ADMIN — CALCIUM GLUCONATE 1000 MG: 98 INJECTION, SOLUTION INTRAVENOUS at 15:47

## 2021-01-01 RX ADMIN — CLONIDINE HYDROCHLORIDE 0.3 MG: 0.3 TABLET ORAL at 08:14

## 2021-01-01 RX ADMIN — Medication: at 17:17

## 2021-01-01 RX ADMIN — DEXMEDETOMIDINE HYDROCHLORIDE 1.4 MCG/KG/HR: 4 INJECTION, SOLUTION INTRAVENOUS at 12:30

## 2021-01-01 RX ADMIN — Medication 400 MG: at 08:43

## 2021-01-01 RX ADMIN — MIDODRINE HYDROCHLORIDE 15 MG: 5 TABLET ORAL at 08:58

## 2021-01-01 RX ADMIN — ALBUTEROL SULFATE 2.5 MG: 2.5 SOLUTION RESPIRATORY (INHALATION) at 12:44

## 2021-01-01 RX ADMIN — INSULIN LISPRO 3 UNITS: 100 INJECTION, SOLUTION INTRAVENOUS; SUBCUTANEOUS at 16:39

## 2021-01-01 RX ADMIN — MIDODRINE HYDROCHLORIDE 15 MG: 5 TABLET ORAL at 16:51

## 2021-01-01 RX ADMIN — ALBUMIN (HUMAN) 12.5 G: 12.5 INJECTION, SOLUTION INTRAVENOUS at 20:00

## 2021-01-01 RX ADMIN — ALBUTEROL SULFATE 2.5 MG: 2.5 SOLUTION RESPIRATORY (INHALATION) at 09:37

## 2021-01-01 RX ADMIN — FUROSEMIDE 20 MG: 10 INJECTION, SOLUTION INTRAMUSCULAR; INTRAVENOUS at 11:58

## 2021-01-01 RX ADMIN — SODIUM CHLORIDE: 9 INJECTION, SOLUTION INTRAVENOUS at 17:36

## 2021-01-01 RX ADMIN — CALCIUM GLUCONATE 1000 MG: 98 INJECTION, SOLUTION INTRAVENOUS at 20:10

## 2021-01-01 RX ADMIN — DILTIAZEM HYDROCHLORIDE 30 MG: 30 TABLET, FILM COATED ORAL at 17:21

## 2021-01-01 RX ADMIN — Medication: at 12:43

## 2021-01-01 RX ADMIN — CEFTRIAXONE 2000 MG: 2 INJECTION, POWDER, FOR SOLUTION INTRAMUSCULAR; INTRAVENOUS at 13:43

## 2021-01-01 RX ADMIN — DEXMEDETOMIDINE HYDROCHLORIDE 1.1 MCG/KG/HR: 4 INJECTION, SOLUTION INTRAVENOUS at 08:26

## 2021-01-01 RX ADMIN — ASPIRIN 325 MG: 325 TABLET, COATED ORAL at 08:55

## 2021-01-01 RX ADMIN — Medication 10 ML: at 20:54

## 2021-01-01 RX ADMIN — CALCIUM GLUCONATE 1000 MG: 20 INJECTION, SOLUTION INTRAVENOUS at 09:18

## 2021-01-01 RX ADMIN — PHENYLEPHRINE HYDROCHLORIDE 300 MCG/MIN: 10 INJECTION INTRAVENOUS at 06:14

## 2021-01-01 RX ADMIN — Medication 10 ML: at 08:30

## 2021-01-01 RX ADMIN — Medication 10 ML: at 20:56

## 2021-01-01 RX ADMIN — MIDODRINE HYDROCHLORIDE 15 MG: 5 TABLET ORAL at 18:00

## 2021-01-01 RX ADMIN — MIDAZOLAM 2 MG: 1 INJECTION INTRAMUSCULAR; INTRAVENOUS at 18:31

## 2021-01-01 RX ADMIN — ESMOLOL HYDROCHLORIDE 25 MCG/KG/MIN: 10 INJECTION INTRAVENOUS at 17:16

## 2021-01-01 RX ADMIN — ONDANSETRON 4 MG: 2 INJECTION INTRAMUSCULAR; INTRAVENOUS at 23:49

## 2021-01-01 RX ADMIN — INSULIN LISPRO 4 UNITS: 100 INJECTION, SOLUTION INTRAVENOUS; SUBCUTANEOUS at 11:31

## 2021-01-01 RX ADMIN — Medication 400 MG: at 20:30

## 2021-01-01 RX ADMIN — PHENYLEPHRINE HYDROCHLORIDE 50 MCG/MIN: 10 INJECTION INTRAVENOUS at 12:03

## 2021-01-01 RX ADMIN — DEXMEDETOMIDINE HYDROCHLORIDE 1.4 MCG/KG/HR: 4 INJECTION, SOLUTION INTRAVENOUS at 19:23

## 2021-01-01 RX ADMIN — INSULIN LISPRO 3 UNITS: 100 INJECTION, SOLUTION INTRAVENOUS; SUBCUTANEOUS at 11:27

## 2021-01-01 RX ADMIN — MIDODRINE HYDROCHLORIDE 15 MG: 5 TABLET ORAL at 18:28

## 2021-01-01 RX ADMIN — ALBUMIN (HUMAN) 25 G: 0.25 INJECTION, SOLUTION INTRAVENOUS at 13:01

## 2021-01-01 RX ADMIN — STANDARDIZED SENNA CONCENTRATE AND DOCUSATE SODIUM 1 TABLET: 8.6; 5 TABLET ORAL at 20:20

## 2021-01-01 RX ADMIN — DEXMEDETOMIDINE HYDROCHLORIDE 1.2 MCG/KG/HR: 4 INJECTION, SOLUTION INTRAVENOUS at 14:27

## 2021-01-01 RX ADMIN — ACETAMINOPHEN 1000 MG: 500 TABLET ORAL at 08:00

## 2021-01-01 RX ADMIN — STANDARDIZED SENNA CONCENTRATE AND DOCUSATE SODIUM 1 TABLET: 8.6; 5 TABLET ORAL at 20:57

## 2021-01-01 RX ADMIN — FLUDROCORTISONE ACETATE 0.1 MG: 0.1 TABLET ORAL at 12:06

## 2021-01-01 RX ADMIN — CALCIUM GLUCONATE 1000 MG: 98 INJECTION, SOLUTION INTRAVENOUS at 15:16

## 2021-01-01 RX ADMIN — EPINEPHRINE 1 MG: 0.1 INJECTION, SOLUTION ENDOTRACHEAL; INTRACARDIAC; INTRAVENOUS at 20:37

## 2021-01-01 RX ADMIN — STANDARDIZED SENNA CONCENTRATE AND DOCUSATE SODIUM 1 TABLET: 8.6; 5 TABLET ORAL at 08:02

## 2021-01-01 RX ADMIN — STANDARDIZED SENNA CONCENTRATE AND DOCUSATE SODIUM 1 TABLET: 8.6; 5 TABLET ORAL at 21:19

## 2021-01-01 RX ADMIN — CEFAZOLIN 2000 MG: 10 INJECTION, POWDER, FOR SOLUTION INTRAVENOUS at 00:14

## 2021-01-01 RX ADMIN — Medication 10 ML: at 08:37

## 2021-01-01 RX ADMIN — ARGATROBAN 0.5 MCG/KG/MIN: 50 INJECTION INTRAVENOUS at 11:02

## 2021-01-01 RX ADMIN — PHENYLEPHRINE HYDROCHLORIDE 300 MCG/MIN: 10 INJECTION INTRAVENOUS at 09:51

## 2021-01-01 RX ADMIN — ACETAMINOPHEN 1000 MG: 500 TABLET ORAL at 06:12

## 2021-01-01 RX ADMIN — MIDAZOLAM 2 MG: 1 INJECTION INTRAMUSCULAR; INTRAVENOUS at 21:57

## 2021-01-01 RX ADMIN — Medication 10 ML: at 19:59

## 2021-01-01 RX ADMIN — INSULIN LISPRO 2 UNITS: 100 INJECTION, SOLUTION INTRAVENOUS; SUBCUTANEOUS at 16:30

## 2021-01-01 RX ADMIN — STANDARDIZED SENNA CONCENTRATE AND DOCUSATE SODIUM 1 TABLET: 8.6; 5 TABLET ORAL at 20:14

## 2021-01-01 RX ADMIN — AMLODIPINE BESYLATE 10 MG: 5 TABLET ORAL at 08:22

## 2021-01-01 RX ADMIN — PHENYLEPHRINE HYDROCHLORIDE 125 MCG/MIN: 10 INJECTION INTRAVENOUS at 00:56

## 2021-01-01 RX ADMIN — DILTIAZEM HYDROCHLORIDE 30 MG: 30 TABLET, FILM COATED ORAL at 11:40

## 2021-01-01 RX ADMIN — AMPICILLIN SODIUM AND SULBACTAM SODIUM 3000 MG: 2; 1 INJECTION, POWDER, FOR SOLUTION INTRAMUSCULAR; INTRAVENOUS at 19:41

## 2021-01-01 RX ADMIN — ONDANSETRON 4 MG: 2 INJECTION INTRAMUSCULAR; INTRAVENOUS at 12:52

## 2021-01-01 RX ADMIN — Medication 2 PUFF: at 22:58

## 2021-01-01 RX ADMIN — ACETAMINOPHEN 1000 MG: 500 TABLET ORAL at 05:19

## 2021-01-01 RX ADMIN — Medication 10 ML: at 20:22

## 2021-01-01 RX ADMIN — METOPROLOL TARTRATE 100 MG: 50 TABLET, FILM COATED ORAL at 21:13

## 2021-01-01 RX ADMIN — PANTOPRAZOLE SODIUM 40 MG: 40 TABLET, DELAYED RELEASE ORAL at 09:03

## 2021-01-01 RX ADMIN — MUPIROCIN: 20 OINTMENT TOPICAL at 08:05

## 2021-01-01 RX ADMIN — ALBUTEROL SULFATE 2.5 MG: 2.5 SOLUTION RESPIRATORY (INHALATION) at 19:34

## 2021-01-01 RX ADMIN — FUROSEMIDE 20 MG: 10 INJECTION, SOLUTION INTRAMUSCULAR; INTRAVENOUS at 10:20

## 2021-01-01 RX ADMIN — DEXMEDETOMIDINE HYDROCHLORIDE 1.4 MCG/KG/HR: 4 INJECTION, SOLUTION INTRAVENOUS at 16:21

## 2021-01-01 RX ADMIN — Medication 10 ML: at 07:43

## 2021-01-01 RX ADMIN — Medication 50 MCG/HR: at 20:23

## 2021-01-01 RX ADMIN — INSULIN LISPRO 10 UNITS: 100 INJECTION, SOLUTION INTRAVENOUS; SUBCUTANEOUS at 11:47

## 2021-01-01 RX ADMIN — TRAMADOL HYDROCHLORIDE 100 MG: 50 TABLET, FILM COATED ORAL at 19:08

## 2021-01-01 RX ADMIN — METOCLOPRAMIDE HYDROCHLORIDE 10 MG: 5 INJECTION INTRAMUSCULAR; INTRAVENOUS at 08:55

## 2021-01-01 RX ADMIN — DIPHENHYDRAMINE HYDROCHLORIDE 25 MG: 25 TABLET ORAL at 23:49

## 2021-01-01 RX ADMIN — VASOPRESSIN 0.04 UNITS/MIN: 20 INJECTION INTRAVENOUS at 04:06

## 2021-01-01 RX ADMIN — ALBUTEROL SULFATE 2.5 MG: 2.5 SOLUTION RESPIRATORY (INHALATION) at 11:22

## 2021-01-01 RX ADMIN — Medication 10 ML: at 07:57

## 2021-01-01 RX ADMIN — HEPARIN SODIUM 12 UNITS/KG/HR: 10000 INJECTION, SOLUTION INTRAVENOUS at 20:46

## 2021-01-01 RX ADMIN — DEXMEDETOMIDINE HYDROCHLORIDE 1.4 MCG/KG/HR: 4 INJECTION, SOLUTION INTRAVENOUS at 11:01

## 2021-01-01 RX ADMIN — PHENYLEPHRINE HYDROCHLORIDE 80 MCG/MIN: 10 INJECTION INTRAVENOUS at 03:10

## 2021-01-01 RX ADMIN — MIDODRINE HYDROCHLORIDE 15 MG: 5 TABLET ORAL at 13:08

## 2021-01-01 RX ADMIN — ACETAMINOPHEN 1000 MG: 500 TABLET ORAL at 13:47

## 2021-01-01 RX ADMIN — PHENYLEPHRINE HYDROCHLORIDE 300 MCG/MIN: 10 INJECTION INTRAVENOUS at 23:10

## 2021-01-01 RX ADMIN — POLYETHYLENE GLYCOL 3350 17 G: 17 POWDER, FOR SOLUTION ORAL at 21:19

## 2021-01-01 RX ADMIN — DEXTROSE MONOHYDRATE 12.5 G: 25 INJECTION, SOLUTION INTRAVENOUS at 01:06

## 2021-01-01 RX ADMIN — VASOPRESSIN 0.02 UNITS/MIN: 20 INJECTION INTRAVENOUS at 21:37

## 2021-01-01 RX ADMIN — CALCIUM GLUCONATE 1000 MG: 20 INJECTION, SOLUTION INTRAVENOUS at 14:11

## 2021-01-01 RX ADMIN — Medication 10 ML: at 09:27

## 2021-01-01 RX ADMIN — VANCOMYCIN HYDROCHLORIDE 1500 MG: 10 INJECTION, POWDER, LYOPHILIZED, FOR SOLUTION INTRAVENOUS at 12:55

## 2021-01-01 RX ADMIN — DEXTROSE MONOHYDRATE 12.5 G: 25 INJECTION, SOLUTION INTRAVENOUS at 20:44

## 2021-01-01 RX ADMIN — INSULIN LISPRO 1 UNITS: 100 INJECTION, SOLUTION INTRAVENOUS; SUBCUTANEOUS at 00:24

## 2021-01-01 RX ADMIN — METOPROLOL TARTRATE 200 MG: 50 TABLET, FILM COATED ORAL at 08:34

## 2021-01-01 RX ADMIN — METOPROLOL TARTRATE 100 MG: 50 TABLET, FILM COATED ORAL at 20:46

## 2021-01-01 RX ADMIN — Medication 400 MG: at 09:03

## 2021-01-01 RX ADMIN — ALBUTEROL SULFATE 2.5 MG: 2.5 SOLUTION RESPIRATORY (INHALATION) at 16:25

## 2021-01-01 RX ADMIN — INSULIN LISPRO 10 UNITS: 100 INJECTION, SOLUTION INTRAVENOUS; SUBCUTANEOUS at 12:02

## 2021-01-01 RX ADMIN — MIDAZOLAM 2 MG: 1 INJECTION INTRAMUSCULAR; INTRAVENOUS at 04:33

## 2021-01-01 RX ADMIN — Medication 10 ML: at 08:25

## 2021-01-01 RX ADMIN — INSULIN LISPRO 1 UNITS: 100 INJECTION, SOLUTION INTRAVENOUS; SUBCUTANEOUS at 09:37

## 2021-01-01 RX ADMIN — Medication 10 ML: at 09:03

## 2021-01-01 RX ADMIN — INSULIN LISPRO 1 UNITS: 100 INJECTION, SOLUTION INTRAVENOUS; SUBCUTANEOUS at 21:00

## 2021-01-01 RX ADMIN — PROPOFOL 5 MCG/KG/MIN: 10 INJECTION, EMULSION INTRAVENOUS at 07:47

## 2021-01-01 RX ADMIN — ONDANSETRON 4 MG: 2 INJECTION INTRAMUSCULAR; INTRAVENOUS at 16:52

## 2021-01-01 RX ADMIN — METRONIDAZOLE 500 MG: 500 INJECTION, SOLUTION INTRAVENOUS at 16:17

## 2021-01-01 RX ADMIN — PROPOFOL 5 MCG/KG/MIN: 10 INJECTION, EMULSION INTRAVENOUS at 18:30

## 2021-01-01 RX ADMIN — DEXMEDETOMIDINE HYDROCHLORIDE 1.4 MCG/KG/HR: 4 INJECTION, SOLUTION INTRAVENOUS at 02:39

## 2021-01-01 RX ADMIN — SODIUM CHLORIDE 10 ML: 9 INJECTION, SOLUTION INTRAVENOUS at 14:13

## 2021-01-01 RX ADMIN — AMPICILLIN SODIUM AND SULBACTAM SODIUM 3000 MG: 2; 1 INJECTION, POWDER, FOR SOLUTION INTRAMUSCULAR; INTRAVENOUS at 02:52

## 2021-01-01 RX ADMIN — METRONIDAZOLE 500 MG: 500 INJECTION, SOLUTION INTRAVENOUS at 00:01

## 2021-01-01 RX ADMIN — MIDODRINE HYDROCHLORIDE 15 MG: 5 TABLET ORAL at 13:50

## 2021-01-01 RX ADMIN — PANTOPRAZOLE SODIUM 40 MG: 40 TABLET, DELAYED RELEASE ORAL at 08:58

## 2021-01-01 RX ADMIN — PHENYLEPHRINE HYDROCHLORIDE 300 MCG/MIN: 10 INJECTION INTRAVENOUS at 06:39

## 2021-01-01 RX ADMIN — Medication 10 ML: at 11:20

## 2021-01-01 RX ADMIN — MIDAZOLAM 2 MG: 1 INJECTION INTRAMUSCULAR; INTRAVENOUS at 18:11

## 2021-01-01 RX ADMIN — SUCCINYLCHOLINE CHLORIDE 200 MG: 20 INJECTION, SOLUTION INTRAMUSCULAR; INTRAVENOUS at 13:05

## 2021-01-01 RX ADMIN — MIDODRINE HYDROCHLORIDE 10 MG: 5 TABLET ORAL at 16:45

## 2021-01-01 RX ADMIN — DEXTROSE MONOHYDRATE 12.5 G: 25 INJECTION, SOLUTION INTRAVENOUS at 07:57

## 2021-01-01 RX ADMIN — ALBUMIN (HUMAN) 12.5 G: 12.5 INJECTION, SOLUTION INTRAVENOUS at 04:38

## 2021-01-01 RX ADMIN — POTASSIUM CHLORIDE 10 MEQ: 750 TABLET, FILM COATED, EXTENDED RELEASE ORAL at 11:40

## 2021-01-01 RX ADMIN — ALBUMIN (HUMAN) 25 G: 0.25 INJECTION, SOLUTION INTRAVENOUS at 05:17

## 2021-01-01 RX ADMIN — ALBUMIN (HUMAN) 12.5 G: 12.5 INJECTION, SOLUTION INTRAVENOUS at 17:44

## 2021-01-01 RX ADMIN — PHENYLEPHRINE HYDROCHLORIDE 48 MCG/MIN: 10 INJECTION INTRAVENOUS at 12:03

## 2021-01-01 RX ADMIN — ASPIRIN 325 MG: 325 TABLET, COATED ORAL at 08:21

## 2021-01-01 RX ADMIN — METOPROLOL TARTRATE 200 MG: 50 TABLET, FILM COATED ORAL at 00:40

## 2021-01-01 RX ADMIN — Medication 400 MG: at 20:20

## 2021-01-01 RX ADMIN — STANDARDIZED SENNA CONCENTRATE AND DOCUSATE SODIUM 1 TABLET: 8.6; 5 TABLET ORAL at 12:07

## 2021-01-01 RX ADMIN — ATROPINE SULFATE 0.5 MG: 0.1 INJECTION, SOLUTION ENDOTRACHEAL; INTRAMUSCULAR; INTRAVENOUS; SUBCUTANEOUS at 23:44

## 2021-01-01 RX ADMIN — DOPAMINE HYDROCHLORIDE 10 MCG/KG/MIN: 160 INJECTION, SOLUTION INTRAVENOUS at 16:14

## 2021-01-01 RX ADMIN — Medication: at 11:19

## 2021-01-01 RX ADMIN — VASOPRESSIN 0.04 UNITS/MIN: 20 INJECTION INTRAVENOUS at 12:30

## 2021-01-01 RX ADMIN — AMPICILLIN SODIUM AND SULBACTAM SODIUM 3000 MG: 2; 1 INJECTION, POWDER, FOR SOLUTION INTRAMUSCULAR; INTRAVENOUS at 22:52

## 2021-01-01 RX ADMIN — Medication 10 ML: at 20:34

## 2021-01-01 RX ADMIN — ONDANSETRON 4 MG: 2 INJECTION INTRAMUSCULAR; INTRAVENOUS at 20:25

## 2021-01-01 RX ADMIN — VASOPRESSIN 0.04 UNITS/MIN: 20 INJECTION INTRAVENOUS at 23:13

## 2021-01-01 RX ADMIN — METOPROLOL TARTRATE 200 MG: 50 TABLET, FILM COATED ORAL at 21:14

## 2021-01-01 RX ADMIN — ACETAMINOPHEN 1000 MG: 500 TABLET ORAL at 18:49

## 2021-01-01 RX ADMIN — DEXMEDETOMIDINE HYDROCHLORIDE 1.1 MCG/KG/HR: 4 INJECTION, SOLUTION INTRAVENOUS at 11:34

## 2021-01-01 RX ADMIN — Medication 18 MCG/MIN: at 23:21

## 2021-01-01 RX ADMIN — ALBUTEROL SULFATE 2.5 MG: 2.5 SOLUTION RESPIRATORY (INHALATION) at 20:11

## 2021-01-01 RX ADMIN — MIDODRINE HYDROCHLORIDE 15 MG: 5 TABLET ORAL at 18:22

## 2021-01-01 RX ADMIN — FLUCONAZOLE 400 MG: 2 INJECTION, SOLUTION INTRAVENOUS at 23:03

## 2021-01-01 RX ADMIN — STANDARDIZED SENNA CONCENTRATE AND DOCUSATE SODIUM 1 TABLET: 8.6; 5 TABLET ORAL at 08:34

## 2021-01-01 RX ADMIN — VASOPRESSIN 0.02 UNITS/MIN: 20 INJECTION INTRAVENOUS at 23:52

## 2021-01-01 RX ADMIN — INSULIN LISPRO 1 UNITS: 100 INJECTION, SOLUTION INTRAVENOUS; SUBCUTANEOUS at 00:46

## 2021-01-01 RX ADMIN — MIDODRINE HYDROCHLORIDE 15 MG: 5 TABLET ORAL at 11:45

## 2021-01-01 RX ADMIN — CEFAZOLIN 2000 MG: 10 INJECTION, POWDER, FOR SOLUTION INTRAVENOUS at 01:11

## 2021-01-01 RX ADMIN — Medication 1 PACKET: at 20:26

## 2021-01-01 RX ADMIN — METRONIDAZOLE 500 MG: 500 INJECTION, SOLUTION INTRAVENOUS at 12:11

## 2021-01-01 RX ADMIN — VASOPRESSIN 0.03 UNITS/MIN: 20 INJECTION INTRAVENOUS at 16:08

## 2021-01-01 RX ADMIN — PHENYLEPHRINE HYDROCHLORIDE 95 MCG/MIN: 10 INJECTION INTRAVENOUS at 12:06

## 2021-01-01 RX ADMIN — ALBUMIN (HUMAN) 12.5 G: 12.5 INJECTION, SOLUTION INTRAVENOUS at 21:26

## 2021-01-01 RX ADMIN — CALCIUM GLUCONATE 1000 MG: 98 INJECTION, SOLUTION INTRAVENOUS at 20:30

## 2021-01-01 RX ADMIN — INSULIN LISPRO 10 UNITS: 100 INJECTION, SOLUTION INTRAVENOUS; SUBCUTANEOUS at 13:08

## 2021-01-01 RX ADMIN — INSULIN LISPRO 10 UNITS: 100 INJECTION, SOLUTION INTRAVENOUS; SUBCUTANEOUS at 11:29

## 2021-01-01 RX ADMIN — ALBUTEROL SULFATE 2.5 MG: 2.5 SOLUTION RESPIRATORY (INHALATION) at 13:15

## 2021-01-01 RX ADMIN — MIDAZOLAM 2 MG: 1 INJECTION INTRAMUSCULAR; INTRAVENOUS at 02:33

## 2021-01-01 RX ADMIN — PHENYLEPHRINE HYDROCHLORIDE 20 MCG/MIN: 10 INJECTION INTRAVENOUS at 02:16

## 2021-01-01 RX ADMIN — METOPROLOL TARTRATE 50 MG: 50 TABLET, FILM COATED ORAL at 09:41

## 2021-01-01 RX ADMIN — INSULIN LISPRO 10 UNITS: 100 INJECTION, SOLUTION INTRAVENOUS; SUBCUTANEOUS at 11:56

## 2021-01-01 RX ADMIN — VASOPRESSIN 0.04 UNITS/MIN: 20 INJECTION INTRAVENOUS at 06:10

## 2021-01-01 RX ADMIN — INSULIN LISPRO 3 UNITS: 100 INJECTION, SOLUTION INTRAVENOUS; SUBCUTANEOUS at 08:04

## 2021-01-01 RX ADMIN — SODIUM CHLORIDE: 9 INJECTION, SOLUTION INTRAVENOUS at 10:45

## 2021-01-01 RX ADMIN — DOPAMINE HYDROCHLORIDE 10 MCG/KG/MIN: 160 INJECTION, SOLUTION INTRAVENOUS at 06:07

## 2021-01-01 RX ADMIN — MIDODRINE HYDROCHLORIDE 15 MG: 5 TABLET ORAL at 16:45

## 2021-01-01 RX ADMIN — PHENYLEPHRINE HYDROCHLORIDE 250 MCG/MIN: 10 INJECTION INTRAVENOUS at 10:44

## 2021-01-01 RX ADMIN — FLUDROCORTISONE ACETATE 0.1 MG: 0.1 TABLET ORAL at 09:40

## 2021-01-01 RX ADMIN — Medication: at 17:14

## 2021-01-01 RX ADMIN — PHENYLEPHRINE HYDROCHLORIDE 180 MCG/MIN: 10 INJECTION INTRAVENOUS at 19:22

## 2021-01-01 RX ADMIN — METRONIDAZOLE 500 MG: 500 INJECTION, SOLUTION INTRAVENOUS at 00:12

## 2021-01-01 RX ADMIN — DEXMEDETOMIDINE HYDROCHLORIDE 1.4 MCG/KG/HR: 4 INJECTION, SOLUTION INTRAVENOUS at 21:54

## 2021-01-01 RX ADMIN — MIDODRINE HYDROCHLORIDE 15 MG: 5 TABLET ORAL at 08:54

## 2021-01-01 RX ADMIN — SODIUM PHOSPHATE, MONOBASIC, MONOHYDRATE 6 MMOL: 276; 142 INJECTION, SOLUTION INTRAVENOUS at 07:57

## 2021-01-01 RX ADMIN — INSULIN LISPRO 1 UNITS: 100 INJECTION, SOLUTION INTRAVENOUS; SUBCUTANEOUS at 12:37

## 2021-01-01 RX ADMIN — PHENYLEPHRINE HYDROCHLORIDE 300 MCG/MIN: 10 INJECTION INTRAVENOUS at 00:46

## 2021-01-01 RX ADMIN — CALCIUM GLUCONATE 1000 MG: 98 INJECTION, SOLUTION INTRAVENOUS at 22:21

## 2021-01-01 RX ADMIN — Medication 400 MG: at 09:25

## 2021-01-01 RX ADMIN — LIDOCAINE HYDROCHLORIDE 100 MG: 20 INJECTION, SOLUTION INTRAVENOUS at 05:52

## 2021-01-01 RX ADMIN — PHENYLEPHRINE HYDROCHLORIDE 115 MCG/MIN: 10 INJECTION INTRAVENOUS at 20:07

## 2021-01-01 RX ADMIN — MIDAZOLAM 2 MG: 1 INJECTION INTRAMUSCULAR; INTRAVENOUS at 00:39

## 2021-01-01 RX ADMIN — SODIUM CHLORIDE 500 ML: 9 INJECTION, SOLUTION INTRAVENOUS at 10:04

## 2021-01-01 RX ADMIN — PHENYLEPHRINE HYDROCHLORIDE 145 MCG/MIN: 10 INJECTION INTRAVENOUS at 23:13

## 2021-01-01 RX ADMIN — HEPARIN SODIUM 14.02 UNITS/KG/HR: 10000 INJECTION, SOLUTION INTRAVENOUS at 14:40

## 2021-01-01 RX ADMIN — PHENYLEPHRINE HYDROCHLORIDE 300 MCG/MIN: 10 INJECTION INTRAVENOUS at 02:35

## 2021-01-01 RX ADMIN — PANTOPRAZOLE SODIUM 40 MG: 40 INJECTION, POWDER, FOR SOLUTION INTRAVENOUS at 08:36

## 2021-01-01 RX ADMIN — INSULIN GLARGINE 15 UNITS: 100 INJECTION, SOLUTION SUBCUTANEOUS at 20:31

## 2021-01-01 RX ADMIN — ACETAMINOPHEN 1000 MG: 500 TABLET ORAL at 11:55

## 2021-01-01 RX ADMIN — SODIUM CHLORIDE: 9 INJECTION, SOLUTION INTRAVENOUS at 10:13

## 2021-01-01 RX ADMIN — STANDARDIZED SENNA CONCENTRATE AND DOCUSATE SODIUM 1 TABLET: 8.6; 5 TABLET ORAL at 20:04

## 2021-01-01 RX ADMIN — INSULIN LISPRO 8 UNITS: 100 INJECTION, SOLUTION INTRAVENOUS; SUBCUTANEOUS at 08:45

## 2021-01-01 RX ADMIN — MUPIROCIN: 20 OINTMENT TOPICAL at 21:18

## 2021-01-01 RX ADMIN — PANTOPRAZOLE SODIUM 40 MG: 40 TABLET, DELAYED RELEASE ORAL at 08:02

## 2021-01-01 RX ADMIN — INSULIN LISPRO 3 UNITS: 100 INJECTION, SOLUTION INTRAVENOUS; SUBCUTANEOUS at 17:22

## 2021-01-01 RX ADMIN — FLUCONAZOLE 400 MG: 2 INJECTION, SOLUTION INTRAVENOUS at 22:58

## 2021-01-01 RX ADMIN — PANTOPRAZOLE SODIUM 40 MG: 40 INJECTION, POWDER, FOR SOLUTION INTRAVENOUS at 08:23

## 2021-01-01 RX ADMIN — STANDARDIZED SENNA CONCENTRATE AND DOCUSATE SODIUM 1 TABLET: 8.6; 5 TABLET ORAL at 10:08

## 2021-01-01 RX ADMIN — PANTOPRAZOLE SODIUM 40 MG: 40 TABLET, DELAYED RELEASE ORAL at 09:00

## 2021-01-01 RX ADMIN — Medication 400 MG: at 08:00

## 2021-01-01 RX ADMIN — Medication 10 ML: at 23:26

## 2021-01-01 RX ADMIN — INSULIN LISPRO 5 UNITS: 100 INJECTION, SOLUTION INTRAVENOUS; SUBCUTANEOUS at 20:59

## 2021-01-01 RX ADMIN — Medication: at 06:11

## 2021-01-01 RX ADMIN — PANTOPRAZOLE SODIUM 40 MG: 40 TABLET, DELAYED RELEASE ORAL at 09:26

## 2021-01-01 RX ADMIN — MIDODRINE HYDROCHLORIDE 15 MG: 5 TABLET ORAL at 09:26

## 2021-01-01 RX ADMIN — DEXTROSE MONOHYDRATE 12.5 G: 25 INJECTION, SOLUTION INTRAVENOUS at 23:26

## 2021-01-01 RX ADMIN — ASPIRIN 325 MG: 325 TABLET, COATED ORAL at 10:08

## 2021-01-01 RX ADMIN — DEXMEDETOMIDINE HYDROCHLORIDE 1.4 MCG/KG/HR: 4 INJECTION, SOLUTION INTRAVENOUS at 13:32

## 2021-01-01 RX ADMIN — HEPARIN SODIUM 12.02 UNITS/KG/HR: 10000 INJECTION, SOLUTION INTRAVENOUS at 05:15

## 2021-01-01 RX ADMIN — ALBUTEROL SULFATE 2.5 MG: 2.5 SOLUTION RESPIRATORY (INHALATION) at 12:30

## 2021-01-01 RX ADMIN — MIDAZOLAM 2 MG: 1 INJECTION INTRAMUSCULAR; INTRAVENOUS at 10:00

## 2021-01-01 RX ADMIN — ALBUMIN (HUMAN) 25 G: 0.25 INJECTION, SOLUTION INTRAVENOUS at 08:16

## 2021-01-01 RX ADMIN — PROPOFOL 20 MCG/KG/MIN: 10 INJECTION, EMULSION INTRAVENOUS at 04:45

## 2021-01-01 RX ADMIN — METOPROLOL TARTRATE 100 MG: 50 TABLET, FILM COATED ORAL at 07:42

## 2021-01-01 RX ADMIN — Medication 10 ML: at 12:38

## 2021-01-01 RX ADMIN — SODIUM ZIRCONIUM CYCLOSILICATE 10 G: 10 POWDER, FOR SUSPENSION ORAL at 20:03

## 2021-01-01 RX ADMIN — POTASSIUM CHLORIDE 20 MEQ: 29.8 INJECTION, SOLUTION INTRAVENOUS at 20:56

## 2021-01-01 RX ADMIN — Medication 2 PUFF: at 05:46

## 2021-01-01 RX ADMIN — MUPIROCIN: 20 OINTMENT TOPICAL at 20:49

## 2021-01-01 RX ADMIN — VASOPRESSIN 0.04 UNITS/MIN: 20 INJECTION INTRAVENOUS at 13:50

## 2021-01-01 RX ADMIN — BISACODYL 5 MG: 5 TABLET, COATED ORAL at 21:36

## 2021-01-01 RX ADMIN — FLUCONAZOLE 400 MG: 2 INJECTION, SOLUTION INTRAVENOUS at 22:41

## 2021-01-01 RX ADMIN — INSULIN LISPRO 2 UNITS: 100 INJECTION, SOLUTION INTRAVENOUS; SUBCUTANEOUS at 08:22

## 2021-01-01 RX ADMIN — DEXMEDETOMIDINE HYDROCHLORIDE 1.4 MCG/KG/HR: 4 INJECTION, SOLUTION INTRAVENOUS at 05:09

## 2021-01-01 RX ADMIN — ACETAMINOPHEN 1000 MG: 500 TABLET ORAL at 12:47

## 2021-01-01 RX ADMIN — CEFAZOLIN 1000 MG: 1 INJECTION, POWDER, FOR SOLUTION INTRAVENOUS at 17:05

## 2021-01-01 RX ADMIN — PANTOPRAZOLE SODIUM 40 MG: 40 TABLET, DELAYED RELEASE ORAL at 08:21

## 2021-01-01 RX ADMIN — METOPROLOL TARTRATE 200 MG: 50 TABLET, FILM COATED ORAL at 08:20

## 2021-01-01 RX ADMIN — MIDAZOLAM 2 MG: 1 INJECTION INTRAMUSCULAR; INTRAVENOUS at 08:17

## 2021-01-01 RX ADMIN — PANTOPRAZOLE SODIUM 40 MG: 40 INJECTION, POWDER, FOR SOLUTION INTRAVENOUS at 08:26

## 2021-01-01 RX ADMIN — PHENYLEPHRINE HYDROCHLORIDE 80 MCG/MIN: 10 INJECTION INTRAVENOUS at 17:38

## 2021-01-01 RX ADMIN — INSULIN LISPRO 3 UNITS: 100 INJECTION, SOLUTION INTRAVENOUS; SUBCUTANEOUS at 12:41

## 2021-01-01 RX ADMIN — INSULIN LISPRO 6 UNITS: 100 INJECTION, SOLUTION INTRAVENOUS; SUBCUTANEOUS at 11:34

## 2021-01-01 RX ADMIN — Medication 400 MG: at 08:42

## 2021-01-01 RX ADMIN — METOPROLOL TARTRATE 100 MG: 50 TABLET, FILM COATED ORAL at 08:57

## 2021-01-01 RX ADMIN — Medication 1 PACKET: at 09:00

## 2021-01-01 RX ADMIN — ALBUTEROL SULFATE 2.5 MG: 2.5 SOLUTION RESPIRATORY (INHALATION) at 08:21

## 2021-01-01 RX ADMIN — MIDAZOLAM 2 MG: 1 INJECTION INTRAMUSCULAR; INTRAVENOUS at 06:17

## 2021-01-01 RX ADMIN — PHENYLEPHRINE HYDROCHLORIDE 90 MCG/MIN: 10 INJECTION INTRAVENOUS at 19:34

## 2021-01-01 RX ADMIN — CLONIDINE HYDROCHLORIDE 0.3 MG: 0.3 TABLET ORAL at 09:42

## 2021-01-01 RX ADMIN — DEXMEDETOMIDINE HYDROCHLORIDE 1.4 MCG/KG/HR: 4 INJECTION, SOLUTION INTRAVENOUS at 08:19

## 2021-01-01 RX ADMIN — Medication 10 ML: at 21:17

## 2021-01-01 RX ADMIN — SODIUM CHLORIDE 25 ML: 9 INJECTION, SOLUTION INTRAVENOUS at 09:01

## 2021-01-01 RX ADMIN — Medication 400 MG: at 20:18

## 2021-01-01 RX ADMIN — Medication 100 MCG/HR: at 06:41

## 2021-01-01 RX ADMIN — INSULIN LISPRO 10 UNITS: 100 INJECTION, SOLUTION INTRAVENOUS; SUBCUTANEOUS at 08:24

## 2021-01-01 RX ADMIN — DEXMEDETOMIDINE HYDROCHLORIDE 0.2 MCG/KG/HR: 4 INJECTION, SOLUTION INTRAVENOUS at 22:25

## 2021-01-01 RX ADMIN — METOPROLOL TARTRATE 100 MG: 50 TABLET, FILM COATED ORAL at 20:50

## 2021-01-01 RX ADMIN — INSULIN LISPRO 10 UNITS: 100 INJECTION, SOLUTION INTRAVENOUS; SUBCUTANEOUS at 08:05

## 2021-01-01 RX ADMIN — VERAPAMIL HYDROCHLORIDE 40 MG: 80 TABLET, FILM COATED ORAL at 15:27

## 2021-01-01 RX ADMIN — PANTOPRAZOLE SODIUM 40 MG: 40 TABLET, DELAYED RELEASE ORAL at 09:27

## 2021-01-01 RX ADMIN — DEXMEDETOMIDINE HYDROCHLORIDE 1.4 MCG/KG/HR: 4 INJECTION, SOLUTION INTRAVENOUS at 02:42

## 2021-01-01 RX ADMIN — INSULIN LISPRO 6 UNITS: 100 INJECTION, SOLUTION INTRAVENOUS; SUBCUTANEOUS at 18:39

## 2021-01-01 RX ADMIN — FUROSEMIDE 20 MG: 10 INJECTION, SOLUTION INTRAMUSCULAR; INTRAVENOUS at 09:33

## 2021-01-01 RX ADMIN — METOPROLOL TARTRATE 200 MG: 50 TABLET, FILM COATED ORAL at 09:27

## 2021-01-01 RX ADMIN — CALCIUM GLUCONATE 1000 MG: 98 INJECTION, SOLUTION INTRAVENOUS at 20:37

## 2021-01-01 RX ADMIN — Medication 10 ML: at 16:53

## 2021-01-01 RX ADMIN — TRAMADOL HYDROCHLORIDE 50 MG: 50 TABLET, FILM COATED ORAL at 12:38

## 2021-01-01 RX ADMIN — INSULIN LISPRO 3 UNITS: 100 INJECTION, SOLUTION INTRAVENOUS; SUBCUTANEOUS at 13:13

## 2021-01-01 RX ADMIN — MUPIROCIN: 20 OINTMENT TOPICAL at 20:32

## 2021-01-01 RX ADMIN — DILTIAZEM HYDROCHLORIDE 30 MG: 30 TABLET, FILM COATED ORAL at 00:09

## 2021-01-01 RX ADMIN — MIDODRINE HYDROCHLORIDE 10 MG: 5 TABLET ORAL at 08:00

## 2021-01-01 RX ADMIN — MIDODRINE HYDROCHLORIDE 15 MG: 5 TABLET ORAL at 11:49

## 2021-01-01 RX ADMIN — CALCIUM CHLORIDE 2000 MG: 100 INJECTION, SOLUTION INTRAVENOUS at 06:52

## 2021-01-01 RX ADMIN — STANDARDIZED SENNA CONCENTRATE AND DOCUSATE SODIUM 1 TABLET: 8.6; 5 TABLET ORAL at 21:36

## 2021-01-01 RX ADMIN — Medication 2 PUFF: at 13:39

## 2021-01-01 RX ADMIN — Medication: at 01:07

## 2021-01-01 RX ADMIN — CALCIUM GLUCONATE 1000 MG: 98 INJECTION, SOLUTION INTRAVENOUS at 20:02

## 2021-01-01 RX ADMIN — ALBUTEROL SULFATE 2.5 MG: 2.5 SOLUTION RESPIRATORY (INHALATION) at 20:06

## 2021-01-01 RX ADMIN — PHENYLEPHRINE HYDROCHLORIDE 150 MCG/MIN: 10 INJECTION INTRAVENOUS at 20:03

## 2021-01-01 RX ADMIN — METOPROLOL TARTRATE 100 MG: 50 TABLET, FILM COATED ORAL at 08:13

## 2021-01-01 RX ADMIN — DEXTROSE MONOHYDRATE 12.5 G: 25 INJECTION, SOLUTION INTRAVENOUS at 02:17

## 2021-01-01 RX ADMIN — HEPARIN SODIUM 12 UNITS/KG/HR: 10000 INJECTION, SOLUTION INTRAVENOUS at 14:03

## 2021-01-01 RX ADMIN — SODIUM ZIRCONIUM CYCLOSILICATE 10 G: 10 POWDER, FOR SUSPENSION ORAL at 15:03

## 2021-01-01 RX ADMIN — ALBUMIN (HUMAN) 25 G: 0.25 INJECTION, SOLUTION INTRAVENOUS at 17:19

## 2021-01-01 RX ADMIN — AMIODARONE HYDROCHLORIDE 400 MG: 200 TABLET ORAL at 09:47

## 2021-01-01 RX ADMIN — MIDODRINE HYDROCHLORIDE 15 MG: 5 TABLET ORAL at 13:53

## 2021-01-01 RX ADMIN — METOPROLOL TARTRATE 50 MG: 50 TABLET, FILM COATED ORAL at 20:23

## 2021-01-01 RX ADMIN — ACETAMINOPHEN 1000 MG: 500 TABLET ORAL at 01:18

## 2021-01-01 RX ADMIN — ACETAMINOPHEN 1000 MG: 500 TABLET ORAL at 19:08

## 2021-01-01 RX ADMIN — ACETAMINOPHEN 1000 MG: 500 TABLET ORAL at 18:46

## 2021-01-01 RX ADMIN — INSULIN LISPRO 10 UNITS: 100 INJECTION, SOLUTION INTRAVENOUS; SUBCUTANEOUS at 16:39

## 2021-01-01 RX ADMIN — ALBUMIN (HUMAN) 25 G: 0.25 INJECTION, SOLUTION INTRAVENOUS at 04:31

## 2021-01-01 RX ADMIN — MIDAZOLAM 2 MG: 1 INJECTION INTRAMUSCULAR; INTRAVENOUS at 06:34

## 2021-01-01 RX ADMIN — INSULIN LISPRO 1 UNITS: 100 INJECTION, SOLUTION INTRAVENOUS; SUBCUTANEOUS at 16:46

## 2021-01-01 RX ADMIN — ACETAMINOPHEN 1000 MG: 500 TABLET ORAL at 09:27

## 2021-01-01 RX ADMIN — Medication 400 MG: at 21:15

## 2021-01-01 RX ADMIN — INSULIN LISPRO 2 UNITS: 100 INJECTION, SOLUTION INTRAVENOUS; SUBCUTANEOUS at 20:13

## 2021-01-01 RX ADMIN — METRONIDAZOLE 500 MG: 500 INJECTION, SOLUTION INTRAVENOUS at 15:54

## 2021-01-01 RX ADMIN — CLONIDINE HYDROCHLORIDE 0.3 MG: 0.3 TABLET ORAL at 08:22

## 2021-01-01 RX ADMIN — CEFAZOLIN 2000 MG: 10 INJECTION, POWDER, FOR SOLUTION INTRAVENOUS at 09:12

## 2021-01-01 RX ADMIN — ROCURONIUM BROMIDE 50 MG: 10 INJECTION, SOLUTION INTRAVENOUS at 13:05

## 2021-01-01 RX ADMIN — MIDODRINE HYDROCHLORIDE 15 MG: 5 TABLET ORAL at 16:33

## 2021-01-01 RX ADMIN — INSULIN LISPRO 1 UNITS: 100 INJECTION, SOLUTION INTRAVENOUS; SUBCUTANEOUS at 13:09

## 2021-01-01 RX ADMIN — INSULIN LISPRO 10 UNITS: 100 INJECTION, SOLUTION INTRAVENOUS; SUBCUTANEOUS at 08:09

## 2021-01-01 RX ADMIN — INSULIN LISPRO 3 UNITS: 100 INJECTION, SOLUTION INTRAVENOUS; SUBCUTANEOUS at 08:11

## 2021-01-01 RX ADMIN — PROPOFOL 35 MCG/KG/MIN: 10 INJECTION, EMULSION INTRAVENOUS at 21:57

## 2021-01-01 RX ADMIN — METOPROLOL TARTRATE 100 MG: 50 TABLET, FILM COATED ORAL at 08:59

## 2021-01-01 RX ADMIN — STANDARDIZED SENNA CONCENTRATE AND DOCUSATE SODIUM 1 TABLET: 8.6; 5 TABLET ORAL at 20:48

## 2021-01-01 RX ADMIN — STANDARDIZED SENNA CONCENTRATE AND DOCUSATE SODIUM 1 TABLET: 8.6; 5 TABLET ORAL at 09:00

## 2021-01-01 RX ADMIN — PHENYLEPHRINE HYDROCHLORIDE 300 MCG/MIN: 10 INJECTION INTRAVENOUS at 03:18

## 2021-01-01 RX ADMIN — INSULIN LISPRO 10 UNITS: 100 INJECTION, SOLUTION INTRAVENOUS; SUBCUTANEOUS at 16:38

## 2021-01-01 RX ADMIN — PHENYLEPHRINE HYDROCHLORIDE 110 MCG/MIN: 10 INJECTION INTRAVENOUS at 00:20

## 2021-01-01 RX ADMIN — METOPROLOL TARTRATE 100 MG: 50 TABLET, FILM COATED ORAL at 20:37

## 2021-01-01 RX ADMIN — Medication 10 ML: at 08:21

## 2021-01-01 RX ADMIN — ALBUMIN (HUMAN) 25 G: 0.25 INJECTION, SOLUTION INTRAVENOUS at 08:08

## 2021-01-01 RX ADMIN — Medication 400 MG: at 09:28

## 2021-01-01 RX ADMIN — PHENYLEPHRINE HYDROCHLORIDE 150 MCG/MIN: 10 INJECTION INTRAVENOUS at 01:41

## 2021-01-01 RX ADMIN — METOPROLOL TARTRATE 200 MG: 50 TABLET, FILM COATED ORAL at 20:57

## 2021-01-01 RX ADMIN — MIDAZOLAM 2 MG: 1 INJECTION INTRAMUSCULAR; INTRAVENOUS at 00:43

## 2021-01-01 RX ADMIN — ACETAMINOPHEN 1000 MG: 500 TABLET ORAL at 20:50

## 2021-01-01 RX ADMIN — PANTOPRAZOLE SODIUM 40 MG: 40 INJECTION, POWDER, FOR SOLUTION INTRAVENOUS at 09:41

## 2021-01-01 RX ADMIN — PANTOPRAZOLE SODIUM 40 MG: 40 TABLET, DELAYED RELEASE ORAL at 07:58

## 2021-01-01 RX ADMIN — Medication 10 ML: at 13:11

## 2021-01-01 RX ADMIN — INSULIN LISPRO 3 UNITS: 100 INJECTION, SOLUTION INTRAVENOUS; SUBCUTANEOUS at 16:43

## 2021-01-01 RX ADMIN — ACETAMINOPHEN 1000 MG: 500 TABLET ORAL at 23:49

## 2021-01-01 RX ADMIN — INSULIN LISPRO 3 UNITS: 100 INJECTION, SOLUTION INTRAVENOUS; SUBCUTANEOUS at 21:10

## 2021-01-01 RX ADMIN — POLYETHYLENE GLYCOL 3350 17 G: 17 POWDER, FOR SOLUTION ORAL at 20:26

## 2021-01-01 RX ADMIN — CALCIUM CHLORIDE 2000 MG: 100 INJECTION, SOLUTION INTRAVENOUS at 19:54

## 2021-01-01 RX ADMIN — METRONIDAZOLE 500 MG: 500 INJECTION, SOLUTION INTRAVENOUS at 10:09

## 2021-01-01 RX ADMIN — METRONIDAZOLE 500 MG: 500 INJECTION, SOLUTION INTRAVENOUS at 16:00

## 2021-01-01 RX ADMIN — ALBUTEROL SULFATE 2.5 MG: 2.5 SOLUTION RESPIRATORY (INHALATION) at 08:25

## 2021-01-01 RX ADMIN — DEXMEDETOMIDINE HYDROCHLORIDE 1.4 MCG/KG/HR: 4 INJECTION, SOLUTION INTRAVENOUS at 00:02

## 2021-01-01 RX ADMIN — DEXMEDETOMIDINE HYDROCHLORIDE 0.2 MCG/KG/HR: 4 INJECTION, SOLUTION INTRAVENOUS at 22:10

## 2021-01-01 RX ADMIN — CALCIUM GLUCONATE 1000 MG: 98 INJECTION, SOLUTION INTRAVENOUS at 14:24

## 2021-01-01 RX ADMIN — LABETALOL HYDROCHLORIDE 10 MG: 5 INJECTION INTRAVENOUS at 00:29

## 2021-01-01 RX ADMIN — MIDAZOLAM 2 MG: 1 INJECTION INTRAMUSCULAR; INTRAVENOUS at 10:42

## 2021-01-01 RX ADMIN — PANTOPRAZOLE SODIUM 40 MG: 40 INJECTION, POWDER, FOR SOLUTION INTRAVENOUS at 08:25

## 2021-01-01 RX ADMIN — Medication: at 01:10

## 2021-01-01 RX ADMIN — ALBUTEROL SULFATE 2.5 MG: 2.5 SOLUTION RESPIRATORY (INHALATION) at 13:09

## 2021-01-01 RX ADMIN — ALBUTEROL SULFATE 2.5 MG: 2.5 SOLUTION RESPIRATORY (INHALATION) at 16:31

## 2021-01-01 RX ADMIN — METRONIDAZOLE 500 MG: 500 INJECTION, SOLUTION INTRAVENOUS at 08:28

## 2021-01-01 RX ADMIN — DEXTROSE MONOHYDRATE 12.5 G: 25 INJECTION, SOLUTION INTRAVENOUS at 04:13

## 2021-01-01 RX ADMIN — DILTIAZEM HYDROCHLORIDE 30 MG: 30 TABLET, FILM COATED ORAL at 06:12

## 2021-01-01 RX ADMIN — CALCIUM GLUCONATE 1000 MG: 98 INJECTION, SOLUTION INTRAVENOUS at 18:32

## 2021-01-01 RX ADMIN — DEXMEDETOMIDINE HYDROCHLORIDE 1 MCG/KG/HR: 4 INJECTION, SOLUTION INTRAVENOUS at 01:52

## 2021-01-01 RX ADMIN — Medication 10 ML: at 20:19

## 2021-01-01 RX ADMIN — ASPIRIN 324 MG: 81 TABLET, CHEWABLE ORAL at 13:20

## 2021-01-01 RX ADMIN — PROPOFOL 35 MCG/KG/MIN: 10 INJECTION, EMULSION INTRAVENOUS at 01:53

## 2021-01-01 RX ADMIN — DIPHENHYDRAMINE HYDROCHLORIDE 25 MG: 25 TABLET ORAL at 21:03

## 2021-01-01 RX ADMIN — Medication 10 ML: at 20:03

## 2021-01-01 RX ADMIN — HEPARIN SODIUM 5500 UNITS: 5000 INJECTION INTRAVENOUS; SUBCUTANEOUS at 13:16

## 2021-01-01 RX ADMIN — MORPHINE SULFATE 2 MG: 2 INJECTION, SOLUTION INTRAMUSCULAR; INTRAVENOUS at 03:08

## 2021-01-01 RX ADMIN — AMLODIPINE BESYLATE 5 MG: 5 TABLET ORAL at 08:13

## 2021-01-01 RX ADMIN — PHENYLEPHRINE HYDROCHLORIDE 140 MCG/MIN: 10 INJECTION INTRAVENOUS at 00:11

## 2021-01-01 RX ADMIN — SODIUM BICARBONATE 100 MEQ: 84 INJECTION, SOLUTION INTRAVENOUS at 04:58

## 2021-01-01 RX ADMIN — PHENYLEPHRINE HYDROCHLORIDE 110 MCG/MIN: 10 INJECTION INTRAVENOUS at 04:16

## 2021-01-01 RX ADMIN — CALCIUM GLUCONATE 1000 MG: 98 INJECTION, SOLUTION INTRAVENOUS at 15:52

## 2021-01-01 RX ADMIN — PANTOPRAZOLE SODIUM 40 MG: 40 TABLET, DELAYED RELEASE ORAL at 08:00

## 2021-01-01 RX ADMIN — INSULIN LISPRO 10 UNITS: 100 INJECTION, SOLUTION INTRAVENOUS; SUBCUTANEOUS at 09:14

## 2021-01-01 RX ADMIN — Medication 400 MG: at 20:03

## 2021-01-01 RX ADMIN — Medication 1000 MG: at 14:13

## 2021-01-01 RX ADMIN — MIDODRINE HYDROCHLORIDE 15 MG: 5 TABLET ORAL at 12:44

## 2021-01-01 RX ADMIN — INSULIN LISPRO 8 UNITS: 100 INJECTION, SOLUTION INTRAVENOUS; SUBCUTANEOUS at 08:10

## 2021-01-01 RX ADMIN — Medication 100 MCG/HR: at 14:25

## 2021-01-01 RX ADMIN — Medication: at 01:11

## 2021-01-01 RX ADMIN — AMPICILLIN SODIUM AND SULBACTAM SODIUM 3000 MG: 2; 1 INJECTION, POWDER, FOR SOLUTION INTRAMUSCULAR; INTRAVENOUS at 19:06

## 2021-01-01 RX ADMIN — MIDODRINE HYDROCHLORIDE 15 MG: 5 TABLET ORAL at 14:33

## 2021-01-01 RX ADMIN — SODIUM BICARBONATE: 84 INJECTION, SOLUTION INTRAVENOUS at 11:33

## 2021-01-01 RX ADMIN — ALBUTEROL SULFATE 2.5 MG: 2.5 SOLUTION RESPIRATORY (INHALATION) at 16:14

## 2021-01-01 RX ADMIN — SODIUM CHLORIDE 25 ML: 9 INJECTION, SOLUTION INTRAVENOUS at 20:59

## 2021-01-01 RX ADMIN — AMPICILLIN SODIUM AND SULBACTAM SODIUM 3000 MG: 2; 1 INJECTION, POWDER, FOR SOLUTION INTRAMUSCULAR; INTRAVENOUS at 02:38

## 2021-01-01 RX ADMIN — VANCOMYCIN HYDROCHLORIDE 1000 MG: 1 INJECTION, POWDER, LYOPHILIZED, FOR SOLUTION INTRAVENOUS at 00:09

## 2021-01-01 RX ADMIN — HEPARIN SODIUM 10000 UNITS: 1000 INJECTION INTRAVENOUS; SUBCUTANEOUS at 14:19

## 2021-01-01 RX ADMIN — ALBUMIN (HUMAN) 25 G: 0.25 INJECTION, SOLUTION INTRAVENOUS at 03:55

## 2021-01-01 RX ADMIN — METRONIDAZOLE 500 MG: 500 INJECTION, SOLUTION INTRAVENOUS at 23:27

## 2021-01-01 RX ADMIN — PHENYLEPHRINE HYDROCHLORIDE 125 MCG/MIN: 10 INJECTION INTRAVENOUS at 17:28

## 2021-01-01 RX ADMIN — SODIUM BICARBONATE: 84 INJECTION, SOLUTION INTRAVENOUS at 18:55

## 2021-01-01 RX ADMIN — FLUDROCORTISONE ACETATE 0.1 MG: 0.1 TABLET ORAL at 08:23

## 2021-01-01 RX ADMIN — FUROSEMIDE 20 MG: 10 INJECTION, SOLUTION INTRAMUSCULAR; INTRAVENOUS at 17:04

## 2021-01-01 RX ADMIN — INSULIN LISPRO 10 UNITS: 100 INJECTION, SOLUTION INTRAVENOUS; SUBCUTANEOUS at 18:39

## 2021-01-01 RX ADMIN — ARGATROBAN 0.5 MCG/KG/MIN: 50 INJECTION INTRAVENOUS at 06:33

## 2021-01-01 RX ADMIN — METOPROLOL TARTRATE 200 MG: 50 TABLET, FILM COATED ORAL at 10:22

## 2021-01-01 RX ADMIN — ASPIRIN 300 MG: 300 SUPPOSITORY RECTAL at 00:18

## 2021-01-01 RX ADMIN — MORPHINE SULFATE 2 MG: 2 INJECTION, SOLUTION INTRAMUSCULAR; INTRAVENOUS at 05:30

## 2021-01-01 RX ADMIN — PHENYLEPHRINE HYDROCHLORIDE 140 MCG/MIN: 10 INJECTION INTRAVENOUS at 07:48

## 2021-01-01 RX ADMIN — MORPHINE SULFATE 2 MG: 2 INJECTION, SOLUTION INTRAMUSCULAR; INTRAVENOUS at 09:10

## 2021-01-01 RX ADMIN — PHENYLEPHRINE HYDROCHLORIDE 150 MCG/MIN: 10 INJECTION INTRAVENOUS at 08:52

## 2021-01-01 RX ADMIN — METOPROLOL TARTRATE 100 MG: 50 TABLET, FILM COATED ORAL at 20:45

## 2021-01-01 RX ADMIN — ASPIRIN 325 MG: 325 TABLET, COATED ORAL at 08:43

## 2021-01-01 RX ADMIN — PHENYLEPHRINE HYDROCHLORIDE 120 MCG/MIN: 10 INJECTION INTRAVENOUS at 03:11

## 2021-01-01 RX ADMIN — FUROSEMIDE 20 MG: 10 INJECTION, SOLUTION INTRAMUSCULAR; INTRAVENOUS at 08:44

## 2021-01-01 RX ADMIN — SODIUM CHLORIDE: 9 INJECTION, SOLUTION INTRAVENOUS at 10:34

## 2021-01-01 RX ADMIN — LIDOCAINE HYDROCHLORIDE 100 MG: 20 INJECTION, SOLUTION INTRAVENOUS at 04:08

## 2021-01-01 RX ADMIN — ALBUTEROL SULFATE 2.5 MG: 2.5 SOLUTION RESPIRATORY (INHALATION) at 16:42

## 2021-01-01 RX ADMIN — DILTIAZEM HYDROCHLORIDE 30 MG: 30 TABLET, FILM COATED ORAL at 05:45

## 2021-01-01 RX ADMIN — MUPIROCIN: 20 OINTMENT TOPICAL at 08:32

## 2021-01-01 RX ADMIN — PHENYLEPHRINE HYDROCHLORIDE 120 MCG/MIN: 10 INJECTION INTRAVENOUS at 22:21

## 2021-01-01 RX ADMIN — TRAMADOL HYDROCHLORIDE 100 MG: 50 TABLET, FILM COATED ORAL at 00:57

## 2021-01-01 RX ADMIN — MIDODRINE HYDROCHLORIDE 15 MG: 5 TABLET ORAL at 07:59

## 2021-01-01 RX ADMIN — ALBUMIN (HUMAN) 12.5 G: 12.5 INJECTION, SOLUTION INTRAVENOUS at 22:44

## 2021-01-01 RX ADMIN — AMPICILLIN SODIUM AND SULBACTAM SODIUM 3000 MG: 2; 1 INJECTION, POWDER, FOR SOLUTION INTRAMUSCULAR; INTRAVENOUS at 11:46

## 2021-01-01 RX ADMIN — Medication 1 PACKET: at 21:19

## 2021-01-01 RX ADMIN — DEXMEDETOMIDINE HYDROCHLORIDE 1.4 MCG/KG/HR: 4 INJECTION, SOLUTION INTRAVENOUS at 10:52

## 2021-01-01 RX ADMIN — STANDARDIZED SENNA CONCENTRATE AND DOCUSATE SODIUM 1 TABLET: 8.6; 5 TABLET ORAL at 20:45

## 2021-01-01 RX ADMIN — VASOPRESSIN 0.04 UNITS/MIN: 20 INJECTION INTRAVENOUS at 21:15

## 2021-01-01 RX ADMIN — FLUCONAZOLE IN SODIUM CHLORIDE 200 MG: 2 INJECTION, SOLUTION INTRAVENOUS at 22:44

## 2021-01-01 RX ADMIN — PHENYLEPHRINE HYDROCHLORIDE 180 MCG/MIN: 10 INJECTION INTRAVENOUS at 14:50

## 2021-01-01 RX ADMIN — DEXMEDETOMIDINE HYDROCHLORIDE 1.4 MCG/KG/HR: 4 INJECTION, SOLUTION INTRAVENOUS at 09:34

## 2021-01-01 RX ADMIN — DOPAMINE HYDROCHLORIDE 5 MCG/KG/MIN: 160 INJECTION, SOLUTION INTRAVENOUS at 04:06

## 2021-01-01 RX ADMIN — DEXTROSE MONOHYDRATE 12.5 G: 25 INJECTION, SOLUTION INTRAVENOUS at 08:00

## 2021-01-01 RX ADMIN — DEXTROSE MONOHYDRATE 12.5 G: 25 INJECTION, SOLUTION INTRAVENOUS at 17:10

## 2021-01-01 RX ADMIN — STANDARDIZED SENNA CONCENTRATE AND DOCUSATE SODIUM 1 TABLET: 8.6; 5 TABLET ORAL at 08:59

## 2021-01-01 RX ADMIN — SODIUM BICARBONATE: 84 INJECTION, SOLUTION INTRAVENOUS at 06:10

## 2021-01-01 RX ADMIN — Medication 2 PUFF: at 00:16

## 2021-01-01 RX ADMIN — Medication 1 PACKET: at 12:06

## 2021-01-01 RX ADMIN — METOPROLOL TARTRATE 100 MG: 50 TABLET, FILM COATED ORAL at 10:17

## 2021-01-01 RX ADMIN — ACETAMINOPHEN 1000 MG: 500 TABLET ORAL at 13:26

## 2021-01-01 RX ADMIN — Medication 12.5 MCG/HR: at 12:24

## 2021-01-01 RX ADMIN — INSULIN LISPRO 8 UNITS: 100 INJECTION, SOLUTION INTRAVENOUS; SUBCUTANEOUS at 12:42

## 2021-01-01 RX ADMIN — CALCIUM GLUCONATE 1000 MG: 98 INJECTION, SOLUTION INTRAVENOUS at 06:07

## 2021-01-01 RX ADMIN — SODIUM CHLORIDE 250 ML: 9 INJECTION, SOLUTION INTRAVENOUS at 07:37

## 2021-01-01 RX ADMIN — FENTANYL CITRATE 250 MCG: 50 INJECTION, SOLUTION INTRAMUSCULAR; INTRAVENOUS at 15:56

## 2021-01-01 RX ADMIN — FUROSEMIDE 20 MG: 10 INJECTION, SOLUTION INTRAMUSCULAR; INTRAVENOUS at 09:03

## 2021-01-01 RX ADMIN — VASOPRESSIN 0.04 UNITS/MIN: 20 INJECTION INTRAVENOUS at 07:37

## 2021-01-01 RX ADMIN — PHENYLEPHRINE HYDROCHLORIDE 240 MCG/MIN: 10 INJECTION INTRAVENOUS at 11:52

## 2021-01-01 RX ADMIN — AMIODARONE HYDROCHLORIDE 150 MG: 50 INJECTION, SOLUTION INTRAVENOUS at 08:00

## 2021-01-01 RX ADMIN — Medication: at 16:28

## 2021-01-01 RX ADMIN — STANDARDIZED SENNA CONCENTRATE AND DOCUSATE SODIUM 1 TABLET: 8.6; 5 TABLET ORAL at 07:58

## 2021-01-01 RX ADMIN — STANDARDIZED SENNA CONCENTRATE AND DOCUSATE SODIUM 1 TABLET: 8.6; 5 TABLET ORAL at 20:50

## 2021-01-01 RX ADMIN — MUPIROCIN: 20 OINTMENT TOPICAL at 09:21

## 2021-01-01 RX ADMIN — ACETAMINOPHEN 1000 MG: 500 TABLET ORAL at 08:34

## 2021-01-01 RX ADMIN — METOPROLOL TARTRATE 200 MG: 50 TABLET, FILM COATED ORAL at 20:29

## 2021-01-01 RX ADMIN — INSULIN LISPRO 10 UNITS: 100 INJECTION, SOLUTION INTRAVENOUS; SUBCUTANEOUS at 16:43

## 2021-01-01 RX ADMIN — FLUCONAZOLE 400 MG: 2 INJECTION, SOLUTION INTRAVENOUS at 23:41

## 2021-01-01 RX ADMIN — CEFAZOLIN 2000 MG: 10 INJECTION, POWDER, FOR SOLUTION INTRAVENOUS at 09:06

## 2021-01-01 RX ADMIN — ALBUMIN (HUMAN) 25 G: 0.25 INJECTION, SOLUTION INTRAVENOUS at 00:09

## 2021-01-01 RX ADMIN — CALCIUM GLUCONATE 1000 MG: 98 INJECTION, SOLUTION INTRAVENOUS at 03:25

## 2021-01-01 RX ADMIN — Medication: at 20:15

## 2021-01-01 RX ADMIN — PHENYLEPHRINE HYDROCHLORIDE 165 MCG/MIN: 10 INJECTION INTRAVENOUS at 21:18

## 2021-01-01 RX ADMIN — Medication 400 MG: at 20:45

## 2021-01-01 RX ADMIN — INSULIN LISPRO 8 UNITS: 100 INJECTION, SOLUTION INTRAVENOUS; SUBCUTANEOUS at 09:13

## 2021-01-01 RX ADMIN — Medication 400 MG: at 10:08

## 2021-01-01 RX ADMIN — ASPIRIN 325 MG: 325 TABLET, COATED ORAL at 07:58

## 2021-01-01 RX ADMIN — TRAMADOL HYDROCHLORIDE 100 MG: 50 TABLET, FILM COATED ORAL at 08:43

## 2021-01-01 RX ADMIN — PHENYLEPHRINE HYDROCHLORIDE 120 MCG/MIN: 10 INJECTION INTRAVENOUS at 10:49

## 2021-01-01 RX ADMIN — FUROSEMIDE 20 MG: 10 INJECTION, SOLUTION INTRAMUSCULAR; INTRAVENOUS at 13:30

## 2021-01-01 RX ADMIN — FUROSEMIDE 20 MG: 10 INJECTION, SOLUTION INTRAMUSCULAR; INTRAVENOUS at 09:25

## 2021-01-01 RX ADMIN — CALCIUM GLUCONATE 1000 MG: 98 INJECTION, SOLUTION INTRAVENOUS at 06:51

## 2021-01-01 RX ADMIN — ALBUMIN (HUMAN) 25 G: 0.25 INJECTION, SOLUTION INTRAVENOUS at 15:35

## 2021-01-01 RX ADMIN — ESMOLOL HYDROCHLORIDE 70 MCG/KG/MIN: 10 INJECTION INTRAVENOUS at 01:27

## 2021-01-01 RX ADMIN — SODIUM BICARBONATE: 84 INJECTION, SOLUTION INTRAVENOUS at 05:54

## 2021-01-01 RX ADMIN — ALBUTEROL SULFATE 2.5 MG: 2.5 SOLUTION RESPIRATORY (INHALATION) at 16:53

## 2021-01-01 RX ADMIN — Medication 10 ML: at 09:10

## 2021-01-01 RX ADMIN — DOPAMINE HYDROCHLORIDE 25 MCG/KG/MIN: 160 INJECTION, SOLUTION INTRAVENOUS at 23:07

## 2021-01-01 RX ADMIN — DIPHENHYDRAMINE HYDROCHLORIDE 25 MG: 25 TABLET ORAL at 20:48

## 2021-01-01 RX ADMIN — SODIUM CHLORIDE 25 ML: 9 INJECTION, SOLUTION INTRAVENOUS at 04:27

## 2021-01-01 RX ADMIN — DOPAMINE HYDROCHLORIDE 5 MCG/KG/MIN: 160 INJECTION, SOLUTION INTRAVENOUS at 15:35

## 2021-01-01 RX ADMIN — ACETAMINOPHEN 1000 MG: 500 TABLET ORAL at 13:39

## 2021-01-01 RX ADMIN — DOPAMINE HYDROCHLORIDE 16 MCG/KG/MIN: 160 INJECTION, SOLUTION INTRAVENOUS at 04:18

## 2021-01-01 RX ADMIN — PHENYLEPHRINE HYDROCHLORIDE 100 MCG/MIN: 10 INJECTION INTRAVENOUS at 03:15

## 2021-01-01 RX ADMIN — AMINOCAPROIC ACID 1000 MG: 250 INJECTION, SOLUTION INTRAVENOUS at 13:05

## 2021-01-01 RX ADMIN — HEPARIN SODIUM 4000 UNITS: 1000 INJECTION INTRAVENOUS; SUBCUTANEOUS at 21:20

## 2021-01-01 RX ADMIN — INSULIN LISPRO 1 UNITS: 100 INJECTION, SOLUTION INTRAVENOUS; SUBCUTANEOUS at 04:32

## 2021-01-01 RX ADMIN — ACETAMINOPHEN 1000 MG: 500 TABLET ORAL at 11:45

## 2021-01-01 RX ADMIN — AMPICILLIN SODIUM AND SULBACTAM SODIUM 3000 MG: 2; 1 INJECTION, POWDER, FOR SOLUTION INTRAMUSCULAR; INTRAVENOUS at 12:11

## 2021-01-01 RX ADMIN — ALBUTEROL SULFATE 2.5 MG: 2.5 SOLUTION RESPIRATORY (INHALATION) at 15:27

## 2021-01-01 RX ADMIN — METRONIDAZOLE 500 MG: 500 INJECTION, SOLUTION INTRAVENOUS at 17:23

## 2021-01-01 RX ADMIN — ASPIRIN 81 MG: 81 TABLET, COATED ORAL at 07:42

## 2021-01-01 RX ADMIN — STANDARDIZED SENNA CONCENTRATE AND DOCUSATE SODIUM 1 TABLET: 8.6; 5 TABLET ORAL at 08:21

## 2021-01-01 RX ADMIN — STANDARDIZED SENNA CONCENTRATE AND DOCUSATE SODIUM 1 TABLET: 8.6; 5 TABLET ORAL at 20:30

## 2021-01-01 RX ADMIN — MIDODRINE HYDROCHLORIDE 10 MG: 5 TABLET ORAL at 11:58

## 2021-01-01 RX ADMIN — FLUDROCORTISONE ACETATE 0.1 MG: 0.1 TABLET ORAL at 09:00

## 2021-01-01 RX ADMIN — VASOPRESSIN 0.04 UNITS/MIN: 20 INJECTION INTRAVENOUS at 15:24

## 2021-01-01 RX ADMIN — MIDAZOLAM 2 MG: 1 INJECTION INTRAMUSCULAR; INTRAVENOUS at 14:49

## 2021-01-01 RX ADMIN — MIDAZOLAM 2 MG: 1 INJECTION INTRAMUSCULAR; INTRAVENOUS at 04:21

## 2021-01-01 RX ADMIN — SODIUM ZIRCONIUM CYCLOSILICATE 10 G: 10 POWDER, FOR SUSPENSION ORAL at 05:10

## 2021-01-01 RX ADMIN — Medication 10 ML: at 08:33

## 2021-01-01 RX ADMIN — TRAMADOL HYDROCHLORIDE 50 MG: 50 TABLET, FILM COATED ORAL at 21:03

## 2021-01-01 RX ADMIN — ALBUMIN (HUMAN) 25 G: 0.25 INJECTION, SOLUTION INTRAVENOUS at 23:16

## 2021-01-01 RX ADMIN — ACETAMINOPHEN 1000 MG: 500 TABLET ORAL at 08:50

## 2021-01-01 RX ADMIN — MUPIROCIN: 20 OINTMENT TOPICAL at 20:58

## 2021-01-01 RX ADMIN — INSULIN LISPRO 8 UNITS: 100 INJECTION, SOLUTION INTRAVENOUS; SUBCUTANEOUS at 17:23

## 2021-01-01 RX ADMIN — INSULIN LISPRO 6 UNITS: 100 INJECTION, SOLUTION INTRAVENOUS; SUBCUTANEOUS at 11:56

## 2021-01-01 RX ADMIN — DIPHENHYDRAMINE HYDROCHLORIDE 25 MG: 25 TABLET ORAL at 21:41

## 2021-01-01 RX ADMIN — PROPOFOL 10 MG/HR: 10 INJECTION, EMULSION INTRAVENOUS at 17:06

## 2021-01-01 RX ADMIN — POTASSIUM CHLORIDE 10 MEQ: 750 TABLET, FILM COATED, EXTENDED RELEASE ORAL at 08:00

## 2021-01-01 RX ADMIN — FENTANYL CITRATE 250 MCG: 50 INJECTION, SOLUTION INTRAMUSCULAR; INTRAVENOUS at 13:05

## 2021-01-01 RX ADMIN — METRONIDAZOLE 500 MG: 500 INJECTION, SOLUTION INTRAVENOUS at 08:51

## 2021-01-01 RX ADMIN — Medication: at 19:53

## 2021-01-01 RX ADMIN — CALCIUM GLUCONATE 1000 MG: 98 INJECTION, SOLUTION INTRAVENOUS at 11:05

## 2021-01-01 RX ADMIN — Medication 1 PACKET: at 20:50

## 2021-01-01 RX ADMIN — MIDODRINE HYDROCHLORIDE 15 MG: 5 TABLET ORAL at 08:26

## 2021-01-01 RX ADMIN — PHENYLEPHRINE HYDROCHLORIDE 300 MCG/MIN: 10 INJECTION INTRAVENOUS at 13:05

## 2021-01-01 RX ADMIN — ARGATROBAN 0.5 MCG/KG/MIN: 50 INJECTION INTRAVENOUS at 21:01

## 2021-01-01 RX ADMIN — Medication 400 MG: at 08:21

## 2021-01-01 RX ADMIN — PHENYLEPHRINE HYDROCHLORIDE 250 MCG/MIN: 10 INJECTION INTRAVENOUS at 14:45

## 2021-01-01 RX ADMIN — AMPICILLIN SODIUM AND SULBACTAM SODIUM 3000 MG: 2; 1 INJECTION, POWDER, FOR SOLUTION INTRAMUSCULAR; INTRAVENOUS at 03:14

## 2021-01-01 RX ADMIN — INSULIN LISPRO 6 UNITS: 100 INJECTION, SOLUTION INTRAVENOUS; SUBCUTANEOUS at 11:46

## 2021-01-01 RX ADMIN — ONDANSETRON 4 MG: 2 INJECTION INTRAMUSCULAR; INTRAVENOUS at 20:49

## 2021-01-01 RX ADMIN — MIDAZOLAM 2 MG: 1 INJECTION INTRAMUSCULAR; INTRAVENOUS at 22:40

## 2021-01-01 RX ADMIN — METOPROLOL TARTRATE 100 MG: 50 TABLET, FILM COATED ORAL at 08:55

## 2021-01-01 RX ADMIN — INSULIN LISPRO 2 UNITS: 100 INJECTION, SOLUTION INTRAVENOUS; SUBCUTANEOUS at 16:43

## 2021-01-01 RX ADMIN — Medication: at 17:16

## 2021-01-01 RX ADMIN — HEPARIN SODIUM 12.02 UNITS/KG/HR: 10000 INJECTION, SOLUTION INTRAVENOUS at 23:57

## 2021-01-01 RX ADMIN — AMLODIPINE BESYLATE 5 MG: 5 TABLET ORAL at 07:43

## 2021-01-01 RX ADMIN — FLUDROCORTISONE ACETATE 0.1 MG: 0.1 TABLET ORAL at 08:54

## 2021-01-01 RX ADMIN — CALCIUM GLUCONATE 1000 MG: 98 INJECTION, SOLUTION INTRAVENOUS at 02:14

## 2021-01-01 RX ADMIN — INSULIN LISPRO 1 UNITS: 100 INJECTION, SOLUTION INTRAVENOUS; SUBCUTANEOUS at 20:52

## 2021-01-01 RX ADMIN — MIDAZOLAM 2 MG: 1 INJECTION INTRAMUSCULAR; INTRAVENOUS at 14:42

## 2021-01-01 RX ADMIN — FLUDROCORTISONE ACETATE 0.1 MG: 0.1 TABLET ORAL at 07:59

## 2021-01-01 RX ADMIN — Medication 10 ML: at 19:43

## 2021-01-01 RX ADMIN — METOPROLOL TARTRATE 100 MG: 50 TABLET, FILM COATED ORAL at 20:18

## 2021-01-01 RX ADMIN — PANTOPRAZOLE SODIUM 40 MG: 40 TABLET, DELAYED RELEASE ORAL at 08:43

## 2021-01-01 RX ADMIN — Medication 10 ML: at 20:21

## 2021-01-01 RX ADMIN — AMPICILLIN SODIUM AND SULBACTAM SODIUM 3000 MG: 2; 1 INJECTION, POWDER, FOR SOLUTION INTRAMUSCULAR; INTRAVENOUS at 11:28

## 2021-01-01 RX ADMIN — ALBUMIN, HUMAN INJ 5% 12.5 G: 5 SOLUTION at 17:44

## 2021-01-01 RX ADMIN — Medication 1 SPRAY: at 17:55

## 2021-01-01 SDOH — ECONOMIC STABILITY: TRANSPORTATION INSECURITY
IN THE PAST 12 MONTHS, HAS THE LACK OF TRANSPORTATION KEPT YOU FROM MEDICAL APPOINTMENTS OR FROM GETTING MEDICATIONS?: NO

## 2021-01-01 SDOH — ECONOMIC STABILITY: HOUSING INSECURITY
IN THE LAST 12 MONTHS, WAS THERE A TIME WHEN YOU DID NOT HAVE A STEADY PLACE TO SLEEP OR SLEPT IN A SHELTER (INCLUDING NOW)?: NO

## 2021-01-01 SDOH — ECONOMIC STABILITY: FOOD INSECURITY: WITHIN THE PAST 12 MONTHS, YOU WORRIED THAT YOUR FOOD WOULD RUN OUT BEFORE YOU GOT MONEY TO BUY MORE.: NEVER TRUE

## 2021-01-01 SDOH — ECONOMIC STABILITY: TRANSPORTATION INSECURITY
IN THE PAST 12 MONTHS, HAS LACK OF TRANSPORTATION KEPT YOU FROM MEETINGS, WORK, OR FROM GETTING THINGS NEEDED FOR DAILY LIVING?: NO

## 2021-01-01 ASSESSMENT — PULMONARY FUNCTION TESTS
PIF_VALUE: 21
PIF_VALUE: 21
PIF_VALUE: 1
PIF_VALUE: 22
PIF_VALUE: 23
PIF_VALUE: 24
PIF_VALUE: 2
PIF_VALUE: 24
PIF_VALUE: 21
PIF_VALUE: 17
PIF_VALUE: 24
PIF_VALUE: 1
PIF_VALUE: 23
PIF_VALUE: 1
PIF_VALUE: 25
PIF_VALUE: 1
PIF_VALUE: 23
PIF_VALUE: 1
PIF_VALUE: 34
PIF_VALUE: 23
PIF_VALUE: 24
PIF_VALUE: 24
PIF_VALUE: 22
PIF_VALUE: 15
PIF_VALUE: 21
PIF_VALUE: 18
PIF_VALUE: 24
PIF_VALUE: 2
PIF_VALUE: 17
PIF_VALUE: 1
PIF_VALUE: 22
PIF_VALUE: 20
PIF_VALUE: 1
PIF_VALUE: 24
PIF_VALUE: 23
PIF_VALUE: 20
PIF_VALUE: 2
PIF_VALUE: 23
PIF_VALUE: 22
PIF_VALUE: 1
PIF_VALUE: 21
PIF_VALUE: 24
PIF_VALUE: 21
PIF_VALUE: 1
PIF_VALUE: 1
PIF_VALUE: 23
PIF_VALUE: 1
PIF_VALUE: 1
PIF_VALUE: 2
PIF_VALUE: 24
PIF_VALUE: 23
PIF_VALUE: 1
PIF_VALUE: 20
PIF_VALUE: 21
PIF_VALUE: 1
PIF_VALUE: 19
PIF_VALUE: 1
PIF_VALUE: 1
PIF_VALUE: 24
PIF_VALUE: 18
PIF_VALUE: 23
PIF_VALUE: 24
PIF_VALUE: 19
PIF_VALUE: 24
PIF_VALUE: 1
PIF_VALUE: 23
PIF_VALUE: 1
PIF_VALUE: 22
PIF_VALUE: 1
PIF_VALUE: 22
PIF_VALUE: 36
PIF_VALUE: 25
PIF_VALUE: 23
PIF_VALUE: 18
PIF_VALUE: 23
PIF_VALUE: 1
PIF_VALUE: 5
PIF_VALUE: 1
PIF_VALUE: 23
PIF_VALUE: 1
PIF_VALUE: 23
PIF_VALUE: 16
PIF_VALUE: 23
PIF_VALUE: 1
PIF_VALUE: 20
PIF_VALUE: 24
PIF_VALUE: 0
PIF_VALUE: 23
PIF_VALUE: 24
PIF_VALUE: 1
PIF_VALUE: 23
PIF_VALUE: 2
PIF_VALUE: 28
PIF_VALUE: 23
PIF_VALUE: 19
PIF_VALUE: 1
PIF_VALUE: 17
PIF_VALUE: 24
PIF_VALUE: 32
PIF_VALUE: 25
PIF_VALUE: 24
PIF_VALUE: 24
PIF_VALUE: 1
PIF_VALUE: 1
PIF_VALUE: 24
PIF_VALUE: 21
PIF_VALUE: 18
PIF_VALUE: 24
PIF_VALUE: 20
PIF_VALUE: 24
PIF_VALUE: 1
PIF_VALUE: 22
PIF_VALUE: 1
PIF_VALUE: 2
PIF_VALUE: 23
PIF_VALUE: 22
PIF_VALUE: 20
PIF_VALUE: 24
PIF_VALUE: 1
PIF_VALUE: 22
PIF_VALUE: 23
PIF_VALUE: 4
PIF_VALUE: 23
PIF_VALUE: 19
PIF_VALUE: 21
PIF_VALUE: 19
PIF_VALUE: 1
PIF_VALUE: 21
PIF_VALUE: 22
PIF_VALUE: 17
PIF_VALUE: 24
PIF_VALUE: 21
PIF_VALUE: 19
PIF_VALUE: 0
PIF_VALUE: 23
PIF_VALUE: 27
PIF_VALUE: 24
PIF_VALUE: 30
PIF_VALUE: 20
PIF_VALUE: 23
PIF_VALUE: 34
PIF_VALUE: 23
PIF_VALUE: 1
PIF_VALUE: 23
PIF_VALUE: 1
PIF_VALUE: 21
PIF_VALUE: 20
PIF_VALUE: 1
PIF_VALUE: 23
PIF_VALUE: 1
PIF_VALUE: 21
PIF_VALUE: 24
PIF_VALUE: 23
PIF_VALUE: 1
PIF_VALUE: 22
PIF_VALUE: 24
PIF_VALUE: 22
PIF_VALUE: 19
PIF_VALUE: 24
PIF_VALUE: 20
PIF_VALUE: 38
PIF_VALUE: 17
PIF_VALUE: 22
PIF_VALUE: 17
PIF_VALUE: 24
PIF_VALUE: 1
PIF_VALUE: 1
PIF_VALUE: 26
PIF_VALUE: 23
PIF_VALUE: 20
PIF_VALUE: 21
PIF_VALUE: 24
PIF_VALUE: 25
PIF_VALUE: 34
PIF_VALUE: 23
PIF_VALUE: 1
PIF_VALUE: 1
PIF_VALUE: 18
PIF_VALUE: 21
PIF_VALUE: 18
PIF_VALUE: 25
PIF_VALUE: 17
PIF_VALUE: 21
PIF_VALUE: 22
PIF_VALUE: 29
PIF_VALUE: 24
PIF_VALUE: 1
PIF_VALUE: 24
PIF_VALUE: 1
PIF_VALUE: 25
PIF_VALUE: 25
PIF_VALUE: 20
PIF_VALUE: 23
PIF_VALUE: 24
PIF_VALUE: 17
PIF_VALUE: 24
PIF_VALUE: 28
PIF_VALUE: 41
PIF_VALUE: 2
PIF_VALUE: 1
PIF_VALUE: 23
PIF_VALUE: 18
PIF_VALUE: 1
PIF_VALUE: 18
PIF_VALUE: 2
PIF_VALUE: 1
PIF_VALUE: 21
PIF_VALUE: 1
PIF_VALUE: 18
PIF_VALUE: 23
PIF_VALUE: 18
PIF_VALUE: 23
PIF_VALUE: 6
PIF_VALUE: 22
PIF_VALUE: 19
PIF_VALUE: 25
PIF_VALUE: 1
PIF_VALUE: 21
PIF_VALUE: 1
PIF_VALUE: 24
PIF_VALUE: 24
PIF_VALUE: 27
PIF_VALUE: 1
PIF_VALUE: 23
PIF_VALUE: 22

## 2021-01-01 ASSESSMENT — PAIN SCALES - GENERAL
PAINLEVEL_OUTOF10: 0
PAINLEVEL_OUTOF10: 4
PAINLEVEL_OUTOF10: 0
PAINLEVEL_OUTOF10: 5
PAINLEVEL_OUTOF10: 0
PAINLEVEL_OUTOF10: 1
PAINLEVEL_OUTOF10: 0
PAINLEVEL_OUTOF10: 7
PAINLEVEL_OUTOF10: 0
PAINLEVEL_OUTOF10: 9
PAINLEVEL_OUTOF10: 0
PAINLEVEL_OUTOF10: 5
PAINLEVEL_OUTOF10: 0
PAINLEVEL_OUTOF10: 10
PAINLEVEL_OUTOF10: 0
PAINLEVEL_OUTOF10: 0
PAINLEVEL_OUTOF10: 4
PAINLEVEL_OUTOF10: 5
PAINLEVEL_OUTOF10: 0
PAINLEVEL_OUTOF10: 6
PAINLEVEL_OUTOF10: 2
PAINLEVEL_OUTOF10: 0
PAINLEVEL_OUTOF10: 9
PAINLEVEL_OUTOF10: 0
PAINLEVEL_OUTOF10: 0
PAINLEVEL_OUTOF10: 6
PAINLEVEL_OUTOF10: 0
PAINLEVEL_OUTOF10: 6
PAINLEVEL_OUTOF10: 0
PAINLEVEL_OUTOF10: 10
PAINLEVEL_OUTOF10: 0
PAINLEVEL_OUTOF10: 10
PAINLEVEL_OUTOF10: 0
PAINLEVEL_OUTOF10: 2
PAINLEVEL_OUTOF10: 0
PAINLEVEL_OUTOF10: 5
PAINLEVEL_OUTOF10: 0
PAINLEVEL_OUTOF10: 2
PAINLEVEL_OUTOF10: 0
PAINLEVEL_OUTOF10: 2
PAINLEVEL_OUTOF10: 4
PAINLEVEL_OUTOF10: 0
PAINLEVEL_OUTOF10: 1
PAINLEVEL_OUTOF10: 0
PAINLEVEL_OUTOF10: 10
PAINLEVEL_OUTOF10: 5
PAINLEVEL_OUTOF10: 0
PAINLEVEL_OUTOF10: 6
PAINLEVEL_OUTOF10: 0
PAINLEVEL_OUTOF10: 5
PAINLEVEL_OUTOF10: 0
PAINLEVEL_OUTOF10: 2
PAINLEVEL_OUTOF10: 0
PAINLEVEL_OUTOF10: 1

## 2021-01-01 ASSESSMENT — PAIN DESCRIPTION - PAIN TYPE
TYPE: SURGICAL PAIN

## 2021-01-01 ASSESSMENT — ENCOUNTER SYMPTOMS
RHINORRHEA: 0
VOMITING: 0
DIARRHEA: 0
NAUSEA: 0
COUGH: 0
SHORTNESS OF BREATH: 1
ABDOMINAL PAIN: 0

## 2021-01-01 ASSESSMENT — SOCIAL DETERMINANTS OF HEALTH (SDOH): HOW HARD IS IT FOR YOU TO PAY FOR THE VERY BASICS LIKE FOOD, HOUSING, MEDICAL CARE, AND HEATING?: NOT HARD AT ALL

## 2021-01-01 ASSESSMENT — PAIN DESCRIPTION - ORIENTATION
ORIENTATION: LEFT;MID

## 2021-01-01 ASSESSMENT — PATIENT HEALTH QUESTIONNAIRE - PHQ9
SUM OF ALL RESPONSES TO PHQ9 QUESTIONS 1 & 2: 0
1. LITTLE INTEREST OR PLEASURE IN DOING THINGS: 0
2. FEELING DOWN, DEPRESSED OR HOPELESS: 0

## 2021-01-01 ASSESSMENT — PAIN DESCRIPTION - LOCATION
LOCATION: CHEST;INCISION;STERNUM
LOCATION: STERNUM
LOCATION: RIB CAGE;STERNUM
LOCATION: STERNUM
LOCATION: STERNUM

## 2021-01-01 ASSESSMENT — LIFESTYLE VARIABLES: SMOKING_STATUS: 0

## 2021-01-01 ASSESSMENT — PAIN - FUNCTIONAL ASSESSMENT: PAIN_FUNCTIONAL_ASSESSMENT: 0-10

## 2021-01-05 NOTE — TELEPHONE ENCOUNTER
Medication:   Requested Prescriptions     Pending Prescriptions Disp Refills    omeprazole (PRILOSEC) 40 MG delayed release capsule [Pharmacy Med Name: Omeprazole Oral Capsule Delayed Release 40 MG] 30 capsule 0     Sig: TAKE 1 CAPSULE BY MOUTH EVERY MORNING BEFORE BREAKFAST        Last Filled:  6/29/20 #30, 5 RF     Patient Phone Number: [phone] (home) [phone] (work)    Last appt: 11/9/2020 AWV   Next appt: 2/11/2021

## 2021-02-19 NOTE — PROGRESS NOTES
SUBJECTIVE:  70 y.o. male for follow up of diabetes. no TIA's, no chest pain at rest or on exertion, no SOB or dyspnea on exertion, no swelling of ankles or feet. medication compliance:  compliant most of the time,He states his insurance has changed from glyburide and has to take glipizide now diabetic diet compliance:  compliant most of the time, home glucose monitoring: are not performed  Exercise:none   Other symptoms and concerns: no new issues. Hypertension: Patient here for follow-up of elevated blood pressure. Blood pressure is not checked at home. Patient denies chest pain, dyspnea and irregular heart beat. He denies side effects from medication. Dyslipidemia: Patient presents for evaluation of lipids. He is unable to tolerate statins  A repeat fasting lipid profile was done.      CKD: Last creatinine was 1.7 and GFR 40 0n 1/26/21    No components found for: CHLPL  Lab Results   Component Value Date    TRIG 202 (H) 01/26/2021    TRIG 178 (H) 02/04/2020    TRIG 189 (H) 01/18/2019     Lab Results   Component Value Date    HDL 31 (L) 01/26/2021    HDL 31 (L) 02/04/2020    HDL 30 (L) 01/18/2019     Lab Results   Component Value Date    LDLCALC 220 (H) 01/26/2021    LDLCALC 206 (H) 02/04/2020    LDLCALC 181 (H) 01/18/2019     Lab Results   Component Value Date    LABVLDL 40 01/26/2021    LABVLDL 36 02/04/2020    LABVLDL 38 01/18/2019     Lab Results   Component Value Date    ALT 31 01/26/2021    AST 28 01/26/2021              Outpatient Medications Marked as Taking for the 2/19/21 encounter (Office Visit) with Milagro Smith MD   Medication Sig Dispense Refill    glipiZIDE (GLUCOTROL) 10 MG tablet Take 1 tablet by mouth 2 times daily 180 tablet 3    hydroCHLOROthiazide (HYDRODIURIL) 25 MG tablet TAKE 1 TABLET BY MOUTH DAILY  90 tablet 3    omeprazole (PRILOSEC) 40 MG delayed release capsule TAKE 1 CAPSULE BY MOUTH EVERY MORNING BEFORE BREAKFAST  30 capsule 5 Poor control although patient cannot tolerate medications treatment. Lifestyle modification  Stage 3a chronic kidney disease  -     US RENAL COMPLETE; Future  Patient declines seeing nephrologist at this time and we will discuss after he gets his renal ultrasound.   Avoidance of nephrotoxic medication specifically NSAIDs, etc.

## 2021-03-02 NOTE — TELEPHONE ENCOUNTER
Medication:   Requested Prescriptions     Pending Prescriptions Disp Refills    lisinopril (PRINIVIL;ZESTRIL) 40 MG tablet [Pharmacy Med Name: Lisinopril Oral Tablet 40 MG] 90 tablet 0     Sig: TAKE ONE TABLET BY MOUTH DAILY    cloNIDine (CATAPRES) 0.3 MG tablet [Pharmacy Med Name: cloNIDine HCl Oral Tablet 0.3 MG] 90 tablet 0     Sig: TAKE ONE TABLET BY MOUTH DAILY       Last Filled:  3/5/20 #90, 3 RF     Patient Phone Number: [phone] (home) [phone] (work)    Last appt: 2/19/21 DM   Next appt: Visit date not found    Lab Results   Component Value Date     01/26/2021    K 5.3 (H) 01/26/2021     01/26/2021    CO2 25 01/26/2021    BUN 39 (H) 01/26/2021    CREATININE 1.7 (H) 01/26/2021    GLUCOSE 95 01/26/2021    CALCIUM 9.5 01/26/2021    PROT 7.1 01/26/2021    LABALBU 4.4 01/26/2021    BILITOT 0.4 01/26/2021    ALKPHOS 85 01/26/2021    AST 28 01/26/2021    ALT 31 01/26/2021    LABGLOM 40 (A) 01/26/2021    GFRAA 48 (A) 01/26/2021    AGRATIO 1.6 01/26/2021    GLOB 2.7 01/26/2021

## 2021-03-12 NOTE — PROGRESS NOTES
Patient continues to do well with no problems. There is no evidence of any recurrence of his disease behind his left ear and I will see him again in 1 year.

## 2021-05-20 PROBLEM — R07.9 CHEST PAIN: Status: ACTIVE | Noted: 2021-01-01

## 2021-05-20 NOTE — CONSULTS
[facility]  [phone]        Reason for Consultation/Chief Complaint: \" Chest discomfort. \"    History of Present Illness:  Noelle Francis is a 70 y.o. patient who presented to the hospital with complaints of 2-month history of progressive exertional shortness of breath and chest discomfort. No rest discomfort so far. .     Past Medical History:   has a past medical history of CAD (coronary artery disease), Diabetes mellitus ([address] Elevated LFT's, Hyperlipidemia, Hypertension, Post poliomyelitis syndrome, and Type II or unspecified type diabetes mellitus without mention of complication, not stated as uncontrolled. Surgical History:   has a past surgical history that includes Cleft palate repair; [place] tooth extraction; and skin biopsy (Left, 7/20/2020). Social History:   reports that he has never smoked. He has never used smokeless tobacco. He reports that he does not drink alcohol and does not use drugs. Family History:  family history includes Diabetes in his mother; High Blood Pressure in his brother, father, and mother. Home Medications:  Were reviewed and are listed in nursing record. and/or listed below  Prior to Admission medications    Medication Sig Start Date End Date Taking?  Authorizing Provider   lisinopril (PRINIVIL;ZESTRIL) 40 MG tablet TAKE ONE TABLET BY MOUTH DAILY 3/2/21  Yes Imtiaz Pitt MD   cloNIDine (CATAPRES) 0.3 MG tablet TAKE ONE TABLET BY MOUTH DAILY 3/2/21  Yes Imtiaz Pitt MD   glipiZIDE (GLUCOTROL) 10 MG tablet Take 1 tablet by mouth 2 times daily 2/19/21  Yes Imtiaz Pitt MD   hydroCHLOROthiazide (HYDRODIURIL) 25 MG tablet TAKE 1 TABLET BY MOUTH DAILY  2/15/21  Yes Imtiaz Pitt MD   omeprazole (PRILOSEC) 40 MG delayed release capsule TAKE 1 CAPSULE BY MOUTH EVERY MORNING BEFORE BREAKFAST  1/5/21  Yes Imtiaz Pitt MD   metFORMIN (GLUCOPHAGE) 1000 MG tablet TAKE 1 TABLET BY MOUTH TWICE A DAY WITH MEALS 8/13/20  Yes Imtiaz Pitt MD GLUCOSE 124 05/05/2012    PROT 7.2 05/20/2021    PROT 7.3 11/21/2012    CALCIUM 9.7 05/20/2021    BILITOT 0.5 05/20/2021    ALKPHOS 91 05/20/2021    AST 27 05/20/2021    ALT 32 05/20/2021     LIPIDS: No components found for: TOTAL CHOLESTEROL,  HDL,  LDL,  TRIGLYCERIDES  PT/INR:  No results found for: PTINR  Lab Results   Component Value Date    TROPONINI 0.02 (H) 05/20/2021       EKG:  I have reviewed EKG with the following interpretation:  Impression:    20-MAY-2021 12:57:56 [facility] ROUTINE RECORD  Normal sinus rhythm  Left ventricular hypertrophy with repolarization abnormality  St and T abnormality, consider LVH versus lateral ischemia    Imaging/Procedures:       Assessment/Plan:  Principal Problem:    Chest pain  Plan: Concerning for unstable angina. Troponin elevation could be related to kidney disease but certainly cannot exclude ischemia. Recommend cardiac catheterization once he is considered optimized from a renal standpoint. Active Problems:    Type 2 diabetes mellitus with diabetic nephropathy, without long-term current use of insulin ([facility] [address]  Plan: Per primary service. Essential hypertension  Plan: Suboptimal control. Adjust antihypertensive regimen. Mixed hyperlipidemia  Plan: Statin intolerant, even to Livalo. Discussed Repatha/Praluent as potential options. Recommend cardiac catheterization once he is considered renally optimized. Performance of the procedure as well as the associated risk, benefits and alternatives have been discussed. Good and pertinent questions have been asked and answered. Thank you for allowing us to participate in the care of Jade Ricketts. Further evaluation will be based upon the patient's clinical course and testing results. All questions and concerns were addressed to the patient/family. Alternatives to my treatment were discussed. The note was completed using EMR.  Every effort was made to ensure accuracy; however, inadvertent computerized transcription errors may be present.     Jatin Gerard M.D.

## 2021-05-20 NOTE — ED PROVIDER NOTES
EKG NOTE:    Sinus rhythm at a rate of 81 beats a minute with no acute ST elevations or depressions or pathologic Q waves. Chronic findings in inferior lateral leads    I was not involved with the care of this patient.        Bernarda Tran MD  05/20/21 9102

## 2021-05-20 NOTE — PROGRESS NOTES
SUBJECTIVE:  70 y.o. male for follow up of diabetes. no TIA's, no chest pain at rest or on exertion, no SOB or dyspnea on exertion, no swelling of ankles or feet. medication compliance:  compliant most of the time,He states his insurance has changed from glyburide and has to take glipizide now diabetic diet compliance:  compliant most of the time, home glucose monitoring: are not performed  Exercise:none   Other symptoms and concerns: no new issues. Hypertension: Patient here for follow-up of elevated blood pressure. Blood pressure is not checked at home. Patient denies chest pain, dyspnea and irregular heart beat. He denies side effects from medication. Chest Pain/SOB: he has noted in the last few months that with activity, he experiences both symptoms. For example 2 days ago he was spreading mulch and couldn't even do a few bags. generally always with activity. OK at rest. No radiation of pain. No diaphoresis, near syncope, nausea. Sometimes food seems to affect. Outpatient Medications Marked as Taking for the 5/20/21 encounter (Office Visit) with Ben Roque MD   Medication Sig Dispense Refill    lisinopril (PRINIVIL;ZESTRIL) 40 MG tablet TAKE ONE TABLET BY MOUTH DAILY 90 tablet 3    cloNIDine (CATAPRES) 0.3 MG tablet TAKE ONE TABLET BY MOUTH DAILY 90 tablet 3    glipiZIDE (GLUCOTROL) 10 MG tablet Take 1 tablet by mouth 2 times daily 180 tablet 3    hydroCHLOROthiazide (HYDRODIURIL) 25 MG tablet TAKE 1 TABLET BY MOUTH DAILY  90 tablet 3    omeprazole (PRILOSEC) 40 MG delayed release capsule TAKE 1 CAPSULE BY MOUTH EVERY MORNING BEFORE BREAKFAST  30 capsule 5    clindamycin (CLEOCIN) 300 MG capsule Take 1 capsule by mouth 3 times daily 21 capsule 0    metFORMIN (GLUCOPHAGE) 1000 MG tablet TAKE 1 TABLET BY MOUTH TWICE A DAY WITH MEALS 180 tablet 3    metoprolol (LOPRESSOR) 100 MG tablet TAKE ONE TABLET BY MOUTH TWICE DAILY  180 tablet 3    aspirin 81 MG EC tablet Take 81 mg by mouth daily. Lab Results   Component Value Date    LABA1C 7.6 05/20/2021       OBJECTIVE:   /80   Pulse 77   Wt 223 lb 9.6 oz (101.4 kg)   SpO2 99%   BMI 33.02 kg/m²    Appearance alert and oriented and in no distress. Skin: warm and dry  Eyes: PERRL  Neck: No JVD, or bruits. No adenopathy or thyromegaly  Lungs: Chest is clear; no wheezes or rales. Heart: S1 and S2 normal, no murmurs, clicks, gallops or rubs. Regular rate and rhythm. Ext: No edema. Diabetic foot check per nurses note. Lab review: Hemoglobin A1c as above. Assessment/Plan:    Antelmo Alan was seen today for diabetes. Diagnoses and all orders for this visit:    Type 2 diabetes mellitus without complication, without long-term current use of insulin (HCC)  -     POCT glycosylated hemoglobin (Hb A1C)  Needs better control. Patient declines any injectable medication. We discussed at this time the chest pain and shortness of breath were her major concern. He will continue his current medication for now. He will avoid exercise until we have an answer regarding his heart. Return 1 month. Essential hypertension  Reasonably well controlled  Continue current treatment  Home BP checks  Return 3 months     Other chest pain/SOB (shortness of breath)  -     EKG 12 Lead  -     Soni Kenyon MD, Cardiology, [facility]  Patient is currently asymptomatic and states he only becomes symptomatic with activity. Cardiology office was called to set up an immediate appointment they will call the patient and schedule an appointment appointment for him. They will also notify me regarding this. The patient was told if he has not heard anything from them within the next few hours to give me a call back. The patient did not want to go to the ER/hospital now. ER if worse. Warning signs discussed with the patient. Joint cardiology worse not able to get him in and it was suggested he go to the ER for possible admission.

## 2021-05-20 NOTE — PROGRESS NOTES
Assessment complete. VSS, respirations are even and unlabored. Afebrile. Pt is resting. Call light within reach. non-skid socks on, bed in lowest position and locked. No other needs expressed. will continue to monitor.

## 2021-05-20 NOTE — H&P
HOSPITALISTS HISTORY AND PHYSICAL    5/20/2021 3:04 PM    Patient Information:  Eric Joseph is a 70 y.o. male [id number]  PCP:  Kianna Burgess MD (Tel: [phone] )    Chief complaint:    Chief Complaint   Patient presents with    Shortness of Breath     sent by Dr Johnny Mack for abnormal EKG. was unable to get into see cardiologist. has been feeling \"not so good\", gets sob with activity for a couple months. denies cp. no n/v. Problem List  Active Problems:    Chest pain  Resolved Problems:    * No resolved hospital problems. *    CKD stage 3  Elevated troponin     Assessment/Plan:   Anginal and angina equivalent short of breath with a history of coronary artery disease, diabetes mellitus, hypertension    Typical history, does have history of CKD stage III,  Does have mild elevation of troponin but that can be related to CKD, continue on aspirin, cannot take statins, I think might benefit from angiographic evaluation, will consult cardiology, discussed with cardiology, also consult nephrology as per the recommendation for risk stratification and help in as patient risk for contrast-induced nephropathy, will also get an echocardiogram      Diabetes mellitus  Hold oral hypoglycemics, insulin sliding scale    Hypertension  Continue beta-blocker and clonidine      DVT prophylaxis Lovenox  Code status full code confirmed with the patient  Diet carb consistent n.p.o. after midnight  IV access peripheral  Curry Catheter no    Admit as observation I anticipate hospitalization spanning less/ than two midnights for investigation and treatment of the above medically necessary diagnoses. History of Present Illness:  Richi Jones is a 70 y.o. male with a history of diabetes mellitus, remote history of coronary disease, hypertension, CKD stage III came to see Dr. Johnny Mack for his regular checkup.   Mention that he has been having exertional central chest pain and short of breath for last 2 months. Mention lives in an apartment complex and going to the [facility] makes him short of breath. He denies any orthopnea PND no worsening leg swelling. Does mention that shortness of breath of chest discomfort which is central in origin gets better after resting. Denies any radiation of pain. No dizziness. No recent fever chills cough phlegm. No diarrhea no constipation no trouble peeing no burning sensation while peeing. Does mention that has a history of post polio syndrome and cannot take statins. At Dr. Monet Ashton office he did had an EKG done which was thought to be abnormal and patient was sent to the ER for further evaluation. REVIEW OF SYSTEMS:   All other ROS negative except mentioned in [address]      Past Medical History:   has a past medical history of CAD (coronary artery disease), Diabetes mellitus ([facility] [address] Elevated LFT's, Hyperlipidemia, Hypertension, Post poliomyelitis syndrome, and Type II or unspecified type diabetes mellitus without mention of complication, not stated as uncontrolled. Past Surgical History:   has a past surgical history that includes Cleft palate repair; [place] tooth extraction; and skin biopsy (Left, 7/20/2020). Medications:  No current facility-administered medications on file prior to encounter.      Current Outpatient Medications on File Prior to Encounter   Medication Sig Dispense Refill    lisinopril (PRINIVIL;ZESTRIL) 40 MG tablet TAKE ONE TABLET BY MOUTH DAILY 90 tablet 3    cloNIDine (CATAPRES) 0.3 MG tablet TAKE ONE TABLET BY MOUTH DAILY 90 tablet 3    glipiZIDE (GLUCOTROL) 10 MG tablet Take 1 tablet by mouth 2 times daily 180 tablet 3    hydroCHLOROthiazide (HYDRODIURIL) 25 MG tablet TAKE 1 TABLET BY MOUTH DAILY  90 tablet 3    omeprazole (PRILOSEC) 40 MG delayed release capsule TAKE 1 CAPSULE BY MOUTH EVERY MORNING BEFORE BREAKFAST  30 capsule 5    metFORMIN (GLUCOPHAGE) 1000 MG tablet TAKE 1 TABLET BY MOUTH TWICE A DAY WITH MEALS 180 tablet 3    metoprolol (LOPRESSOR) 100 MG tablet TAKE ONE TABLET BY MOUTH TWICE DAILY  180 tablet 3    aspirin 81 MG EC tablet Take 81 mg by mouth daily. Allergies: Allergies   Allergen Reactions    Livalo [Pitavastatin Calcium]      Muscle weakness        Social History:  Patient Lives with his wife    reports that he has never smoked. He has never used smokeless tobacco. He reports that he does not drink alcohol and does not use drugs. Family History:  family history includes Diabetes in his mother; High Blood Pressure in his brother, father, and mother. ,    Physical Exam:  BP (!) 156/91   Pulse 70   Temp 98 °F (36.7 °C) (Oral)   Resp 14   Ht 5' 9\" (1.753 m)   Wt 221 lb 11.2 oz (100.6 kg)   SpO2 98%   BMI 32.74 kg/m²     General appearance:  Appears comfortable. Well nourished  Eyes: Sclera clear, pupils equal  ENT: Moist mucus membranes, no thrush. Trachea midline. Cardiovascular: Regular rhythm, normal S1, S2. No murmur, gallop, rub. No edema in lower extremities  Respiratory: Clear to auscultation bilaterally, no wheeze, good inspiratory effort  Gastrointestinal: Abdomen soft, non-tender, not distended, normal bowel sounds  Musculoskeletal: No cyanosis in digits, neck supple  Neurology: Cranial nerves grossly intact. Alert and oriented in time, place and person. No speech or motor deficits  Psychiatry: Appropriate affect.  Not agitated  Skin: Warm, dry, normal turgor, no rash  Brisk capillary refill, peripheral pulses palpable   Labs:  CBC:   Lab Results   Component Value Date    WBC 6.5 05/20/2021    RBC 4.42 05/20/2021    HGB 12.4 05/20/2021    HCT 38.6 05/20/2021    MCV 87.3 05/20/2021    MCH 28.0 05/20/2021    MCHC 32.0 05/20/2021    RDW 16.3 05/20/2021     05/20/2021    MPV 9.2 05/20/2021     BMP:    Lab Results   Component Value Date     05/20/2021    K 4.8 05/20/2021     05/20/2021    CO2 20 05/20/2021 BUN 35 05/20/2021    CREATININE 1.6 05/20/2021    CALCIUM 9.7 05/20/2021    GFRAA 52 05/20/2021    LABGLOM 43 05/20/2021    LABGLOM 52 08/22/2014    GLUCOSE 159 05/20/2021    GLUCOSE 124 05/05/2012     XR CHEST (2 VW)   Final Result   No active cardiopulmonary disease           Chest Xray:   EKG:    I visualized CXR images and EKG strips LVH with repolarization abnormalities which were present in the previous EKGs too    Discussed case  with ER provider and cardiology    Please note that some part of this chart was generated using Dragon dictation software. Although every effort was made to ensure the accuracy of this automated transcription, some errors in transcription may have occurred inadvertently. If you may need any clarification, please do not hesitate to contact me through [facility].        Dougie Talbot MD    5/20/2021 3:04 PM

## 2021-05-20 NOTE — PROGRESS NOTES
Pt seen in  ED, admission completed. Pt is alert and oriented x 3. Pt lives at home with his wife, and is being admitted for cardiac eval d/t complaints of SOB with exertion and chest tightness. Plan of care updated, all questions answered.

## 2021-05-20 NOTE — ED PROVIDER NOTES
not smoke. No other complaints. Nursing Notes were all reviewed and agreed with or any disagreements were addressed in the HPI. REVIEW OF SYSTEMS    (2-9 systems for level 4, 10 or more for level 5)     Review of Systems   Constitutional: Negative for activity change, appetite change, chills and fever. HENT: Negative for congestion and rhinorrhea. Respiratory: Positive for shortness of breath. Negative for cough. Cardiovascular: Positive for chest pain. Gastrointestinal: Negative for abdominal pain, diarrhea, nausea and vomiting. Genitourinary: Negative for difficulty urinating, dysuria and hematuria. Positives and Pertinent negatives as per HPI. Except as noted above in the ROS, all other systems were reviewed and negative. PAST MEDICAL HISTORY     Past Medical History:   Diagnosis Date    CAD (coronary artery disease)     Diabetes mellitus ([facility] [address]     Elevated LFT's     Hyperlipidemia     Hypertension     Post poliomyelitis syndrome     Type II or unspecified type diabetes mellitus without mention of complication, not stated as uncontrolled          SURGICAL HISTORY     Past Surgical History:   Procedure Laterality Date    CLEFT PALATE REPAIR      SKIN BIOPSY Left 7/20/2020    EXCISE SKIN LESION LEFT POST AURICULAR WITH FLAP, FROZEN SECTIONS performed by Stefanie Morrow MD at [place] [address]       Previous Medications    ASPIRIN 81 MG EC TABLET    Take 81 mg by mouth daily.     CLONIDINE (CATAPRES) 0.3 MG TABLET    TAKE ONE TABLET BY MOUTH DAILY    GLIPIZIDE (GLUCOTROL) 10 MG TABLET    Take 1 tablet by mouth 2 times daily    HYDROCHLOROTHIAZIDE (HYDRODIURIL) 25 MG TABLET    TAKE 1 TABLET BY MOUTH DAILY     LISINOPRIL (PRINIVIL;ZESTRIL) 40 MG TABLET    TAKE ONE TABLET BY MOUTH DAILY    METFORMIN (GLUCOPHAGE) 1000 MG TABLET    TAKE 1 TABLET BY MOUTH TWICE A DAY WITH MEALS    METOPROLOL (LOPRESSOR) 100 MG TABLET    TAKE ONE TABLET BY MOUTH TWICE DAILY     OMEPRAZOLE (PRILOSEC) 40 MG DELAYED RELEASE CAPSULE    TAKE 1 CAPSULE BY MOUTH EVERY MORNING BEFORE BREAKFAST          ALLERGIES     Livalo [pitavastatin calcium]    FAMILYHISTORY       Family History   Problem Relation Age of Onset    High Blood Pressure Mother     Diabetes Mother     High Blood Pressure Father     High Blood Pressure Brother           SOCIAL HISTORY       Social History     Tobacco Use    Smoking status: Never Smoker    Smokeless tobacco: Never Used   Vaping Use    Vaping Use: Never used   Substance Use Topics    Alcohol use: No     Alcohol/week: 0.0 standard drinks    Drug use: No       SCREENINGS             PHYSICAL EXAM    (up to 7 for level 4, 8 or more for level 5)     ED Triage Vitals [05/20/21 1228]   BP Temp Temp Source Pulse Resp SpO2 Height Weight   (!) 197/103 98 °F (36.7 °C) Oral 77 22 100 % 5' 9\" (1.753 m) 221 lb 11.2 oz (100.6 kg)       Physical Exam  Vitals and nursing note reviewed. Constitutional:       Appearance: He is well-developed. He is not diaphoretic. HENT:      Head: Normocephalic and atraumatic. Right Ear: External ear normal.      Left Ear: External ear normal.      Nose: Nose normal.   Eyes:      General:         Right eye: No discharge. Left eye: No discharge. Neck:      Trachea: No tracheal deviation. Cardiovascular:      Rate and Rhythm: Normal rate and regular rhythm. Comments: No lower leg edema  Pulmonary:      Effort: Pulmonary effort is normal. No respiratory distress. Breath sounds: No wheezing or rales. Abdominal:      General: Bowel sounds are normal. There is no distension. Palpations: Abdomen is soft. Tenderness: There is no abdominal tenderness. Musculoskeletal:         General: Normal range of motion. Cervical back: Normal range of motion and neck supple. Skin:     General: Skin is warm and dry.    Neurological:      Mental Status: He is alert and oriented to person, place, and time. Psychiatric:         Behavior: Behavior normal.         DIAGNOSTIC RESULTS   LABS:    Labs Reviewed   CBC WITH AUTO DIFFERENTIAL - Abnormal; Notable for the following components:       Result Value    Hemoglobin 12.4 (*)     Hematocrit 38.6 (*)     RDW 16.3 (*)     All other components within normal limits    Narrative:     Performed at:  [facility] [address] [facility] [place] [facility] [place] [facility]   Phone [phone]   COMPREHENSIVE METABOLIC PANEL - Abnormal; Notable for the following components:    CO2 20 (*)     Glucose 159 (*)     BUN 35 (*)     CREATININE 1.6 (*)     GFR Non- 43 (*)     GFR  52 (*)     All other components within normal limits    Narrative:     Performed at:  [facility] [address]   Phone [phone]   TROPONIN - Abnormal; Notable for the following components:    Troponin 0.02 (*)     All other components within normal limits    Narrative:     Performed at:  [facility] [address]   Phone 21  - Abnormal; Notable for the following components:    Pro-BNP 3,828 (*)     All other components within normal limits    Narrative:     Performed at:  [facility] [address]   Phone [phone]   URINE RT REFLEX TO CULTURE       All other labs were within normal range or not returned as of this dictation. EKG: All EKG's are interpreted by the Emergency Department Physician in the absence of a cardiologist.  Please see their note for interpretation of EKG.     RADIOLOGY:   Non-plain film images such as CT, Ultrasound and MRI are read by the radiologist. Plain radiographic images are visualized and preliminarily interpreted by the ED Provider with the below findings:        Interpretation per the admission. FINAL IMPRESSION      1. Chest pain, unspecified type          DISPOSITION/PLAN   DISPOSITION Admitted 05/20/2021 02:29:14 PM      PATIENT REFERRED TO:  No follow-up provider specified.     DISCHARGE MEDICATIONS:  New Prescriptions    No medications on file       DISCONTINUED MEDICATIONS:  Discontinued Medications    CLINDAMYCIN (CLEOCIN) 300 MG CAPSULE    Take 1 capsule by mouth 3 times daily              (Please note that portions of this note were completed with a voice recognition program.  Efforts were made to edit the dictations but occasionally words are mis-transcribed.)    Veronica Puckett PA-C (electronically signed)            Veronica Puckett PA-C  05/20/21 1519

## 2021-05-20 NOTE — TELEPHONE ENCOUNTER
Nimisha with Dr Natacha Foss office called to see if pt was still at our office. She asked that I contact pt and have him go to the ER. I spoke with pt's wife and they will go to the ER.

## 2021-05-21 PROBLEM — I20.89 ANGINA OF EFFORT: Status: ACTIVE | Noted: 2021-01-01

## 2021-05-21 PROBLEM — I20.8 ANGINA OF EFFORT (HCC): Status: ACTIVE | Noted: 2021-01-01

## 2021-05-21 NOTE — CONSULTS
Office : [phone]     Fax :[phone]       Nephrology Consult Note      Patient's Name: Gil Briggs  8:39 AM  5/21/2021    Reason for Consult:  CKD stage3 A      Requesting Physician:  Nilson Kaba MD      Chief Complaint:    Chief Complaint   Patient presents with    Shortness of Breath     sent by Dr Colin Dinh for abnormal EKG. was unable to get into see cardiologist. has been feeling \"not so good\", gets sob with activity for a couple months. denies cp. no n/v. A/p     1. CKD stage 3A   Creat 1.6 ---> 1.4   H/o DM 2  Low risk for ZARI . Give NS at 75 ml/ hr   D/w Mr. Jose Eduardo Luther. Explained risk of iv contrast.   He verbalized understanding   He gave consent for LHC     2. DM 2. Needs better control    3. HTN . controlled   held lisinopril   Give amlodipine 5 mg po daily     4. Dyslipidemia . Statins     History of Present Ilness:    Gil Briggs is a 70 y.o. male with h/o DM 2, CKD 3 , DLP who came with complaints of 2-month history of progressive exertional shortness of breath and chest discomfort.       I/O last 3 completed shifts:  In: -   Out: 200 [Urine:975]    Past Medical History:   Diagnosis Date    CAD (coronary artery disease)     Diabetes mellitus ([facility] [address]     Elevated LFT's     Hyperlipidemia     Hypertension     Post poliomyelitis syndrome     Type II or unspecified type diabetes mellitus without mention of complication, not stated as uncontrolled        Past Surgical History:   Procedure Laterality Date    CLEFT PALATE REPAIR      SKIN BIOPSY Left 7/20/2020    EXCISE SKIN LESION LEFT POST AURICULAR WITH FLAP, FROZEN SECTIONS performed by Tiffanie Hallman MD at [facility]         Family History   Problem Relation Age of Onset    nd  Ext: no  lower extremity edema  Psychiatric: mood and affect appropriate  Musculoskeletal:  Rom, muscular strength intact    Labs:  CBC:   Recent Labs     05/20/21  1245 05/20/21  2042   WBC 6.5 7.5   HGB 12.4* 12.0*    160     BMP:    Recent Labs     05/20/21  1245 05/21/21  0327    139   K 4.8 4.7    107   CO2 20* 21   BUN 35* 30*   CREATININE 1.6* 1.4*   GLUCOSE 159* 242*     Ca/Mg/Phos:   Recent Labs     05/20/21  1245 05/21/21  0327   CALCIUM 9.7 9.2     Hepatic:   Recent Labs     05/20/21  1245   AST 27   ALT 32   BILITOT 0.5   ALKPHOS 91     Troponin:   Recent Labs     05/20/21  1245 05/20/21  1733 05/20/21 2010   TROPONINI 0.02* 0.02* 0.02*     BNP: No results for input(s): BNP in the last 72 hours. Lipids: No results for input(s): CHOL, TRIG, HDL, LDLCALC, LABVLDL in the last 72 hours. ABGs: No results for input(s): PHART, PO2ART, XVJ8MEY in the last 72 hours. INR: No results for input(s): INR in the last 72 hours. UA:  Recent Labs     05/21/21  0014   COLORU YELLOW   CLARITYU Clear   GLUCOSEU 100*   BILIRUBINUR Negative   KETUA TRACE*   SPECGRAV 1.015   BLOODU Negative   PHUR 5.0   PROTEINU TRACE*   UROBILINOGEN 0.2   NITRU Negative   LEUKOCYTESUR Negative   LABMICR YES   Danella Friday NotGiven      Urine Microscopic:   Recent Labs     05/21/21  0014   HYALCAST 2   WBCUA 0   RBCUA 0   EPIU 0     Urine Culture: No results for input(s): LABURIN in the last 72 hours. Urine Chemistry: No results for input(s): Arthor Lie, PROTEINUR, NAUR in the last 72 hours. IMAGING:  XR CHEST (2 VW)   Final Result   No active cardiopulmonary disease                             Assessment/Plan :      1.     2. HTN    3. Anemia    4. Acid- base disorder.     5. Electrolytes            D/w primary team      Thank you for allowing us to participate in care of Remedios Petersen         Electronically signed by: Margarita Mauricio MD, 5/21/2021, 8:39 AM      Nephrology associates [facility] [place]  Office : [phone]  Fax :[phone]

## 2021-05-21 NOTE — PROGRESS NOTES
Pt requested not to receive mealtime ss insulin today. RN educated on the benefits of using ss insulin. Pt requested to not receive. RN educated that blood glucose will continue to rise as they are not taking their home antidiabetic medication, metformin. Answered all questions. Pt states they will think about it and possibly be okay with taking ss insulin at a later time.

## 2021-05-21 NOTE — CONSULTS
Cardiothoracic Surgery Consultation           PCP: Timm Collet, MD    Referring Physician: Dr. Joi Singleton    DIAGNOSIS:  Left main and severe multivessel coronary artery disease    CHIEF COMPLAINT:  Shortness of breath    History Obtained From:  patient, electronic medical record    HISTORY OF PRESENT ILLNESS:      The patient is a 70 y.o. male with significant past medical history of HTN CKD stage 3 (Leanne Peng this admission), DM2, polio (iron lung & post-polio syndrome), cleft palate (last surgery age 23), CAD (1995 MI, no stent/PCI), and HLD (intolerant to statins) who presents to the ER on 5/20 with shortness of breath over the past 2 months. Dr. Anastacia Lock saw him in the office and sent him to the ER after the patient had an abnormal EKG. Tropnins were elevated upon admission but patient has CKD. Angiogram found left main and  severe multivessel coronary artery disease. CVTS was consulted for surgical evaluation for myocardial revascularization. Patient is currently hemodynamically stable and denying any complaints of chest pain or shortness of breath. Patient is a non smoker. Patient denies ever testing positive for Covid and has been vaccinated for covid with the last dose (4/7/21) more than 14 days ago. Past Medical History:        Diagnosis Date    CAD (coronary artery disease)     Diabetes mellitus ([address]     Elevated LFT's     Hyperlipidemia     Hypertension     Post poliomyelitis syndrome     Type II or unspecified type diabetes mellitus without mention of complication, not stated as uncontrolled        Past Surgical History:        Procedure Laterality Date    CLEFT PALATE REPAIR      SKIN BIOPSY Left 7/20/2020    EXCISE SKIN LESION LEFT POST AURICULAR WITH FLAP, FROZEN SECTIONS performed by Lila Iniguez [facility]         Medications:   Home Meds:   Prior to Admission medications    Medication Sig Start Date End Date Taking?  Authorizing Provider 0.9 % sodium chloride infusion  25 mL Intravenous PRN Roseann Lundberg MD        promethazine (PHENERGAN) tablet 12.5 mg  12.5 mg Oral Q6H PRN Roseann Lundberg MD        Or    ondansetron [facility]) injection 4 mg  4 mg Intravenous Q6H PRN Roseann Lundberg MD   4 mg at 05/20/21 2349    polyethylene glycol (GLYCOLAX) packet 17 g  17 g Oral Daily PRN Roseann Lundberg MD        acetaminophen (TYLENOL) tablet 650 mg  650 mg Oral Q6H PRN Roseann Lundberg MD        Or    acetaminophen (TYLENOL) suppository 650 mg  650 mg Rectal Q6H PRN Roseann Lundberg MD        perflutren lipid microspheres (DEFINITY) injection 1.65 mg  1.5 mL Intravenous ONCE PRN Roseann Lundberg MD        aspirin EC tablet 81 mg  81 mg Oral Daily Roseann Lundberg MD   81 mg at 05/21/21 0823    cloNIDine (CATAPRES) tablet 0.3 mg  0.3 mg Oral Daily Roseann Lundberg MD   0.3 mg at 05/21/21 3226    metoprolol tartrate (LOPRESSOR) tablet 100 mg  100 mg Oral BID Roseann Lundberg MD   100 mg at 05/21/21 6713    insulin lispro (1 Unit Dial) 0-12 Units  0-12 Units Subcutaneous TID WC Roseann Lundberg MD        insulin lispro (1 Unit Dial) 0-6 Units  0-6 Units Subcutaneous Nightly Roseann Lundberg MD        0.9 % sodium chloride infusion   Intravenous Continuous Anthony Lawson MD 75 mL/hr at 05/21/21 0704 Rate Verify at 05/21/21 0704    sodium chloride flush 0.9 % injection 5-40 mL  5-40 mL Intravenous 2 times per day Anthony Lawson MD        sodium chloride flush 0.9 % injection 5-40 mL  5-40 mL Intravenous PRN Anthony Lawson MD        0.9 % sodium chloride infusion  25 mL Intravenous PRN Anthony Lawson MD        heparin (porcine) injection 4,000 Units  4,000 Units Intravenous PRN Anthony Lawson MD        heparin (porcine) injection 2,000 Units  2,000 Units Intravenous PRN Anthony Lawson MD        heparin 25,000 units in dextrose 5% 250 mL (premix) infusion  5-30 Units/kg/hr Intravenous Continuous Collette Mountain, MD 12.1 mL/hr at 05/21/21 0704 12 Units/kg/hr at 05/21/21 0704       Allergies:  Livalo [pitavastatin calcium]    Social History:    TOBACCO:   reports that he has never smoked. He has never used smokeless tobacco.  ETOH:   reports no history of alcohol use. DRUGS:   reports no history of drug use.   LIFESTYLE: active  MARITAL STATUS:    OCCUPATION:  Retired 2020    Family History:        Problem Relation Age of Onset    High Blood Pressure Mother     Diabetes Mother     High Blood Pressure Father     High Blood Pressure Brother        REVIEW OF SYSTEMS:    CONSTITUTIONAL:  fatigue  DERMATOLOGICAL: negative  NEUROLOGICAL:  negative  EYES:  positive for  glasses  HEENT:  positive for multiple surgeries for cleft lip  RESPIRATORY:  positive for  dyspnea  CARDIOVASCULAR:  negative  GASTROINTESTINAL:  negative  GENITO-URINARY: no dysuria, trouble voiding, or hematuria  ENDOCRINE: positive for diabetes   MUSCULOSKELETAL:  Left leg shorter than right from polio   HEMATOLOGICAL AND LYMPHATIC: negative  IMMUNOLOGICAL: negative  PSYCHOLOGICAL: negative    PHYSICAL EXAM:    VITALS:  BP (!) 179/81   Pulse 76   Temp 98.6 °F (37 °C) (Temporal)   Resp 18   Ht 5' 9\" (1.753 m)   Wt 221 lb 8 oz (100.5 kg)   SpO2 95%   BMI 32.71 kg/m²   Hand Dominance: right    Eyes:  lids and lashes normal, pupils equal and round, extra ocular muscles intact, sclera clear, conjunctiva normal    Head/ENT:  Normocephalic, atraumatic, bottom permanent bridge, upper partial denture, normal gums, & cleft palate well healed, oropharynx without erythema or exudates    Neck:  supple, symmetrical, trachea midline, no lymphadenopathy, no jugular venous distension, no carotid bruits and MASSES:  no masses    Lungs:  no increased work of breathing, good air exchange, no retractions and clear to auscultation, no palpable / percussible abnormalities    Cardiovascular:  regular rate and rhythm, S1, S2 SPECGRAV 1.015 2021    LEUKOCYTESUR Negative 2021    UROBILINOGEN 0.2 2021    BILIRUBINUR Negative 2021    BLOODU Negative 2021    GLUCOSEU 100 2021       TESTING/IMAGING  EK2021  NSR    ANGIOGRAM: 2021  LM-70% diffuse  LAD-diffusely diseased, 80% proximal, 90% mid calcified  Cx-90% ostial  RCA-diffusely diseased 50% proximal and mid, 70% distal  RPDA-patent  LVEF-50%     ECHO: 2021  -Left ventricular cavity size is normal.   -Ejection fraction is visually estimated to be 50%. -The apex and apical septum appear hypokinetic.   -Grade I diastolic dysfunction with normal LV filling pressures. E/e' =   12.6.   -Left atrium is dilated. -Mild aortic stenosis with a peak velocity of 2.3m/s and a mean pressure   gradient of 11mmHg. -Mitral annular calcification is present. -Mild mitral regurgitation.   -Mild tricuspid regurgitation. RVSP = 36 mmHG. CXRAY: 2021  Heart size and pulmonary vasculature within normal limits.  Lungs clear. Costophrenic angles sharp    CAROTIDS: ordered    CT CHEST, ABD, PELVIS WO CONTRAST: ordered    PFTS: ordered    [id number] Score for Atrial Fibrillation Stroke Risk   Risk   Factors  Component Value   C CHF No 0   H HTN Yes 1   A2 Age >= 76 No,  (75 y.o.) 0   D DM Yes 1   S2 Prior Stroke/TIA No 0   V Vascular Disease No 0   A Age 74-69 Yes,  (75 y.o.) 1   Sc Sex male 0    [id number]  Score  3   Score last updated 21 78:63 AM EDT    Click here for a link to the UpToDate guideline \"Atrial Fibrillation: Anticoagulation therapy to prevent embolization    Disclaimer: Risk Score calculation is dependent on accuracy of patient problem list and past encounter diagnosis.       CTS Adult Cardiac Risk: CABG: Pending carotids  Mortality Risk:  1.203%  Renal Failure: 1.692%  Permanent Stroke: 1.496%  Prolonged Ventilation: 5.128%  DSW Infection: .0180%  Reoperation: 1.884%  Morbidity and Mortality: 8.813%  Short Length of Stay: 49.634%  Long Length of Stay: 3.591%      ASSESSMENT AND PLAN:    The patient was counseled at length about the risks of bret Covid-19 during their perioperative period and any recovery window from their procedure. The patient was made aware that bret Covid-19  may worsen their prognosis for recovering from their procedure  and lend to a higher morbidity and/or mortality risk. All material risks, benefits, and reasonable alternatives including postponing the procedure were discussed. The patient does wish to proceed with the procedure at this time. 79-year-old male with significant  medical history of hypertension, chronic kidney disease stage III, diabetes, poliomyelitis, cleft palate status post surgical correction, coronary artery disease status post MI 80 and hyperlipidemia who presents with worsening shortness of breath. His work-up included a cardiac cath that showed severe three-vessel coronary artery disease including left main disease. Treatment options including medical therapy versus doing nothing and the consequences of that versus multiple stenting versus surgical vascularization with CABG were discussed in extent with the patient and his wife at the bedside. Best option is CABG. Patient is amenable to this. Will complete standard preoperative work-up and risk stratification over the weekend.   He understands that with urgent CABG surgery he has risks including but not limited to bleeding that might require transfusions and/or reexploration, infection, irregular heartbeat, heart block that might require a permanent pacemaker, wound dehiscence, sternal dehiscence with possible need for reconstructive surgery and multiple surgical scars, chronic chest wall pain syndrome and/or chest wall numbness/tingling, brachial plexus and/or peripheral neuropathy, stroke with possible permanent deficit, respiratory failure with possible need for prolonged mechanical ventilation

## 2021-05-21 NOTE — PROGRESS NOTES
(TYLENOL) suppository 650 mg, Q6H PRN  perflutren lipid microspheres (DEFINITY) injection 1.65 mg, ONCE PRN  aspirin EC tablet 81 mg, Daily  cloNIDine (CATAPRES) tablet 0.3 mg, Daily  metoprolol tartrate (LOPRESSOR) tablet 100 mg, BID  insulin lispro (1 Unit Dial) 0-12 Units, TID WC  insulin lispro (1 Unit Dial) 0-6 Units, Nightly  0.9 % sodium chloride infusion, Continuous  sodium chloride flush 0.9 % injection 5-40 mL, 2 times per day  sodium chloride flush 0.9 % injection 5-40 mL, PRN  0.9 % sodium chloride infusion, PRN  heparin (porcine) injection 4,000 Units, PRN  heparin (porcine) injection 2,000 Units, PRN  heparin 25,000 units in dextrose 5% 250 mL (premix) infusion, Continuous        Objective:  BP (!) 179/81   Pulse 76   Temp 98.6 °F (37 °C) (Temporal)   Resp 18   Ht 5' 9\" (1.753 m)   Wt 221 lb 8 oz (100.5 kg)   SpO2 95%   BMI 32.71 kg/m²     Intake/Output Summary (Last 24 hours) at 5/21/2021 0929  Last data filed at 5/21/2021 0704  Gross per 24 hour   Intake 845.34 ml   Output 975 ml   Net -129.66 ml      Wt Readings from Last 3 Encounters:   05/21/21 221 lb 8 oz (100.5 kg)   05/20/21 223 lb 9.6 oz (101.4 kg)   03/12/21 216 lb (98 kg)       General appearance:  Appears comfortable  Eyes: Sclera clear. Pupils equal.  ENT: Moist oral mucosa. Trachea midline, no adenopathy. Cardiovascular: Regular rhythm, normal S1, S2. No murmur. No edema in lower extremities  Respiratory: Not using accessory muscles. Good inspiratory effort. Clear to auscultation bilaterally, no wheeze or crackles. GI: Abdomen soft, no tenderness, not distended, normal bowel sounds  Musculoskeletal: No cyanosis in digits, neck supple  Neurology: CN 2-12 grossly intact. No speech or motor deficits  Psych: Normal affect. Alert and oriented in time, place and person  Skin: Warm, dry, normal turgor  Extremity exam shows brisk capillary refill.   Peripheral pulses are palpable in lower extremities     Labs and Tests:  CBC:   Recent Labs     05/20/21  1245 05/20/21  2042   WBC 6.5 7.5   HGB 12.4* 12.0*    160     BMP:    Recent Labs     05/20/21  1245 05/21/21  0327    139   K 4.8 4.7    107   CO2 20* 21   BUN 35* 30*   CREATININE 1.6* 1.4*   GLUCOSE 159* 242*     Hepatic:   Recent Labs     05/20/21  1245   AST 27   ALT 32   BILITOT 0.5   ALKPHOS 91     XR CHEST (2 VW)   Final Result   No active cardiopulmonary disease               Discussed with the family  Jonathan Christine MD   5/21/2021 9:29 AM

## 2021-05-21 NOTE — PROGRESS NOTES
Updated pt and spouse on plan of care. All questions answered. Pt denies further questions at this time. Pt requested RN call and update daughter, Yves Medina, on plan of care. RN educated pt and spouse to call for additional questions. Pt and spouse satisfied. RN called and updated daughter on plan of care after leaving the room. Daughter denies any further questions at this time. RN encouraged daughter to call for additional questions in the future and provided unit phone number. Daughter satisfied.

## 2021-05-21 NOTE — PROGRESS NOTES
Pt had episode of sudden nausea/ vomiting. /100s. NP notified,  X 1 dose Labetolol and Zofran given. Pt states nausea is improving after Zofran. Will continue to monitor.

## 2021-05-21 NOTE — BRIEF OP NOTE
Cardiac Cath 5/21/2021:  Access: Right RA  Ultrasound: Ultrasound guidance used to determine after mentioned artery patency, size (> 2 mm), anatomic variations and ideal puncture location. Real-time ultrasound utilized concurrent with vascular needle entry into the artery. Images permanently recorded and reported in the patient chart. Hemostasis: TR band  Conscious sedation start time: 1100  Conscious sedation stop time: 1130  Versed: 2.5 mg  Fentanyl: 75 mcg  Bleeding risk: Low  LVEDP: 18 mmHg  AO: 97/67 mmHg  Estimated blood loss: Less than 20 mL  Contrast: 56 mL  Fluoroscopy time: 8.0 min. Anatomy:   LM-70% diffuse  LAD-diffusely diseased, 80% proximal, 90% mid calcified  Cx-90% ostial  RCA-diffusely diseased 50% proximal and mid, 70% distal  RPDA-patent  LVEF-50%    Impression:  1. Severe multivessel CAD. 2.  Preserved LV systolic function. Plan:  1.  CV surgical consultation for CABG. 2.  Patient has been statin intolerant to Livalo but unsure whether other statins have been tried. Will discuss trial of Crestor with him.

## 2021-05-21 NOTE — PRE SEDATION
5 Aleah Mcdonnell MD at [facility]           Medications:  Current Facility-Administered Medications   Medication Dose Route Frequency Provider Last Rate Last Admin    promethazine (PHENERGAN) injection 12.5 mg  12.5 mg Intravenous Q6H PRN Bonnie He PA-C        amLODIPine (NORVASC) tablet 5 mg  5 mg Oral Daily Vu Owens MD        sodium chloride flush 0.9 % injection 5-40 mL  5-40 mL Intravenous 2 times per day Roseann Chang MD   10 mL at 05/20/21 2113    sodium chloride flush 0.9 % injection 5-40 mL  5-40 mL Intravenous PRN Roseann Chang MD        0.9 % sodium chloride infusion  25 mL Intravenous PRN Roseann Chang MD        promethazine (PHENERGAN) tablet 12.5 mg  12.5 mg Oral Q6H PRN Roseann Chang MD        Or    ondansetron [facility]) injection 4 mg  4 mg Intravenous Q6H PRN Roseann Chang MD   4 mg at 05/20/21 2349    polyethylene glycol (GLYCOLAX) packet 17 g  17 g Oral Daily PRN Roseann Chang MD        acetaminophen (TYLENOL) tablet 650 mg  650 mg Oral Q6H PRN Roseann Chang MD        Or    acetaminophen (TYLENOL) suppository 650 mg  650 mg Rectal Q6H PRN Roseann Chang MD        perflutren lipid microspheres (DEFINITY) injection 1.65 mg  1.5 mL Intravenous ONCE PRN Roseann Chang MD        aspirin EC tablet 81 mg  81 mg Oral Daily Roseann Chang MD   81 mg at 05/21/21 2015    cloNIDine (CATAPRES) tablet 0.3 mg  0.3 mg Oral Daily Roseann Chang MD   0.3 mg at 05/21/21 6629    metoprolol tartrate (LOPRESSOR) tablet 100 mg  100 mg Oral BID Roseann Chang MD   100 mg at 05/21/21 3579    insulin lispro (1 Unit Dial) 0-12 Units  0-12 Units Subcutaneous TID WC Roseann Chang MD        insulin lispro (1 Unit Dial) 0-6 Units  0-6 Units Subcutaneous Nightly Roseann Chang MD        0.9 % sodium chloride infusion   Intravenous Continuous Keren Tran MD 18 mL/hr at 05/21/21 0704 Rate Verify at 05/21/21 0704    sodium chloride flush 0.9 % injection 5-40 mL  5-40 mL Intravenous 2 times per day Mamta Hanna MD        sodium chloride flush 0.9 % injection 5-40 mL  5-40 mL Intravenous PRN Mamta Hanna MD        0.9 % sodium chloride infusion  25 mL Intravenous PRN Mamta Hanna MD        heparin (porcine) injection 4,000 Units  4,000 Units Intravenous PRN Mamta Hanna MD        heparin (porcine) injection 2,000 Units  2,000 Units Intravenous PRN Mamta Hanna MD        heparin 25,000 units in dextrose 5% 250 mL (premix) infusion  5-30 Units/kg/hr Intravenous Continuous Mamta Hanna MD 12.1 mL/hr at 05/21/21 0704 12 Units/kg/hr at 05/21/21 0704           Pre-Sedation:    Pre-Sedation Documentation and Exam:  I have personally completed a history, physical exam & review of systems for this patient (see notes). I have assessed the patient and agree with the H&P present on the chart. Prior History of Anesthesia Complications:   none    Modified Mallampati:  II (soft palate, uvula, fauces visible)    ASA Classification:  Class 3 - A patient with severe systemic disease that limits activity but is not incapacitating      Paige Scale: Activity:  2 - Able to move 4 extremities voluntarily on command  Respiration:  2 - Able to breathe deeply and cough freely  Circulation:  2 - BP+/- 20mmHg of normal  Consciousness:  2 - Fully awake  Oxygen Saturation (color):  2 - Able to maintain oxygen saturation >92% on room air    Sedation/Anesthesia Plan:  Guard the patient's safety and welfare. Minimize physical discomfort and pain. Minimize negative psychological responses to treatment by providing sedation and analgesia and maximize the potential amnesia. Patient to meet pre-procedure discharge plan.     Medication Planned:  midazolam intravenously and fentanyl intravenously    Patient is an appropriate candidate for plan of sedation: yes      Electronically signed by Jonathan Mena MD on 5/21/2021 at 10:36 AM

## 2021-05-22 NOTE — PROGRESS NOTES
[facility]   Cardiology Progress Note     Date: 5/22/2021  Admit Date: 5/20/2021     Reason for consultation: chest pain     Chief Complaint:   Chief Complaint   Patient presents with    Shortness of Breath     sent by Dr Bony Cloud for abnormal EKG. was unable to get into see cardiologist. has been feeling \"not so good\", gets sob with activity for a couple months. denies cp. no n/v. History of Present Illness: History obtained from patient and medical record. Elvina Kawasaki is a 70 y.o. male presented to the hospital with complaints of 2-month history of progressive exertional shortness of breath and chest discomfort. Interval Hx: Today, he is feeling well today. No complaints of chest pain or shortness of breath. Sitting up in the chair at time of visit. Heparin drip infusing. Undergoing preop testing for CABG. Patient seen and examined. Clinical notes reviewed. Telemetry reviewed. No new complaints today. No major events overnight. Denies having chest pain, palpitations, shortness of breath, orthopnea/PND, cough, or dizziness at the time of this visit. Past Medical History:  Past Medical History:   Diagnosis Date    CAD (coronary artery disease)     Diabetes mellitus ([address]     Elevated LFT's     Hyperlipidemia     Hypertension     Post poliomyelitis syndrome     Type II or unspecified type diabetes mellitus without mention of complication, not stated as uncontrolled         Past Surgical History:    has a past surgical history that includes Cleft palate repair; Elton tooth extraction; and skin biopsy (Left, 7/20/2020). Social History:  Reviewed. reports that he has never smoked. He has never used smokeless tobacco. He reports that he does not drink alcohol and does not use drugs. Allergies: Allergies   Allergen Reactions    Livalo [Pitavastatin Calcium]      Muscle weakness       Family History:  Reviewed.  family history includes Diabetes in his mother; High Blood Pressure in his brother, father, and mother. Denies family history of sudden cardiac death, arrhythmia, premature CAD    Home Meds:  Prior to Visit Medications    Medication Sig Taking? Authorizing Provider   lisinopril (PRINIVIL;ZESTRIL) 40 MG tablet TAKE ONE TABLET BY MOUTH DAILY Yes Roscoe Duncan MD   cloNIDine (CATAPRES) 0.3 MG tablet TAKE ONE TABLET BY MOUTH DAILY Yes Roscoe Duncan MD   glipiZIDE (GLUCOTROL) 10 MG tablet Take 1 tablet by mouth 2 times daily Yes Roscoe Duncan MD   hydroCHLOROthiazide (HYDRODIURIL) 25 MG tablet TAKE 1 TABLET BY MOUTH DAILY  Yes Roscoe Duncan MD   omeprazole (PRILOSEC) 40 MG delayed release capsule TAKE 1 CAPSULE BY MOUTH EVERY MORNING BEFORE BREAKFAST  Yes Roscoe Duncan MD   metFORMIN (GLUCOPHAGE) 1000 MG tablet TAKE 1 TABLET BY MOUTH TWICE A DAY WITH MEALS Yes Roscoe Duncan MD   metoprolol (LOPRESSOR) 100 MG tablet TAKE ONE TABLET BY MOUTH TWICE DAILY  Yes Roscoe Duncan MD   aspirin 81 MG EC tablet Take 81 mg by mouth daily.  Yes Historical Provider, MD        Scheduled Meds:   amLODIPine  5 mg Oral Daily    sodium chloride flush  5-40 mL Intravenous 2 times per day    sodium chloride flush  5-40 mL Intravenous 2 times per day    aspirin  81 mg Oral Daily    cloNIDine  0.3 mg Oral Daily    metoprolol  100 mg Oral BID    insulin lispro  0-12 Units Subcutaneous TID WC    insulin lispro  0-6 Units Subcutaneous Nightly    sodium chloride flush  5-40 mL Intravenous 2 times per day     Continuous Infusions:   sodium chloride      dextrose      sodium chloride      sodium chloride Stopped (05/21/21 5104)    sodium chloride      heparin (PORCINE) Infusion 14.0159 Units/kg/hr (05/22/21 0655)     PRN Meds:promethazine, sodium chloride flush, sodium chloride, acetaminophen, glucose, dextrose, glucagon (rDNA), dextrose, sodium chloride flush, sodium chloride, promethazine **OR** ondansetron, polyethylene glycol, [DISCONTINUED] acetaminophen **OR** acetaminophen, perflutren lipid microspheres, sodium chloride flush, sodium chloride, heparin (porcine), heparin (porcine)     Review of Systems:  · Constitutional: Negative for fever, night sweats, chills,  · Skin: Negative for rash, pruritus, bleeding, or bruising    · HEENT: Negative for vision changes, ringing in the ears, dysphagia  · Respiratory: Reviewed in HPI  · Cardiovascular: Reviewed in HPI  · Gastrointestinal: Negative for abdominal pain, N/V/D, constipation, or black/tarry stools  · Genito-Urinary: Negative for dysuria, incontinence, or hematuria  · Musculoskeletal: Negative for joint swelling, muscle pain, or injuries  · Neurological/Psych: Negative for confusion, seizures, headaches, or TIA-like symptoms. No anxiety or depression. Physical Examination:  Vitals:    05/22/21 0244   BP: (!) 144/88   Pulse: 62   Resp: 18   Temp: 98 °F (36.7 °C)   SpO2: 98%      In: 1361.7 [I.V.:1361.7]  Out: 1025    Wt Readings from Last 3 Encounters:   05/21/21 218 lb 0.6 oz (98.9 kg)   05/20/21 223 lb 9.6 oz (101.4 kg)   03/12/21 216 lb (98 kg)       Intake/Output Summary (Last 24 hours) at 5/22/2021 0857  Last data filed at 5/22/2021 9506  Gross per 24 hour   Intake 1761.74 ml   Output 1025 ml   Net 736.74 ml       Telemetry: Personally Reviewed  - Sinus rhythm, 5 beats of wide-complex tachycardia overnight  · Constitutional: Cooperative and in no apparent distress, and appears well nourished  · Skin: Warm and pink; no pallor, cyanosis, clubbing, or bruising. Cath site stable. · HEENT: Symmetric and normocephalic. PERRL. Conjunctiva pink with clear sclera. Mucus membranes moist.   · Cardiovascular: Regular rate and rhythm. S1/S2 present without murmurs, no rubs or gallops. Peripheral pulses 2+, capillary refill < 3 seconds. No elevation of JVP. No peripheral edema  · Respiratory: Respirations symmetric and unlabored.  Lungs clear to auscultation bilaterally, no wheezing, crackles, or rhonchi  · Gastrointestinal: Abdomen soft and round. Bowel sounds active without tenderness. · Musculoskeletal: Bilateral upper and lower extremity strength 5/5 with full ROM  · Neurologic/Psych: Awake and orientated to person, place and time. Calm affect, appropriate mood    Pertinent labs, diagnostic, device, and imaging results reviewed as a part of this visit    Labs:    BMP:   Recent Labs     21  1245 217 21  0240    139 138   K 4.8 4.7 4.6    107 106   CO2    BUN 35* 30* 30*   CREATININE 1.6* 1.4* 1.5*     Estimated Creatinine Clearance: 52 mL/min (A) (based on SCr of 1.5 mg/dL (H)). CBC:   Recent Labs     21  1245 21  0240   WBC 6.5 7.5 7.1   HGB 12.4* 12.0* 12.3*   HCT 38.6* 37.3* 38.3*   MCV 87.3 87.5 86.9    160 162     Thyroid:   Lab Results   Component Value Date    TSH 1.89 2012    E5BYGMW 9.1 2012     Lipids:   Lab Results   Component Value Date    CHOL 254 2021    HDL 30 2021    TRIG 184 2021     LFTS:   Lab Results   Component Value Date    ALT 31 2021    AST 30 2021    ALKPHOS 88 2021    PROT 6.6 2021    PROT 7.3 2012    AGRATIO 1.5 2021    BILITOT 0.6 2021     Cardiac Enzymes:   Lab Results   Component Value Date    TROPONINI 0.02 2021    TROPONINI 0.02 2021    TROPONINI 0.02 2021     Coags: No results found for: PROTIME, INR    EC21  Normal sinus rhythm  Left ventricular hypertrophy with repolarization abnormality  St and T abnormality, consider LVH versus lateral ischemia    ECHO:  21   Summary   -Left ventricular cavity size is normal.   -Ejection fraction is visually estimated to be 50%. -The apex and apical septum appear hypokinetic.   -Grade I diastolic dysfunction with normal LV filling pressures. E/e' =   12.6.   -Left atrium is dilated. -Mild aortic stenosis with a peak velocity of 2.3m/s and a mean pressure   gradient of 11mmHg.    -Mitral information and plan of care discussed with the physician. All questions and concerns were addressed to the patient. Alternatives to my treatment were discussed. I have discussed the above stated plan with patient and the nurse. The patient verbalized understanding and agreed with the plan. Thank you for allowing to us to participate in the care of Joanna Kendall. Total visit time > 35 minutes; > 50% spend counseling / coordinating care. I reviewed interval history, physical exam, review of data including labs, imaging, development and implementation of treatment plan and coordination of complex care. Counseled on risk factor modifications. Ania Krishnamurthy APRN-CNP  [address]   Office: [phone]    NOTE:  This report was transcribed using voice recognition software. Every effort was made to ensure accuracy; however, inadvertent computerized transcription errors may be present.

## 2021-05-22 NOTE — PROGRESS NOTES
Office : [phone]     Fax :[phone]       Nephrology Progress Note      Patient's Name: Gil Briggs  2:01 PM  5/22/2021    Reason for Consult:  CKD stage3 A      Requesting Physician:  Nilson Kaba MD      Chief Complaint:    Chief Complaint   Patient presents with    Shortness of Breath     sent by Dr Colin Dinh for abnormal EKG. was unable to get into see cardiologist. has been feeling \"not so good\", gets sob with activity for a couple months. denies cp. no n/v. A/p     1. CKD stage 3A   Creat 1.6 ---> 1.4   H/o DM 2    Serum cr stable post cardiac cath    monier renal fn    Encourage po intake of fluids to thirst      2. DM 2. Needs better control    3. HTN . controlled   held lisinopril   Give amlodipine 5 mg po daily     4. Dyslipidemia . Statins     History of Present Ilness:    Gil Briggs is a 70 y.o. male with h/o DM 2, CKD 3 , DLP who came with complaints of 2-month history of progressive exertional shortness of breath and chest discomfort. I/O last 3 completed shifts: In: 2607.1 [P.O.:400;  I.V.:2207.1]  Out: 80 [Urine:625]    Past Medical History:   Diagnosis Date    CAD (coronary artery disease)     [facility] mellitus [facility]     Elevated LFT's     Hyperlipidemia     Hypertension     Post poliomyelitis syndrome     Type II or unspecified type diabetes mellitus without mention of complication, not stated as uncontrolled        Past Surgical History:   Procedure Laterality Date    CLEFT PALATE REPAIR      SKIN BIOPSY Left 7/20/2020    EXCISE SKIN LESION LEFT POST AURICULAR WITH FLAP, FROZEN SECTIONS performed [facility] Tiffanie Hallman [facility]         Family History   Problem Relation Age of Onset    High Blood Pressure Mother     Diabetes Mother     High Blood Pressure Father     High Blood Pressure Brother         reports that he has never smoked. He has never used smokeless tobacco. He reports that he does not drink alcohol and does not use drugs.         Allergies:  Livalo [pitavastatin calcium]    Current Medications:    mupirocin (BACTROBAN) 2 % ointment,   promethazine (PHENERGAN) injection 12.5 mg, Q6H PRN  amLODIPine (NORVASC) tablet 5 mg, Daily  sodium chloride flush 0.9 % injection 5-40 mL, 2 times per day  sodium chloride flush 0.9 % injection 5-40 mL, PRN  0.9 % sodium chloride infusion, PRN  acetaminophen (TYLENOL) tablet 650 mg, Q4H PRN  glucose (GLUTOSE) 40 % oral gel 15 g, PRN  dextrose 50 % IV solution, PRN  glucagon (rDNA) injection 1 mg, PRN  dextrose 5 % solution, PRN  sodium chloride flush 0.9 % injection 5-40 mL, 2 times per day  sodium chloride flush 0.9 % injection 5-40 mL, PRN  0.9 % sodium chloride infusion, PRN  promethazine (PHENERGAN) tablet 12.5 mg, Q6H PRN   Or  ondansetron (ZOFRAN) injection 4 mg, Q6H PRN  polyethylene glycol (GLYCOLAX) packet 17 g, Daily PRN  acetaminophen (TYLENOL) suppository 650 mg, Q6H PRN  perflutren lipid microspheres (DEFINITY) injection 1.65 mg, ONCE PRN  aspirin EC tablet 81 mg, Daily  cloNIDine (CATAPRES) tablet 0.3 mg, Daily  metoprolol tartrate (LOPRESSOR) tablet 100 mg, BID  insulin lispro (1 Unit Dial) 0-12 Units, TID WC  insulin lispro (1 Unit Dial) 0-6 Units, Nightly  0.9 % sodium chloride infusion, Continuous  sodium chloride flush 0.9 % injection 5-40 mL, 2 times per day  sodium chloride flush 0.9 % injection 5-40 mL, PRN  0.9 % sodium chloride infusion, PRN  heparin (porcine) injection 4,000 Units, PRN  heparin (porcine) injection 2,000 Units, PRN  heparin 25,000 units in dextrose 5% 250 mL (premix) infusion, Continuous        Review of Systems:   14 point ROS obtained but were negative except mentioned in HPI      Physical exam:     Vitals:  BP (!) 144/88 Pulse 62   Temp 98 °F (36.7 °C) (Temporal)   Resp 18   Ht 5' 9\" (1.753 m)   Wt 218 lb 0.6 oz (98.9 kg)   SpO2 98%   BMI 32.20 kg/m²   Constitutional:  OAA X3 NAD  Skin: no rash, turgor wnl  Heent:  eomi, mmm  Neck: no bruits or jvd noted  Cardiovascular:  S1, S2 without m/r/g  Respiratory: CTA B without w/r/r  Abdomen:  +bs, soft, nt, nd  Ext: no  lower extremity edema  Psychiatric: mood and affect appropriate  Musculoskeletal:  Rom, muscular strength intact    Labs:  CBC:   Recent Labs     05/20/21  1245 05/20/21  2042 05/22/21  0240   WBC 6.5 7.5 7.1   HGB 12.4* 12.0* 12.3*    160 162     BMP:    Recent Labs     05/20/21  1245 05/21/21  0327 05/22/21  0240    139 138   K 4.8 4.7 4.6    107 106   CO2 20* 21 22   BUN 35* 30* 30*   CREATININE 1.6* 1.4* 1.5*   GLUCOSE 159* 242* 182*     Ca/Mg/Phos:   Recent Labs     05/20/21  1245 05/21/21  0327 05/22/21  0240   CALCIUM 9.7 9.2 8.7     Hepatic:   Recent Labs     05/20/21  1245 05/22/21  0240   AST 27 30   ALT 32 31   BILITOT 0.5 0.6   ALKPHOS 91 88     Troponin:   Recent Labs     05/20/21  1245 05/20/21 1733 05/20/21 2010   TROPONINI 0.02* 0.02* 0.02*     BNP: No results for input(s): BNP in the last 72 hours. Lipids:   Recent Labs     05/21/21  0907   CHOL 254*   TRIG 184*   HDL 30*   LDLCALC 187*   LABVLDL 37     ABGs:   Recent Labs     05/22/21  0240   PHART 7.387   PO2ART 89.6   TKD9WCE 37.6     INR: No results for input(s): INR in the last 72 hours. UA:  Recent Labs     05/21/21  0014   COLORU YELLOW   CLARITYU Clear   GLUCOSEU 100*   BILIRUBINUR Negative   KETUA TRACE*   SPECGRAV 1.015   BLOODU Negative   PHUR 5.0   PROTEINU TRACE*   UROBILINOGEN 0.2   NITRU Negative   LEUKOCYTESUR Negative   LABMICR YES   Gioia Mcburney NotGiven      Urine Microscopic:   Recent Labs     05/21/21  0014   HYALCAST 2   WBCUA 0   RBCUA 0   EPIU 0     Urine Culture: No results for input(s): LABURIN in the last 72 hours.   Urine Chemistry: No results for input(s): CLUR, LABCREA, PROTEINUR, NAUR in the last 72 hours. IMAGING:  CT CHEST ABDOMEN PELVIS WO CONTRAST   Final Result   No acute abnormality identified in the chest, abdomen, or pelvis. No finding   worrisome for occult malignancy. Moderate enlargement of the prostate.          VL PRE OP VEIN MAPPING   Final Result      VL DUP CAROTID BILATERAL   Final Result      XR CHEST (2 VW)   Final Result   No active cardiopulmonary disease                                       Thank you for allowing us to participate in care of Milagro Spangler         Electronically signed by: Alix Singh MD, 5/22/2021, 2:01 PM      [facility] [address] [place] [address] S  Office : [phone]  Fax :[phone]

## 2021-05-22 NOTE — DISCHARGE SUMMARY
Death Summary    Patient:  Ralph Canales 1950 [id number]   Admission Date:  5/20/2021 12:16 PM  Date of Death:  6/14/2021  Time of Death: 0810h  Cause of Death: Multisystem organ failure secondary to metabolic acidosis secondary to likely intra-abdominal ischemia    Admitting Principle Diagnosis:  Left main and severe multivessel coronary artery disease    Secondary Diagnosis:   HTN  CKD Stage 3  CAD  HLD  DM2  Hx polio  MI    Angiogram: 5/21/2021  LM-70% diffuse  LAD-diffusely diseased, 80% proximal, 90% mid calcified  Cx-90% ostial  RCA-diffusely diseased 50% proximal and mid, 70% distal  RPDA-patent  LVEF-50%    Procedure:  5/25/2021  Urgent CABG Coronary Artery Bypass Graft x4 (LIMA to LAD, SVG to PDA, SVG sequenced to OM1 and OM2), Endoscopic vein harvest left saphenous vein, Left atrial appendage ligation with 40mm AtriClip, total cardiopulmonary bypass, doppler flow verification of bypass grafts, insertion of temporary ventricular pacing wires, transesophageal echocardiogram, 5 level bilateral intercostal nerve block with Marcaine 0.25%, left sided robicsek weave. History:  The patient was a 70 y.o. male with significant past medical history of HTN CKD stage 3 (Skyler Tsai this admission), DM2, polio (iron lung & post-polio syndrome), cleft palate (last surgery age 23), CAD (1995 MI, no stent/PCI), and HLD (intolerant to statins) who presented to the ER on 5/20 with shortness of breath over the past 2 months. Dr. Christal Rooney saw him in the office and sent him to the ER after the patient had an abnormal EKG. Tropnins were elevated upon admission but patient has CKD. Angiogram found left main and  severe multivessel coronary artery disease. CVTS was consulted for surgical evaluation for myocardial revascularization. Patient was a non smoker. Patient denies ever testing positive for Covid and has been vaccinated for covid with the last dose (4/7/21) more than 14 days ago. Hospital Course:   The patient underwent CABG X 4, ANDREW clip on 2021. The patient was weaned from cardiopulmonary bypass, but PAP became systemic despite good biventricular function. When PAP did not improve with inotropes and reperfusion, KATHERYN was suspected and confirmed. Inotropes were stopped. Levophed switched to liana and esmolol started. Post-operatively, the patient required meticulous titration of beta blocker and Neosynephrine gtt. On  patient developed elevated liver enzymes. Hematology was consulted after patient had mild thrombocytopenia and tested HIT positive. Patient was started on argatroban but YESICA came back negative and argatroban stopped. Prior LFTs were normal, so GI consulted and ordered RUQ ultrasound and KUB. Suspected abnormal liver function due to ischemic hepatopathy. Patient progressing well surgically and hemodynamically until  when he had a brief witnessed arrest requiring CPR and intubation for a new profound metabolic acidosis. The patient responded with epinephrine and bicarbonate. Central line was placed. Patient with CKD and creatinine continued to rise. CRRT initiated on 2021. His hemodynamics improved and acidosis cleared by the next day after his arrest.  The patient remained intubated and hemodynamics declined  am, requiring high dose neospynephrine, vasopressin, and levophed. Patient was unable to tolerate any fluid removal from CRRT and worsening acidosis per ABGs. Family was notified and requested that patient be made a DNR per his wishes.  The patient  with family at bedside and was declared on   at 0810 am.     Pathology:  No specimens    Disposition:        Electronically signed by MONSE Lee CNP on 2021 at 8:37 PM     Edits made -- Electronically signed by Angel Hernandez MD on 2021 at 12:02 PM

## 2021-05-22 NOTE — PROGRESS NOTES
HOSPITALISTS PROGRESS NOTE    5/22/2021 12:21 PM        Name: Lia Estrada . Admitted: 5/20/2021  Primary Care Provider: Misha Ortega MD (Tel: [phone])      Problem List  Principal Problem:    Chest pain  Active Problems:    Type 2 diabetes mellitus with diabetic nephropathy, without long-term current use of insulin (HCC)    Essential hypertension    Mixed hyperlipidemia    Angina of effort [facility])  Resolved Problems:    * No resolved hospital problems. *       Assessment & Plan:   CAD   S/p cath showing left main 70%, LAD 80% proximal LAD 90% mid, circumflex 90% ostial, RCA 50% proximal and 70% distal with a EF of 50%  3 vessel disease   Asa, metoprolol, heparin drip,, CTVS evaluated the patient and planning to take the patient to the OR next week    CKD stage 3  Nephrology on board, functions are stable    DM  ISS, hold oral hypoglycemics     Hypertension  Beta blocker, clonidine       IV Access:peripheral   Curry:No  Diet: DIET CARB CONTROL;  Code:Full Code  DVT PPX Heparin   Disposition Remains inpatient       Brief Course:    Admitted with exertional dyspnea. Cath showing multivessel disease. Does has CKD stage III. Probably will need CABG. 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 CC: Chest pain    Subjective:  .   Patient feels better, has been transferred to CV you, denies any chest pain or shortness of breath,  Reviewed interval ancillary notes    Current Medications  mupirocin (BACTROBAN) 2 % ointment,   promethazine (PHENERGAN) injection 12.5 mg, Q6H PRN  amLODIPine (NORVASC) tablet 5 mg, Daily  sodium chloride flush 0.9 % injection 5-40 mL, 2 times per day  sodium chloride flush 0.9 % injection 5-40 mL, PRN  0.9 % sodium chloride infusion, PRN  acetaminophen (TYLENOL) tablet 650 mg, Q4H PRN  glucose (GLUTOSE) 40 % oral gel 15 g, PRN  dextrose 50 % IV solution, tenderness, not distended, normal bowel sounds  Musculoskeletal: No cyanosis in digits, neck supple  Neurology: CN 2-12 grossly intact. No speech or motor deficits  Psych: Normal affect. Alert and oriented in time, place and person  Skin: Warm, dry, normal turgor  Extremity exam shows brisk capillary refill. Peripheral pulses are palpable in lower extremities     Labs and Tests:  CBC:   Recent Labs     05/20/21  1245 05/20/21  2042 05/22/21  0240   WBC 6.5 7.5 7.1   HGB 12.4* 12.0* 12.3*    160 162     BMP:    Recent Labs     05/20/21  1245 05/21/21  0327 05/22/21  0240    139 138   K 4.8 4.7 4.6    107 106   CO2 20* 21 22   BUN 35* 30* 30*   CREATININE 1.6* 1.4* 1.5*   GLUCOSE 159* 242* 182*     Hepatic:   Recent Labs     05/20/21  1245 05/22/21  0240   AST 27 30   ALT 32 31   BILITOT 0.5 0.6   ALKPHOS 91 88     CT CHEST ABDOMEN PELVIS WO CONTRAST   Final Result   No acute abnormality identified in the chest, abdomen, or pelvis. No finding   worrisome for occult malignancy. Moderate enlargement of the prostate.          VL PRE OP VEIN MAPPING   Final Result      VL DUP CAROTID BILATERAL   Final Result      XR CHEST (2 VW)   Final Result   No active cardiopulmonary disease               Discussed with the family  Bárbara Doll MD   5/22/2021 12:21 PM

## 2021-05-23 NOTE — PROGRESS NOTES
PTT 76.7 this morning, heparin drip was decreased by 2 u/kg/hr.  Another PTT is ordered to be drawn at 2 PM.

## 2021-05-23 NOTE — PROGRESS NOTES
CC: LM and 3V CAD    No c/o, no CP  NSR   CTAB RRR abd benign  As per CC  All questions answered  For CABG next week

## 2021-05-23 NOTE — PROGRESS NOTES
Office : [phone]     Fax :[phone]       Nephrology Progress Note      Patient's Name: Jaylen Hernandez  2:13 PM  5/23/2021    Reason for Consult:  CKD stage3 A      Requesting Physician:  Doron Zabala MD       Interval history :    Sitting in chair  No chest pain  No sob  Not in acute distress  Renal fn stable      Chief Complaint:    Chief Complaint   Patient presents with    Shortness of Breath     sent by Dr Blane Woodward for abnormal EKG. was unable to get into see cardiologist. has been feeling \"not so good\", gets sob with activity for a couple months. denies cp. no n/v. A/p     1. CKD stage 3A   Creat 1.6 ---> 1.4   H/o DM 2    Serum cr stable post cardiac cath    monier renal fn    Encourage po intake of fluids to thirst      2. DM 2. Needs better control    3. HTN . controlled   held lisinopril   Give amlodipine 5 mg po daily     4. Dyslipidemia . Statins     History of Present Ilness:    Jaylen Hernandez is a 70 y.o. male with h/o DM 2, CKD 3 , DLP who came with complaints of 2-month history of progressive exertional shortness of breath and chest discomfort. I/O last 3 completed shifts: In: 2826 [P.O.:1100;  I.V.:701]  Out: 400 [Urine:400]    Past Medical History:   Diagnosis Date    CAD (coronary artery disease)     Diabetes mellitus ([facility] [address]     Elevated LFT's     Hyperlipidemia     Hypertension     Post poliomyelitis syndrome     Type II or unspecified type diabetes mellitus without mention of complication, not stated as uncontrolled        Past Surgical History:   Procedure Laterality Date    CLEFT PALATE REPAIR      SKIN BIOPSY Left 7/20/2020    EXCISE SKIN LESION LEFT POST AURICULAR WITH FLAP, FROZEN SECTIONS performed by Bard Malena [facility] ABDOMEN PELVIS WO CONTRAST   Final Result   No acute abnormality identified in the chest, abdomen, or pelvis. No finding   worrisome for occult malignancy. Moderate enlargement of the prostate.          VL PRE OP VEIN MAPPING   Final Result      VL DUP CAROTID BILATERAL   Final Result      XR CHEST (2 VW)   Final Result   No active cardiopulmonary disease                                       Thank you for allowing us to participate in care of Elvina Kawasaki         Electronically signed by: Marjorie Alexander MD, 5/23/2021, 2:13 PM      [facility]phrology associates of [facility] [address]  Office : [phone]  Fax :[phone]

## 2021-05-23 NOTE — PROGRESS NOTES
his brother, father, and mother. Denies family history of sudden cardiac death, arrhythmia, premature CAD    Home Meds:  Prior to Visit Medications    Medication Sig Taking? Authorizing Provider   lisinopril (PRINIVIL;ZESTRIL) 40 MG tablet TAKE ONE TABLET BY MOUTH DAILY Yes Balaji Wang MD   cloNIDine (CATAPRES) 0.3 MG tablet TAKE ONE TABLET BY MOUTH DAILY Yes Balaji Wang MD   glipiZIDE (GLUCOTROL) 10 MG tablet Take 1 tablet by mouth 2 times daily Yes Balaji Wang MD   hydroCHLOROthiazide (HYDRODIURIL) 25 MG tablet TAKE 1 TABLET BY MOUTH DAILY  Yes Balaji Wang MD   omeprazole (PRILOSEC) 40 MG delayed release capsule TAKE 1 CAPSULE BY MOUTH EVERY MORNING BEFORE BREAKFAST  Yes Balaji Wang MD   metFORMIN (GLUCOPHAGE) 1000 MG tablet TAKE 1 TABLET BY MOUTH TWICE A DAY WITH MEALS Yes Balaji Wang MD   metoprolol (LOPRESSOR) 100 MG tablet TAKE ONE TABLET BY MOUTH TWICE DAILY  Yes Balaji Wang MD   aspirin 81 MG EC tablet Take 81 mg by mouth daily.  Yes Historical Provider, MD        Scheduled Meds:   amLODIPine  5 mg Oral Daily    sodium chloride flush  5-40 mL Intravenous 2 times per day    sodium chloride flush  5-40 mL Intravenous 2 times per day    aspirin  81 mg Oral Daily    cloNIDine  0.3 mg Oral Daily    metoprolol  100 mg Oral BID    insulin lispro  0-12 Units Subcutaneous TID WC    insulin lispro  0-6 Units Subcutaneous Nightly    sodium chloride flush  5-40 mL Intravenous 2 times per day     Continuous Infusions:   sodium chloride      dextrose      sodium chloride      sodium chloride Stopped (05/21/21 2678)    sodium chloride      heparin (PORCINE) Infusion 14 Units/kg/hr (05/23/21 9090)     PRN Meds:promethazine, sodium chloride flush, sodium chloride, acetaminophen, glucose, dextrose, glucagon (rDNA), dextrose, sodium chloride flush, sodium chloride, promethazine **OR** ondansetron, polyethylene glycol, [DISCONTINUED] acetaminophen **OR** acetaminophen, perflutren lipid microspheres, sodium chloride flush, sodium chloride, heparin (porcine), heparin (porcine)     Review of Systems:  · Constitutional: Negative for fever, night sweats, chills,  · Skin: Negative for rash, pruritus, bleeding, or bruising    · HEENT: Negative for vision changes, ringing in the ears, dysphagia  · Respiratory: Reviewed in HPI  · Cardiovascular: Reviewed in HPI  · Gastrointestinal: Negative for abdominal pain, N/V/D, constipation, or black/tarry stools  · Genito-Urinary: Negative for dysuria, incontinence, or hematuria  · Musculoskeletal: Negative for joint swelling, muscle pain, or injuries  · Neurological/Psych: Negative for confusion, seizures, headaches, or TIA-like symptoms. No anxiety or depression. Physical Examination:  Vitals:    05/23/21 0540   BP: (!) 149/88   Pulse: 63   Resp: 16   Temp: 98 °F (36.7 °C)   SpO2: 94%      In: 1421 [P.O.:720; I.V.:701]  Out: -    Wt Readings from Last 3 Encounters:   05/23/21 216 lb 0.8 oz (98 kg)   05/20/21 223 lb 9.6 oz (101.4 kg)   03/12/21 216 lb (98 kg)       Intake/Output Summary (Last 24 hours) at 5/23/2021 0754  Last data filed at 5/23/2021 5710  Gross per 24 hour   Intake 2041 ml   Output --   Net 2041 ml       Telemetry: Personally Reviewed  - Sinus rhythm, 4 beats of wide-complex tachycardia this AM  · Constitutional: Cooperative and in no apparent distress, and appears well nourished  · Skin: Warm and pink; no pallor, cyanosis, clubbing, or bruising. Cath site stable. · HEENT: Symmetric and normocephalic. PERRL. Conjunctiva pink with clear sclera. Mucus membranes moist.   · Cardiovascular: Regular rate and rhythm. S1/S2 present without murmurs, no rubs or gallops. Peripheral pulses 2+, capillary refill < 3 seconds. No elevation of JVP. No peripheral edema  · Respiratory: Respirations symmetric and unlabored. Lungs clear to auscultation bilaterally, no wheezing, crackles, or rhonchi  · Gastrointestinal: Abdomen soft and round.  Bowel sounds active without mitral regurgitation.   -Mild tricuspid regurgitation. RVSP = 36 mmHG. Cath: 5/21/2021  Anatomy:   LM-70% diffuse  LAD-diffusely diseased, 80% proximal, 90% mid calcified  Cx-90% ostial  RCA-diffusely diseased 50% proximal and mid, 70% distal  RPDA-patent  LVEF-50%     Impression:  1. Severe multivessel CAD. 2.  Preserved LV systolic function.     Plan:  1.  CV surgical consultation for CABG. 2.  Patient has been statin intolerant to Livalo but unsure whether other statins have been tried. Will discuss trial of Crestor with him. Carotid duplex: 5/21/21  Summary        -The right internal carotid artery appears to have a <50% diameter reducing    stenosis based on velocity criteria.    -The left internal carotid artery appears to have a <50% diameter reducing    stenosis based on velocity criteria.         Problem List:   Patient Active Problem List    Diagnosis Date Noted    Angina of effort ([facility] [address] 05/21/2021    Chest pain 05/20/2021    Basal cell carcinoma (BCC) of skin of face     Essential hypertension 11/07/2016    Mixed hyperlipidemia 11/07/2016    Type 2 diabetes mellitus with diabetic nephropathy, without long-term current use of insulin ([facility] [address] 11/13/2015    Gout 11/27/2012    CAD (coronary artery disease)     Elevated LFTs     Post poliomyelitis syndrome         Assessment and Plan:     Chest pain  ~Troponin 0 0.02x3  ~[facility] 5/21: Revealing severe multivessel disease. Preserved LV systolic function. ~Heparin drip infusing; platelets stable  ~CV surgery consult for CABG  ~Undergoing preop work-up. Tentative CABG this week. ~denies CP today    Diabetes  ~A1c 7.1 (1/2021)    Hypertension  ~Labile    Hyperlipidemia  ~Statin intolerant (unsure of all he has tried with exception to Livalo)  ~Discussed Crestor trial with patient. He will let us know his decision. CKD  ~Per nephrology    Multiple medical conditions with risk of decompensation.    All pertinent information and plan of care

## 2021-05-23 NOTE — PROGRESS NOTES
HOSPITALISTS PROGRESS NOTE    5/23/2021 3:01 PM        Name: Petrina Kussmaul . Admitted: 5/20/2021  Primary Care Provider: Julio C Vincent MD (Tel: [phone])      Problem List  Principal Problem:    Chest pain  Active Problems:    Type 2 diabetes mellitus with diabetic nephropathy, without long-term current use of insulin (HCC)    Essential hypertension    Mixed hyperlipidemia    Angina of effort [facility])  Resolved Problems:    * No resolved hospital problems. *       Assessment & Plan:   CAD   S/p cath showing left main 70%, LAD 80% proximal LAD 90% mid, circumflex 90% ostial, RCA 50% proximal and 70% distal with a EF of 50%  3 vessel disease   Asa, metoprolol, heparin drip,, CTVS evaluated the patient and planning to take the patient to the OR next week        CKD stage 3  Nephrology on board, functions are stable    DM uncontrolled  Add lantus  ISS, hold oral hypoglycemics     Hypertension  Beta blocker, clonidine   Increase norvasc dose to 10 mg daily    Stop iv fluids       IV Access:peripheral   Curry:No  Diet: DIET CARB CONTROL;  Code:Full Code  DVT PPX Heparin   Disposition Remains inpatient       Brief Course:    Admitted with exertional dyspnea. Cath showing multivessel disease. Does has CKD stage III. Probably will need CABG. 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 CC: Chest pain    Subjective:  .   No new issues  No chest pain    Reviewed interval ancillary notes    Current Medications  [START ON 5/24/2021] amLODIPine (NORVASC) tablet 10 mg, Daily  insulin glargine (LANTUS;BASAGLAR) injection pen 10 Units, QPM  promethazine (PHENERGAN) injection 12.5 mg, Q6H PRN  sodium chloride flush 0.9 % injection 5-40 mL, 2 times per day  sodium chloride flush 0.9 % injection 5-40 mL, PRN  0.9 % sodium chloride infusion, PRN  acetaminophen (TYLENOL) tablet 650 mg, Q4H PRN  glucose tenderness, not distended, normal bowel sounds  Musculoskeletal: No cyanosis in digits, neck supple  Neurology: CN 2-12 grossly intact. No speech or motor deficits  Psych: Normal affect. Alert and oriented in time, place and person  Skin: Warm, dry, normal turgor  Extremity exam shows brisk capillary refill. Peripheral pulses are palpable in lower extremities     Labs and Tests:  CBC:   Recent Labs     05/20/21  2042 05/22/21  0240   WBC 7.5 7.1   HGB 12.0* 12.3*    162     BMP:    Recent Labs     05/21/21  0327 05/22/21  0240    138   K 4.7 4.6    106   CO2 21 22   BUN 30* 30*   CREATININE 1.4* 1.5*   GLUCOSE 242* 182*     Hepatic:   Recent Labs     05/22/21  0240   AST 30   ALT 31   BILITOT 0.6   ALKPHOS 88     CT CHEST ABDOMEN PELVIS WO CONTRAST   Final Result   No acute abnormality identified in the chest, abdomen, or pelvis. No finding   worrisome for occult malignancy. Moderate enlargement of the prostate.          VL PRE OP VEIN MAPPING   Final Result      VL DUP CAROTID BILATERAL   Final Result      XR CHEST (2 VW)   Final Result   No active cardiopulmonary disease               Discussed with the family  Ramesh Flores MD   5/23/2021 3:01 PM

## 2021-05-24 PROBLEM — Z78.9 STATIN INTOLERANCE: Status: ACTIVE | Noted: 2021-01-01

## 2021-05-24 PROBLEM — N18.31 STAGE 3A CHRONIC KIDNEY DISEASE (HCC): Status: ACTIVE | Noted: 2021-01-01

## 2021-05-24 NOTE — PROGRESS NOTES
Office : [phone]     Fax :[phone]       Nephrology Progress Note      Patient's Name: Remedios Petersen  7:08 AM  5/24/2021    Reason for Consult:  CKD stage3 A      Requesting Physician:  Murray Molina MD       Interval history :    Sitting in chair  No chest pain  No sob  Not in acute distress  Renal fn stable      Chief Complaint:    Chief Complaint   Patient presents with    Shortness of Breath     sent by Dr Leslie Joyner for abnormal EKG. was unable to get into see cardiologist. has been feeling \"not so good\", gets sob with activity for a couple months. denies cp. no n/v. History of Present Ilness:    Remedios Petersen is a 70 y.o. male with h/o DM 2, CKD 3 , DLP who came with complaints of 2-month history of progressive exertional shortness of breath and chest discomfort. I/O last 3 completed shifts:   In: 26 [P.O.:660; I.V.:292]  Out: -     Past Medical History:   Diagnosis Date    CAD (coronary artery disease)     [facility] mellitus ([facility] [address]     Elevated LFT's     Hyperlipidemia     Hypertension     Post poliomyelitis syndrome     Type II or unspecified type diabetes mellitus without mention of complication, not stated as uncontrolled        Past Surgical History:   Procedure Laterality Date    CLEFT PALATE REPAIR      SKIN BIOPSY Left 7/20/2020    EXCISE SKIN LESION LEFT POST AURICULAR WITH FLAP, FROZEN SECTIONS performed by Kendrick Roper MD at [facility]           Current Medications:    amLODIPine (NORVASC) tablet 10 mg, Daily  insulin glargine (LANTUS;BASAGLAR) injection pen 10 Units, QPM  promethazine (PHENERGAN) injection 12.5 mg, Q6H PRN  sodium chloride flush 0.9 % injection 5-40 mL, 2 times per day  sodium chloride flush 0.9 % injection 5-40 mL, PRN  0.9 % sodium chloride infusion, PRN  acetaminophen (TYLENOL) tablet 650 mg, Q4H PRN  glucose (GLUTOSE) 40 % oral gel 15 g, PRN  dextrose 50 % IV solution, PRN  glucagon (rDNA) injection 1 mg, PRN  dextrose 5 % solution, PRN  sodium chloride flush 0.9 % injection 5-40 mL, 2 times per day  sodium chloride flush 0.9 % injection 5-40 mL, PRN  0.9 % sodium chloride infusion, PRN  promethazine (PHENERGAN) tablet 12.5 mg, Q6H PRN   Or  ondansetron (ZOFRAN) injection 4 mg, Q6H PRN  polyethylene glycol (GLYCOLAX) packet 17 g, Daily PRN  acetaminophen (TYLENOL) suppository 650 mg, Q6H PRN  perflutren lipid microspheres (DEFINITY) injection 1.65 mg, ONCE PRN  aspirin EC tablet 81 mg, Daily  cloNIDine (CATAPRES) tablet 0.3 mg, Daily  metoprolol tartrate (LOPRESSOR) tablet 100 mg, BID  insulin lispro (1 Unit Dial) 0-12 Units, TID WC  insulin lispro (1 Unit Dial) 0-6 Units, Nightly  sodium chloride flush 0.9 % injection 5-40 mL, 2 times per day  sodium chloride flush 0.9 % injection 5-40 mL, PRN  0.9 % sodium chloride infusion, PRN  heparin (porcine) injection 4,000 Units, PRN  heparin (porcine) injection 2,000 Units, PRN  heparin 25,000 units in dextrose 5% 250 mL (premix) infusion, Continuous        Review of Systems:   14 point ROS obtained but were negative except mentioned in HPI      Physical exam:     Vitals:  /76   Pulse 56   Temp 97.6 °F (36.4 °C) (Temporal)   Resp 16   Ht 5' 9\" (1.753 m)   Wt 216 lb 11.4 oz (98.3 kg)   SpO2 96%   BMI 32.00 kg/m²   Constitutional:  OAA X3 NAD  Skin: no rash, turgor wnl  Heent:  eomi, mmm  Neck: no bruits or jvd noted  Cardiovascular:  S1, S2 without m/r/g  Respiratory: CTA B without w/r/r  Abdomen:  +bs, soft, nt, nd  Ext: no  lower extremity edema  Psychiatric: mood and affect appropriate  Musculoskeletal:  Rom, muscular strength intact    Labs:  CBC:   Recent Labs     05/22/21  0240 05/24/21  0520   WBC 7.1 5.3   HGB 12.3* 11.6*    140 BMP:    Recent Labs     05/22/21  0240 05/24/21  0520    139   K 4.6 4.5    105   CO2 22 23   BUN 30* 37*   CREATININE 1.5* 1.5*   GLUCOSE 182* 147*     Ca/Mg/Phos:   Recent Labs     05/22/21 0240 05/24/21  0520   CALCIUM 8.7 8.8     Hepatic:   Recent Labs     05/22/21 0240   AST 30   ALT 31   BILITOT 0.6   ALKPHOS 88     Troponin:   No results for input(s): TROPONINI in the last 72 hours. BNP: No results for input(s): BNP in the last 72 hours. Lipids:   Recent Labs     05/21/21  0907   CHOL 254*   TRIG 184*   HDL 30*   LDLCALC 187*   LABVLDL 37     ABGs:   Recent Labs     05/22/21 0240   PHART 7.387   PO2ART 89.6   ODB8OXA 37.6                IMAGING:  CT CHEST ABDOMEN PELVIS WO CONTRAST   Final Result   No acute abnormality identified in the chest, abdomen, or pelvis. No finding   worrisome for occult malignancy. Moderate enlargement of the prostate. VL PRE OP VEIN MAPPING   Final Result      VL DUP CAROTID BILATERAL   Final Result      XR CHEST (2 VW)   Final Result   No active cardiopulmonary disease               A/p     1. CKD stage 3A   Creat 1.6 ---> 1.4 --> 1.5   H/o DM 2    Serum cr stable post cardiac cath      2. DM 2. Needs better control    3. HTN . controlled   held lisinopril   Give amlodipine 5 mg po daily     4. Dyslipidemia . Statins     5. Has multivessel coronary artery disease. Evaluated by CT surgery.   Plan for CABG this week      Recommend to dose adjust all medications  based on renal functions  Maintain SBP> 90 mmHg   Daily weights   AVOID NSAIDs  Avoid Nephrotoxins  Monitor Intake/Output  Call if significant decrease in urine output                 Thank you for allowing us to participate in care of Remedios Petersen         Electronically signed by: Margarita Mauricio MD, 5/24/2021, 7:08 AM      Nephrology associates of [address]  Office : [phone]  Fax :[phone]

## 2021-05-24 NOTE — ACP (ADVANCE CARE PLANNING)
Advanced Care Planning Note. Purpose of Encounter: Advanced care planning in light of CAD  Parties In Attendance:  Patient  Decisional Capacity: Yes  Subjective: Patient with GAGNON  Objective: Cr 1.5  Goals of Care Determination: Patient wants full support (CPR, vent, surgery, HD, trach, PEG)  Plan:  Cardio and CTS consults. CABG. Hep gtt  Code Status: Full code   Time spent on Advanced care Plannin minutes  Advanced Care Planning Documents: Completed advanced directives on chart, wife is the POA.     Carey Gaviria MD  2021 7:39 AM

## 2021-05-24 NOTE — CARE COORDINATION
Discharge Planning Assessment    CABG planned for tomorrow. SW discharge planner met with patientto discuss reason for admission, current living situation, and potential needs at the time of discharge    Demographics/Insurance verified Yes/yes    Current type of dwelling: private home     Patient from ECF/SW confirmed with: n/a    Living arrangements: lives with spouse and no stairs to enter    Level of function/Support: independent     PCP: Dr. Mont Lanes    Last Visit to PCP: 5/21    DME: no    Active with any community resources/agencies/skilled home care: no    Medication compliance issues: no    Financial issues that could impact healthcare: no        Tentative discharge plan: The Plan for Transition of Care is related to the following treatment goals: get stronger at home with [facility]. The Patient and/or patient representativewas provided with a choice of provider and agrees   with the discharge plan. [x] Yes [] No    Freedom of choice list was provided with basic dialogue that supports the patient's individualized plan of care/goals, treatment preferences and shares the quality data associated with the providers. [x] Yes [] No      Discussed with patient and/or family that on the day of discharge home tentative time of discharge will be between 10 AM and noon. Transportation at the time of discharge: spouse    Referral to [facility].     Luda Bermudez MSW, [address]

## 2021-05-24 NOTE — DISCHARGE INSTR - COC
Continuity of Care Form    Patient Name: Noelle Francis   :  1950  MRN:  [id number]    Admit date:  2021  Discharge date:  ***    Code Status Order: Full Code   Advance Directives:   Advance Care Flowsheet Documentation       Date/Time Healthcare Directive Type of Healthcare Directive Copy in [address] Agent's Name Healthcare Agent's Phone Number    21 1638  No, patient does not have an advance directive for healthcare treatment -- -- -- -- --    21 1439  No, patient does not have an advance directive for healthcare treatment -- -- -- -- --            Admitting Physician:  Yesenia Lara MD  PCP: Imtiaz Pitt MD    Discharging Nurse: [facility] Unit/Room#: EUP-7827/8350-68  Discharging Unit Phone Number: ***    Emergency Contact:   Extended Emergency Contact Information  Primary Emergency Contact: Gloria Allen  Address: [address] [place] [address]           [employer], Miguela Mathias Moritz [address] [place] [address] Phone: [phone]  Work Phone: [phone]  Mobile Phone: [phone]  Relation: Spouse  Secondary Emergency Contact: Gloria Allen  Address: [address] [place] [address]           [employer], [address] [place] [address] Phone: [phone]  Relation: Spouse    Past Surgical History:  Past Surgical History:   Procedure Laterality Date    CLEFT PALATE REPAIR      SKIN BIOPSY Left 2020    EXCISE SKIN LESION LEFT POST AURICULAR WITH FLAP, FROZEN SECTIONS performed by Nara Diehl MD at [address] [place] [address]         Immunization History:   Immunization History   Administered Date(s) Administered    Influenza Vaccine, unspecified formulation 2015, 2016    Influenza Virus Vaccine 10/01/2012, 2014, 2017, 2018    Influenza, High Dose (Fluzone 65 yrs and older) 2019    Influenza, Quadv, adjuvanted, 65 yrs +, IM, PF (Fluad) 2020    Pneumococcal Conjugate 13-valent at 2021 1140  Last data filed at 2021 0921  Gross per 24 hour   Intake 892 ml   Output --   Net 892 ml     I/O last 3 completed shifts: In: 26 [P.O.:660; I.V.:292]  Out: -     Safety Concerns:     [email or web address] Safety Concerns:607843509}    Impairments/Disabilities:      [email or web address] Impairments/Disabilities:967300619}    Nutrition Therapy:  Current Nutrition Therapy:   [email or web address] Diet List:160287406}    Routes of Feeding: {CHP DME Other Feedings:536965411}  Liquids: {Slp liquid thickness:80210}  Daily Fluid Restriction: {CHP DME Yes amt example:144125111}  Last Modified Barium Swallow with Video (Video Swallowing Test): {Done Not Done RSRQ:201523279}    Treatments at the Time of Hospital Discharge:   Respiratory Treatments: ***  Oxygen Therapy:  {Therapy; copd oxygen:00584}  Ventilator:    { CC Vent INIS:296009880}    Rehab Therapies: Nursing  Weight Bearing Status/Restrictions: [address] [employer]}  Other Medical Equipment (for information only, NOT a DME order):  {EQUIPMENT:530963613}  Other Treatments:   OPEN HEART UZIEL  Pre-op wt. ***    Additional Orders: ***    Incision/Wound Care:    Midsternal ***     Chest Tube Site***       Legs ***        Arm***    Upper Extremity Restrictions:  No lifting, pushing, pulling over 5 pounds for 8 weeks. Remove chest tubes sutures 10 days post-op, after checking with surgeon's office. Please remove IJ suture if it is still in.   1.  Wash EACH incision daily with separate wash cloth with antibacterial soap and water; pat dry. Do NOT apply anything to incisions - NO LOTIONS, HYDROGEN PEROXIDE, POWDER. 2.  If discharged with dressing on wound, remove dressing and wash daily with antibacterial soap and water. Leave open to air unless draining. 3. If arm incision, squeeze tension ball 10 X per hour. No BP, needle sticks, etc. in affected arm. 4. AMBER hose on during the day and off at night. Keep legs elevated while sitting, especially if edematous.     5. Incentive sprirpmeter X 10, cough and deep breath every hour while awake. 6.  Pulse Ox checks with each visit for home care and with vital signs for ECF. CALL if pulse ox less than 90%. 7.  Keep acticity log (ambulate as directed and bring log to follow up appointment.)  8. Daily weights  9. No tub bath. Call the surgeon at [phone] for any for any of the following reasons:  Changes in incision (increased redness, tenderness, warmth or drainage)  Call for pain not relieved with pain medication  Call for temp >101, HR <50 or >110 beats/min, SBP < 90 or >160. No bowel movement for 3 days  Daily weights: call if 2 pound weight gain in 24 hours or 5 pound weight gain in 1 week. Call for persistent diarrhea, nausea, or vomiting.   Pain, tenderness, warmth, or redness in calf  Call for symptomatic fluttering in chest, irregular pulse, dyspnea  DEPRESSION: Call Primary Care Physician if feelings of depression and depression persists  DIABETICS: Call Primary Care Physician for blood sugars >130 consistently     Patient's personal belongings (please select all that are sent with patient):  {[facility] DME Belongings:867229423}    RN SIGNATURE:  {Esignature:833406967}    CASE MANAGEMENT/SOCIAL WORK SECTION    Inpatient Status Date: ***    Readmission Risk Assessment Score:  Readmission Risk              Risk of Unplanned Readmission:  15           Discharging to Facility/ Agency     Name: Eileen Flores will call for Appointment  Phone: [phone]  Fax: [phone]      Dialysis Facility (if applicable)   Name:  Address:  Dialysis Schedule:  Phone:  Fax:    / signature: {Esignature:467319335}    PHYSICIAN SECTION    Prognosis: Good    Condition at Discharge: Stable    Rehab Potential (if transferring to Rehab): Good    Recommended Labs or Other Treatments After Discharge: Home Care: RN    Physician Certification: I certify the above information and transfer of Deyvi Dunbar  is necessary for the continuing treatment of the diagnosis listed and that he requires acute rehab for less 30 days.      Update Admission H&P: No change in H&P    PHYSICIAN SIGNATURE:  Electronically signed by MONSE Arthur CNP on 5/26/21 at 2:42 PM EDT

## 2021-05-24 NOTE — PROGRESS NOTES
[facility] Daily Progress Note      Admit Date:  5/20/2021    Chief Complaint: CAD with unstable angina    Subjective:  Mr. Logan Santana denies chest discomfort or shortness of breath.     Objective:   BP (!) 151/82   Pulse 61   Temp 97.4 °F (36.3 °C) (Temporal)   Resp 18   Ht 5' 9\" (1.753 m)   Wt 216 lb 11.4 oz (98.3 kg)   SpO2 98%   BMI 32.00 kg/m²       Intake/Output Summary (Last 24 hours) at 5/24/2021 1127  Last data filed at 5/24/2021 3550  Gross per 24 hour   Intake 892 ml   Output --   Net 892 ml       TELEMETRY: Sinus     Physical Exam:  General:  Awake, alert, oriented x 3, NAD  Skin:  Warm and dry  Neck:  JVD normal  Chest:  normal air entry  Cardiovascular:  RRR S1S2, no S3, 2/6 systolic at lower left sternal border, at apex  Abdomen:  Soft, ND, NT, No HSM  Extremities:  No edema    Medications:    insulin glargine  10 Units Subcutaneous Nightly    amLODIPine  10 mg Oral Daily    sodium chloride flush  5-40 mL Intravenous 2 times per day    sodium chloride flush  5-40 mL Intravenous 2 times per day    aspirin  81 mg Oral Daily    cloNIDine  0.3 mg Oral Daily    metoprolol  100 mg Oral BID    insulin lispro  0-12 Units Subcutaneous TID WC    insulin lispro  0-6 Units Subcutaneous Nightly    sodium chloride flush  5-40 mL Intravenous 2 times per day      sodium chloride      dextrose      sodium chloride      sodium chloride      heparin (PORCINE) Infusion 12 Units/kg/hr (05/24/21 0917)     LORazepam, promethazine, sodium chloride flush, sodium chloride, acetaminophen, glucose, dextrose, glucagon (rDNA), dextrose, sodium chloride flush, sodium chloride, promethazine **OR** ondansetron, polyethylene glycol, [DISCONTINUED] acetaminophen **OR** acetaminophen, perflutren lipid microspheres, sodium chloride flush, sodium chloride, heparin (porcine), heparin (porcine)    Lab Data:  CBC:   Recent Labs     05/22/21  0240 05/24/21  0520   WBC 7.1 5.3   HGB 12.3* 11.6*   HCT 38.3* 35.9* MCV 86.9 86.9    140     BMP:   Recent Labs     05/22/21  0240 05/24/21  0520    139   K 4.6 4.5  4.5    105   CO2 22 23   BUN 30* 37*   CREATININE 1.5* 1.5*     LIVER PROFILE:   Recent Labs     05/22/21  0240   AST 30   ALT 31   BILIDIR <0.2   BILITOT 0.6   ALKPHOS 88     PT/INR: No results for input(s): PROTIME, INR in the last 72 hours. APTT:   Recent Labs     05/23/21  1445 05/23/21  2105 05/24/21  0520   APTT 61.1* 60.4* 60.5*     BNP:  No results for input(s): BNP in the last 72 hours. Imaging/Procedures:   ECHO:  5/21/21   Summary   -Left ventricular cavity size is normal.   -Ejection fraction is visually estimated to be 50%.  -The apex and apical septum appear hypokinetic.   -Grade I diastolic dysfunction with normal LV filling pressures. E/e' =   12.6.   -Left atrium is dilated.   -Mild aortic stenosis with a peak velocity of 2.3m/s and a mean pressure   gradient of 11mmHg.   -Mitral annular calcification is present.   -Mild mitral regurgitation.   -Mild tricuspid regurgitation. RVSP = 36 mmHG.     Cath: 5/21/2021  Anatomy:   LM-70% diffuse  LAD-diffusely diseased, 80% proximal, 90% mid calcified  Cx-90% ostial  RCA-diffusely diseased 50% proximal and mid, 70% distal  RPDA-patent  LVEF-50%     Impression:  1.  Severe multivessel CAD. 2.  Preserved LV systolic function.     Plan:  1. [facility] surgical consultation for CABG.   2.  Patient has been statin intolerant to Livalo but unsure whether other statins have been tried. Anthony Lewis discuss trial of Crestor with him.     Carotid duplex: 5/21/21  Summary        -The right internal carotid artery appears to have a <50% diameter reducing    stenosis based on velocity criteria.    -The left internal carotid artery appears to have a <50% diameter reducing    stenosis based on velocity criteria         Assessment/Plan:  Principal Problem:    Coronary artery disease involving native coronary artery of native heart with unstable angina pectoris [facility])  Plan: Severe multivessel CAD with normal LV systolic function. CABG planned for tomorrow. Active Problems:   Type 2 diabetes mellitus with diabetic nephropathy, without long-term current use of insulin ([address]  Plan: Per hospitalist service. Essential hypertension  Plan: Suboptimal but improving. Some lability. Mixed hyperlipidemia  Plan: Suboptimal.  Currently untreated due to statin intolerance. Repatha/Praluent not available on an inpatient basis we will plan to trial an outpatient basis. Statin intolerance  Plan: Secondary to side effects. Will need to trial alternative such as Repatha/Praluent but will need to be done on outpatient basis. Stage 3a chronic kidney disease  Plan: Chronic. Per nephrology. Post poliomyelitis syndrome  Plan: Chronic. Cardiac status overall stable. Will need a solution for treatment of his dyslipidemia. Discussed with pharmacy who will will help research Repatha/Praluent potential side effects but likely be limited in regards to post polio syndrome. Unavailable as an inpatient.             Azael Reyna MD, MD 5/24/2021 11:27 AM

## 2021-05-24 NOTE — PROGRESS NOTES
(porcine)      Intake/Output Summary (Last 24 hours) at 5/24/2021 0739  Last data filed at 5/24/2021 0529  Gross per 24 hour   Intake 712 ml   Output --   Net 712 ml       Physical Exam Performed:    /76   Pulse 56   Temp 97.6 °F (36.4 °C) (Temporal)   Resp 16   Ht 5' 9\" (1.753 m)   Wt 216 lb 11.4 oz (98.3 kg)   SpO2 96%   BMI 32.00 kg/m²     General appearance: No apparent distress, appears stated age and cooperative. HEENT: Pupils equal, round, and reactive to light. Conjunctivae/corneas clear. Neck: Supple, with full range of motion. No jugular venous distention. Trachea midline. Respiratory:  Normal respiratory effort. Clear to auscultation, bilaterally without Rales/Wheezes/Rhonchi. Cardiovascular: Regular rate and rhythm with normal S1/S2 without murmurs, rubs or gallops. Abdomen: Soft, non-tender, non-distended with normal bowel sounds. Musculoskeletal: No clubbing, cyanosis or edema bilaterally. Full range of motion without deformity. Skin: Skin color, texture, turgor normal.  No rashes or lesions. Neurologic:  Neurovascularly intact without any focal sensory/motor deficits. Cranial nerves: II-XII intact, grossly non-focal.  Psychiatric: Alert and oriented, thought content appropriate, normal insight  Capillary Refill: Brisk,3 seconds, normal   Peripheral Pulses: +2 palpable, equal bilaterally       Labs:   Recent Labs     05/22/21  0240 05/24/21  0520   WBC 7.1 5.3   HGB 12.3* 11.6*   HCT 38.3* 35.9*    140     Recent Labs     05/22/21  0240 05/24/21  0520    139   K 4.6 4.5  4.5    105   CO2 22 23   BUN 30* 37*   CREATININE 1.5* 1.5*   CALCIUM 8.7 8.8     Recent Labs     05/22/21 0240   AST 30   ALT 31   BILIDIR <0.2   BILITOT 0.6   ALKPHOS 88     No results for input(s): INR in the last 72 hours. No results for input(s): Tennis Snow in the last 72 hours.     Urinalysis:      Lab Results   Component Value Date    NITRU Negative 05/21/2021    WBCUA 0

## 2021-05-24 NOTE — PLAN OF CARE
Problem: Cardiovascular  Goal: Hemodynamic stability  Outcome: Ongoing     Problem: Respiratory  Goal: O2 Sat > 90%  Outcome: Met This Shift     Problem: Falls - Risk of:  Goal: Will remain free from falls  Description: Will remain free from falls  5/24/2021 8650 by Gina Pennington RN  Outcome: Ongoing        Electronically signed by Gina Pennington RN on 5/24/2021 at 9:22 AM

## 2021-05-25 PROBLEM — Z98.890 S/P LEFT ATRIAL APPENDAGE LIGATION: Status: ACTIVE | Noted: 2021-01-01

## 2021-05-25 PROBLEM — Z95.1 S/P CORONARY ARTERY BYPASS GRAFT X 4: Status: ACTIVE | Noted: 2021-01-01

## 2021-05-25 NOTE — ACP (ADVANCE CARE PLANNING)
Advanced Care Planning Note. Purpose of Encounter: Advanced care planning in light of CAD  Parties In Attendance:  Patient, wife, daughter  Decisional Capacity: Yes  Subjective: Patient with GAGNON  Objective: Cr 1.4  Goals of Care Determination: Patient wants full support (CPR, vent, surgery, HD, trach, PEG)  Plan:  Cardio and CTS consults. CABG. Hep gtt  Code Status: Full code   Time spent on Advanced care Plannin minutes  Advanced Care Planning Documents: Completed advanced directives on chart, wife is the POA.     Madai Park MD  2021 1:28 PM

## 2021-05-25 NOTE — ANESTHESIA PROCEDURE NOTES
Central Venous Line:    A central venous line was placed using ultrasound guidance and surface landmarks, in the pre-op for the following indication(s): central venous access and CVP monitoring. Sterility preparation included the following: hand hygiene performed prior to procedure, maximum sterile barriers used and sterile technique used to drape from head to toe. The patient was placed in Trendelenburg position. The right internal jugular vein was prepped. The site was prepped with Chloraprep. A 9 Fr (size), 11.5 (length), introducer triple lumen was placed. During the procedure, the following specific steps were taken: target vein identified, needle advanced into vein and blood aspirated and guidewire advanced into vein. Intravenous verification was obtained by ultrasound and venous blood return. Post insertion care included: all ports aspirated, all ports flushed easily, guidewire removed intact, Biopatch applied, line sutured in place and dressing applied. During the procedure the patient experienced: patient tolerated procedure well with no complications and EBL < 5mL. Insertion site scrubbed per usage guidelines?: Yes  Skin prep agent dried for 3 minutes prior to procedure?:yes  Anesthesia type: localA(n) oximetric, 8 (size) Pulmonary Artery Catheter (PAC) was placed through the Introducer CVL in the right internal jugular vein. The PAC placement was confirmed by pressure tracing changes. The patient experienced the following events during the procedure: patient tolerated procedure well with no complications. PA Cath placed?: Yes  Staffing  Performed: Anesthesiologist   Anesthesiologist: Jennyfer Etienne MD  Preanesthetic Checklist  Completed: patient identified, IV checked, site marked, risks and benefits discussed, surgical consent, monitors and equipment checked, pre-op evaluation, timeout performed, anesthesia consent given, oxygen available and patient being monitored

## 2021-05-25 NOTE — ANESTHESIA PRE PROCEDURE
Department of Anesthesiology  Preprocedure Note       Name:  Timmy Jeffrey   Age:  70 y.o.  :  1950                                          MRN:  [id number]         Date:  2021      Surgeon: Aimee Ricks): Susan Robbins MD    Procedure: Procedure(s):  CABG  Medications prior to admission:   Prior to Admission medications    Medication Sig Start Date End Date Taking? Authorizing Provider   Evolocumab (REPATHA) 140 MG/ML SOSY Inject 1 mL into the skin every 14 days for 2 doses 21  Benita Burkitt, MD   lisinopril (PRINIVIL;ZESTRIL) 40 MG tablet TAKE ONE TABLET BY MOUTH DAILY 3/2/21   Kirby Mauro MD   cloNIDine (CATAPRES) 0.3 MG tablet TAKE ONE TABLET BY MOUTH DAILY 3/2/21   Kirby Mauro MD   glipiZIDE (GLUCOTROL) 10 MG tablet Take 1 tablet by mouth 2 times daily 21   Kirby Mauro MD   hydroCHLOROthiazide (HYDRODIURIL) 25 MG tablet TAKE 1 TABLET BY MOUTH DAILY  2/15/21   Kirby Mauro MD   omeprazole (PRILOSEC) 40 MG delayed release capsule TAKE 1 CAPSULE BY MOUTH EVERY MORNING BEFORE BREAKFAST  21   Kirby Mauro MD   metFORMIN (GLUCOPHAGE) 1000 MG tablet TAKE 1 TABLET BY MOUTH TWICE A DAY WITH MEALS 20   Kirby Mauro MD   metoprolol (LOPRESSOR) 100 MG tablet TAKE ONE TABLET BY MOUTH TWICE DAILY  20   Kirby Mauro MD   aspirin 81 MG EC tablet Take 81 mg by mouth daily. Historical Provider, MD       Current medications:    No current facility-administered medications for this visit.      Current Outpatient Medications   Medication Sig Dispense Refill    Evolocumab (REPATHA) 140 MG/ML SOSY Inject 1 mL into the skin every 14 days for 2 doses 2.1 mL 5     Facility-Administered Medications Ordered in Other Visits   Medication Dose Route Frequency Provider Last Rate Last Admin    aminocaproic acid (AMICAR) 5,000 mg in sodium chloride 0.9 % 250 mL infusion bolus  5,000 mg Intravenous Once PRN Susan Robbins MD        insulin regular (HUMULIN R;NOVOLIN R) 100 Units in sodium chloride 0.9 % 100 mL infusion  0.1 Units/kg/hr Intravenous Continuous PRN Delfina Phillips MD        norepinephrine (LEVOPHED) 16 mg in dextrose 5 % 250 mL infusion  2 mcg/min Intravenous Once PRN Delfina Phillips MD        phenylephrine (TAN-SYNEPHRINE) 50 mg in dextrose 5 % 250 mL infusion  100 mcg/min Intravenous Once PRN Delfina Phillips MD        insulin glargine (LANTUS;BASAGLAR) injection pen 10 Units  10 Units Subcutaneous Nightly Hetal Bateman MD   10 Units at 05/24/21 2019    LORazepam (ATIVAN) injection 0.5 mg  0.5 mg Intravenous Q6H PRN Hetal Bateman MD        ceFAZolin (ANCEF) 2000 mg in dextrose 5 % 50 mL IVPB  2,000 mg Intravenous On Call to MONSE Westbrook CNP        vancomycin (VANCOCIN) 1,500 mg in dextrose 5 % 250 mL IVPB  1,500 mg Intravenous On Call to MONSE Westbrook CNP        chlorhexidine (HIBICLENS) 4 % liquid   Topical See Admin Instructions MONSE Monzon CNP        0.9 % sodium chloride infusion   Intravenous Continuous MONSE Monzon CNP        pantoprazole (PROTONIX) injection 40 mg  40 mg Intravenous On Call MONSE Monzon CNP        amLODIPine (NORVASC) tablet 10 mg  10 mg Oral Daily Roseann Whelan MD   10 mg at 05/24/21 3258    promethazine (PHENERGAN) injection 12.5 mg  12.5 mg Intravenous Q6H PRN Jonathan Mena MD        sodium chloride flush 0.9 % injection 5-40 mL  5-40 mL Intravenous 2 times per day Jonathan Mena MD   10 mL at 05/24/21 2019    sodium chloride flush 0.9 % injection 5-40 mL  5-40 mL Intravenous PRN Jonathan Mena MD        0.9 % sodium chloride infusion  25 mL Intravenous PRN Jonathan Mena MD        acetaminophen (TYLENOL) tablet 650 mg  650 mg Oral Q4H PRN Jonathan Mena MD        glucose (GLUTOSE) 40 % oral gel 15 g  15 g Oral PRN Roseann Whelan MD        dextrose 50 % IV solution  12.5 g Intravenous PRN Roseann Whelan MD        glucagon (rDNA) injection 1 mg 1 mg Intramuscular PRN Roseann Edwards MD        dextrose 5 % solution  100 mL/hr Intravenous PRN Roseann Edwards MD        sodium chloride flush 0.9 % injection 5-40 mL  5-40 mL Intravenous 2 times per day Felix Matthew MD   10 mL at 05/24/21 0823    sodium chloride flush 0.9 % injection 5-40 mL  5-40 mL Intravenous PRN Felix Matthew MD        0.9 % sodium chloride infusion  25 mL Intravenous PRN Felix Matthew MD        promethazine (PHENERGAN) tablet 12.5 mg  12.5 mg Oral Q6H PRN Felix Matthew MD        Or    ondansetron [facility]) injection 4 mg  4 mg Intravenous Q6H PRN Felix Matthew MD   4 mg at 05/20/21 2349    polyethylene glycol (GLYCOLAX) packet 17 g  17 g Oral Daily PRN Felix Matthew MD        acetaminophen (TYLENOL) suppository 650 mg  650 mg Rectal Q6H PRN Felix Matthew MD        perflutren lipid microspheres (DEFINITY) injection 1.65 mg  1.5 mL Intravenous ONCE PRN Felix Matthew MD        aspirin EC tablet 81 mg  81 mg Oral Daily Felix Matthew MD   81 mg at 05/24/21 0724    cloNIDine (CATAPRES) tablet 0.3 mg  0.3 mg Oral Daily Felix Matthew MD   0.3 mg at 05/24/21 8348    metoprolol tartrate (LOPRESSOR) tablet 100 mg  100 mg Oral BID Felix Mattehw MD   100 mg at 05/24/21 2018    insulin lispro (1 Unit Dial) 0-12 Units  0-12 Units Subcutaneous TID WC Felix Matthew MD   2 Units at 05/24/21 1643    insulin lispro (1 Unit Dial) 0-6 Units  0-6 Units Subcutaneous Nightly Felix Matthew MD   1 Units at 05/24/21 2018    sodium chloride flush 0.9 % injection 5-40 mL  5-40 mL Intravenous 2 times per day Felix Matthew MD   10 mL at 05/24/21 0823    sodium chloride flush 0.9 % injection 5-40 mL  5-40 mL Intravenous PRN Felix Matthew MD        0.9 % sodium chloride infusion  25 mL Intravenous PRN Felix Matthew MD        heparin (porcine) injection 4,000 Units  4,000 Units Intravenous PRN Felix Matthew MD        heparin (porcine) injection 2,000 Units  2,000 Units Intravenous PRN Wendy Lopez MD        heparin 25,000 units in dextrose 5% 250 mL (premix) infusion  5-30 Units/kg/hr Intravenous Continuous Wedny Lopez MD 12.1 mL/hr at 05/25/21 0644 12.02 Units/kg/hr at 05/25/21 0644       Allergies: Allergies   Allergen Reactions    Atorvastatin      Muscle weakness, failed every statin attempted    Livalo [Pitavastatin Calcium]      Muscle weakness       Problem List:    Patient Active Problem List   Diagnosis Code    Coronary artery disease involving native coronary artery of native heart with unstable angina pectoris ([facility]) I25.110    Elevated LFTs R79.89    Post poliomyelitis syndrome G14    Gout M10.9    Type 2 diabetes mellitus with diabetic nephropathy, without long-term current use of insulin ([facility]) E11.21    Essential hypertension I10    Mixed hyperlipidemia E78.2    Basal cell carcinoma (BCC) of skin of face C44.310    Chest pain R07.9    Angina of effort ([facility] [address] I20.8    Statin intolerance Z78.9    Stage 3a chronic kidney disease N18.31       Past Medical History:        Diagnosis Date    CAD (coronary artery disease)     Diabetes mellitus ([address]     Elevated LFT's     Hyperlipidemia     Hypertension     Post poliomyelitis syndrome     Type II or unspecified type diabetes mellitus without mention of complication, not stated as uncontrolled        Past Surgical History:        Procedure Laterality Date    CLEFT PALATE REPAIR      SKIN BIOPSY Left 7/20/2020    EXCISE SKIN LESION LEFT POST AURICULAR WITH FLAP, FROZEN SECTIONS performed [facility] Olive Burt MD [facility]    WISDOM TOOTH EXTRACTION         Social History:    Social History     Tobacco Use    Smoking status: Never Smoker    Smokeless tobacco: Never Used   Substance Use Topics    Alcohol use: No     Alcohol/week: 0.0 standard drinks                                Counseling given: Not Answered      Vital Signs (Current):    There were no vitals filed for this visit.                                            BP Readings from Last 3 Encounters:   05/25/21 112/63   05/20/21 134/80   03/12/21 (!) 177/95       NPO Status:                                                                                 BMI:   Wt Readings from Last 3 Encounters:   05/25/21 216 lb 11.4 oz (98.3 kg)   05/20/21 223 lb 9.6 oz (101.4 kg)   03/12/21 216 lb (98 kg)     There is no height or weight on file to calculate BMI.    CBC:   Lab Results   Component Value Date    WBC 5.3 05/24/2021    RBC 4.13 05/24/2021    HGB 11.6 05/24/2021    HCT 35.9 05/24/2021    MCV 86.9 05/24/2021    RDW 16.0 05/24/2021     05/24/2021       CMP:   Lab Results   Component Value Date     05/25/2021    K 4.6 05/25/2021    K 4.5 05/24/2021     05/25/2021    CO2 21 05/25/2021    BUN 38 05/25/2021    CREATININE 1.4 05/25/2021    GFRAA >60 05/25/2021    AGRATIO 1.5 05/20/2021    LABGLOM 50 05/25/2021    LABGLOM 52 08/22/2014    GLUCOSE 161 05/25/2021    GLUCOSE 124 05/05/2012    PROT 6.6 05/22/2021    PROT 7.3 11/21/2012    CALCIUM 8.7 05/25/2021    BILITOT 0.6 05/22/2021    ALKPHOS 88 05/22/2021    AST 30 05/22/2021    ALT 31 05/22/2021       POC Tests:   Recent Labs     05/24/21  2013   POCGLU 183*       Coags:   Lab Results   Component Value Date    PROTIME 12.6 05/24/2021    INR 1.09 05/24/2021    APTT 61.0 05/25/2021       HCG (If Applicable): No results found for: PREGTESTUR, PREGSERUM, HCG, HCGQUANT     ABGs:   Lab Results   Component Value Date    PHART 7.387 05/22/2021    PO2ART 89.6 05/22/2021    JUD2RCP 37.6 05/22/2021    EUU1JDL 22.6 05/22/2021    BEART -2.1 05/22/2021    C2FPJFVY 97.5 05/22/2021        Type & Screen (If Applicable):  No results found for: LABABO, LABRH    Drug/Infectious Status (If Applicable):  No results found for: HIV, HEPCAB    COVID-19 Screening (If Applicable):   Lab Results   Component Value Date    COVID19 Not Detected 07/13/2020         Anesthesia

## 2021-05-25 NOTE — ANESTHESIA POSTPROCEDURE EVALUATION
Department of Anesthesiology  Postprocedure Note    Patient: [place]  MRN: [id number]  YOB: 1950  Date of evaluation: 5/25/2021  Time:  7:04 PM     Procedure Summary     Date: 05/25/21 Room / Location: [address]    Anesthesia Start: 1302 Anesthesia Stop: 1903    Procedure: Urgent CABG Coronary Artery Bypass Graft x4 (LIMA to LAD, SVG to PDA, SVG sequenced to OM1 and OM2), Endoscopic vein harvest left saphenous vein, Left atrial appendage ligation with 40mm AtriClip, total cardiopulmonary bypass, doppler flow verification of bypass grafts, insertion of temporary ventricular pacing wires, transesophageal echocardiogram, 5 level bilateral intercostal nerve block with Marcaine 0.25%, left sided robicsek weave. (N/A ) Diagnosis: (CORONARY ARTERY DISEASE)    Surgeons: Jerry Nieves MD Responsible Provider: Luz Maria Marr MD    Anesthesia Type: general ASA Status: 4          Anesthesia Type: general    Paige Phase I: Paige Score: 10    Paige Phase II:      Last vitals: Reviewed and per EMR flowsheets.        Anesthesia Post Evaluation    Patient location during evaluation: ICU

## 2021-05-25 NOTE — PROGRESS NOTES
Patient admitted to CVU from [place] [address] and attached to ventilator and monitors. Report received from anesthesiologist.  Chest x-ray ordered. Labs drawn and sent. Assessment complete. Continue monitoring hemodynamics. Visiting hours reviewed and all questions answered. Family aware of discharge class.  Primary RN Grzegorz/Sarai    RECOVERY PERIOD BEGINS [id number]

## 2021-05-25 NOTE — PROGRESS NOTES
Hospitalist Progress Note      PCP: Ben Roque MD    Date of Admission: 5/20/2021    Chief Complaint: [facility] Course:   71 yo M with history of DM 2, HTN, CKD 3, post poliomyelitis syndrome who came to ER with CP. Admitted as inpatient for further management. Underwent LHC on 5/21: Anatomy:   LM-70% diffuse  LAD-diffusely diseased, 80% proximal, 90% mid calcified  Cx-90% ostial  RCA-diffusely diseased 50% proximal and mid, 70% distal  RPDA-patent  LVEF-50%     Impression:  1. Severe multivessel CAD. 2.  Preserved LV systolic function.     Plan:  1.  CV surgical consultation for CABG. 2.  Patient has been statin intolerant to Livalo but unsure whether other statins have been tried. Will discuss trial of Crestor with him. For CABG on 5/25. Subjective:  Patient denies CP, SOB, HA or abdominal pain. Has GAGNON. No fevers. Ready for OR today.       Medications:  Reviewed    Infusion Medications    insulin (HUMAN R) weight based infusion      norepinephrine (LEVOPHED) infusion      phenylephrine (TAN-SYNEPHRINE) 50mg/250mL infusion      sodium chloride      sodium chloride      dextrose      sodium chloride      sodium chloride      heparin (PORCINE) Infusion Stopped (05/25/21 0916)     Scheduled Medications    insulin glargine  10 Units Subcutaneous Nightly    ceFAZolin (ANCEF) IVPB  2,000 mg Intravenous On Call to OR    vancomycin (VANCOCIN) IV  1,500 mg Intravenous On Call to OR    chlorhexidine   Topical See Admin Instructions    amLODIPine  10 mg Oral Daily    sodium chloride flush  5-40 mL Intravenous 2 times per day    sodium chloride flush  5-40 mL Intravenous 2 times per day    aspirin  81 mg Oral Daily    cloNIDine  0.3 mg Oral Daily    metoprolol  100 mg Oral BID    insulin lispro  0-12 Units Subcutaneous TID WC    insulin lispro  0-6 Units Subcutaneous Nightly    sodium chloride flush  5-40 mL Intravenous 2 times per day     PRN Meds: aminocaproic acid HCT 35.9*        Recent Labs     05/24/21  0520 05/25/21  0535    134*   K 4.5  4.5 4.6    103   CO2 23 21   BUN 37* 38*   CREATININE 1.5* 1.4*   CALCIUM 8.8 8.7     No results for input(s): AST, ALT, BILIDIR, BILITOT, ALKPHOS in the last 72 hours. Recent Labs     05/24/21  1239   INR 1.09     No results for input(s): Marletta Shohola in the last 72 hours. Urinalysis:      Lab Results   Component Value Date    NITRU Negative 05/21/2021    WBCUA 0 05/21/2021    RBCUA 0 05/21/2021    BLOODU Negative 05/21/2021    SPECGRAV 1.015 05/21/2021    GLUCOSEU 100 05/21/2021       Radiology:  CT CHEST ABDOMEN PELVIS WO CONTRAST   Final Result   No acute abnormality identified in the chest, abdomen, or pelvis. No finding   worrisome for occult malignancy. Moderate enlargement of the prostate. VL PRE OP VEIN MAPPING   Final Result      VL DUP CAROTID BILATERAL   Final Result      XR CHEST (2 VW)   Final Result   No active cardiopulmonary disease                 Assessment/Plan:    Active Hospital Problems    Diagnosis     Statin intolerance [Z78.9]      Priority: Medium    Stage 3a chronic kidney disease [N18.31]      Priority: Medium    Essential hypertension [I10]     Mixed hyperlipidemia [E78.2]     Type 2 diabetes mellitus with diabetic nephropathy, without long-term current use of insulin (HCC) [E11.21]     Coronary artery disease involving native coronary artery of native heart with unstable angina pectoris ([facility] [address] [I25.110]     Post poliomyelitis syndrome [G14]      1. Cont Hep gtt  2. Cont ASA and Metoprolol  3. NPO p MN for CABG today  4. Cont Clonidine  5. Cont Lantus 10 U qhs    DVT Prophylaxis: Hep gtt  Diet: Diet NPO Time Specified Exceptions are: Sips with Meds  Code Status: Full Code    PT/OT Eval Status: Not needed    Dispo - Home    Discussed with patient, nursing and Donn Awan (CTS NP). D/W wife and daughter    For CABG today.     Merna Carr MD

## 2021-05-25 NOTE — PROGRESS NOTES
Pt verified information regarding surgery, name, birth date, surgeon, procedure and allergies: atorvastatin, livalo. Patient transferred to [address] [place] [address] for surgery. Appropriate antibiotics ordered: vanco 1.5G, ancef 2G. Beta blocker: metoprolol @ 0836. Lab work within normal limits, 2 units of RBC's on call to OR, vital signs stable, Mepilex sacral border applied and 2% chlorhexidine gluconate skin prep given. Patient and family educated about surgery and pain management. Arterial line placed in left brachial and TLC introducer in RIJ with PA line by Dr. Theresa Oakley. To surgery per bed at 1257.

## 2021-05-25 NOTE — PROGRESS NOTES
Incentive Spirometry education and demonstration completed by Respiratory Therapy Yes      Response to education: Very Good     Teaching Time: 5 minutes    Minimum Predicted Vital Capacity - 707 mL. Patient's Actual Vital Capacity - 800 mL. Turning over to Nursing for routine follow-up No.    Comments: pt could more education.      Electronically signed by Jimmy Russ RCP on 5/24/2021 at 8:54 PM

## 2021-05-25 NOTE — OP NOTE
Catheter Indications Perioperative use for selected surgical procedures 05/25/21 1410   Site Assessment No urethral drainage;Pink 05/25/21 1410   Urine Color Yellow 05/25/21 1410   Urine Appearance Clear 05/25/21 1410       Findings: Excellent LIMA and SV used. EF 50% post op. Initially had high PAP with MR from [facility] which resolved with cessation of inotropes, beta-blockade and fluid. Excellent doppler signals in all grafts post-CPB. Details of Procedure:    After placement of appropriate monitors, induction of general endotracheal anesthesia and infusion of appropriate antibiotics, the patient was sterilely prepped and draped. Saphenous vein was endoscopically harvested from the left leg and thigh. These wounds were irrigated and closed in layers after achieving meticulous hemostasis. Concurrent to vein harvest, a median sternotomy was performed and the left internal mammary artery was taken down from the chest wall. The patient was given a systemic dose of heparin. The internal mammary artery was ligated distally and divided. It was noted to have excellent flow. It was injected with papaverine, clamped and set aside. The patient was then cannulated in standard fashion for antegrade cardioplegia and cardiopulmonary bypass. After reaching an appropriate ACT, the patient went on cardiopulmonary bypass without difficulty. The aorta was cross-clamped and warm antegrade cardioplegia was given for 300cc or cardiac standstill, and then cold blood cardioplegia was given to complete induction and intermittently throughout the case as needed. First, the vein graft to the PDA was performed in running end-to-side fashion. Next, a punch aortotomy was performed and the proximal anastomosis was performed in running end-to-side fashion. Next, the vein graft to the distal OM was performed in running end-to-side fashion. This graft was then sequenced to the first OM in running side-to-side fashion.   At this point, the left atrial appendage was sized and found to be appropriate for a 40mm ACHV AtriClip which was then placed at the base of the appendage. Next, a punch aortotomy was performed and the proximal anastomosis was performed in running end-to-side fashion. The left internal mammary artery was brought a hole in the pericardium anterior to the phrenic nerve and anastomosed to the LAD in running end to side fashion after splitting the distal lesion which left 1.5mm proximal and distal run-off. .  The patient was given a hot shot of cardioplegia. De-airing maneuvers were performed. The aortic cross-clamp was removed and the patient allowed to re-perfuse. During this time, two 24 Fr Phil drains were brought through a separate stab incision inferiorly. A bipolar ventricular pacing wire was placed. The patient was weaned from cardiopulmonary bypass, but PAP became systemic despite good biventricular function. When PAP did not improve with inotropes and reperfusion, KATHERYN was suspected and confirmed. Inotropes were stopped. Levophed switched to liana and esmolol started. The patient was then weaned from cardiopulmonary bypass and was decannulated and heparin reversed. The grafts were interrogated with doppler ultrasound and noted to have excellent signals. The wound was checked for hemostasis, which was meticulously achieved. Findings were as above. The pericardium and thymic fat was loosely re-approximated in the midline using interrupted 0 Ethibond sutures. The sternal edges were treated with platelet gel. The sternotomy favored the left side of the sternum so a left-sided Robicsek weave was performed and the median sternotomy was then closed in standard fashion around the weave on the left, irrigating between each layer. Bilateral parasternal intercostal nerve blocks were performed with 1/4% marcaine prior to closing the fascia.   The patient tolerated the procedure well without apparent complications and was taken in critical, but stable condition to the CVICU. Sponge and needle count were reported as correct at the end of the procedure. Tiff England performed vein harvest and assisted at the chest as did Larry Juarez. Both were present for the entire procedure.     Trevor Amor MD  Date: 5/25/2021  Time: 6:16 PM

## 2021-05-25 NOTE — ANESTHESIA PROCEDURE NOTES
Arterial Line:    An arterial line was placed using ultrasound guidance, in the pre-op for the following indication(s): continuous blood pressure monitoring and blood sampling needed. A 20 gauge (size), 12 cm (length), Arrow (type) catheter was placed, Seldinger technique used, into the left brachial artery, secured by tape and Tegaderm. Anesthesia type: Local  Local infiltration: Injection    Events:  patient tolerated procedure well with no complications and EBL < 5mL.   Anesthesiologist: Bessie Morales MD  Performed: Anesthesiologist   Preanesthetic Checklist  Completed: patient identified, IV checked, site marked, risks and benefits discussed, surgical consent, monitors and equipment checked, pre-op evaluation, timeout performed, anesthesia consent given, oxygen available and patient being monitored

## 2021-05-25 NOTE — PLAN OF CARE
Problem: Cardiovascular  Goal: Hemodynamic stability  Outcome: Ongoing     Problem: Respiratory  Goal: No pulmonary complications  Outcome: Ongoing  Goal: O2 Sat > 90%  Outcome: Ongoing     Problem: Falls - Risk of:  Goal: Will remain free from falls  Description: Will remain free from falls  Outcome: Ongoing       Electronically signed by Jadine Phoenix, RN on 5/25/2021 at 9:06 AM

## 2021-05-25 NOTE — PROGRESS NOTES
Office : [phone]     Fax :[phone]       Nephrology Progress Note      Patient's Name: Mana Valladares  10:12 AM  5/25/2021    Reason for Consult:  CKD stage3 A      Requesting Physician:  Tricia Hernández MD       Interval history :    Sitting in chair  No chest pain  No sob  Not in acute distress  Renal fn stable      Chief Complaint:    Chief Complaint   Patient presents with    Shortness of Breath     sent by Dr Momo Norman for abnormal EKG. was unable to get into see cardiologist. has been feeling \"not so good\", gets sob with activity for a couple months. denies cp. no n/v. History of Present Ilness:    Mana Valladares is a 70 y.o. male with h/o DM 2, CKD 3 , DLP who came with complaints of 2-month history of progressive exertional shortness of breath and chest discomfort. I/O last 3 completed shifts:   In: 929.3 [P.O.:480; I.V.:449.3]  Out: -     Past Medical History:   Diagnosis Date    CAD (coronary artery disease)     Diabetes mellitus ([facility] [address]     Elevated LFT's     Hyperlipidemia     Hypertension     Post poliomyelitis syndrome     Type II or unspecified type diabetes mellitus without mention of complication, not stated as uncontrolled        Past Surgical History:   Procedure Laterality Date    CLEFT PALATE REPAIR      SKIN BIOPSY Left 7/20/2020    EXCISE SKIN LESION LEFT POST AURICULAR WITH FLAP, FROZEN SECTIONS performed by Olive Burt MD at [facility]           Current Medications:    aminocaproic acid (AMICAR) 5,000 mg in sodium chloride 0.9 % 250 mL infusion bolus, Once PRN  insulin regular (HUMULIN R;NOVOLIN R) 100 Units in sodium chloride 0.9 % 100 mL infusion, Continuous PRN  norepinephrine (LEVOPHED) 16 mg in dextrose 5 % 250 mL infusion, Once PRN  phenylephrine (TAN-SYNEPHRINE) 50 mg in dextrose 5 % 250 mL infusion, Once PRN  insulin glargine (LANTUS;BASAGLAR) injection pen 10 Units, Nightly  LORazepam (ATIVAN) injection 0.5 mg, Q6H PRN  ceFAZolin (ANCEF) 2000 mg in dextrose 5 % 50 mL IVPB, On Call to OR  vancomycin (VANCOCIN) 1,500 mg in dextrose 5 % 250 mL IVPB, On Call to OR  chlorhexidine (HIBICLENS) 4 % liquid, See Admin Instructions  0.9 % sodium chloride infusion, Continuous  amLODIPine (NORVASC) tablet 10 mg, Daily  promethazine (PHENERGAN) injection 12.5 mg, Q6H PRN  sodium chloride flush 0.9 % injection 5-40 mL, 2 times per day  sodium chloride flush 0.9 % injection 5-40 mL, PRN  0.9 % sodium chloride infusion, PRN  acetaminophen (TYLENOL) tablet 650 mg, Q4H PRN  glucose (GLUTOSE) 40 % oral gel 15 g, PRN  dextrose 50 % IV solution, PRN  glucagon (rDNA) injection 1 mg, PRN  dextrose 5 % solution, PRN  sodium chloride flush 0.9 % injection 5-40 mL, 2 times per day  sodium chloride flush 0.9 % injection 5-40 mL, PRN  0.9 % sodium chloride infusion, PRN  promethazine (PHENERGAN) tablet 12.5 mg, Q6H PRN   Or  ondansetron (ZOFRAN) injection 4 mg, Q6H PRN  polyethylene glycol (GLYCOLAX) packet 17 g, Daily PRN  acetaminophen (TYLENOL) suppository 650 mg, Q6H PRN  perflutren lipid microspheres (DEFINITY) injection 1.65 mg, ONCE PRN  aspirin EC tablet 81 mg, Daily  cloNIDine (CATAPRES) tablet 0.3 mg, Daily  metoprolol tartrate (LOPRESSOR) tablet 100 mg, BID  insulin lispro (1 Unit Dial) 0-12 Units, TID WC  insulin lispro (1 Unit Dial) 0-6 Units, Nightly  sodium chloride flush 0.9 % injection 5-40 mL, 2 times per day  sodium chloride flush 0.9 % injection 5-40 mL, PRN  0.9 % sodium chloride infusion, PRN  heparin (porcine) injection 4,000 Units, PRN  heparin (porcine) injection 2,000 Units, PRN  heparin 25,000 units in dextrose 5% 250 mL (premix) infusion, Continuous        Review of Systems:   14 point ROS obtained but were negative except mentioned in HPI      Physical exam:     Vitals:  BP (!) 147/79   Pulse 63   Temp 99.6 °F (37.6 °C) (Temporal)   Resp 16   Ht 5' 9\" (1.753 m)   Wt 216 lb 11.4 oz (98.3 kg)   SpO2 98%   BMI 32.00 kg/m²   Constitutional:  OAA X3 NAD  Skin: no rash, turgor wnl  Heent:  eomi, mmm  Neck: no bruits or jvd noted  Cardiovascular:  S1, S2 without m/r/g  Respiratory: CTA B without w/r/r  Abdomen:  +bs, soft, nt, nd  Ext: no  lower extremity edema  Psychiatric: mood and affect appropriate  Musculoskeletal:  Rom, muscular strength intact    Labs:  CBC:   Recent Labs     05/24/21  0520   WBC 5.3   HGB 11.6*        BMP:    Recent Labs     05/24/21  0520 05/25/21  0535    134*   K 4.5  4.5 4.6    103   CO2 23 21   BUN 37* 38*   CREATININE 1.5* 1.4*   GLUCOSE 147* 161*     Ca/Mg/Phos:   Recent Labs     05/24/21  0520 05/25/21  0535   CALCIUM 8.8 8.7     Hepatic:   No results for input(s): AST, ALT, ALB, BILITOT, ALKPHOS in the last 72 hours. Troponin:   No results for input(s): TROPONINI in the last 72 hours. BNP: No results for input(s): BNP in the last 72 hours. Lipids:   No results for input(s): CHOL, TRIG, HDL, LDLCALC, LABVLDL in the last 72 hours. ABGs:   No results for input(s): PHART, PO2ART, DTV9EJO in the last 72 hours. IMAGING:  CT CHEST ABDOMEN PELVIS WO CONTRAST   Final Result   No acute abnormality identified in the chest, abdomen, or pelvis. No finding   worrisome for occult malignancy. Moderate enlargement of the prostate. VL PRE OP VEIN MAPPING   Final Result      VL DUP CAROTID BILATERAL   Final Result      XR CHEST (2 VW)   Final Result   No active cardiopulmonary disease               A/p     1. CKD stage 3A   Creat 1.6 ---> 1.4 --> 1.5 ---> 1.4   H/o DM 2  Serum cr stable post cardiac cath      2. DM 2. Needs better control    3. HTN . controlled   held lisinopril   Give amlodipine 5 mg po daily     4. Dyslipidemia . Statins     5.   Has multivessel coronary artery disease. Evaluated by CT surgery.   Plan for CABG today      Recommend to dose adjust all medications  based on renal functions  Monitor closely post CABG  Maintain SBP> 90 mmHg   Daily weights   AVOID NSAIDs  Avoid Nephrotoxins  Monitor Intake/Output  Call if significant decrease in urine output                 Thank you for allowing us to participate in care of Christianna Fabry         Electronically signed by: Paul Frost MD, 5/25/2021, 10:12 AM      [facility] [address]  Office : [phone]  Fax :[phone]

## 2021-05-26 NOTE — PROGRESS NOTES
Awake alert  Pre op wt 98.3 kg  todays wt 105 kg  POD1 cabg x4, ANDREW clip  Generalized weakness  NSR 70s  S1S2 with syst murmer. Cristian 50mcg, esmolol 50mcg/kg/m  C.I 1.9-2.2  02 4L  I/0 -1741 last 24 hr bal  Bun/creat 23/1.6  Will give scheduled albumin/lasix as ordered.

## 2021-05-26 NOTE — PROGRESS NOTES
More awake  Moves all extremities spontaneously  NSR  02 2l  brevibloc gtt off  metropolol po, diltiazem po given,well tolerated. C.I consistently above 2.0  Sufficient  u.o hourly.

## 2021-05-26 NOTE — PROCEDURES
Pulmonary Function Testing      Patient name:  Petrina Kussmaul     [facility] Unit #:   [id number]   Date of test: 5/21/2021  Date of interpretation:   5/26/2021    Mr. Petrina Kussmaul is a 70y.o. year-old non smoker. The spirometry data were acceptable and reproducible. Spirometry:  Flow volume loops were normal. The FEV-1/FVC ratio was normal. The  Prebronchodilator FEV-1 was 1.75 liters (57% of predicted), which was moderately decreased. The FVC was 2.30 liters (55% of predicted), which was decreased. Response to inhaled bronchodilators (albuterol) was not performed. Lung volumes:  Lung volumes were not tested by plethysmography. Diffusion capacity was found to be not tested. Interpretation:  Decreased FEV1 and FVC suggestive of restriction. Full PFTs are required for further evaluation.

## 2021-05-26 NOTE — PROGRESS NOTES
times per day    fondaparinux  2.5 mg Subcutaneous Daily    aspirin  325 mg Oral Daily    acetaminophen  1,000 mg Oral Q6H    furosemide  20 mg Intravenous BID    magnesium oxide  400 mg Oral BID    mupirocin   Nasal BID    nitroGLYCERIN  1 patch Transdermal Daily    potassium chloride  10 mEq Oral TID     sennosides-docusate sodium  1 tablet Oral BID    [START ON 5/28/2021] lisinopril  2.5 mg Oral Lunch    pantoprazole  40 mg Oral Daily    pantoprazole  40 mg Intravenous Daily    And    sodium chloride (PF)  10 mL Intravenous Daily    ceFAZolin (ANCEF) IVPB  2,000 mg Intravenous Q8H    insulin glargine  0.15 Units/kg Subcutaneous Nightly    [START ON 5/27/2021] insulin lispro  0.08 Units/kg Subcutaneous TID     insulin lispro  0-12 Units Subcutaneous TID WC    insulin lispro  0-6 Units Subcutaneous Nightly     PRN Meds: sodium chloride flush, sodium chloride, ondansetron, metoclopramide, traMADol **OR** traMADol, morphine, diphenhydrAMINE, zolpidem, magnesium hydroxide, polyethylene glycol, potassium chloride, magnesium sulfate, calcium chloride IVPB, calcium chloride IVPB, albuterol sulfate HFA, albumin human, lactated ringers, phenylephrine (TAN-SYNEPHRINE) 50mg/250mL infusion, furosemide, nitroGLYCERIN, clevidipine, glucose, dextrose, glucagon (rDNA), dextrose      Intake/Output Summary (Last 24 hours) at 5/26/2021 1220  Last data filed at 5/26/2021 1200  Gross per 24 hour   Intake 1544.05 ml   Output 3745 ml   Net -2200.95 ml       Physical Exam Performed:    BP (!) 147/79   Pulse 88   Temp 98.4 °F (36.9 °C)   Resp 19   Ht 5' 9\" (1.753 m)   Wt 231 lb 7.7 oz (105 kg)   SpO2 96%   BMI 34.18 kg/m²     General appearance: No apparent distress, appears stated age and cooperative. HEENT: Pupils equal, round, and reactive to light. Conjunctivae/corneas clear. Neck: Supple, with full range of motion. No jugular venous distention. Trachea midline. Respiratory:  BL rales 1/3 up.   No

## 2021-05-26 NOTE — PROGRESS NOTES
Office : [phone]     Fax :[phone]       Nephrology Progress Note      Patient's Name: Crystal River  11:14 AM  5/26/2021      Chief Complaint:    Chief Complaint   Patient presents with    Shortness of Breath     sent by Dr Lu Sagastume for abnormal EKG. was unable to get into see cardiologist. has been feeling \"not so good\", gets sob with activity for a couple months. denies cp. no n/v. History of Present Ilness:    Crystal River is a 70 y.o. male with h/o DM 2, CKD 3 , DLP who came with complaints of 2-month history of progressive exertional shortness of breath and chest discomfort. Travel history. S/p CABG yesterday. Post CABG recovering. Awake alert oriented in time place and person. Urine output is good. Minimal edema. Creatinine level elevated at 1.6  I/O last 3 completed shifts: In: 1424.1 [I.V.:977.9; IV Piggyback:446.1]  Out: 3165 [Urine:2795;  Chest Tube:370]    Past Medical History:   Diagnosis Date    CAD (coronary artery disease)     Diabetes mellitus ([facility] [address]     Elevated LFT's     Hyperlipidemia     Hypertension     Post poliomyelitis syndrome     Type II or unspecified type diabetes mellitus without mention of complication, not stated as uncontrolled        Past Surgical History:   Procedure Laterality Date    CLEFT PALATE REPAIR      SKIN BIOPSY Left 7/20/2020    EXCISE SKIN LESION LEFT POST AURICULAR WITH FLAP, FROZEN SECTIONS performed by Tala Caldwell MD at [facility]           Current Medications:    metoprolol tartrate (LOPRESSOR) tablet 100 mg, BID  albumin human 25 % IV solution 25 g, Q8H  dilTIAZem (CARDIZEM) tablet 30 mg, Q6H  esmolol (BREVIBLOC) 2.5gm/250ml NS infusion, Continuous  dextrose 5 % and Continuous PRN  clevidipine (CLEVIPREX) infusion, Continuous PRN  insulin regular (HUMULIN R;NOVOLIN R) 100 Units in sodium chloride 0.9 % 100 mL infusion, Continuous  glucose (GLUTOSE) 40 % oral gel 15 g, PRN  dextrose 50 % IV solution, PRN  glucagon (rDNA) injection 1 mg, PRN  dextrose 5 % solution, PRN  ceFAZolin (ANCEF) 2000 mg in dextrose 5 % 50 mL IVPB, Q8H  insulin glargine (LANTUS;BASAGLAR) injection pen 15 Units, Nightly  [START ON 5/27/2021] insulin lispro (1 Unit Dial) 8 Units, TID WC  insulin lispro (1 Unit Dial) 0-12 Units, TID WC  insulin lispro (1 Unit Dial) 0-6 Units, Nightly        Physical exam:     Vitals:  BP (!) 147/79   Pulse 92   Temp 98 °F (36.7 °C) (Temporal)   Resp 18   Ht 5' 9\" (1.753 m)   Wt 231 lb 7.7 oz (105 kg)   SpO2 93%   BMI 34.18 kg/m²   Constitutional:  OAA X3 NAD  Skin: no rash, turgor wnl  Heent:  eomi, mmm  Neck: no bruits or jvd noted  Cardiovascular:  S1, S2 without m/r/g  Respiratory: CTA B without w/r/r  Abdomen:  +bs, soft, nt, nd  Ext: no  lower extremity edema    Labs:  CBC:   Recent Labs     05/24/21  0520 05/25/21 1918 05/26/21  0000 05/26/21  0440   WBC 5.3 17.1*  --  14.7*   HGB 11.6* 8.7* 9.4* 8.0*    132*  --  103*     BMP:    Recent Labs     05/25/21  0535 05/25/21 1918 05/26/21  0440   * 139 141   K 4.6 4.2 4.9    115* 111*   CO2 21 16* 21   BUN 38* 23* 23*   CREATININE 1.4* 1.2 1.6*   GLUCOSE 161* 201* 105*     Ca/Mg/Phos:   Recent Labs     05/25/21  0535 05/25/21 1918 05/26/21  0440   CALCIUM 8.7 6.2* 8.5   MG  --  2.20 2.20     Hepatic:   No results for input(s): AST, ALT, ALB, BILITOT, ALKPHOS in the last 72 hours. Troponin:   No results for input(s): TROPONINI in the last 72 hours. BNP: No results for input(s): BNP in the last 72 hours. Lipids:   No results for input(s): CHOL, TRIG, HDL, LDLCALC, LABVLDL in the last 72 hours.   ABGs:   Recent Labs     05/26/21  0529   PHART 7.377   PO2ART 106.3   GTA5CCU 39.6 IMAGING:  XR ABDOMEN FOR NG/OG/NE TUBE PLACEMENT   Final Result      XR CHEST PORTABLE   Final Result   Satisfactory appearance status post median sternotomy      No pneumothorax         CT CHEST ABDOMEN PELVIS WO CONTRAST   Final Result   No acute abnormality identified in the chest, abdomen, or pelvis. No finding   worrisome for occult malignancy. Moderate enlargement of the prostate. VL PRE OP VEIN MAPPING   Final Result      VL DUP CAROTID BILATERAL   Final Result      XR CHEST (2 VW)   Final Result   No active cardiopulmonary disease               A/p     1. CKD stage 3A   Creat 1.6 ---> 1.4 --> 1.5 ---> 1.4 --->1.2 ---> 1.6   H/o DM 2  Serum creatinine post CABG slightly elevated at 1.6. Good urine output  Expect renal function to improve  Continue Lasix for volume judgment      2. DM 2. Needs better control  On insulin    3. HTN . controlled   held lisinopril   Give amlodipine 5 mg po daily     4. Dyslipidemia . Statins     5. Has multivessel coronary artery disease. S/p CABG.   Management per CT surgery      Recommend to dose adjust all medications  based on renal functions  Monitor closely post CABG  Maintain SBP> 90 mmHg   Daily weights   AVOID NSAIDs  Avoid Nephrotoxins  Monitor Intake/Output  Call if significant decrease in urine output                 Thank you for allowing us to participate in care of Christianna Fabry         Electronically signed by: Paul Frost MD, 5/26/2021, 11:14 AM      [facility]  [address]  Office : [phone]  Fax :[phone]

## 2021-05-26 NOTE — CONSULTS
Comprehensive Nutrition Assessment    Type and Reason for Visit:  Initial, Consult    Nutrition Recommendations/Plan:   Diet adv to cardiac, CCC, 2000 mL fluid restriction  Diet education prior to d/c    Nutrition Assessment:  Consult received s/p cabg 5/25. Extubated early this am and diet remains NPO. Pt was eating well prior to surgery. Recommend to advance diet and allow to eat as he feels ready. Malnutrition Assessment:  Malnutrition Status:  No malnutrition      Estimated Daily Nutrient Needs:  Energy (kcal):  1100 - 1400; Weight Used for Energy Requirements:  Admission (100 kg)     Protein (g):  146; Weight Used for Protein Requirements:  Ideal (2g/73kg)        Fluid (ml/day):  2000; Method Used for Fluid Requirements:  ml/Kg      Nutrition Related Findings:  absent BS; +1 generalized edema; A1c 7.6      Wounds:  Surgical Incision       Current Nutrition Therapies:    NPO    Anthropometric Measures:  · Height: 5' 9\" (175.3 cm)  · Current Body Weight: 231 lb (104.8 kg)   · Admission Body Weight: 221 lb (100.2 kg)    · Ideal Body Weight: 160 lbs; % Ideal Body Weight 144.4 %   · BMI: 34.1  · BMI Categories: Obese Class 1 (BMI 30.0-34. 9)       Nutrition Diagnosis:   · Increased nutrient needs related to increase demand for energy/nutrients as evidenced by wounds (surgical)      Nutrition Interventions:   Food and/or Nutrient Delivery:  Start Oral Diet  Nutrition Education/Counseling:  Education needed   Coordination of Nutrition Care:  Continue to monitor while inpatient    Goals:  PO intake 50% or greater       Nutrition Monitoring and Evaluation:   Food/Nutrient Intake Outcomes:  Diet Advancement/Tolerance, Food and Nutrient Intake  Physical Signs/Symptoms Outcomes:  Weight     Discharge Planning:     Too soon to determine     Electronically signed by Pooja Zamora RD, LD on 5/26/21 at 10:56 AM EDT  Contact: [phone]

## 2021-05-26 NOTE — PLAN OF CARE
Nutrition Problem #1: Increased nutrient needs  Intervention: Food and/or Nutrient Delivery: Start Oral Diet  Nutritional Goals: PO intake 50% or greater

## 2021-05-26 NOTE — PROGRESS NOTES
[address]   Cardiology Progress Note     Date: 5/26/2021  Admit Date: 5/20/2021     Reason for consultation: chest pain     Chief Complaint:   Chief Complaint   Patient presents with    Shortness of Breath     sent by Dr Stefani Delaney for abnormal EKG. was unable to get into see cardiologist. has been feeling \"not so good\", gets sob with activity for a couple months. denies cp. no n/v. History of Present Illness: History obtained from patient and medical record. Sigifredo Balderas is a 70 y.o. male presented to the hospital with complaints of 2-month history of progressive exertional shortness of breath and chest discomfort. LHC revealed MVD. s/p CABG x4, ANDREW ligation 5/25/21. Interval Hx: Today, is POD #1. In some pain. Wt up 15lbs. Pt thinks his strength has improved some. CT and marrero in place. Weaning gtts. Patient seen and examined. Clinical notes reviewed. Telemetry reviewed. No new complaints today. No major events overnight. Denies having palpitations, shortness of breath, orthopnea/PND, cough, or dizziness at the time of this visit. Past Medical History:  Past Medical History:   Diagnosis Date    CAD (coronary artery disease)     Diabetes mellitus ([facility] [address]     Elevated LFT's     Hyperlipidemia     Hypertension     Post poliomyelitis syndrome     Type II or unspecified type diabetes mellitus without mention of complication, not stated as uncontrolled         Past Surgical History:    has a past surgical history that includes Cleft palate repair; [place] tooth extraction; skin biopsy (Left, 7/20/2020); and Coronary artery bypass graft (N/A, 5/25/2021). Social History:  Reviewed. reports that he has never smoked. He has never used smokeless tobacco. He reports that he does not drink alcohol and does not use drugs. Allergies:   Allergies   Allergen Reactions    Atorvastatin      Muscle weakness, failed every statin attempted    Livalo [Pitavastatin Calcium]      Muscle weakness       Family History:  Reviewed. family history includes Diabetes in his mother; High Blood Pressure in his brother, father, and mother. Denies family history of sudden cardiac death, arrhythmia, premature CAD    Home Meds:  Prior to Visit Medications    Medication Sig Taking? Authorizing Provider   Evolocumab (REPATHA) 140 MG/ML SOSY Inject 1 mL into the skin every 14 days for 2 doses Yes Azael Reyna MD   lisinopril (PRINIVIL;ZESTRIL) 40 MG tablet TAKE ONE TABLET BY MOUTH DAILY Yes Abbi Katz MD   cloNIDine (CATAPRES) 0.3 MG tablet TAKE ONE TABLET BY MOUTH DAILY Yes Abbi Katz MD   glipiZIDE (GLUCOTROL) 10 MG tablet Take 1 tablet by mouth 2 times daily Yes Abbi Katz MD   hydroCHLOROthiazide (HYDRODIURIL) 25 MG tablet TAKE 1 TABLET BY MOUTH DAILY  Yes Abbi Katz MD   omeprazole (PRILOSEC) 40 MG delayed release capsule TAKE 1 CAPSULE BY MOUTH EVERY MORNING BEFORE BREAKFAST  Yes Abbi Katz MD   metFORMIN (GLUCOPHAGE) 1000 MG tablet TAKE 1 TABLET BY MOUTH TWICE A DAY WITH MEALS Yes Abbi Katz MD   metoprolol (LOPRESSOR) 100 MG tablet TAKE ONE TABLET BY MOUTH TWICE DAILY  Yes Abbi Katz MD   aspirin 81 MG EC tablet Take 81 mg by mouth daily.  Yes Historical Provider, MD        Scheduled Meds:   metoprolol tartrate  100 mg Oral BID    albumin human  25 g Intravenous Q8H    dilTIAZem  30 mg Oral Q6H    sodium chloride flush  10 mL Intravenous 2 times per day    fondaparinux  2.5 mg Subcutaneous Daily    aspirin  325 mg Oral Daily    acetaminophen  1,000 mg Oral Q6H    furosemide  20 mg Intravenous BID    magnesium oxide  400 mg Oral BID    mupirocin   Nasal BID    nitroGLYCERIN  1 patch Transdermal Daily    [Held by provider] potassium chloride  10 mEq Oral TID     sennosides-docusate sodium  1 tablet Oral BID    [START ON 5/28/2021] lisinopril  2.5 mg Oral Lunch    pantoprazole  40 mg Oral Daily    ceFAZolin (ANCEF) IVPB  2,000 mg Intravenous Q8H    insulin glargine 0.15 Units/kg Subcutaneous Nightly    [START ON 5/27/2021] insulin lispro  0.08 Units/kg Subcutaneous TID WC    insulin lispro  0-12 Units Subcutaneous TID WC    insulin lispro  0-6 Units Subcutaneous Nightly     Continuous Infusions:   esmolol Stopped (05/26/21 1001)    sodium chloride      lactated ringers      phenylephrine (TAN-SYNEPHRINE) 50mg/250mL infusion 40 mcg/min (05/26/21 1045)    nitroGLYCERIN      clevidipine      insulin 3 Units/hr (05/26/21 1045)    dextrose       PRN Meds:sodium chloride flush, sodium chloride, ondansetron, metoclopramide, traMADol **OR** traMADol, morphine, diphenhydrAMINE, zolpidem, magnesium hydroxide, polyethylene glycol, potassium chloride, magnesium sulfate, calcium chloride IVPB, calcium chloride IVPB, albuterol sulfate HFA, albumin human, lactated ringers, phenylephrine (TAN-SYNEPHRINE) 50mg/250mL infusion, furosemide, nitroGLYCERIN, clevidipine, glucose, dextrose, glucagon (rDNA), dextrose     Review of Systems:  · Constitutional: Negative for fever, night sweats, chills,  · Skin: Negative for rash, pruritus, bleeding, or bruising    · HEENT: Negative for vision changes, ringing in the ears, dysphagia  · Respiratory: Reviewed in HPI  · Cardiovascular: Reviewed in HPI  · Gastrointestinal: Negative for abdominal pain, N/V/D, constipation, or black/tarry stools  · Genito-Urinary: Negative for dysuria, incontinence, or hematuria  · Musculoskeletal: Negative for joint swelling, muscle pain, or injuries  · Neurological/Psych: Negative for confusion, seizures, headaches, or TIA-like symptoms. No anxiety or depression. Physical Examination:  Vitals:    05/26/21 1600   BP:    Pulse: 86   Resp: 26   Temp: 98.9 °F (37.2 °C)   SpO2: 96%      In: 1078 [P.O.:120;  I.V.:661.9]  Out: 2190    Wt Readings from Last 3 Encounters:   05/26/21 231 lb 7.7 oz (105 kg)   05/20/21 223 lb 9.6 oz (101.4 kg)   03/12/21 216 lb (98 kg)       Intake/Output Summary (Last 24 hours) at 31 2021    AST 30 2021    ALKPHOS 88 2021    PROT 6.6 2021    PROT 7.3 2012    AGRATIO 1.5 2021    BILITOT 0.6 2021     Cardiac Enzymes:   Lab Results   Component Value Date    TROPONINI 0.02 2021    TROPONINI 0.02 2021    TROPONINI 0.02 2021     Coags:   Lab Results   Component Value Date    PROTIME 12.6 2021    INR 1.09 2021       EC21  Normal sinus rhythm  Left ventricular hypertrophy with repolarization abnormality  St and T abnormality, consider LVH versus lateral ischemia    ECHO:  21   Summary   -Left ventricular cavity size is normal.   -Ejection fraction is visually estimated to be 50%. -The apex and apical septum appear hypokinetic.   -Grade I diastolic dysfunction with normal LV filling pressures. E/e' =   12.6.   -Left atrium is dilated. -Mild aortic stenosis with a peak velocity of 2.3m/s and a mean pressure   gradient of 11mmHg. -Mitral annular calcification is present. -Mild mitral regurgitation.   -Mild tricuspid regurgitation. RVSP = 36 mmHG. Cath: 2021  Anatomy:   LM-70% diffuse  LAD-diffusely diseased, 80% proximal, 90% mid calcified  Cx-90% ostial  RCA-diffusely diseased 50% proximal and mid, 70% distal  RPDA-patent  LVEF-50%     Impression:  1. Severe multivessel CAD. 2.  Preserved LV systolic function.     Plan:  1.  CV surgical consultation for CABG. 2.  Patient has been statin intolerant to Livalo but unsure whether other statins have been tried. Will discuss trial of Crestor with him.     Carotid duplex: 21  Summary        -The right internal carotid artery appears to have a <50% diameter reducing    stenosis based on velocity criteria.    -The left internal carotid artery appears to have a <50% diameter reducing    stenosis based on velocity criteria.         Procedure(s): 21  Urgent CABG Coronary Artery Bypass Graft x4 (LIMA to LAD, SVG to PDA, SVG sequenced to OM1 and OM2), Endoscopic vein harvest left saphenous vein, Left atrial appendage ligation with 40mm AtriClip, total cardiopulmonary bypass, doppler flow verification of bypass grafts, insertion of temporary ventricular pacing wires, transesophageal echocardiogram, 5 level bilateral intercostal nerve block with Marcaine 0.25%, left sided robicsek weave. Problem List:   Patient Active Problem List    Diagnosis Date Noted    Statin intolerance 05/24/2021    Stage 3a chronic kidney disease 05/24/2021    S/P coronary artery bypass graft x 4 05/25/2021    S/P left atrial appendage ligation 05/25/2021    Angina of effort ([facility] [address] 05/21/2021    Chest pain 05/20/2021    Basal cell carcinoma (BCC) of skin of face     Essential hypertension 11/07/2016    Mixed hyperlipidemia 11/07/2016    Type 2 diabetes mellitus with diabetic nephropathy, without long-term current use of insulin ([facility] [address] 11/13/2015    Gout 11/27/2012    Coronary artery disease involving native coronary artery of native heart with unstable angina pectoris (HCC)     Elevated LFTs     Post poliomyelitis syndrome         Assessment and Plan:     Coronary artery disease   [facility] 5/21: Revealing severe multivessel disease. Preserved LV systolic function. ~5/25/21 s/p CABG x4, ANDREW ligation. KATHERYN.   ~on aspirin, lopressor, diltiazem   ~contuinue post op care per CT surgery. Diuresing per CT surgery. Diabetes  ~A1c 7.1 (1/2021)    Hypertension  ~controlled     Hyperlipidemia  ~Statin intolerant (unsure of all he has tried with exception to Livalo)  ~discuss PCSK9 inhibitor as OP, may be limited r/t post polio syndrome      CKD  ~Per nephrology    Multiple medical conditions with risk of decompensation. All pertinent information and plan of care discussed with the physician. All questions and concerns were addressed to the patient. Alternatives to my treatment were discussed. I have discussed the above stated plan with patient and the nurse.  The patient verbalized understanding and agreed with the plan. Thank you for allowing to us to participate in the care of Seth Greenwood. Total visit time > 35 minutes; > 50% spend counseling / coordinating care. I reviewed interval history, physical exam, review of data including labs, imaging, development and implementation of treatment plan and coordination of complex care. Counseled on risk factor modifications. Deedee PENA  [facility] [place]   Office: [phone]    NOTE:  This report was transcribed using voice recognition software. Every effort was made to ensure accuracy; however, inadvertent computerized transcription errors may be present.

## 2021-05-26 NOTE — CARE COORDINATION
Discharge Planning Assessment  Readmission risk score 18%  RN discharge planner met with patient/ (and family member) to discuss reason for admission, current living situation, and potential needs at the time of discharge    Demographics/Insurance verified Yes    Current type of dwellin bedroom apartment with first floor entry    Patient from ECF/SW confirmed with:n/a    Living arrangements:with wife    Level of function/Support: generally independent    PCP:MAURA Doyle    Last Visit to PCP: in past week    DME: none    Active with any community resources/agencies/skilled home care: not at present    Medication compliance issues: not identified    Financial issues that could impact healthcare:not identified    Tentative discharge plan: pending therapy recommendations s/p CABG on 2021, potential home health or skilled management     Discussed and provided facilities of choice if transition to a skilled nursing facility is required at the time of discharge      Discussed with patient and/or family that on the day of discharge home tentative time of discharge will be between 10 AM and noon.     Transportation at the time of discharge: family will transport to home    CHRISTIAN Prater, CCM, [place] [id number]  [phone]

## 2021-05-27 NOTE — PROGRESS NOTES
PAD 26-28, BP labile from systolic  without any change in titration. CI 1.8-2.1 Liana titrated to 210mcgs/min. Dr. Alton Mullins in to see patient, Hilda Stock to continue titrating liana up. Suggested highly against levo. Okay to hold albumin till PAD is lower. Will pass on in report.

## 2021-05-27 NOTE — PROGRESS NOTES
Office : [phone]     Fax :[phone]       Nephrology Progress Note      Patient's Name: Elizabeth Russell  7:46 AM  5/27/2021      Chief Complaint:    Chief Complaint   Patient presents with    Shortness of Breath     sent by Dr Brit Craig for abnormal EKG. was unable to get into see cardiologist. has been feeling \"not so good\", gets sob with activity for a couple months. denies cp. no n/v. History of Present Ilness:    Elizabeth Russell is a 70 y.o. male with h/o DM 2, CKD 3 , DLP who came with complaints of 2-month history of progressive exertional shortness of breath and chest discomfort. Interval history :  S/p CABG yesterday. Post CABG recovering. Awake alert oriented in time place and person. Urine output is good. Minimal edema. Creatinine level elevated at 1.6  I/O last 3 completed shifts:   In: 0 [P.O.:600]  Out: 36 [Urine:1940; Chest Tube:430]    Past Medical History:   Diagnosis Date    CAD (coronary artery disease)     Diabetes mellitus ([facility] [address]     Elevated LFT's     Hyperlipidemia     Hypertension     Post poliomyelitis syndrome     Type II or unspecified type diabetes mellitus without mention of complication, not stated as uncontrolled        Past Surgical History:   Procedure Laterality Date    CLEFT PALATE REPAIR      CORONARY ARTERY BYPASS GRAFT N/A 5/25/2021    Urgent CABG Coronary Artery Bypass Graft x4 (LIMA to LAD, SVG to PDA, SVG sequenced to OM1 and OM2), Endoscopic vein harvest left saphenous vein, Left atrial appendage ligation with 40mm AtriClip, total cardiopulmonary bypass, doppler flow verification of bypass grafts, insertion of temporary ventricular pacing wires, transesophageal echocardiogram, 5 level bilateral intercostal nerve block with Ma    SKIN BIOPSY Left 7/20/2020    EXCISE SKIN LESION LEFT POST AURICULAR WITH FLAP, FROZEN SECTIONS performed by Anca Dhaliwal MD at [facility]           Current Medications:    albumin human 25 % IV solution 25 g, Q8H  dilTIAZem (CARDIZEM) tablet 30 mg, Q6H  sodium chloride 0.9 % infusion,   metoprolol tartrate (LOPRESSOR) tablet 150 mg, BID  esmolol (BREVIBLOC) 2.5gm/250ml NS infusion, Continuous  sodium chloride flush 0.9 % injection 10 mL, 2 times per day  sodium chloride flush 0.9 % injection 10 mL, PRN  0.9 % sodium chloride infusion, PRN  fondaparinux (ARIXTRA) injection 2.5 mg, Daily  ondansetron (ZOFRAN) injection 4 mg, Q6H PRN  metoclopramide (REGLAN) injection 10 mg, Q6H PRN  aspirin EC tablet 325 mg, Daily  acetaminophen (TYLENOL) tablet 1,000 mg, Q6H  traMADol (ULTRAM) tablet 50 mg, Q6H PRN   Or  traMADol (ULTRAM) tablet 100 mg, Q6H PRN  morphine (PF) injection 2 mg, Q2H PRN  furosemide (LASIX) injection 20 mg, BID  magnesium oxide (MAG-OX) tablet 400 mg, BID  mupirocin (BACTROBAN) 2 % ointment, BID  nitroGLYCERIN (NITRODUR) 0.2 MG/HR 1 patch, Daily  [Held by provider] potassium chloride (KLOR-CON) extended release tablet 10 mEq, TID WC  diphenhydrAMINE (BENADRYL) tablet 25 mg, Nightly PRN  zolpidem (AMBIEN) tablet 5 mg, Nightly PRN  sennosides-docusate sodium (SENOKOT-S) 8.6-50 MG tablet 1 tablet, BID  magnesium hydroxide (MILK OF MAGNESIA) 400 MG/5ML suspension 30 mL, Daily PRN  polyethylene glycol (GLYCOLAX) packet 17 g, Daily PRN  [START ON 5/28/2021] lisinopril (PRINIVIL;ZESTRIL) tablet 2.5 mg, Lunch  pantoprazole (PROTONIX) tablet 40 mg, Daily  potassium chloride 20 mEq/50 mL IVPB (Central Line), PRN  magnesium sulfate 2000 mg in 50 mL IVPB premix, PRN  calcium chloride 1,000 mg in sodium chloride 0.9 % 100 mL IVPB, PRN  calcium chloride 2,000 mg in sodium chloride 0.9 % 100 mL IVPB, PRN  albuterol sulfate  (90 Base) MCG/ACT inhaler 2 puff, Q6H PRN  albumin human 5 % IV solution 25 g, PRN  lactated ringers infusion 250 mL, Continuous PRN  phenylephrine (TAN-SYNEPHRINE) 50 mg in dextrose 5 % 250 mL infusion, Continuous PRN  furosemide (LASIX) injection 20 mg, PRN  nitroGLYCERIN 50 mg in dextrose 5% 250 mL infusion, Continuous PRN  clevidipine (CLEVIPREX) infusion, Continuous PRN  insulin regular (HUMULIN R;NOVOLIN R) 100 Units in sodium chloride 0.9 % 100 mL infusion, Continuous  glucose (GLUTOSE) 40 % oral gel 15 g, PRN  dextrose 50 % IV solution, PRN  glucagon (rDNA) injection 1 mg, PRN  dextrose 5 % solution, PRN  ceFAZolin (ANCEF) 2000 mg in dextrose 5 % 50 mL IVPB, Q8H  insulin glargine (LANTUS;BASAGLAR) injection pen 15 Units, Nightly  insulin lispro (1 Unit Dial) 8 Units, TID WC  insulin lispro (1 Unit Dial) 0-12 Units, TID WC  insulin lispro (1 Unit Dial) 0-6 Units, Nightly        Physical exam:     Vitals:  /65   Pulse 85   Temp 99.4 °F (37.4 °C) (Core)   Resp 16   Ht 5' 9\" (1.753 m)   Wt 229 lb 15 oz (104.3 kg)   SpO2 94%   BMI 33.96 kg/m²   Constitutional:  OAA X3 NAD  Skin: no rash, turgor wnl  Heent:  eomi, mmm  Neck: no bruits or jvd noted  Cardiovascular:  S1, S2 without m/r/g  Respiratory: CTA B without w/r/r  Abdomen:  +bs, soft, nt, nd  Ext: no  lower extremity edema    Labs:  CBC:   Recent Labs     05/25/21 1918 05/26/21  0000 05/26/21  0440 05/27/21  0401   WBC 17.1*  --  14.7* 16.2*   HGB 8.7* 9.4* 8.0* 6.9*   *  --  103* 97*     BMP:    Recent Labs     05/25/21 1918 05/26/21  0440 05/27/21  0401    141 141   K 4.2 4.9 5.1   * 111* 106   CO2 16* 21 20*   BUN 23* 23* 27*   CREATININE 1.2 1.6* 1.9*   GLUCOSE 201* 105* 184*     Ca/Mg/Phos:   Recent Labs     05/25/21  1918 05/26/21  0440 05/27/21  0401   CALCIUM 6.2* 8.5 8.5   MG 2.20 2.20 2.00     Hepatic:   No results for input(s): AST, ALT, ALB, BILITOT, ALKPHOS in the last 72 hours.   Troponin:   No results for input(s): TROPONINI in the last 72 hours.  BNP: No results for input(s): BNP in the last 72 hours. Lipids:   No results for input(s): CHOL, TRIG, HDL, LDLCALC, LABVLDL in the last 72 hours. ABGs:   Recent Labs     05/26/21  0529   PHART 7.377   PO2ART 106.3   EFT1RDS 39.6                IMAGING:  XR ABDOMEN FOR NG/OG/NE TUBE PLACEMENT   Final Result      XR CHEST PORTABLE   Final Result   Satisfactory appearance status post median sternotomy      No pneumothorax         CT CHEST ABDOMEN PELVIS WO CONTRAST   Final Result   No acute abnormality identified in the chest, abdomen, or pelvis. No finding   worrisome for occult malignancy. Moderate enlargement of the prostate. VL PRE OP VEIN MAPPING   Final Result      VL DUP CAROTID BILATERAL   Final Result      XR CHEST (2 VW)   Final Result   No active cardiopulmonary disease               A/p     1. Acute kidney injury on CKD stage 3A   NORA 2/2 ATN   Has anemia. hemoglobin is 6.9 today. Getting blood transfusion  Creat 1.6 ---> 1.4 --> 1.5 ---> 1.4 --->1.2 ---> 1.6 ---> 1.9   H/o DM 2    Good urine output  Expect renal function to improve  Continue Lasix for volume judgment      2. DM 2. Needs better control  On insulin    3. HTN . controlled   held lisinopril   Give amlodipine 5 mg po daily     4. Dyslipidemia . Statins     5. Has multivessel coronary artery disease. S/p CABG.   Management per CT surgery      Recommend to dose adjust all medications  based on renal functions  Monitor closely post CABG  Maintain SBP> 90 mmHg   Daily weights   AVOID NSAIDs  Avoid Nephrotoxins  Monitor Intake/Output  Call if significant decrease in urine output                 Thank you for allowing us to participate in care of Christianna Fabry         Electronically signed by: Paul Frost MD, 5/27/2021, 7:46 AM      [facility]  [address]  Office : [phone]  Fax :[phone]

## 2021-05-27 NOTE — PROGRESS NOTES
[facility] Progress Note      Admit Date:  5/20/2021    Chief Complaint: CAD with unstable angina    Subjective:  Mr. Branham Running notes mild sternotomy discomfort. Breathing seems reasonably comfortable.     Objective:   /72   Pulse 67   Temp 99.4 °F (37.4 °C) (Core)   Resp 18   Ht 5' 9\" (1.753 m)   Wt 229 lb 15 oz (104.3 kg)   SpO2 96%   BMI 33.96 kg/m²       Intake/Output Summary (Last 24 hours) at 5/27/2021 1255  Last data filed at 5/27/2021 1056  Gross per 24 hour   Intake 842.5 ml   Output 2415 ml   Net -1572.5 ml       TELEMETRY: Sinus     Physical Exam:  General:  Awake, alert, oriented x 3, NAD  Skin:  Warm and dry  Neck:  JVD normal  Chest:  normal air entry  Cardiovascular:  RRR S1S2, no S3, 2/6 systolic at lower left sternal border, at apex  Abdomen:  Soft, ND, NT, No HSM  Extremities: Trace edema    Medications:    insulin lispro  0-18 Units Subcutaneous TID     insulin lispro  0-9 Units Subcutaneous Nightly    albumin human  25 g Intravenous Q8H    dilTIAZem  30 mg Oral Q6H    metoprolol tartrate  150 mg Oral BID    sodium chloride flush  10 mL Intravenous 2 times per day    [Held by provider] fondaparinux  2.5 mg Subcutaneous Daily    aspirin  325 mg Oral Daily    acetaminophen  1,000 mg Oral Q6H    furosemide  20 mg Intravenous BID    magnesium oxide  400 mg Oral BID    mupirocin   Nasal BID    nitroGLYCERIN  1 patch Transdermal Daily    [Held by provider] potassium chloride  10 mEq Oral TID     sennosides-docusate sodium  1 tablet Oral BID    [Held by provider] lisinopril  2.5 mg Oral Lunch    pantoprazole  40 mg Oral Daily    insulin glargine  0.15 Units/kg Subcutaneous Nightly    insulin lispro  0.08 Units/kg Subcutaneous TID       esmolol Stopped (05/26/21 1001)    sodium chloride 25 mL (05/27/21 0901)    lactated ringers      phenylephrine (TAN-SYNEPHRINE) 50mg/250mL infusion 35 mcg/min (05/27/21 0942)    nitroGLYCERIN      clevidipine 5/21/2021  Anatomy:   LM-70% diffuse  LAD-diffusely diseased, 80% proximal, 90% mid calcified  Cx-90% ostial  RCA-diffusely diseased 50% proximal and mid, 70% distal  RPDA-patent  LVEF-50%     Impression:  1.  Severe multivessel CAD. 2.  Preserved LV systolic function.     Plan:  1. [facility] surgical consultation for CABG. 2.  Patient has been statin intolerant to Livalo but unsure whether other statins have been tried. Jagjit Rondon discuss trial of Crestor with him.     Carotid duplex: 5/21/21  Summary        -The right internal carotid artery appears to have a <50% diameter reducing    stenosis based on velocity criteria.    -The left internal carotid artery appears to have a <50% diameter reducing    stenosis based on velocity criteria         Assessment/Plan:  Principal Problem:    Coronary artery disease involving native coronary artery of native heart with unstable angina pectoris ([address]  Plan: Severe multivessel. S/P CABG POD #1    Active Problems:    S/P coronary artery bypass graft x 4  Plan: On Cristian-Synephrine currently; wean as hemodynamics allow. Did have KATHERYN (systolic anterior motion) of the mitral valve of unclear etiology as no apparent HCM (hypertrophic cardiomyopathy). Possibly leaflet length and/or underfilled ventricle. Will need caution to avoid hypovolemia as this could lead to hypotension. POD #1      S/P left atrial appendage ligation  Plan: Stable. Type 2 diabetes mellitus with diabetic nephropathy, without long-term current use of insulin (HCC)  Plan: Chronic. Currently on insulin drip. Essential hypertension  Plan: Currently hypotensive on Cristian-Synephrine. Wean as hemodynamics allow. Mixed hyperlipidemia  Plan: Statin intolerant. Will need to start on Repatha/Praluent when able. Statin intolerance  Plan: Will need to start on Repatha/Praluent when able. Stage 3a chronic kidney disease  Plan: Chronic. Trend creatinine. Nephrology following.       Post poliomyelitis syndrome  Plan: Chronic. Muna Potter MD, MD 5/27/2021 12:55 PM    Critical care time: 35 minutes.

## 2021-05-27 NOTE — PROGRESS NOTES
MD order received. Procedure explained to patient. Pacing wire site within normal limits. Cleansed site with Chloroprep. Ventricular pacing wires removed per policy without difficulty. Dry sterile dressing applied. Vital signs will be monitored and recorded per open heart protocol (every 15 minutes X 2, every 30 minutes X 1, and every hour X 1). Patient tolerated procedure well, heart tones easily auscultated, oxygen saturation within normal limits. Signs/symtoms for cardiac tamponade will be monitored closely.

## 2021-05-27 NOTE — PLAN OF CARE
Problem: Cardiovascular  Goal: Hemodynamic stability  Outcome: Ongoing     Problem: Respiratory  Goal: O2 Sat > 90%  Outcome: Ongoing     Problem: Falls - Risk of:  Goal: Will remain free from falls  Description: Will remain free from falls  Outcome: Ongoing

## 2021-05-27 NOTE — PROGRESS NOTES
Cardiothoracic Surgery Progress Note    CC: s/p CABG X 4, ANDREW ligation  POD #2     Subjective:  Hemodynamically stable on Cristian gtt, 2 L O2 NC, afebrile. Weight down from yesterday. Alert and oriented X 3. Pain well controlled. Wt: 104.3kg/105kg   Pre-op wt: 98.3 kg    Vital Signs:   Vitals:    05/27/21 0700   BP: 150/77   Pulse: 85   Resp: 16   Temp: 99.4 °F (37.4 °C)   SpO2: 94%      Gtts: Cristian @ 80 mcg/min    Physical Exam:   Cardiac:  NSR + murmur   Lungs: clear to auscultation, decreased bases bilaterally  Abdomen:  No BM  Vascular:  pulses all palpable   Extremities: generalized, non-pitting edema 1+  :  /675/665   Lasix 20 mg IV BID  Chest Tube(s) & Incision(s):    Chest Tubes:   180/120/130 No airleak No crepitus -20cm sx  Sternum: stable, edges approximated, no drainage  Chest tube site: C/D/I    Left leg incision:  edges well approximated     Labs:   CBC:   Recent Labs     05/26/21  0440 05/27/21  0401   WBC 14.7* 16.2*   HGB 8.0* 6.9*   HCT 25.2* 21.9*   * 97*     BMP:   Recent Labs     05/26/21  0440 05/27/21  0401   K 4.9 5.1   CREATININE 1.6* 1.9*   CALCIUM 8.5 8.5   MG 2.20 2.00       Assessment/Plan:  As per CC:     Plan:   -D/C pacing wires  -Follow CVTS chest tube removal protocol  -Leave [place]   -NORA- Diuresis and electrolytes per nephrology. Hold arixtra and K. Leave marrero. No ACE. Will continue current lasix regimen.  -Wean Cristian if able- keep SBP >115   -DM2- continue SSI/prandial/lantus. Will hold off on resuming home glucophage due to Cr  -Hgb 6.9- transfused 1 u PRBC this am   -Wean oxygen   -Aggressive IS  -OOB to chair today    Electronically signed by MONSE Fuller - CNP on 5/27/2021 at 8:47 AM     Attending Supervising [address]  The patient met the criteria for indirect supervision. I discussed the findings and plans with the nurse practitioner and agree as documented in her note .     Electronically signed by Palmer Way MD on 5/27/21 at 12:56 PM EDT

## 2021-05-27 NOTE — PROGRESS NOTES
05/26/21 1958   Incentive Spirometry Tx   Treatment Tolerance Fair   Incentive Spirometry Goal (mL) 707 mL   Incentive Spirometry Achieved (mL) 400 mL   4 attempts only

## 2021-05-27 NOTE — PROGRESS NOTES
Physical Therapy    Facility/Department: [facility]  Initial Assessment    NAME: Chris Roach  : 1950  MRN: [id number]    Date of Service: 2021    Discharge Recommendations: Chris Roach scored a 6/24 on the AM-PAC short mobility form. Current research shows that an AM-PAC score of 17 or less is typically not associated with a discharge to the patient's home setting. Based on the patient's AM-PAC score and their current functional mobility deficits, it is recommended that the patient have 5-7 sessions per week of Physical Therapy at d/c to increase the patient's independence. At this time, this patient demonstrates the endurance, and/or tolerance for 3 hours of therapy each day, with a treatment frequency of 5-7x/wk. Please see assessment section for further patient specific details. If patient discharges prior to next session this note will serve as a discharge summary. Please see below for the latest assessment towards goals. PT Equipment Recommendations  Equipment Needed: No  Other: TBD at next level of care, pt would need a w/c if he discharged home    Assessment   Body structures, Functions, Activity limitations: Decreased functional mobility ; Decreased strength;Decreased endurance;Decreased balance  Assessment: Pt presented with decreased strength, balance and activity tolerance s/p CABG that impairs his ability to stand or ambulate independently. He required two person assistance to stand today. He was independent and active prior to admission.   Prognosis: Good  Decision Making: High Complexity  PT Education: PT Role;Plan of Care;General Safety;Transfer Training  Patient Education: pt verbalized understanding  Barriers to Learning: none  REQUIRES PT FOLLOW UP: Yes  Activity Tolerance  Activity Tolerance: Patient Tolerated treatment well;Treatment limited secondary to medical complications (free text)  Activity Tolerance: RN in room for transfer due to medical complexity Patient Diagnosis(es): The encounter diagnosis was Chest pain, unspecified type. has a past medical history of CAD (coronary artery disease), Diabetes mellitus ([address] Elevated LFT's, Hyperlipidemia, Hypertension, Post poliomyelitis syndrome, and Type II or unspecified type diabetes mellitus without mention of complication, not stated as uncontrolled. has a past surgical history that includes Cleft palate repair; [place] tooth extraction; skin biopsy (Left, 7/20/2020); and Coronary artery bypass graft (N/A, 5/25/2021). Restrictions  Restrictions/Precautions  Restrictions/Precautions: Fall Risk (high fall risk)  Position Activity Restriction  Sternal Precautions: 5# Lifting Restrictions, No Pulling, No Pushing  Other position/activity restrictions: Renita Luciano is a 70 y.o. male presented to the hospital with complaints of 2-month history of progressive exertional shortness of breath and chest discomfort. LHC revealed MVD. s/p CABG x4, ANDREW ligation 5/25/21. \"  Pt was extubated on 5/26.   Vision/Hearing  Vision: Within Functional Limits  Hearing: Within functional limits     Subjective  General  Chart Reviewed: Yes  Patient assessed for rehabilitation services?: Yes  Family / Caregiver Present: No  Diagnosis: s/p CABG  Follows Commands: Within Functional Limits  General Comment  Comments: pt was supine in bed upon PT arrival, agreeable to PT  Subjective  Subjective: pt denied pain, pt was agreeable to getting OOB  Pain Screening  Patient Currently in Pain: No          Orientation  Orientation  Overall Orientation Status: Within Functional Limits  Social/Functional History  Social/Functional History  Lives With: Spouse  Type of Home: Apartment  Home Layout: One level  Home Access: Stairs to enter without rails  Entrance Stairs - Number of Steps: 1  Bathroom Shower/Tub: Tub/Shower unit  Bathroom Toilet: Standard  Bathroom Equipment: Grab bars in shower  ADL Assistance: [facility] with min A  Short term goal 3: Pt will ambulate 21' with AAD and mod A  Short term goal 4: Pt will sit unsupported for 5 minutes and SBA  Patient Goals   Patient goals : ultimately regain independence       Therapy Time   Individual Concurrent Group Co-treatment   Time In 1115         Time Out 1138         Minutes 23         Timed Code Treatment Minutes: RHODA Jorgensen DPT [id number]

## 2021-05-27 NOTE — PROGRESS NOTES
Dr. Davy Lorenzo notified per Adama Kiser, NP, about today's events. Order received to give Cardizem today and to give lopressor tonight - No parameters given. Patient's BP is extremely sensitive and labile. Per Dr. Davy Lorenzo okay to titrate liana to systolic of 512. Will continue to titrate liana synephrine per order. Hgb recheck 8.2 no new orders. Also, patient's right eyelid swollen, Adama Kiser notified, no new orders.

## 2021-05-27 NOTE — PROGRESS NOTES
[id number] [facility] Diabetes Education Nurse  Consult Note       NAME:  [place] RECORD NUMBER:  [id number]  AGE: 70 y.o. GENDER: male  : 1950  TODAY'S DATE:  2021    Subjective:     Reason for Educator consult ---  Evaluation and Assessment:    Shawn Schwartz is a 70 y.o. male referred by:   [] Physician  [x] Nursing: APRN  [] Other:      Pt seen for DM education. Pt states Dr. Cuevas Neither manages his diabetes care. Pt states he takes metformin and glipizide at home, always remembers to take his medications. Pt states he does not have testing supplies; glucose monitor is older. Educated on s/s of hyperglycemia and when to call provider. Explained A1C values and goals. Informed pt of current A1C which is up from January. Pt states he and his wife cook meals at home and avoid sugary beverages. Discussed foot care, wearing protective shoes, inspecting feet for abrasions. Pt denies any loss of sensation to feet. Pt given Diabetes DVD and booklets titled \"Diabetes and me\", \"Meal planning and carb counting\", and \"Staying on track\".       PAST MEDICAL HISTORY      Diagnosis Date    CAD (coronary artery disease)     Diabetes mellitus ([address]     Elevated LFT's     Hyperlipidemia     Hypertension     Post poliomyelitis syndrome     Type II or unspecified type diabetes mellitus without mention of complication, not stated as uncontrolled        PAST SURGICAL HISTORY  Past Surgical History:   Procedure Laterality Date    CLEFT PALATE REPAIR      CORONARY ARTERY BYPASS GRAFT N/A 2021    Urgent CABG Coronary Artery Bypass Graft x4 (LIMA to LAD, SVG to PDA, SVG sequenced to OM1 and OM2), Endoscopic vein harvest left saphenous vein, Left atrial appendage ligation with 40mm AtriClip, total cardiopulmonary bypass, doppler flow verification of bypass grafts, insertion of temporary ventricular pacing wires, transesophageal echocardiogram, 5 level bilateral intercostal nerve block with Ma    SKIN 05/27/2021    PLT 97 05/27/2021    MPV 10.6 05/27/2021     CMP:    Lab Results   Component Value Date     05/27/2021    K 5.1 05/27/2021    K 4.5 05/24/2021     05/27/2021    CO2 20 05/27/2021    BUN 27 05/27/2021    CREATININE 1.9 05/27/2021    GFRAA 42 05/27/2021    AGRATIO 1.5 05/20/2021    LABGLOM 35 05/27/2021    LABGLOM 52 08/22/2014    GLUCOSE 184 05/27/2021    GLUCOSE 124 05/05/2012    PROT 6.6 05/22/2021    PROT 7.3 11/21/2012    LABALBU 3.9 05/22/2021    CALCIUM 8.5 05/27/2021    BILITOT 0.6 05/22/2021    ALKPHOS 88 05/22/2021    AST 30 05/22/2021    ALT 31 05/22/2021       HgBA1c:    Lab Results   Component Value Date    LABA1C 7.6 05/22/2021       This patient's last creatinine was   Recent Labs     05/27/21  0401   CREATININE 1.9*        Recent Blood sugars have been   Lab Results   Component Value Date    POCGLU 166 05/27/2021    POCGLU 188 05/27/2021    POCGLU 192 05/27/2021    POCGLU 175 05/27/2021    POCGLU 148 05/27/2021    POCGLU 154 05/27/2021    POCGLU 169 05/27/2021    POCGLU 177 05/27/2021     Lab Results   Component Value Date    GLUCOSE 184 05/27/2021    GLUCOSE 105 05/26/2021    GLUCOSE 201 05/25/2021    GLUCOSE 161 05/25/2021    GLUCOSE 147 05/24/2021    GLUCOSE 182 05/22/2021    GLUCOSE 124 05/05/2012    GLUCOSE 96 11/11/2011    GLUCOSE 119 08/20/2011    GLUCOSE 114 06/18/2011            Assessment:     Patient Active Problem List   Diagnosis    Coronary artery disease involving native coronary artery of native heart with unstable angina pectoris (HCC)    Elevated LFTs    Post poliomyelitis syndrome    Gout    Type 2 diabetes mellitus with diabetic nephropathy, without long-term current use of insulin (HCC)    Essential hypertension    Mixed hyperlipidemia    Basal cell carcinoma (BCC) of skin of face    Chest pain    Angina of effort ([address]    Statin intolerance    Stage 3a chronic kidney disease    S/P coronary artery bypass graft x 4    S/P left atrial appendage ligation       Plan:     Plan of Care:   Pt seen by dietitian  Continue to monitor blood sugars to determine adequacy of current dosages  Prescriptions for testing supplies have been sent to pt's pharmacy    Discharge Plan:  Patient to f/u with PCP. May need meds adjusted for renal considerations.

## 2021-05-27 NOTE — PROGRESS NOTES
Hospitalist Progress Note      PCP: Kianna Burgess MD    Date of Admission: 5/20/2021    Chief Complaint: [facility] Course:   71 yo M with history of DM 2, HTN, CKD 3, post poliomyelitis syndrome who came to ER with CP. Admitted as inpatient for further management. Underwent LHC on 5/21: Anatomy:   LM-70% diffuse  LAD-diffusely diseased, 80% proximal, 90% mid calcified  Cx-90% ostial  RCA-diffusely diseased 50% proximal and mid, 70% distal  RPDA-patent  LVEF-50%     Impression:  1. Severe multivessel CAD. 2.  Preserved LV systolic function.     Plan:  1.  CV surgical consultation for CABG. 2.  Patient has been statin intolerant to Livalo but unsure whether other statins have been tried. Will discuss trial of Crestor with him. S/P Urgent CABG Coronary Artery Bypass Graft x4 (LIMA to LAD, SVG to PDA, SVG sequenced to OM1 and OM2), Endoscopic vein harvest left saphenous vein, Left atrial appendage ligation with 40mm AtriClip, total cardiopulmonary bypass, doppler flow verification of bypass grafts, insertion of temporary ventricular pacing wires, transesophageal echocardiogram, 5 level bilateral intercostal nerve block with Marcaine 0.25%, left sided robicsek weave on 5/25/21. Subjective:  Patient feels better today. Able to lift arms. Has expected CP from tubes. Denies SOB, HA or abdominal pain. No fevers.       Medications:  Reviewed    Infusion Medications    esmolol Stopped (05/26/21 1001)    sodium chloride 25 mL (05/27/21 0901)    lactated ringers      phenylephrine (TAN-SYNEPHRINE) 50mg/250mL infusion 35 mcg/min (05/27/21 0942)    nitroGLYCERIN      clevidipine      dextrose       Scheduled Medications    insulin lispro  0-18 Units Subcutaneous TID WC    insulin lispro  0-9 Units Subcutaneous Nightly    albumin human  25 g Intravenous Q8H    dilTIAZem  30 mg Oral Q6H    metoprolol tartrate  150 mg Oral BID    sodium chloride flush  10 mL Intravenous 2 times per day motion without deformity. Skin: Sternotomy C/D/I  Neurologic:  Neurovascularly intact without any focal sensory/motor deficits. Cranial nerves: II-XII intact, grossly non-focal.  Psychiatric: Alert and oriented, thought content appropriate, normal insight  Capillary Refill: Brisk,3 seconds, normal   Peripheral Pulses: +2 palpable, equal bilaterally       Labs:   Recent Labs     05/25/21 1918 05/26/21  0000 05/26/21  0440 05/27/21  0401   WBC 17.1*  --  14.7* 16.2*   HGB 8.7* 9.4* 8.0* 6.9*   HCT 27.6*  --  25.2* 21.9*   *  --  103* 97*     Recent Labs     05/25/21 1918 05/26/21  0440 05/27/21  0401    141 141   K 4.2 4.9 5.1   * 111* 106   CO2 16* 21 20*   BUN 23* 23* 27*   CREATININE 1.2 1.6* 1.9*   CALCIUM 6.2* 8.5 8.5     No results for input(s): AST, ALT, BILIDIR, BILITOT, ALKPHOS in the last 72 hours. No results for input(s): INR in the last 72 hours. No results for input(s): Adama Cobble in the last 72 hours. Urinalysis:      Lab Results   Component Value Date    NITRU Negative 05/21/2021    WBCUA 0 05/21/2021    RBCUA 0 05/21/2021    BLOODU Negative 05/21/2021    SPECGRAV 1.015 05/21/2021    GLUCOSEU 100 05/21/2021       Radiology:  XR ABDOMEN FOR NG/OG/NE TUBE PLACEMENT   Final Result      XR CHEST PORTABLE   Final Result   Satisfactory appearance status post median sternotomy      No pneumothorax         CT CHEST ABDOMEN PELVIS WO CONTRAST   Final Result   No acute abnormality identified in the chest, abdomen, or pelvis. No finding   worrisome for occult malignancy. Moderate enlargement of the prostate.          VL PRE OP VEIN MAPPING   Final Result      VL DUP CAROTID BILATERAL   Final Result      XR CHEST (2 VW)   Final Result   No active cardiopulmonary disease                 Assessment/Plan:    Active Hospital Problems    Diagnosis     Statin intolerance [Z78.9]      Priority: Medium    Stage 3a chronic kidney disease [N18.31]      Priority: Medium    S/P coronary

## 2021-05-27 NOTE — PROGRESS NOTES
Occupational Therapy   Occupational Therapy Initial Assessment/Treatment   Date: 2021   Patient Name: Fredi Salas  MRN: [id number]     : 1950    Date of Service: 2021    Discharge Recommendations:  5-7 sessions per week  OT Equipment Recommendations  Other: CTA pending progress     Fredi Salas scored a  on the AM-PAC ADL Inpatient form. Current research shows that an AM-PAC score of 17 or less is typically not associated with a discharge to the patient's home setting. Based on the patient's AM-PAC score and their current ADL deficits, it is recommended that the patient have 5-7 sessions per week of Occupational Therapy at d/c to increase the patient's independence. At this time, this patient demonstrates the endurance, and/or tolerance for 3 hours of therapy each day, with a treatment frequency of 5-7x/wk. Please see assessment section for further patient specific details. If patient discharges prior to next session this note will serve as a discharge summary. Please see below for the latest assessment towards goals. Assessment   Performance deficits / Impairments: Decreased functional mobility ; Decreased strength;Decreased endurance;Decreased high-level IADLs;Decreased ADL status; Decreased balance  Assessment: Pt is a 72yr old male functioning below baseline due to deficits in strength, activity tolerance, mobility, and impaired ADLs. Pt reports baseline independence with ADLs and IADLs. Pt is currently requiring assistance x2 for stand pivot transfers. Anticipate the need for high intensity OT at 5-7x a week in order to increase function to baseline independence. Prognosis: Good  Decision Making: High Complexity  OT Education: OT Role;Transfer Training;Precautions; ADL Adaptive Strategies; Plan of Care;Family Education;Orientation  Patient Education: sternal precautions, role of OT  REQUIRES OT FOLLOW UP: Yes  Activity Tolerance  Activity Tolerance: Treatment limited secondary to medical complications (free text); Patient limited by fatigue;Patient limited by pain; Patient Tolerated treatment well  Activity Tolerance: Pt tolerate OOB activity to bedside chair; Pt limited due to critical line management of [place] cath (RN present to manage)  Safety Devices  Safety Devices in place: Yes  Type of devices: All fall risk precautions in place;Nurse notified; Left in chair;Call light within reach; Patient at risk for falls           Patient Diagnosis(es): The encounter diagnosis was Chest pain, unspecified type. has a past medical history of CAD (coronary artery disease), Diabetes mellitus ([facility] [address] Elevated LFT's, Hyperlipidemia, Hypertension, Post poliomyelitis syndrome, and Type II or unspecified type diabetes mellitus without mention of complication, not stated as uncontrolled. has a past surgical history that includes Cleft palate repair; [place] tooth extraction; skin biopsy (Left, 7/20/2020); and Coronary artery bypass graft (N/A, 5/25/2021). Restrictions  Restrictions/Precautions  Restrictions/Precautions: Fall Risk (high fall risk)  Position Activity Restriction  Sternal Precautions: 5# Lifting Restrictions, No Pulling, No Pushing  Other position/activity restrictions: Shira Palma is a 70 y.o. male presented to the hospital with complaints of 2-month history of progressive exertional shortness of breath and chest discomfort. LHC revealed MVD. s/p CABG x4, ANDREW ligation 5/25/21. \"  Pt was extubated on 5/26. Subjective   General  Chart Reviewed: Yes, Orders, Progress Notes, History and Physical, Imaging  Patient assessed for rehabilitation services?: Yes  Additional Pertinent Hx: DM 2, HTN, CKD 3, post poliomyelitis syndrome  Referring Practitioner: Libby Edwards MD  Diagnosis: s/p CABG X 4, ANDREW ligation  Subjective  Subjective: Pt pleasant, agreeable to OT evaluation.  RN present for assessment due to [facility] present  Vital Signs  Temp: 99.4 °F (37.4 °C)  Temp Source: Core  Pulse: 74  Heart Rate Source: Monitor  Resp: 18  Oxygen Therapy  SpO2: 96 %  Social/Functional History  Social/Functional History  Lives With: Spouse  Type of Home: Apartment  Home Layout: One level  Home Access: Stairs to enter without rails  Entrance Stairs - Number of Steps: 1  Bathroom Shower/Tub: Tub/Shower unit  Bathroom Toilet: Standard  Bathroom Equipment: Grab bars in shower  ADL Assistance: Independent  Homemaking Responsibilities:  (wife completes all cooking, cleaning, and laundry)  Ambulation Assistance: Independent  Transfer Assistance: Independent  Occupation: Retired  Leisure & Hobbies: yardwork  Additional Comments: Pt's polio affected his LLE length but pt had no other deficits. Objective        Orientation  Overall Orientation Status: Within Functional Limits     Balance  Sitting Balance: Minimal assistance (Min to CGA at side of bed)  Standing Balance: Dependent/Total (mod x2 for stand pivot transfer)  Standing Balance  Time: ~15 secs  Activity: Stand pivot transfers from bed to chair  Comment: mod x2  Functional Mobility  Functional Mobility Comments: No assessed due to deficits in functional strength and balance impairments  ADL  LE Dressing: Dependent/Total (donning socks)  Toileting: Other (Comment) (marrero catheter)  Additional Comments: Pt declined all further ADLs this date. Anticipate the need for max A for LB ADLs and min A for UB ADLs due to deficits in strength, ROM, balance, and mobility  Tone RUE  RUE Tone: Normotonic  Tone LUE  LUE Tone: Normotonic     Bed mobility  Supine to Sit: Dependent/Total;2 Person assistance (mod A x2)  Scooting: Dependent/Total;2 Person assistance (Mod x2)  Transfers  Stand Pivot Transfers: Moderate assistance;2 Person assistance;Dependent/Total  Sit to stand: Moderate assistance;2 Person assistance;Dependent/Total  Stand to sit: 2 Person assistance; Moderate assistance;Dependent/Total  Transfer Comments: without AD; stand pivot from bed to chair     Cognition  Overall Cognitive Status: WFL        Sensation  Overall Sensation Status: WFL        LUE AROM (degrees)  LUE General AROM: functional withing surgical protocol  RUE AROM (degrees)  RUE General AROM: functional withing surgical protocol                      Plan   Plan  Times per week: 3-5  Current Treatment Recommendations: Strengthening, Patient/Caregiver Education & Training, Functional Mobility Training, Endurance Training, Balance Training, Safety Education & Training, Self-Care / ADL, Home Management Training             AM-PAC Score        AM-PAC Inpatient Daily Activity Raw Score: 12 (05/27/21 1220)  AM-PAC Inpatient ADL T-Scale Score : 30.6 (05/27/21 1220)  ADL Inpatient CMS 0-100% Score: 66.57 (05/27/21 1220)  ADL Inpatient CMS G-Code Modifier : CL (05/27/21 1220)    Goals  Short term goals  Time Frame for Short term goals: Prior to d/c  Short term goal 1: Pt will complete LB dressing with mod A  Short term goal 2: Pt will complete toileting with min A  Short term goal 3: Pt will complete bathing with mod A  Short term goal 4: Pt will complete toilet transfers with min A using LRAD  Patient Goals   Patient goals :  To be independent       Therapy Time   Individual Concurrent Group Co-treatment   Time In 1115         Time Out 1138         Minutes 23         Timed Code Treatment Minutes: 100 JENNI Lemus/DEE

## 2021-05-27 NOTE — ACP (ADVANCE CARE PLANNING)
Advanced Care Planning Note. Purpose of Encounter: Advanced care planning in light of CAD  Parties In Attendance:  Patient, wife  Decisional Capacity: Yes  Subjective: Patient with expected pain from chest tubes  Objective: Cr 1.9  Goals of Care Determination: Patient wants full support (CPR, vent, surgery, HD, trach, PEG)  Plan:  Cardio and CTS consults. Renal consult. PRBC. PT/OT  Code Status: Full code   Time spent on Advanced care Plannin minutes  Advanced Care Planning Documents: Completed advanced directives on chart, wife is the POA.     Allen Oliver MD  2021 2:19 PM

## 2021-05-28 NOTE — PROGRESS NOTES
CC: s/p CABG x 4 / ANDREW clip; CRF; KATHERYN    No c/o  SR at 80  On 150 mcg/min liana (keeping SBP>115 as no KATHERYN with that pressure intra-op)  Cr stable at 1.9 (baseline 1.4-1.6) with good UOP  Hgb 8.4  CTAB RRR with softer murmur this morning sternum stable  As per CC  D/C cardizem and increase lopressor  1 unit PRBC  Will try adding clonidine next to decrease heart rate next  Monitor Cr

## 2021-05-28 NOTE — PROGRESS NOTES
Chest tubes meet criteria to remove per open heart protocol. No air leak and no crepitus noted. Pt instructed on procedure. Dressings removed. Incision within normal limits. Site cleansed and prepped per protocol. Chest tubes X 2 removed without difficulty. Vaseline gauze and dry sterile dressing applied. Bilateral breath sounds audible. O2Sats 93% on 4L. Patient tolerated well.

## 2021-05-28 NOTE — FLOWSHEET NOTE
O2 sat is 91-92%. Remains on 15 liters of high flow. Patient has use incentive spirometry 3x sets of 10 this AM with volume of 250 mostly and rarely 400cc.

## 2021-05-28 NOTE — PLAN OF CARE
Problem: Cardiovascular  Goal: Hemodynamic stability  Outcome: Ongoing     Problem: Respiratory  Goal: O2 Sat > 90%  Outcome: Ongoing     Problem: Falls - Risk of:  Goal: Will remain free from falls  Description: Will remain free from falls  Outcome: Ongoing     Problem: Nutrition  Goal: Optimal nutrition therapy  Outcome: Ongoing

## 2021-05-28 NOTE — PROGRESS NOTES
Hospitalist Progress Note      PCP: Julio C Vincent MD    Date of Admission: 5/20/2021    Chief Complaint: [facility] Course:   71 yo M with history of DM 2, HTN, CKD 3, post poliomyelitis syndrome who came to ER with CP. Admitted as inpatient for further management. Underwent LHC on 5/21: Anatomy:   LM-70% diffuse  LAD-diffusely diseased, 80% proximal, 90% mid calcified  Cx-90% ostial  RCA-diffusely diseased 50% proximal and mid, 70% distal  RPDA-patent  LVEF-50%     Impression:  1. Severe multivessel CAD. 2.  Preserved LV systolic function.     Plan:  1.  CV surgical consultation for CABG. 2.  Patient has been statin intolerant to Livalo but unsure whether other statins have been tried. Will discuss trial of Crestor with him. S/P Urgent CABG Coronary Artery Bypass Graft x4 (LIMA to LAD, SVG to PDA, SVG sequenced to OM1 and OM2), Endoscopic vein harvest left saphenous vein, Left atrial appendage ligation with 40mm AtriClip, total cardiopulmonary bypass, doppler flow verification of bypass grafts, insertion of temporary ventricular pacing wires, transesophageal echocardiogram, 5 level bilateral intercostal nerve block with Marcaine 0.25%, left sided robicsek weave on 5/25/21.       Subjective: Patient more short of breath today oxygen requirement has gone up to 15 L and having cough that has been low-grade fevers of 99 no chest pain no nausea vomiting      Medications:  Reviewed    Infusion Medications    sodium chloride      sodium chloride      sodium chloride 25 mL (05/27/21 0901)    lactated ringers      phenylephrine (TAN-SYNEPHRINE) 50mg/250mL infusion 150 mcg/min (05/28/21 0827)    nitroGLYCERIN      clevidipine      dextrose       Scheduled Medications    metoprolol tartrate  200 mg Oral BID    furosemide  20 mg Intravenous Daily    insulin lispro  0-18 Units Subcutaneous TID WC    insulin lispro  0-9 Units Subcutaneous Nightly    sodium chloride flush  10 mL Intravenous 2 times per day    [Held by provider] fondaparinux  2.5 mg Subcutaneous Daily    aspirin  325 mg Oral Daily    acetaminophen  1,000 mg Oral Q6H    magnesium oxide  400 mg Oral BID    mupirocin   Nasal BID    [Held by provider] potassium chloride  10 mEq Oral TID     sennosides-docusate sodium  1 tablet Oral BID    [Held by provider] lisinopril  2.5 mg Oral Lunch    pantoprazole  40 mg Oral Daily    insulin glargine  0.15 Units/kg Subcutaneous Nightly    insulin lispro  0.08 Units/kg Subcutaneous TID      PRN Meds: sodium chloride, sodium chloride flush, sodium chloride, ondansetron, metoclopramide, traMADol **OR** traMADol, morphine, diphenhydrAMINE, zolpidem, magnesium hydroxide, polyethylene glycol, potassium chloride, magnesium sulfate, calcium chloride IVPB, calcium chloride IVPB, albuterol sulfate HFA, albumin human, lactated ringers, phenylephrine (TAN-SYNEPHRINE) 50mg/250mL infusion, furosemide, nitroGLYCERIN, clevidipine, glucose, dextrose, glucagon (rDNA), dextrose      Intake/Output Summary (Last 24 hours) at 5/28/2021 1111  Last data filed at 5/28/2021 1037  Gross per 24 hour   Intake 1311.03 ml   Output 1980 ml   Net -668.97 ml       Physical Exam Performed:    /61   Pulse 75   Temp (P) 99.7 °F (37.6 °C) (Temporal)   Resp 24   Ht 5' 9\" (1.753 m)   Wt 229 lb 0.9 oz (103.9 kg)   SpO2 91%   BMI 33.83 kg/m²     General appearance: In bed appears short of breath oriented x3  HEENT: Pupils equal, round, and reactive to light. Conjunctivae/corneas clear. Neck: Supple, with full range of motion. No jugular venous distention. Trachea midline. Respiratory: Bilateral wheezing expiratory  Cardiovascular: Regular rate and rhythm with normal S1/S2 without murmurs, rubs or gallops. Abdomen: Soft, non-tender, non-distended with normal bowel sounds.   Musculoskeletal: 2+Plus bilateral lower extremity edema  Skin: Sternotomy C/D/I  Neurologic: no gross focal defciits  Psychiatric: Alert and oriented, thought content appropriate, normal insight      Labs:   Recent Labs     05/26/21  0440 05/27/21  0401 05/27/21  1405 05/28/21  0407   WBC 14.7* 16.2*  --  19.5*   HGB 8.0* 6.9* 8.2* 8.4*   HCT 25.2* 21.9* 25.8* 26.3*   * 97*  --  111*     Recent Labs     05/26/21  0440 05/27/21  0401 05/28/21  0407    141 137   K 4.9 5.1 4.8   * 106 104   CO2 21 20* 21   BUN 23* 27* 41*   CREATININE 1.6* 1.9* 1.9*   CALCIUM 8.5 8.5 8.3     No results for input(s): AST, ALT, BILIDIR, BILITOT, ALKPHOS in the last 72 hours. No results for input(s): INR in the last 72 hours. No results for input(s): Jono Ang in the last 72 hours. Urinalysis:      Lab Results   Component Value Date    NITRU Negative 05/21/2021    WBCUA 0 05/21/2021    RBCUA 0 05/21/2021    BLOODU Negative 05/21/2021    SPECGRAV 1.015 05/21/2021    GLUCOSEU 100 05/21/2021       Radiology:  XR CHEST PORTABLE   Final Result   No pneumothorax following removal of the left chest tube. Development of   moderate opacities within the right lung either atelectasis or pneumonia. No   abnormal pulmonary vascular congestion or sizable pleural effusion. XR ABDOMEN FOR NG/OG/NE TUBE PLACEMENT   Final Result      XR CHEST PORTABLE   Final Result   Satisfactory appearance status post median sternotomy      No pneumothorax         CT CHEST ABDOMEN PELVIS WO CONTRAST   Final Result   No acute abnormality identified in the chest, abdomen, or pelvis. No finding   worrisome for occult malignancy. Moderate enlargement of the prostate.          VL PRE OP VEIN MAPPING   Final Result      VL DUP CAROTID BILATERAL   Final Result      XR CHEST (2 VW)   Final Result   No active cardiopulmonary disease                 Assessment/Plan:    Active Hospital Problems    Diagnosis     Statin intolerance [Z78.9]      Priority: Medium    Stage 3a chronic kidney disease [N18.31]      Priority: Medium    S/P coronary artery bypass graft x 4 [Z95.1]  S/P left atrial appendage ligation [Z98.890]     Essential hypertension [I10]     Mixed hyperlipidemia [E78.2]     Type 2 diabetes mellitus with diabetic nephropathy, without long-term current use of insulin (HCC) [E11.21]     Coronary artery disease involving native coronary artery of native heart with unstable angina pectoris (HCC) [I25.110]     Post poliomyelitis syndrome [G14]    Acute hypoxic respiratory failure likely secondary to heart failure secondary to KATHERYN  - phenlyephine keep SBP>115   -patient withwheezing discussed with CTVS likely cardiac wheeze hold of steroids for now  - lopressor added to rate control    Acute Postop blood loss anemia   -  transufe 1 more unit prbcs per ctvs    CAD s/p CABG: per cards and ctvs    Dm2: lantus and lispro monitor    Leukocytosis: disccused with cvts lkely post op is having low grade fever no need for atbx per ctvs  - will check procal, ua and blood cutlures   - monitor    Eulogio: cr akua 1.3  - lasix nephro on board      Vinicio Powell MD

## 2021-05-28 NOTE — FLOWSHEET NOTE
Patient at 15 liters of high flow with sat at 88% now. ABG drawn. PH 7.37 PCO2 35 PO2 57 Bicarb 20.5,base excess -4.8 and sat 88.7%.

## 2021-05-28 NOTE — PROGRESS NOTES
Physical Therapy  Ralph Ashton  Upon entrance to the room, Maty Sadler, CODI stated that it is \"probably best to hold the patient's therapy this date due to receiving blood and being on 15L high flow. \" Will re-attempt therapy as schedule permits and patient is medically stable. Lynne Winston, [place] [address]    I agree with the above note.    [employer], [place] [id number]

## 2021-05-28 NOTE — PROGRESS NOTES
[facility] Daily Progress Note      Admit Date:  5/20/2021    Chief Complaint: CAD with unstable angina    Subjective:  Mr. Robert Soto notes mild sternotomy discomfort. As noted some worsening shortness of breath.     Objective:   /69   Pulse 73   Temp 99.3 °F (37.4 °C) (Core)   Resp 24   Ht 5' 9\" (1.753 m)   Wt 229 lb 0.9 oz (103.9 kg)   SpO2 95%   BMI 33.83 kg/m²       Intake/Output Summary (Last 24 hours) at 5/28/2021 1252  Last data filed at 5/28/2021 1206  Gross per 24 hour   Intake 1791.03 ml   Output 2200 ml   Net -408.97 ml       TELEMETRY: Sinus     Physical Exam:  General:  Awake, alert, oriented x 3, NAD  Skin:  Warm and dry  Neck:  JVD normal  Chest: Decreased breath sounds  Cardiovascular:  RRR S1S2, no S3, 4/6 systolic at lower left sternal border, at apex  Abdomen:  Soft, ND, NT, No HSM  Extremities: Trace edema    Medications:    metoprolol tartrate  200 mg Oral BID    furosemide  20 mg Intravenous Daily    insulin lispro  0-18 Units Subcutaneous TID     insulin lispro  0-9 Units Subcutaneous Nightly    sodium chloride flush  10 mL Intravenous 2 times per day    [Held by provider] fondaparinux  2.5 mg Subcutaneous Daily    aspirin  325 mg Oral Daily    acetaminophen  1,000 mg Oral Q6H    magnesium oxide  400 mg Oral BID    mupirocin   Nasal BID    [Held by provider] potassium chloride  10 mEq Oral TID     sennosides-docusate sodium  1 tablet Oral BID    [Held by provider] lisinopril  2.5 mg Oral Lunch    pantoprazole  40 mg Oral Daily    insulin glargine  0.15 Units/kg Subcutaneous Nightly    insulin lispro  0.08 Units/kg Subcutaneous TID       sodium chloride      sodium chloride      sodium chloride 25 mL (05/27/21 0901)    lactated ringers      phenylephrine (TAN-SYNEPHRINE) 50mg/250mL infusion 155 mcg/min (05/28/21 1203)    nitroGLYCERIN      clevidipine      dextrose       sodium chloride, sodium chloride flush, sodium chloride, ondansetron, metoclopramide, traMADol **OR** traMADol, morphine, diphenhydrAMINE, zolpidem, magnesium hydroxide, polyethylene glycol, potassium chloride, magnesium sulfate, calcium chloride IVPB, calcium chloride IVPB, albuterol sulfate HFA, albumin human, lactated ringers, phenylephrine (TAN-SYNEPHRINE) 50mg/250mL infusion, furosemide, nitroGLYCERIN, clevidipine, glucose, dextrose, glucagon (rDNA), dextrose    Lab Data:  CBC:   Recent Labs     05/26/21  0440 05/27/21  0401 05/27/21  1405 05/28/21  0407   WBC 14.7* 16.2*  --  19.5*   HGB 8.0* 6.9* 8.2* 8.4*   HCT 25.2* 21.9* 25.8* 26.3*   MCV 87.0 88.5  --  89.3   * 97*  --  111*     BMP:   Recent Labs     05/26/21  0440 05/27/21  0401 05/28/21  0407    141 137   K 4.9 5.1 4.8   * 106 104   CO2 21 20* 21   BUN 23* 27* 41*   CREATININE 1.6* 1.9* 1.9*     LIVER PROFILE:   No results for input(s): AST, ALT, LIPASE, BILIDIR, BILITOT, ALKPHOS in the last 72 hours. Invalid input(s): AMYLASE,  ALB  PT/INR:   No results for input(s): PROTIME, INR in the last 72 hours. APTT:   No results for input(s): APTT in the last 72 hours. BNP:  No results for input(s): BNP in the last 72 hours. Imaging/Procedures:   Cardiac surgical procedure 5/25/2021:  Urgent CABG Coronary Artery Bypass Graft x4 (LIMA to LAD, SVG to PDA, SVG sequenced to OM1 and OM2), Left atrial appendage ligation with 40mm AtriClip    ECHO:  5/21/21   Summary   -Left ventricular cavity size is normal.   -Ejection fraction is visually estimated to be 50%.  -The apex and apical septum appear hypokinetic.   -Grade I diastolic dysfunction with normal LV filling pressures. E/e' = 12.6.   -Left atrium is dilated.   -Mild aortic stenosis with a peak velocity of 2.3m/s and a mean pressure gradient of 11mmHg.   -Mitral annular calcification is present.   -Mild mitral regurgitation.   -Mild tricuspid regurgitation.  RVSP = 36 mmHG.     Cath: 5/21/2021  Anatomy:   LM-70% diffuse  LAD-diffusely diseased, 80% proximal, 90% mid calcified  Cx-90% ostial  RCA-diffusely diseased 50% proximal and mid, 70% distal  RPDA-patent  LVEF-50%     Impression:  1.  Severe multivessel CAD. 2.  Preserved LV systolic function.     Plan:  1. [facility] surgical consultation for CABG. 2.  Patient has been statin intolerant to Livalo but unsure whether other statins have been tried. Maxi Seay discuss trial of Crestor with him.     Carotid duplex: 5/21/21  Summary        -The right internal carotid artery appears to have a <50% diameter reducing    stenosis based on velocity criteria.    -The left internal carotid artery appears to have a <50% diameter reducing    stenosis based on velocity criteria         Assessment/Plan:  Principal Problem:    Coronary artery disease involving native coronary artery of native heart with unstable angina pectoris ([address]  Plan: Severe multivessel. S/P CABG POD #2    Active Problems:    Hypotension  Plan: Multifactorial.  On Cristian-Synephrine. Aggravated by KATHERYN (systolic anterior motion) of the mitral valve. S/P coronary artery bypass graft x 4  Plan: Continues on Cristian-Synephrine currently; wean as hemodynamics allow. Did have KATHERYN (systolic anterior motion) of the mitral valve of unclear etiology as no apparent HCM (hypertrophic cardiomyopathy). Possibly leaflet length and/or underfilled ventricle. Will need caution to avoid hypovolemia as this could lead to hypotension. POD #2. S/P left atrial appendage ligation  Plan: Stable. Type 2 diabetes mellitus with diabetic nephropathy, without long-term current use of insulin (HCC)  Plan: Chronic. Currently on insulin drip. Essential hypertension  Plan: Currently still hypotensive on Cirstian-Synephrine. Wean as hemodynamics allow. KATHERYN (systolic anterior motion) of the mitral valve still a problem      Mixed hyperlipidemia  Plan: Statin intolerant. Will need to start on Repatha/Praluent when able. Statin intolerance  Plan:    Will need to start on

## 2021-05-28 NOTE — PROGRESS NOTES
Limited echo performed and reveals improved LV systolic function, although still some distal septal hypokinesis, KATHERYN (systolic anterior motion) of the mitral valve is very pronounced with LVOT gradient around 200 mmHg, at least moderate mitral regurgitation present. Pre and post CABG echo was reviewed with additional cardiology and with cardiovascular surgery. KATHERYN likely multifactorial given improvement in LV systolic function, worsened anemia due to postoperative state, elevated heart rate, postoperative fluid shifts. Plan:   1. Agree with attempts to reduce heart rate with beta-blocker. Currently on high-dose oral beta-blocker. 2.  Consider trial of verapamil. IV Cardizem was tried earlier without additional benefit. 3.  Would try to increase hemoglobin to around 10.  4.  Wean pressors.     Critical care time: 45 minutes

## 2021-05-28 NOTE — PROGRESS NOTES
[facility] Daily Progress Note      Admit Date:  5/20/2021    Chief Complaint: CAD with unstable angina    Subjective:  Mr. Echo Perry describe it better today. Sitting up in the chair.     Objective:   BP (!) 143/91   Pulse 66   Temp 98 °F (36.7 °C) (Core)   Resp 24   Ht 5' 9\" (1.753 m)   Wt 227 lb 15.3 oz (103.4 kg)   SpO2 100%   BMI 33.66 kg/m²       Intake/Output Summary (Last 24 hours) at 5/29/2021 1726  Last data filed at 5/29/2021 1722  Gross per 24 hour   Intake 1027.18 ml   Output 2175 ml   Net -1147.82 ml       TELEMETRY: Sinus     Physical Exam:  General:  Awake, alert, oriented x 3, NAD  Skin:  Warm and dry  Neck:  JVD normal  Chest: Decreased breath sounds  Cardiovascular:  RRR S1S2, no S3, 2/6 systolic at lower left sternal border, at apex  Abdomen:  Soft, ND, NT, No HSM  Extremities: Trace edema    Medications:    albumin human  25 g Intravenous Q6H    insulin glargine  18 Units Subcutaneous Nightly    insulin lispro  10 Units Subcutaneous TID     cefTRIAXone (ROCEPHIN) IV  1,000 mg Intravenous Q24H    metoprolol tartrate  200 mg Oral BID    furosemide  20 mg Intravenous Daily    insulin lispro  0-18 Units Subcutaneous TID     insulin lispro  0-9 Units Subcutaneous Nightly    sodium chloride flush  10 mL Intravenous 2 times per day    [Held by provider] fondaparinux  2.5 mg Subcutaneous Daily    aspirin  325 mg Oral Daily    acetaminophen  1,000 mg Oral Q6H    magnesium oxide  400 mg Oral BID    mupirocin   Nasal BID    [Held by provider] potassium chloride  10 mEq Oral TID     sennosides-docusate sodium  1 tablet Oral BID    [Held by provider] lisinopril  2.5 mg Oral Lunch    pantoprazole  40 mg Oral Daily      sodium chloride      sodium chloride Stopped (05/27/21 0951)    lactated ringers      phenylephrine (TAN-SYNEPHRINE) 50mg/250mL infusion 180 mcg/min (05/29/21 1450)    nitroGLYCERIN      clevidipine      dextrose       sodium chloride, perflutren lipid microspheres, sodium chloride flush, sodium chloride, ondansetron, metoclopramide, traMADol **OR** traMADol, morphine, diphenhydrAMINE, zolpidem, magnesium hydroxide, polyethylene glycol, potassium chloride, magnesium sulfate, calcium chloride IVPB, calcium chloride IVPB, albuterol sulfate HFA, albumin human, lactated ringers, phenylephrine (TAN-SYNEPHRINE) 50mg/250mL infusion, furosemide, nitroGLYCERIN, clevidipine, glucose, dextrose, glucagon (rDNA), dextrose    Lab Data:  CBC:   Recent Labs     05/27/21  0401 05/27/21  1405 05/28/21  0407 05/29/21  0640   WBC 16.2*  --  19.5* 16.8*   HGB 6.9* 8.2* 8.4* 10.4*   HCT 21.9* 25.8* 26.3* 32.1*   MCV 88.5  --  89.3 85.8   PLT 97*  --  111* 124*     BMP:   Recent Labs     05/27/21  0401 05/28/21  0407 05/29/21  0640    137 134*   K 5.1 4.8 4.8    104 100   CO2 20* 21 22   BUN 27* 41* 61*   CREATININE 1.9* 1.9* 1.8*     LIVER PROFILE:   No results for input(s): AST, ALT, LIPASE, BILIDIR, BILITOT, ALKPHOS in the last 72 hours. Invalid input(s): AMYLASE,  ALB  PT/INR:   No results for input(s): PROTIME, INR in the last 72 hours. APTT:   No results for input(s): APTT in the last 72 hours. BNP:  No results for input(s): BNP in the last 72 hours. Imaging/Procedures:   Limited Echo 5/28/2021:   Summary   -Limited exam per Dr. Gurpreet Serna for hypotension and systolic anterior motion of mitral valve. Patient had a previous complete exam 5/21/2021.   -Normal global ejection fraction estimated at 55%. -There is possible apical hypokinesis noted. -There is mild concentric left ventricular hypertrophy noted. -Thickened mitral valve without evidence of stenosis. -Systolic anterior motion of the mitral valve is noted causing a left   ventricular outflow tract obstruction with a maximum resting pressure gradient of 201 mmHg. -There is moderate eccentric mitral regurgitation.     Cardiac surgical procedure 5/25/2021:  Urgent CABG Coronary Artery Bypass Graft x4 (LIMA to LAD, SVG to PDA, SVG sequenced to OM1 and OM2), Left atrial appendage ligation with 40mm AtriClip    ECHO:  5/21/21   Summary   -Left ventricular cavity size is normal.   -Ejection fraction is visually estimated to be 50%.  -The apex and apical septum appear hypokinetic.   -Grade I diastolic dysfunction with normal LV filling pressures. E/e' = 12.6.   -Left atrium is dilated.   -Mild aortic stenosis with a peak velocity of 2.3m/s and a mean pressure gradient of 11mmHg.   -Mitral annular calcification is present.   -Mild mitral regurgitation.   -Mild tricuspid regurgitation. RVSP = 36 mmHG.     Cath: 5/21/2021  Anatomy:   LM-70% diffuse  LAD-diffusely diseased, 80% proximal, 90% mid calcified  Cx-90% ostial  RCA-diffusely diseased 50% proximal and mid, 70% distal  RPDA-patent  LVEF-50%     Impression:  1.  Severe multivessel CAD. 2.  Preserved LV systolic function.     Plan:  1. [address] [place] [address] surgical consultation for CABG. 2.  Patient has been statin intolerant to Livalo but unsure whether other statins have been tried. Day Doll discuss trial of Crestor with him.     Carotid duplex: 5/21/21  Summary        -The right internal carotid artery appears to have a <50% diameter reducing    stenosis based on velocity criteria.    -The left internal carotid artery appears to have a <50% diameter reducing    stenosis based on velocity criteria         Assessment/Plan:  Principal Problem:    Coronary artery disease involving native coronary artery of native heart with unstable angina pectoris ([address]  Plan: Severe multivessel. S/P CABG POD #3    Active Problems:    Hypotension  Plan: Multifactorial.  Continues on Cristian-Synephrine. Aggravated by KATHERYN (systolic anterior motion) of the mitral valve. Tolerating being up in the chair. S/P coronary artery bypass graft x 4  Plan: Continues on Cristian-Synephrine currently; wean as hemodynamics allow.   Did have KATHERYN (systolic anterior motion) of the mitral valve of unclear etiology as no apparent HCM (hypertrophic cardiomyopathy). Possibly leaflet length and/or underfilled ventricle. Limited echo yesterday documented markedly elevated gradient of 200 mmHg in the LVOT; preop it was 21 mmHg. Will need caution to avoid hypovolemia as this could lead to hypotension. POD # 3. Hemoglobin now 10. S/P left atrial appendage ligation  Plan: Stable. Type 2 diabetes mellitus with diabetic nephropathy, without long-term current use of insulin (HCC)  Plan: Chronic. Currently on insulin drip. Essential hypertension  Plan: Currently still hypotensive on Cristian-Synephrine. Wean as hemodynamics allow. KATHERYN (systolic anterior motion) of the mitral valve still a problem      Mixed hyperlipidemia  Plan: Statin intolerant. Will need to start on Repatha/Praluent when able. Statin intolerance  Plan: Will need to start on Repatha/Praluent when able. Stage 3a chronic kidney disease  Plan: Chronic. Trend creatinine. Nephrology following. Post poliomyelitis syndrome  Plan: Chronic. He looks and feels much better today. Continues to have difficulty weaning pressor. KATHERYN (systolic anterior motion) of the mitral valve still a problem. Cardiac murmur softer today.       Muna Potter MD, MD 5/29/2021 5:26 PM    Critical care time: 35 minutes

## 2021-05-28 NOTE — PROGRESS NOTES
Office : [phone]     Fax :[phone]       Nephrology Progress Note      Patient's Name: Dilan Dougherty  8:18 AM  5/28/2021      Chief Complaint:    Chief Complaint   Patient presents with    Shortness of Breath     sent by Dr Ramsey Vega for abnormal EKG. was unable to get into see cardiologist. has been feeling \"not so good\", gets sob with activity for a couple months. denies cp. no n/v. History of Present Ilness:    Dilan Dougherty is a 70 y.o. male with h/o DM 2, CKD 3 , DLP who came with complaints of 2-month history of progressive exertional shortness of breath and chest discomfort. Interval history :      Post CABG recovering. Urine output low overnight       Minimal edema. Creatinine level elevated at 1.9       I/O last 3 completed shifts: In: 1673.5 [P.O.:240; I.V.:521; Blood:362.5; IV Piggyback:550]  Out: 9444 [Urine:2220;  Chest Tube:390]    Past Medical History:   Diagnosis Date    CAD (coronary artery disease)     Diabetes mellitus ([facility] [address]     Elevated LFT's     Hyperlipidemia     Hypertension     Post poliomyelitis syndrome     Type II or unspecified type diabetes mellitus without mention of complication, not stated as uncontrolled        Past Surgical History:   Procedure Laterality Date    CLEFT PALATE REPAIR      CORONARY ARTERY BYPASS GRAFT N/A 5/25/2021    Urgent CABG Coronary Artery Bypass Graft x4 (LIMA to LAD, SVG to PDA, SVG sequenced to OM1 and OM2), Endoscopic vein harvest left saphenous vein, Left atrial appendage ligation with 40mm AtriClip, total cardiopulmonary bypass, doppler flow verification of bypass grafts, insertion of temporary ventricular pacing wires, transesophageal echocardiogram, 5 level bilateral intercostal nerve block with Ma  SKIN BIOPSY Left 7/20/2020    EXCISE SKIN LESION LEFT POST AURICULAR WITH FLAP, FROZEN SECTIONS performed by Kendrick Roper MD at [facility]           Current Medications:    metoprolol tartrate (LOPRESSOR) tablet 200 mg, BID  0.9 % sodium chloride infusion, PRN  insulin lispro (1 Unit Dial) 0-18 Units, TID WC  insulin lispro (1 Unit Dial) 0-9 Units, Nightly  sodium chloride flush 0.9 % injection 10 mL, 2 times per day  sodium chloride flush 0.9 % injection 10 mL, PRN  0.9 % sodium chloride infusion, PRN  [Held by provider] fondaparinux (ARIXTRA) injection 2.5 mg, Daily  ondansetron (ZOFRAN) injection 4 mg, Q6H PRN  metoclopramide (REGLAN) injection 10 mg, Q6H PRN  aspirin EC tablet 325 mg, Daily  acetaminophen (TYLENOL) tablet 1,000 mg, Q6H  traMADol (ULTRAM) tablet 50 mg, Q6H PRN   Or  traMADol (ULTRAM) tablet 100 mg, Q6H PRN  morphine (PF) injection 2 mg, Q2H PRN  [Held by provider] furosemide (LASIX) injection 20 mg, BID  magnesium oxide (MAG-OX) tablet 400 mg, BID  mupirocin (BACTROBAN) 2 % ointment, BID  [Held by provider] potassium chloride (KLOR-CON) extended release tablet 10 mEq, TID WC  diphenhydrAMINE (BENADRYL) tablet 25 mg, Nightly PRN  zolpidem (AMBIEN) tablet 5 mg, Nightly PRN  sennosides-docusate sodium (SENOKOT-S) 8.6-50 MG tablet 1 tablet, BID  magnesium hydroxide (MILK OF MAGNESIA) 400 MG/5ML suspension 30 mL, Daily PRN  polyethylene glycol (GLYCOLAX) packet 17 g, Daily PRN  [Held by provider] lisinopril (PRINIVIL;ZESTRIL) tablet 2.5 mg, Lunch  pantoprazole (PROTONIX) tablet 40 mg, Daily  potassium chloride 20 mEq/50 mL IVPB (Central Line), PRN  magnesium sulfate 2000 mg in 50 mL IVPB premix, PRN  calcium chloride 1,000 mg in sodium chloride 0.9 % 100 mL IVPB, PRN  calcium chloride 2,000 mg in sodium chloride 0.9 % 100 mL IVPB, PRN  albuterol sulfate  (90 Base) MCG/ACT inhaler 2 puff, Q6H PRN  albumin human 5 % IV solution 25 g, PRN  lactated ringers infusion 250 mL, Continuous PRN  phenylephrine (TAN-SYNEPHRINE) 50 mg in dextrose 5 % 250 mL infusion, Continuous PRN  furosemide (LASIX) injection 20 mg, PRN  nitroGLYCERIN 50 mg in dextrose 5% 250 mL infusion, Continuous PRN  clevidipine (CLEVIPREX) infusion, Continuous PRN  glucose (GLUTOSE) 40 % oral gel 15 g, PRN  dextrose 50 % IV solution, PRN  glucagon (rDNA) injection 1 mg, PRN  dextrose 5 % solution, PRN  insulin glargine (LANTUS;BASAGLAR) injection pen 15 Units, Nightly  insulin lispro (1 Unit Dial) 8 Units, TID WC        Physical exam:     Vitals:  BP 96/70   Pulse 78   Temp 99.9 °F (37.7 °C) (Core)   Resp 24   Ht 5' 9\" (1.753 m)   Wt 229 lb 0.9 oz (103.9 kg)   SpO2 (!) 89%   BMI 33.83 kg/m²   Constitutional:  OAA X3 NAD  Skin: no rash, turgor wnl  Heent:  eomi, mmm  Neck: no bruits or jvd noted  Cardiovascular:  S1, S2 without m/r/g  Respiratory: CTA B without w/r/r  Abdomen:  +bs, soft, nt, nd  Ext: no  lower extremity edema    Labs:  CBC:   Recent Labs     05/26/21  0440 05/27/21  0401 05/27/21  1405 05/28/21  0407   WBC 14.7* 16.2*  --  19.5*   HGB 8.0* 6.9* 8.2* 8.4*   * 97*  --  111*     BMP:    Recent Labs     05/26/21  0440 05/27/21  0401 05/28/21  0407    141 137   K 4.9 5.1 4.8   * 106 104   CO2 21 20* 21   BUN 23* 27* 41*   CREATININE 1.6* 1.9* 1.9*   GLUCOSE 105* 184* 117*     Ca/Mg/Phos:   Recent Labs     05/26/21  0440 05/27/21  0401 05/28/21  0407   CALCIUM 8.5 8.5 8.3   MG 2.20 2.00 2.00     Hepatic:   No results for input(s): AST, ALT, ALB, BILITOT, ALKPHOS in the last 72 hours. Troponin:   No results for input(s): TROPONINI in the last 72 hours. BNP: No results for input(s): BNP in the last 72 hours. Lipids:   No results for input(s): CHOL, TRIG, HDL, LDLCALC, LABVLDL in the last 72 hours.   ABGs:   Recent Labs     05/26/21  0529   PHART 7.377   PO2ART 106.3   GVL0GJT 39.6                IMAGING:  XR CHEST PORTABLE   Final Result   No pneumothorax following removal of the left chest tube. Development of   moderate opacities within the right lung either atelectasis or pneumonia. No   abnormal pulmonary vascular congestion or sizable pleural effusion. XR ABDOMEN FOR NG/OG/NE TUBE PLACEMENT   Final Result      XR CHEST PORTABLE   Final Result   Satisfactory appearance status post median sternotomy      No pneumothorax         CT CHEST ABDOMEN PELVIS WO CONTRAST   Final Result   No acute abnormality identified in the chest, abdomen, or pelvis. No finding   worrisome for occult malignancy. Moderate enlargement of the prostate. VL PRE OP VEIN MAPPING   Final Result      VL DUP CAROTID BILATERAL   Final Result      XR CHEST (2 VW)   Final Result   No active cardiopulmonary disease               A/p     1. Acute kidney injury on CKD stage 3A   NORA 2/2 ATN   Creatinine at stable   got Blood transfusion yesterday   HB better now at 8.4     Creat 1.6 ---> 1.4 --> 1.5 ---> 1.4 --->1.2 ---> 1.6 ---> 1.9   H/o DM 2    Good urine output  Expect renal function to improve  Continue Lasix for volume control      2. DM 2. Needs better control  On insulin    3. HTN . controlled   held lisinopril     4. Dyslipidemia . Statins     5. Has multivessel coronary artery disease. S/p CABG.   Management per CT surgery      Recommend to dose adjust all medications  based on renal functions  Monitor closely post CABG  Maintain SBP> 90 mmHg   Daily weights   AVOID NSAIDs  Avoid Nephrotoxins  Monitor Intake/Output  Call if significant decrease in urine output                 Thank you for allowing us to participate in care of Richi Jones         Electronically signed by: Kaycee Philip MD, 5/28/2021, 8:18 AM      [facility] [address]  Office : [phone]  Fax :[phone]

## 2021-05-29 NOTE — PROGRESS NOTES
Office : [phone]     Fax :[phone]       Nephrology Progress Note      Patient's Name: Petrina Kussmaul  12:35 PM  5/29/2021      Chief Complaint:    Chief Complaint   Patient presents with    Shortness of Breath     sent by Dr Mont Lanes for abnormal EKG. was unable to get into see cardiologist. has been feeling \"not so good\", gets sob with activity for a couple months. denies cp. no n/v. History of Present Ilness:    Petrina Kussmaul is a 70 y.o. male with h/o DM 2, CKD 3 , DLP who came with complaints of 2-month history of progressive exertional shortness of breath and chest discomfort. Interval history :      Post CABG recovering. Urine output good  Minimal edema. Creatinine level slightly better      I/O last 3 completed shifts:   In: 2384.6 [P.O.:180; I.V.:1904.6; Blood:300]  Out: 65 [Urine:1565]    Past Medical History:   Diagnosis Date    CAD (coronary artery disease)     Diabetes mellitus ([facility] [address]     Elevated LFT's     Hyperlipidemia     Hypertension     Post poliomyelitis syndrome     Type II or unspecified type diabetes mellitus without mention of complication, not stated as uncontrolled        Past Surgical History:   Procedure Laterality Date    CLEFT PALATE REPAIR      CORONARY ARTERY BYPASS GRAFT N/A 5/25/2021    Urgent CABG Coronary Artery Bypass Graft x4 (LIMA to LAD, SVG to PDA, SVG sequenced to OM1 and OM2), Endoscopic vein harvest left saphenous vein, Left atrial appendage ligation with 40mm AtriClip, total cardiopulmonary bypass, doppler flow verification of bypass grafts, insertion of temporary ventricular pacing wires, transesophageal echocardiogram, 5 level bilateral intercostal nerve block with Ma    SKIN BIOPSY Left 7/20/2020    EXCISE SKIN LESION LEFT POST AURICULAR WITH FLAP, FROZEN SECTIONS performed by Tai Jordan MD at [facility]           Current Medications:    albumin human 25 % IV solution 25 g, Q6H  calcium gluconate 1000 mg in sodium chloride 50 mL, Once  metoprolol tartrate (LOPRESSOR) tablet 200 mg, BID  0.9 % sodium chloride infusion, PRN  furosemide (LASIX) injection 20 mg, Daily  perflutren lipid microspheres (DEFINITY) injection 1.65 mg, ONCE PRN  insulin lispro (1 Unit Dial) 0-18 Units, TID WC  insulin lispro (1 Unit Dial) 0-9 Units, Nightly  sodium chloride flush 0.9 % injection 10 mL, 2 times per day  sodium chloride flush 0.9 % injection 10 mL, PRN  0.9 % sodium chloride infusion, PRN  [Held by provider] fondaparinux (ARIXTRA) injection 2.5 mg, Daily  ondansetron (ZOFRAN) injection 4 mg, Q6H PRN  metoclopramide (REGLAN) injection 10 mg, Q6H PRN  aspirin EC tablet 325 mg, Daily  acetaminophen (TYLENOL) tablet 1,000 mg, Q6H  traMADol (ULTRAM) tablet 50 mg, Q6H PRN   Or  traMADol (ULTRAM) tablet 100 mg, Q6H PRN  morphine (PF) injection 2 mg, Q2H PRN  magnesium oxide (MAG-OX) tablet 400 mg, BID  mupirocin (BACTROBAN) 2 % ointment, BID  [Held by provider] potassium chloride (KLOR-CON) extended release tablet 10 mEq, TID WC  diphenhydrAMINE (BENADRYL) tablet 25 mg, Nightly PRN  zolpidem (AMBIEN) tablet 5 mg, Nightly PRN  sennosides-docusate sodium (SENOKOT-S) 8.6-50 MG tablet 1 tablet, BID  magnesium hydroxide (MILK OF MAGNESIA) 400 MG/5ML suspension 30 mL, Daily PRN  polyethylene glycol (GLYCOLAX) packet 17 g, Daily PRN  [Held by provider] lisinopril (PRINIVIL;ZESTRIL) tablet 2.5 mg, Lunch  pantoprazole (PROTONIX) tablet 40 mg, Daily  potassium chloride 20 mEq/50 mL IVPB (Central Line), PRN  magnesium sulfate 2000 mg in 50 mL IVPB premix, PRN  calcium chloride 1,000 mg in sodium chloride 0.9 % 100 mL IVPB, PRN  calcium chloride 2,000 mg in sodium chloride 0.9 % 100 mL IVPB, PRN  albuterol sulfate HFA 108 (90 Base) MCG/ACT inhaler 2 puff, Q6H PRN  albumin human 5 % IV solution 25 g, PRN  lactated ringers infusion 250 mL, Continuous PRN  phenylephrine (TAN-SYNEPHRINE) 50 mg in dextrose 5 % 250 mL infusion, Continuous PRN  furosemide (LASIX) injection 20 mg, PRN  nitroGLYCERIN 50 mg in dextrose 5% 250 mL infusion, Continuous PRN  clevidipine (CLEVIPREX) infusion, Continuous PRN  glucose (GLUTOSE) 40 % oral gel 15 g, PRN  dextrose 50 % IV solution, PRN  glucagon (rDNA) injection 1 mg, PRN  dextrose 5 % solution, PRN  insulin glargine (LANTUS;BASAGLAR) injection pen 15 Units, Nightly  insulin lispro (1 Unit Dial) 8 Units, TID WC        Physical exam:     Vitals:  /75   Pulse 60   Temp 99 °F (37.2 °C) (Core)   Resp 24   Ht 5' 9\" (1.753 m)   Wt 227 lb 15.3 oz (103.4 kg)   SpO2 99%   BMI 33.66 kg/m²   Constitutional:  OAA X3 NAD  Skin: no rash, turgor wnl  Heent:  eomi, mmm  Neck: no bruits or jvd noted  Cardiovascular:  S1, S2 without m/r/g  Respiratory: CTA B without w/r/r  Abdomen:  +bs, soft, nt, nd  Ext: no  lower extremity edema    Labs:  CBC:   Recent Labs     05/27/21  0401 05/27/21  1405 05/28/21  0407 05/29/21  0640   WBC 16.2*  --  19.5* 16.8*   HGB 6.9* 8.2* 8.4* 10.4*   PLT 97*  --  111* 124*     BMP:    Recent Labs     05/27/21  0401 05/28/21  0407 05/29/21  0640    137 134*   K 5.1 4.8 4.8    104 100   CO2 20* 21 22   BUN 27* 41* 61*   CREATININE 1.9* 1.9* 1.8*   GLUCOSE 184* 117* 205*     Ca/Mg/Phos:   Recent Labs     05/27/21  0401 05/28/21  0407 05/29/21  0640   CALCIUM 8.5 8.3 8.1*   MG 2.00 2.00  --      Hepatic:   No results for input(s): AST, ALT, ALB, BILITOT, ALKPHOS in the last 72 hours. Troponin:   No results for input(s): TROPONINI in the last 72 hours. BNP: No results for input(s): BNP in the last 72 hours. Lipids:   No results for input(s): CHOL, TRIG, HDL, LDLCALC, LABVLDL in the last 72 hours.   ABGs:   Recent Labs     05/28/21  1026   PHART 7.368   PO2ART 57.9*

## 2021-05-29 NOTE — PLAN OF CARE
Problem: Cardiovascular  Goal: Hemodynamic stability  Note: Titrating Cristian for Goals, pt B/Ps remain labile. Pt states he is feeling better than this morning and has no complaints at this time.

## 2021-05-29 NOTE — PROGRESS NOTES
Hospitalist Progress Note      PCP: Brandi Jonas MD    Date of Admission: 5/20/2021    Chief Complaint: [address] Course:   69 yo M with history of DM 2, HTN, CKD 3, post poliomyelitis syndrome who came to ER with CP. Admitted as inpatient for further management. Underwent LHC on 5/21: Anatomy:   LM-70% diffuse  LAD-diffusely diseased, 80% proximal, 90% mid calcified  Cx-90% ostial  RCA-diffusely diseased 50% proximal and mid, 70% distal  RPDA-patent  LVEF-50%     Impression:  1. Severe multivessel CAD. 2.  Preserved LV systolic function.     Plan:  1.  CV surgical consultation for CABG. 2.  Patient has been statin intolerant to Livalo but unsure whether other statins have been tried. Will discuss trial of Crestor with him. S/P Urgent CABG Coronary Artery Bypass Graft x4 (LIMA to LAD, SVG to PDA, SVG sequenced to OM1 and OM2), Endoscopic vein harvest left saphenous vein, Left atrial appendage ligation with 40mm AtriClip, total cardiopulmonary bypass, doppler flow verification of bypass grafts, insertion of temporary ventricular pacing wires, transesophageal echocardiogram, 5 level bilateral intercostal nerve block with Marcaine 0.25%, left sided robicsek weave on 5/25/21.       Subjective: Patient sitting in chair today feeling much better shortness of breath is greatly improved wheezing has resolved down to 4 L of oxygen no chest pain nausea or vomitting      Medications:  Reviewed    Infusion Medications    sodium chloride      sodium chloride Stopped (05/27/21 0951)    lactated ringers      phenylephrine (TAN-SYNEPHRINE) 50mg/250mL infusion 160 mcg/min (05/29/21 1000)    nitroGLYCERIN      clevidipine      dextrose       Scheduled Medications    albumin human  25 g Intravenous Q6H    calcium gluconate-NaCl  1,000 mg Intravenous Once    insulin glargine  18 Units Subcutaneous Nightly    insulin lispro  10 Units Subcutaneous TID WC    cefTRIAXone (ROCEPHIN) IV  1,000 mg Intravenous Q24H    metoprolol tartrate  200 mg Oral BID    furosemide  20 mg Intravenous Daily    insulin lispro  0-18 Units Subcutaneous TID     insulin lispro  0-9 Units Subcutaneous Nightly    sodium chloride flush  10 mL Intravenous 2 times per day    [Held by provider] fondaparinux  2.5 mg Subcutaneous Daily    aspirin  325 mg Oral Daily    acetaminophen  1,000 mg Oral Q6H    magnesium oxide  400 mg Oral BID    mupirocin   Nasal BID    [Held by provider] potassium chloride  10 mEq Oral TID     sennosides-docusate sodium  1 tablet Oral BID    [Held by provider] lisinopril  2.5 mg Oral Lunch    pantoprazole  40 mg Oral Daily     PRN Meds: sodium chloride, perflutren lipid microspheres, sodium chloride flush, sodium chloride, ondansetron, metoclopramide, traMADol **OR** traMADol, morphine, diphenhydrAMINE, zolpidem, magnesium hydroxide, polyethylene glycol, potassium chloride, magnesium sulfate, calcium chloride IVPB, calcium chloride IVPB, albuterol sulfate HFA, albumin human, lactated ringers, phenylephrine (TAN-SYNEPHRINE) 50mg/250mL infusion, furosemide, nitroGLYCERIN, clevidipine, glucose, dextrose, glucagon (rDNA), dextrose      Intake/Output Summary (Last 24 hours) at 5/29/2021 1326  Last data filed at 5/29/2021 1255  Gross per 24 hour   Intake 2031.55 ml   Output 1550 ml   Net 481.55 ml       Physical Exam Performed:    /75   Pulse 69   Temp 98.6 °F (37 °C) (Core)   Resp 24   Ht 5' 9\" (1.753 m)   Wt 227 lb 15.3 oz (103.4 kg)   SpO2 99%   BMI 33.66 kg/m²     General appearance: In bed appears short of breath oriented x3  HEENT: Pupils equal, round, and reactive to light. Conjunctivae/corneas clear. Neck: Supple, with full range of motion. No jugular venous distention. Trachea midline. Respiratory:no wheezing some crackles  Cardiovascular: Regular rate and rhythm with normal S1/S2 without murmurs, rubs or gallops.   Abdomen: Soft, non-tender, non-distended with normal bowel sounds. Musculoskeletal: 2+Plus bilateral lower extremity edema  Skin: Sternotomy C/D/I  Neurologic: no gross focal defciits  Psychiatric: Alert and oriented, thought content appropriate, normal insight      Labs:   Recent Labs     05/27/21  0401 05/27/21  1405 05/28/21  0407 05/29/21  0640   WBC 16.2*  --  19.5* 16.8*   HGB 6.9* 8.2* 8.4* 10.4*   HCT 21.9* 25.8* 26.3* 32.1*   PLT 97*  --  111* 124*     Recent Labs     05/27/21  0401 05/28/21  0407 05/29/21  0640    137 134*   K 5.1 4.8 4.8    104 100   CO2 20* 21 22   BUN 27* 41* 61*   CREATININE 1.9* 1.9* 1.8*   CALCIUM 8.5 8.3 8.1*     No results for input(s): AST, ALT, BILIDIR, BILITOT, ALKPHOS in the last 72 hours. No results for input(s): INR in the last 72 hours. No results for input(s): Stella N[place]sack in the last 72 hours. Urinalysis:      Lab Results   Component Value Date    NITRU Negative 05/28/2021    WBCUA 20 05/28/2021    RBCUA 8 05/28/2021    BLOODU SMALL 05/28/2021    SPECGRAV 1.012 05/28/2021    GLUCOSEU Negative 05/28/2021       Radiology:  XR CHEST PORTABLE   Final Result   No pneumothorax following removal of the left chest tube. Development of   moderate opacities within the right lung either atelectasis or pneumonia. No   abnormal pulmonary vascular congestion or sizable pleural effusion. XR ABDOMEN FOR NG/OG/NE TUBE PLACEMENT   Final Result      XR CHEST PORTABLE   Final Result   Satisfactory appearance status post median sternotomy      No pneumothorax         CT CHEST ABDOMEN PELVIS WO CONTRAST   Final Result   No acute abnormality identified in the chest, abdomen, or pelvis. No finding   worrisome for occult malignancy. Moderate enlargement of the prostate.          VL PRE OP VEIN MAPPING   Final Result      VL DUP CAROTID BILATERAL   Final Result      XR CHEST (2 VW)   Final Result   No active cardiopulmonary disease                 Assessment/Plan:    Active Hospital Problems    Diagnosis     Statin intolerance [Z78.9]      Priority: Medium    Stage 3a chronic kidney disease [N18.31]      Priority: Medium    S/P coronary artery bypass graft x 4 [Z95.1]     S/P left atrial appendage ligation [Z98.890]     Essential hypertension [I10]     Mixed hyperlipidemia [E78.2]     Type 2 diabetes mellitus with diabetic nephropathy, without long-term current use of insulin ([facility] [address] [E11.21]     Coronary artery disease involving native coronary artery of native heart with unstable angina pectoris (HCC) [I25.110]     Post poliomyelitis syndrome [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]    Acute hypoxic respiratory failure likely secondary to heart failure secondary to KATHERYN  - phenlyephine keep SBP>115   -patient withwheezing discussed with CTVS likely cardiac wheeze hold of steroids for now  - lopressor added to rate control    Acute Postop blood loss anemia   -  transufe 1 more unit prbcs per ctvs    CAD s/p CABG: per cards and ctvs    Dm2 with hypglycemia  a1c 7.6 titrate up lantus and lispro    UTI: possibel pna procalcitonin elevated but also NORA, wbc trending down without atbx  - will give rocpehin for now fu cutlures    Nora: cr akua 1.3  - lasix nephro on board      Wojciech Tracey MD

## 2021-05-29 NOTE — PROGRESS NOTES
Progress Note    S/P cabgx4, ANDREW exclusion 5/25/21    Vital Signs:                                                 /75   Pulse 61   Temp 98.9 °F (37.2 °C) (Core)   Resp 24   Ht 5' 9\" (1.753 m)   Wt 227 lb 15.3 oz (103.4 kg)   SpO2 98%   BMI 33.66 kg/m²  O2 Flow Rate (L/min): 7 L/min (decreased to 6) preop 98.3 kg  NSR  CVP (Mean): 8 mmHg  PAP: 45/22  PAP (Mean): 29 mmHg  SVR (Using ABP Mean): 2451.61 dyne*sec/cm5  CCI: 1.4 L/min  SVO2 (%): 50 %  Admission Weight: 221 lb 11.2 oz (100.6 kg)      Drips:  Cristian 160    I/O:      Intake/Output Summary (Last 24 hours) at 5/29/2021 1018  Last data filed at 5/29/2021 1000  Gross per 24 hour   Intake 2331.55 ml   Output 1645 ml   Net 686.55 ml     CV: reg, + murmur, wound c/d/i  Pulm: decreased  Abd: soft  Ext: warm, 1+ edema    Data Review:  CBC:   Recent Labs     05/27/21  0401 05/27/21  1405 05/28/21  0407 05/29/21  0640   WBC 16.2*  --  19.5* 16.8*   HGB 6.9* 8.2* 8.4* 10.4*   HCT 21.9* 25.8* 26.3* 32.1*   MCV 88.5  --  89.3 85.8   PLT 97*  --  111* 124*     BMP:   Recent Labs     05/27/21  0401 05/28/21  0407 05/29/21  0640    137 134*   K 5.1 4.8 4.8    104 100   CO2 20* 21 22   BUN 27* 41* 61*   CREATININE 1.9* 1.9* 1.8*   CALCIUM 8.5 8.3 8.1*   MG 2.00 2.00  --      Cardiac Enzymes: No results for input(s): CKTOTAL, CKMB, CKMBINDEX, TROPONINI in the last 72 hours. PT/INR: No results for input(s): PROTIME, INR in the last 72 hours. APTT: No results for input(s): APTT in the last 72 hours. Assessment/Plan:  CV - SR. Replete Ca. - KATHERYN. Well BBlocked. bp supported with cristian. No B agonists. Pad 26 optimal. Seeking euvolemia, needs volume optimization, murmur is harsh today and I think this is all reflected in the low CI. Alb 250cc bolus, then SPalb q 6. pulm - pulm toilet,   Renal - BL Cr 1.5. NORA, improving   - retain Curry for now  ID - wbc elevated but improving. abx until cx results available  Heme - acute blood loss anemia. Goal hgb 10.   -  for CAD    Cori Palomares MD  5/29/2021  10:18 AM

## 2021-05-30 NOTE — PROGRESS NOTES
[facility] Daily Progress Note      Admit Date:  5/20/2021    Chief Complaint: CAD with unstable angina    Subjective:  Mr. Ewing Galeazzi continues to improve. Not much in terms of complaints of discomfort or shortness of breath.     Objective:   /87   Pulse 65   Temp 99.4 °F (37.4 °C) (Core)   Resp 19   Ht 5' 9\" (1.753 m)   Wt 223 lb 1.7 oz (101.2 kg)   SpO2 100%   BMI 32.95 kg/m²       Intake/Output Summary (Last 24 hours) at 5/30/2021 2047  Last data filed at 5/30/2021 2000  Gross per 24 hour   Intake 2612.98 ml   Output 3685 ml   Net -1072.02 ml       TELEMETRY: Sinus     Physical Exam:  General:  Awake, alert, oriented x 3, NAD  Skin:  Warm and dry  Neck:  JVD normal  Chest: Decreased breath sounds  Cardiovascular:  RRR S1S2, no S3, 2/6 systolic at lower left sternal border, at apex  Abdomen:  Soft, ND, NT, No HSM  Extremities: Trace edema    Medications:    insulin glargine  18 Units Subcutaneous Nightly    insulin lispro  10 Units Subcutaneous TID WC    metoprolol tartrate  200 mg Oral BID    furosemide  20 mg Intravenous Daily    insulin lispro  0-18 Units Subcutaneous TID WC    insulin lispro  0-9 Units Subcutaneous Nightly    sodium chloride flush  10 mL Intravenous 2 times per day    [Held by provider] fondaparinux  2.5 mg Subcutaneous Daily    aspirin  325 mg Oral Daily    acetaminophen  1,000 mg Oral Q6H    magnesium oxide  400 mg Oral BID    [Held by provider] potassium chloride  10 mEq Oral TID WC    sennosides-docusate sodium  1 tablet Oral BID    [Held by provider] lisinopril  2.5 mg Oral Lunch    pantoprazole  40 mg Oral Daily      sodium chloride      sodium chloride      sodium chloride Stopped (05/27/21 0951)    lactated ringers      phenylephrine (TAN-SYNEPHRINE) 50mg/250mL infusion 100 mcg/min (05/30/21 1900)    nitroGLYCERIN      clevidipine      dextrose       sodium chloride, sodium chloride, perflutren lipid microspheres, sodium chloride flush, sequenced to OM1 and OM2), Left atrial appendage ligation with 40mm AtriClip    ECHO:  5/21/21   Summary   -Left ventricular cavity size is normal.   -Ejection fraction is visually estimated to be 50%.  -The apex and apical septum appear hypokinetic.   -Grade I diastolic dysfunction with normal LV filling pressures. E/e' = 12.6.   -Left atrium is dilated.   -Mild aortic stenosis with a peak velocity of 2.3m/s and a mean pressure gradient of 11mmHg.   -Mitral annular calcification is present.   -Mild mitral regurgitation.   -Mild tricuspid regurgitation. RVSP = 36 mmHG.     Cath: 5/21/2021  Anatomy:   LM-70% diffuse  LAD-diffusely diseased, 80% proximal, 90% mid calcified  Cx-90% ostial  RCA-diffusely diseased 50% proximal and mid, 70% distal  RPDA-patent  LVEF-50%     Impression:  1.  Severe multivessel CAD. 2.  Preserved LV systolic function.     Plan:  1. [facility] surgical consultation for CABG. 2.  Patient has been statin intolerant to Livalo but unsure whether other statins have been tried. David Calloway discuss trial of Crestor with him.     Carotid duplex: 5/21/21  Summary        -The right internal carotid artery appears to have a <50% diameter reducing    stenosis based on velocity criteria.    -The left internal carotid artery appears to have a <50% diameter reducing    stenosis based on velocity criteria         Assessment/Plan:  Principal Problem:    Coronary artery disease involving native coronary artery of native heart with unstable angina pectoris ([address]  Plan: Severe multivessel. S/P CABG POD #4    Active Problems:    Hypotension  Plan: Multifactorial.  Continues on Cristian-Synephrine but still significantly decreased and around 100 mcg/min. Aggravated by KATHERYN (systolic anterior motion) of the mitral valve. S/P coronary artery bypass graft x 4  Plan: Continues on Cristian-Synephrine currently but has decreased; wean as hemodynamics allow.   Did have KATHERYN (systolic anterior motion) of the mitral valve of unclear etiology as no apparent HCM (hypertrophic cardiomyopathy). Possibly leaflet length and/or underfilled ventricle. Recent limited echo documented markedly elevated gradient of 200 mmHg in the LVOT; preop it was 21 mmHg. Will need caution to avoid hypovolemia as this could lead to hypotension. POD #4. Hemoglobin now above 10. S/P left atrial appendage ligation  Plan: Stable. Type 2 diabetes mellitus with diabetic nephropathy, without long-term current use of insulin (HCC)  Plan: Chronic. Currently on insulin drip. Essential hypertension  Plan: Currently still hypotensive on Cristian-Synephrine but improving. Wean as hemodynamics allow. KATHERYN (systolic anterior motion) of the mitral valve still a problem      Mixed hyperlipidemia  Plan: Statin intolerant. Will need to start on Repatha/Praluent when able. Statin intolerance  Plan: Will need to start on Repatha/Praluent when able. Stage 3a chronic kidney disease  Plan: Chronic. Trend creatinine. Nephrology following. Post poliomyelitis syndrome  Plan: Chronic. He looks and feels much better today. Cristian-Synephrine with significant decrease in dose. Continue to wean as hemodynamics allow. KATHERYN (systolic anterior motion) of the mitral valve still a problem.       Jonathan Mena MD, MD 5/30/2021 8:47 PM    Critical care time: 35 minutes

## 2021-05-30 NOTE — PROGRESS NOTES
Progress Note    S/P cabgx4, ANDREW exclusion 5/25/21    Vital Signs:                                                 /80   Pulse 69   Temp 97.5 °F (36.4 °C) (Core)   Resp 22   Ht 5' 9\" (1.753 m)   Wt 223 lb 1.7 oz (101.2 kg)   SpO2 99%   BMI 32.95 kg/m²  O2 Flow Rate (L/min): 2 L/min preop 98.3 kg  NSR  CVP (Mean): 9 mmHg  PAP: 61/16  PAP (Mean): 38 mmHg  SVR (Using ABP Mean): 1671.11 dyne*sec/cm5  CCI: 2.1 L/min  SVO2 (%): 57 %  Admission Weight: 221 lb 11.2 oz (100.6 kg)      Drips:  Cristian 95    I/O:      Intake/Output Summary (Last 24 hours) at 5/30/2021 0931  Last data filed at 5/30/2021 0840  Gross per 24 hour   Intake 1647.65 ml   Output 3425 ml   Net -1777.35 ml     CV: reg, + murmur, wound c/d/i  Pulm: decreased  Abd: soft  Ext: warm, 1+ edema    Data Review:  CBC:   Recent Labs     05/28/21  0407 05/29/21  0640 05/30/21  0418   WBC 19.5* 16.8* 13.0*   HGB 8.4* 10.4* 9.1*   HCT 26.3* 32.1* 28.4*   MCV 89.3 85.8 87.0   * 124* 111*     BMP:   Recent Labs     05/28/21  0407 05/29/21  0640 05/30/21  0418    134* 135*   K 4.8 4.8 4.2    100 100   CO2 21 22 26   BUN 41* 61* 61*   CREATININE 1.9* 1.8* 1.5*   CALCIUM 8.3 8.1* 8.3   MG 2.00  --   --      Cardiac Enzymes: No results for input(s): CKTOTAL, CKMB, CKMBINDEX, TROPONINI in the last 72 hours. PT/INR: No results for input(s): PROTIME, INR in the last 72 hours. APTT: No results for input(s): APTT in the last 72 hours. Assessment/Plan:  CV - SR.   - KATHERYN. BBlocked. bp supported with cristian. No B agonists. Pad 26 optimal.   Responded well to volume supplement. pulm - pulm toilet, wean O2  Renal - back to BL Cr 1.5. NORA   - retain Curry for now  ID - wbc improving. UCx neg  Heme - acute blood loss anemia.  Goal hgb 10.- transfuse 1 PRBC   -  for CAD    Rylan Nieves MD  5/30/2021  9:31 AM

## 2021-05-30 NOTE — PROGRESS NOTES
Hospitalist Progress Note      PCP: Imtiaz Pitt MD    Date of Admission: 5/20/2021    Chief Complaint: [facility] Course:   69 yo M with history of DM 2, HTN, CKD 3, post poliomyelitis syndrome who came to ER with CP. Admitted as inpatient for further management. Underwent LHC on 5/21: Anatomy:   LM-70% diffuse  LAD-diffusely diseased, 80% proximal, 90% mid calcified  Cx-90% ostial  RCA-diffusely diseased 50% proximal and mid, 70% distal  RPDA-patent  LVEF-50%     Impression:  1. Severe multivessel CAD. 2.  Preserved LV systolic function.     Plan:  1.  CV surgical consultation for CABG. 2.  Patient has been statin intolerant to Livalo but unsure whether other statins have been tried. Will discuss trial of Crestor with him. S/P Urgent CABG Coronary Artery Bypass Graft x4 (LIMA to LAD, SVG to PDA, SVG sequenced to OM1 and OM2), Endoscopic vein harvest left saphenous vein, Left atrial appendage ligation with 40mm AtriClip, total cardiopulmonary bypass, doppler flow verification of bypass grafts, insertion of temporary ventricular pacing wires, transesophageal echocardiogram, 5 level bilateral intercostal nerve block with Marcaine 0.25%, left sided robicsek weave on 5/25/21.       Subjective: Patient lying in bed from breath improved after 2 L oxygen no nausea vomiting further fevers chills or sweats      Medications:  Reviewed    Infusion Medications    sodium chloride      sodium chloride      sodium chloride      sodium chloride Stopped (05/27/21 0951)    lactated ringers      phenylephrine (TAN-SYNEPHRINE) 50mg/250mL infusion 95 mcg/min (05/30/21 0630)    nitroGLYCERIN      clevidipine      dextrose       Scheduled Medications    insulin glargine  18 Units Subcutaneous Nightly    insulin lispro  10 Units Subcutaneous TID WC    metoprolol tartrate  200 mg Oral BID    furosemide  20 mg Intravenous Daily    insulin lispro  0-18 Units Subcutaneous TID WC    insulin lispro  0-9 Units Subcutaneous Nightly    sodium chloride flush  10 mL Intravenous 2 times per day    [Held by provider] fondaparinux  2.5 mg Subcutaneous Daily    aspirin  325 mg Oral Daily    acetaminophen  1,000 mg Oral Q6H    magnesium oxide  400 mg Oral BID    [Held by provider] potassium chloride  10 mEq Oral TID WC    sennosides-docusate sodium  1 tablet Oral BID    [Held by provider] lisinopril  2.5 mg Oral Lunch    pantoprazole  40 mg Oral Daily     PRN Meds: sodium chloride, sodium chloride, perflutren lipid microspheres, sodium chloride flush, sodium chloride, ondansetron, metoclopramide, traMADol **OR** traMADol, morphine, diphenhydrAMINE, zolpidem, magnesium hydroxide, polyethylene glycol, potassium chloride, magnesium sulfate, calcium chloride IVPB, calcium chloride IVPB, albuterol sulfate HFA, albumin human, lactated ringers, phenylephrine (TAN-SYNEPHRINE) 50mg/250mL infusion, furosemide, nitroGLYCERIN, clevidipine, glucose, dextrose, glucagon (rDNA), dextrose      Intake/Output Summary (Last 24 hours) at 5/30/2021 1159  Last data filed at 5/30/2021 1030  Gross per 24 hour   Intake 1520.73 ml   Output 3460 ml   Net -1939.27 ml       Physical Exam Performed:    BP (!) 143/82   Pulse 65   Temp 98.8 °F (37.1 °C) (Core)   Resp 19   Ht 5' 9\" (1.753 m)   Wt 223 lb 1.7 oz (101.2 kg)   SpO2 (!) 83%   BMI 32.95 kg/m²     General appearance: In bed appears short of breath oriented x3  HEENT: Pupils equal, round, and reactive to light. Conjunctivae/corneas clear. Neck: Supple, with full range of motion. No jugular venous distention. Trachea midline. Respiratory:no wheezing some crackles  Cardiovascular: Regular rate and rhythm with normal S1/S2 without murmurs, rubs or gallops. Abdomen: Soft, non-tender, non-distended with normal bowel sounds.   Musculoskeletal: 2+Plus bilateral lower extremity edema  Skin: Sternotomy C/D/I  Neurologic: no gross focal defciits  Psychiatric: Alert and oriented, thought content appropriate, normal insight      Labs:   Recent Labs     05/28/21  0407 05/29/21  0640 05/30/21  0418   WBC 19.5* 16.8* 13.0*   HGB 8.4* 10.4* 9.1*   HCT 26.3* 32.1* 28.4*   * 124* 111*     Recent Labs     05/28/21  0407 05/29/21  0640 05/30/21  0418    134* 135*   K 4.8 4.8 4.2    100 100   CO2 21 22 26   BUN 41* 61* 61*   CREATININE 1.9* 1.8* 1.5*   CALCIUM 8.3 8.1* 8.3     No results for input(s): AST, ALT, BILIDIR, BILITOT, ALKPHOS in the last 72 hours. No results for input(s): INR in the last 72 hours. No results for input(s): Gordan Blacksmith in the last 72 hours. Urinalysis:      Lab Results   Component Value Date    NITRU Negative 05/28/2021    WBCUA 20 05/28/2021    RBCUA 8 05/28/2021    BLOODU SMALL 05/28/2021    SPECGRAV 1.012 05/28/2021    GLUCOSEU Negative 05/28/2021       Radiology:  XR CHEST PORTABLE   Final Result   No pneumothorax following removal of the left chest tube. Development of   moderate opacities within the right lung either atelectasis or pneumonia. No   abnormal pulmonary vascular congestion or sizable pleural effusion. XR ABDOMEN FOR NG/OG/NE TUBE PLACEMENT   Final Result      XR CHEST PORTABLE   Final Result   Satisfactory appearance status post median sternotomy      No pneumothorax         CT CHEST ABDOMEN PELVIS WO CONTRAST   Final Result   No acute abnormality identified in the chest, abdomen, or pelvis. No finding   worrisome for occult malignancy. Moderate enlargement of the prostate.          VL PRE OP VEIN MAPPING   Final Result      VL DUP CAROTID BILATERAL   Final Result      XR CHEST (2 VW)   Final Result   No active cardiopulmonary disease                 Assessment/Plan:    Active Hospital Problems    Diagnosis     Statin intolerance [Z78.9]      Priority: Medium    Stage 3a chronic kidney disease [N18.31]      Priority: Medium    S/P coronary artery bypass graft x 4 [Z95.1]     S/P left atrial appendage ligation [Z98.890]  Essential hypertension [I10]     Mixed hyperlipidemia [E78.2]     Type 2 diabetes mellitus with diabetic nephropathy, without long-term current use of insulin (HCC) [E11.21]     Coronary artery disease involving native coronary artery of native heart with unstable angina pectoris (HCC) [I25.110]     Post poliomyelitis syndrome [G14]    Acute hypoxic respiratory failure likely secondary to heart failure secondary to KATHERYN  - phenlyephine keep SBP>115   - lopressor added to rate control    Acute Postop blood loss anemia   - 1 more units prbcs hgb goal 10 per ctvs    CAD s/p CABG: per cards and ctvs    Dm2 with hypglycemia  a1c 7.6 improved monitor    UTI:r/o cultures negative  - stop atbx    Eulogio: cr akua 1.3  - lasix nephro on board      Sayra Main MD

## 2021-05-30 NOTE — FLOWSHEET NOTE
Cristian weaned to 95mcg currently with sbp 120's on both cuff and art line, which is very positional. NC down to 2L, sat 95%. NSR 60's entire shift, with occ pvc's. Good UO.

## 2021-05-30 NOTE — PROGRESS NOTES
Office : [phone]     Fax :[phone]       Nephrology Progress Note      Patient's Name: Jaylen Hernandez  10:39 AM  5/30/2021      Chief Complaint:    Chief Complaint   Patient presents with    Shortness of Breath     sent by Dr Blane Woodward for abnormal EKG. was unable to get into see cardiologist. has been feeling \"not so good\", gets sob with activity for a couple months. denies cp. no n/v. History of Present Ilness:    Jaylen Hernandez is a 70 y.o. male with h/o DM 2, CKD 3 , DLP who came with complaints of 2-month history of progressive exertional shortness of breath and chest discomfort. Interval history :      Post CABG recovering. Urine output good  Minimal edema. Creatinine level slightly better      I/O last 3 completed shifts: In: 1647.7 [P.O.:240;  I.V.:1092.6; IV Piggyback:315]  Out: 5201 [Urine:3275]    Past Medical History:   Diagnosis Date    CAD (coronary artery disease)     Diabetes mellitus ([facility] [address]     Elevated LFT's     Hyperlipidemia     Hypertension     Post poliomyelitis syndrome     Type II or unspecified type diabetes mellitus without mention of complication, not stated as uncontrolled        Past Surgical History:   Procedure Laterality Date    CLEFT PALATE REPAIR      CORONARY ARTERY BYPASS GRAFT N/A 5/25/2021    Urgent CABG Coronary Artery Bypass Graft x4 (LIMA to LAD, SVG to PDA, SVG sequenced to OM1 and OM2), Endoscopic vein harvest left saphenous vein, Left atrial appendage ligation with 40mm AtriClip, total cardiopulmonary bypass, doppler flow verification of bypass grafts, insertion of temporary ventricular pacing wires, transesophageal echocardiogram, 5 level bilateral intercostal nerve block with Ma    SKIN BIOPSY Left 7/20/2020    EXCISE SKIN LESION LEFT POST AURICULAR WITH FLAP, FROZEN SECTIONS performed by Francheska Mott MD at [facility]           Current Medications:    0.9 % sodium chloride infusion, PRN  insulin glargine (LANTUS;BASAGLAR) injection pen 18 Units, Nightly  insulin lispro (1 Unit Dial) 10 Units, TID WC  metoprolol tartrate (LOPRESSOR) tablet 200 mg, BID  0.9 % sodium chloride infusion, PRN  furosemide (LASIX) injection 20 mg, Daily  perflutren lipid microspheres (DEFINITY) injection 1.65 mg, ONCE PRN  insulin lispro (1 Unit Dial) 0-18 Units, TID WC  insulin lispro (1 Unit Dial) 0-9 Units, Nightly  sodium chloride flush 0.9 % injection 10 mL, 2 times per day  sodium chloride flush 0.9 % injection 10 mL, PRN  0.9 % sodium chloride infusion, PRN  [Held by provider] fondaparinux (ARIXTRA) injection 2.5 mg, Daily  ondansetron (ZOFRAN) injection 4 mg, Q6H PRN  metoclopramide (REGLAN) injection 10 mg, Q6H PRN  aspirin EC tablet 325 mg, Daily  acetaminophen (TYLENOL) tablet 1,000 mg, Q6H  traMADol (ULTRAM) tablet 50 mg, Q6H PRN   Or  traMADol (ULTRAM) tablet 100 mg, Q6H PRN  morphine (PF) injection 2 mg, Q2H PRN  magnesium oxide (MAG-OX) tablet 400 mg, BID  [Held by provider] potassium chloride (KLOR-CON) extended release tablet 10 mEq, TID WC  diphenhydrAMINE (BENADRYL) tablet 25 mg, Nightly PRN  zolpidem (AMBIEN) tablet 5 mg, Nightly PRN  sennosides-docusate sodium (SENOKOT-S) 8.6-50 MG tablet 1 tablet, BID  magnesium hydroxide (MILK OF MAGNESIA) 400 MG/5ML suspension 30 mL, Daily PRN  polyethylene glycol (GLYCOLAX) packet 17 g, Daily PRN  [Held by provider] lisinopril (PRINIVIL;ZESTRIL) tablet 2.5 mg, Lunch  pantoprazole (PROTONIX) tablet 40 mg, Daily  potassium chloride 20 mEq/50 mL IVPB (Central Line), PRN  magnesium sulfate 2000 mg in 50 mL IVPB premix, PRN  calcium chloride 1,000 mg in sodium chloride 0.9 % 100 mL IVPB, PRN  calcium chloride 2,000 mg in sodium chloride 0.9 % 100 mL IVPB, PRN  albuterol sulfate  (90 Base) MCG/ACT inhaler 2 puff, Q6H PRN  albumin human 5 % IV solution 25 g, PRN  lactated ringers infusion 250 mL, Continuous PRN  phenylephrine (TAN-SYNEPHRINE) 50 mg in dextrose 5 % 250 mL infusion, Continuous PRN  furosemide (LASIX) injection 20 mg, PRN  nitroGLYCERIN 50 mg in dextrose 5% 250 mL infusion, Continuous PRN  clevidipine (CLEVIPREX) infusion, Continuous PRN  glucose (GLUTOSE) 40 % oral gel 15 g, PRN  dextrose 50 % IV solution, PRN  glucagon (rDNA) injection 1 mg, PRN  dextrose 5 % solution, PRN        Physical exam:     Vitals:  /80   Pulse 72   Temp 96.9 °F (36.1 °C) (Core)   Resp 17   Ht 5' 9\" (1.753 m)   Wt 223 lb 1.7 oz (101.2 kg)   SpO2 99%   BMI 32.95 kg/m²   Constitutional:  OAA X3 NAD  Skin: no rash, turgor wnl  Heent:  eomi, mmm  Neck: no bruits or jvd noted  Cardiovascular:  S1, S2 without m/r/g  Respiratory: CTA B without w/r/r  Abdomen:  +bs, soft, nt, nd  Ext: no  lower extremity edema    Labs:  CBC:   Recent Labs     05/28/21 0407 05/29/21  0640 05/30/21  0418   WBC 19.5* 16.8* 13.0*   HGB 8.4* 10.4* 9.1*   * 124* 111*     BMP:    Recent Labs     05/28/21 0407 05/29/21  0640 05/30/21  0418    134* 135*   K 4.8 4.8 4.2    100 100   CO2 21 22 26   BUN 41* 61* 61*   CREATININE 1.9* 1.8* 1.5*   GLUCOSE 117* 205* 126*     Ca/Mg/Phos:   Recent Labs     05/28/21 0407 05/29/21  0640 05/30/21  0418   CALCIUM 8.3 8.1* 8.3   MG 2.00  --   --      Hepatic:   No results for input(s): AST, ALT, ALB, BILITOT, ALKPHOS in the last 72 hours. Troponin:   No results for input(s): TROPONINI in the last 72 hours. BNP: No results for input(s): BNP in the last 72 hours. Lipids:   No results for input(s): CHOL, TRIG, HDL, LDLCALC, LABVLDL in the last 72 hours.   ABGs:   Recent Labs     05/28/21  1026   PHART 7.368   PO2ART 57.9*   [id number] 35.7                IMAGING:  XR CHEST PORTABLE   Final Result   No pneumothorax following removal of the left chest tube. Development of   moderate opacities within the right lung either atelectasis or pneumonia. No   abnormal pulmonary vascular congestion or sizable pleural effusion. XR ABDOMEN FOR NG/OG/NE TUBE PLACEMENT   Final Result      XR CHEST PORTABLE   Final Result   Satisfactory appearance status post median sternotomy      No pneumothorax         CT CHEST ABDOMEN PELVIS WO CONTRAST   Final Result   No acute abnormality identified in the chest, abdomen, or pelvis. No finding   worrisome for occult malignancy. Moderate enlargement of the prostate. VL PRE OP VEIN MAPPING   Final Result      VL DUP CAROTID BILATERAL   Final Result      XR CHEST (2 VW)   Final Result   No active cardiopulmonary disease               A/p     1. Acute kidney injury on CKD stage 3A   NORA 2/2 ATN   Creatinine at stable   Creat 1.6 ---> 1.4 --> 1.5 ---> 1.4 --->1.2 ---> 1.6 ---> 1.9 --> 1.8 --> 1.5  H/o DM 2    Good urine output  Expect renal function to improve  Continue Lasix for volume control      2. DM 2. Needs better control  On insulin    3. HTN . controlled   held lisinopril     4. Dyslipidemia . Statins     5. Has multivessel coronary artery disease. S/p CABG.   Management per CT surgery      Recommend to dose adjust all medications  based on renal functions  Monitor closely post CABG  Maintain SBP> 90 mmHg   Daily weights   AVOID NSAIDs  Avoid Nephrotoxins  Monitor Intake/Output  Call if significant decrease in urine output                 Thank you for allowing us to participate in care of Mana Valladares         Electronically signed by: Kevyn Darden MD, 5/30/2021, 10:39 AM      [facility] [address]  Office : [phone]  Fax :[phone]

## 2021-05-31 NOTE — FLOWSHEET NOTE
Up to commode x2 assist, large soft BM. Then to chair, roddy well, decently weak though. Cristian now at 50mcg, with cuff sbp 120's. Cristal Barry remains at 68cm. Doing well this AM, denies pain. NSR 60's.

## 2021-05-31 NOTE — PLAN OF CARE
Problem: Cardiovascular  Goal: No DVT, peripheral vascular complications  Outcome: Met This Shift     Problem: Falls - Risk of:  Goal: Will remain free from falls  Description: Will remain free from falls  Outcome: Met This Shift  Note: Pt free from falls this shift, bed side table next to bed, call light in reach, bed in lowest position, wheels locked. Encouraged to call for any assistance      Problem: Nutrition  Goal: Optimal nutrition therapy  Outcome: Met This Shift  Note: Eating 100% of all meal trays, roddy well. Problem: Skin Integrity:  Goal: Will show no infection signs and symptoms  Description: Will show no infection signs and symptoms  Outcome: Met This Shift  Goal: Absence of new skin breakdown  Description: Absence of new skin breakdown  Outcome: Met This Shift     Problem: Cardiovascular  Goal: Hemodynamic stability  Outcome: Ongoing  Goal: Anticoagulate/Hct stable  Outcome: Ongoing  Note: Received 1 unit of prbc today, will cont to syeda counts     Problem: Respiratory  Goal: No pulmonary complications  Outcome: Ongoing  Goal: O2 Sat > 90%  Outcome: Ongoing  Goal: Supplemental O2 requirements decreased  Outcome: Ongoing  Note: Decreased to 1L nc, sat maintaining 92% or greater.  Will cont to syeda

## 2021-05-31 NOTE — PROGRESS NOTES
Office : [phone]     Fax :[phone]       Nephrology Progress Note      Patient's Name: Timmy Jeffrey  8:58 AM  5/31/2021      Chief Complaint:    Chief Complaint   Patient presents with    Shortness of Breath     sent by Dr Florence Doyle for abnormal EKG. was unable to get into see cardiologist. has been feeling \"not so good\", gets sob with activity for a couple months. denies cp. no n/v. History of Present Ilness:    Timmy Jeffrey is a 70 y.o. male with h/o DM 2, CKD 3 , DLP who came with complaints of 2-month history of progressive exertional shortness of breath and chest discomfort. Interval history :      Post CABG recovering. Urine output good  Minimal edema. Creatinine level  better      I/O last 3 completed shifts: In: 2150.1 [P.O.:840; I.V.:933.1;  Blood:377.1]  Out: 56 [Urine:3760]    Past Medical History:   Diagnosis Date    CAD (coronary artery disease)     Diabetes mellitus ([facility] [address]     Elevated LFT's     Hyperlipidemia     Hypertension     Post poliomyelitis syndrome     Type II or unspecified type diabetes mellitus without mention of complication, not stated as uncontrolled        Past Surgical History:   Procedure Laterality Date    CLEFT PALATE REPAIR      CORONARY ARTERY BYPASS GRAFT N/A 5/25/2021    Urgent CABG Coronary Artery Bypass Graft x4 (LIMA to LAD, SVG to PDA, SVG sequenced to OM1 and OM2), Endoscopic vein harvest left saphenous vein, Left atrial appendage ligation with 40mm AtriClip, total cardiopulmonary bypass, doppler flow verification of bypass grafts, insertion of temporary ventricular pacing wires, transesophageal echocardiogram, 5 level bilateral intercostal nerve block with Ma    SKIN BIOPSY Left 7/20/2020    EXCISE SKIN LESION LEFT Result   No pneumothorax following removal of the left chest tube. Development of   moderate opacities within the right lung either atelectasis or pneumonia. No   abnormal pulmonary vascular congestion or sizable pleural effusion. XR ABDOMEN FOR NG/OG/NE TUBE PLACEMENT   Final Result      XR CHEST PORTABLE   Final Result   Satisfactory appearance status post median sternotomy      No pneumothorax         CT CHEST ABDOMEN PELVIS WO CONTRAST   Final Result   No acute abnormality identified in the chest, abdomen, or pelvis. No finding   worrisome for occult malignancy. Moderate enlargement of the prostate. VL PRE OP VEIN MAPPING   Final Result      VL DUP CAROTID BILATERAL   Final Result      XR CHEST (2 VW)   Final Result   No active cardiopulmonary disease               A/p     1. Acute kidney injury on CKD stage 3A   NORA 2/2 ATN   Creatinine at stable   Creat 1.6 ---> 1.4 --> 1.5 ---> 1.4 --->1.2 ---> 1.6 ---> 1.9 --> 1.8 --> 1.5  H/o DM 2    Good urine output  Expect renal function to improve  Continue Lasix for volume control      2. DM 2. Needs better control  On insulin    3. HTN . controlled   held lisinopril     4. Dyslipidemia . Statins     5. Has multivessel coronary artery disease. S/p CABG.   Management per CT surgery      Recommend to dose adjust all medications  based on renal functions  Monitor closely post CABG  Maintain SBP> 90 mmHg   Daily weights   AVOID NSAIDs  Avoid Nephrotoxins  Monitor Intake/Output  Call if significant decrease in urine output                 Thank you for allowing us to participate in care of Celina Hashimoto         Electronically signed by: Salina Cazares MD, 5/31/2021, 8:58 AM      [facility] [address]  Office : [phone]  Fax :[phone]

## 2021-05-31 NOTE — PLAN OF CARE
Problem: Cardiovascular  Goal: No DVT, peripheral vascular complications  Outcome: Ongoing     Problem: Respiratory  Goal: No pulmonary complications  Outcome: Ongoing     Problem: Nutrition  Goal: Optimal nutrition therapy  Outcome: Ongoing     Problem: Falls - Risk of:  Goal: Will remain free from falls  Description: Will remain free from falls  Outcome: Ongoing     Problem: Skin Integrity:  Goal: Will show no infection signs and symptoms  Description: Will show no infection signs and symptoms  Outcome: Ongoing

## 2021-05-31 NOTE — PROGRESS NOTES
Hospitalist Progress Note      PCP: Imtiaz Pitt MD    Date of Admission: 5/20/2021    Chief Complaint: [facility] Course:   Patient mated to hospital with chest pain. Patient underwent CABG postop had KATHERYN, managed by CVTS,. Patient developed leukocytosis likely postop has trended down off antibiotics. NORA is improving. Diabetes mellitus is better controlled no new Lantus and lispro dose.    Subjective: Sitting in bed shortness of is proved on room air denies any chest pain no nausea vomiting no fevers or chills    Medications:  Reviewed    Infusion Medications    sodium chloride      sodium chloride      sodium chloride Stopped (05/27/21 0951)    lactated ringers      phenylephrine (TAN-SYNEPHRINE) 50mg/250mL infusion 50 mcg/min (05/31/21 0591)    nitroGLYCERIN      clevidipine      dextrose       Scheduled Medications    insulin glargine  18 Units Subcutaneous Nightly    insulin lispro  10 Units Subcutaneous TID WC    metoprolol tartrate  200 mg Oral BID    furosemide  20 mg Intravenous Daily    insulin lispro  0-18 Units Subcutaneous TID WC    insulin lispro  0-9 Units Subcutaneous Nightly    sodium chloride flush  10 mL Intravenous 2 times per day    [Held by provider] fondaparinux  2.5 mg Subcutaneous Daily    aspirin  325 mg Oral Daily    acetaminophen  1,000 mg Oral Q6H    magnesium oxide  400 mg Oral BID    [Held by provider] potassium chloride  10 mEq Oral TID WC    sennosides-docusate sodium  1 tablet Oral BID    [Held by provider] lisinopril  2.5 mg Oral Lunch    pantoprazole  40 mg Oral Daily     PRN Meds: sodium chloride, sodium chloride, perflutren lipid microspheres, sodium chloride flush, sodium chloride, ondansetron, metoclopramide, traMADol **OR** traMADol, morphine, diphenhydrAMINE, zolpidem, magnesium hydroxide, polyethylene glycol, potassium chloride, magnesium sulfate, calcium chloride IVPB, calcium chloride IVPB, albuterol sulfate HFA, albumin human, lactated ringers, phenylephrine (TAN-SYNEPHRINE) 50mg/250mL infusion, furosemide, nitroGLYCERIN, clevidipine, glucose, dextrose, glucagon (rDNA), dextrose      Intake/Output Summary (Last 24 hours) at 5/31/2021 1035  Last data filed at 5/31/2021 0849  Gross per 24 hour   Intake 2030.13 ml   Output 3225 ml   Net -1194.87 ml       Physical Exam Performed:    /70   Pulse 71   Temp 99.5 °F (37.5 °C) (Core)   Resp 16   Ht 5' 9\" (1.753 m)   Wt 222 lb 7.1 oz (100.9 kg)   SpO2 94%   BMI 32.85 kg/m²     General appearance: In bed appears short of breath oriented x3  HEENT: Pupils equal, round, and reactive to light. Conjunctivae/corneas clear. Neck: Supple, with full range of motion. No jugular venous distention. Trachea midline. Respiratory:no wheezing or crackles  Cardiovascular: Regular rate and rhythm with normal S1/S2 without murmurs, rubs or gallops. Abdomen: Soft, non-tender, non-distended with normal bowel sounds. Musculoskeletal: 1+Plus bilateral lower extremity edema  Skin: Sternotomy C/D/I  Neurologic: no gross focal defciits  Psychiatric: Alert and oriented, thought content appropriate, normal insight      Labs:   Recent Labs     05/29/21  0640 05/30/21  0418 05/30/21  1545 05/31/21  0330   WBC 16.8* 13.0*  --  13.2*   HGB 10.4* 9.1* 10.6* 10.9*   HCT 32.1* 28.4* 32.7* 33.6*   * 111*  --  90*     Recent Labs     05/29/21  0640 05/30/21  0418 05/31/21  0330   * 135* 135*   K 4.8 4.2 4.5    100 101   CO2 22 26 23   BUN 61* 61* 59*   CREATININE 1.8* 1.5* 1.2   CALCIUM 8.1* 8.3 7.7*     No results for input(s): AST, ALT, BILIDIR, BILITOT, ALKPHOS in the last 72 hours. No results for input(s): INR in the last 72 hours. No results for input(s): Ethelene Soulier in the last 72 hours.     Urinalysis:      Lab Results   Component Value Date    NITRU Negative 05/28/2021    WBCUA 20 05/28/2021    RBCUA 8 05/28/2021    BLOODU SMALL 05/28/2021    SPECGRAV 1.012 05/28/2021    GLUCOSEU Negative 05/28/2021       Radiology:  XR CHEST PORTABLE   Final Result   No pneumothorax following removal of the left chest tube. Development of   moderate opacities within the right lung either atelectasis or pneumonia. No   abnormal pulmonary vascular congestion or sizable pleural effusion. XR ABDOMEN FOR NG/OG/NE TUBE PLACEMENT   Final Result      XR CHEST PORTABLE   Final Result   Satisfactory appearance status post median sternotomy      No pneumothorax         CT CHEST ABDOMEN PELVIS WO CONTRAST   Final Result   No acute abnormality identified in the chest, abdomen, or pelvis. No finding   worrisome for occult malignancy. Moderate enlargement of the prostate.          VL PRE OP VEIN MAPPING   Final Result      VL DUP CAROTID BILATERAL   Final Result      XR CHEST (2 VW)   Final Result   No active cardiopulmonary disease                 Assessment/Plan:    Active Hospital Problems    Diagnosis     Statin intolerance [Z78.9]      Priority: Medium    Stage 3a chronic kidney disease [N18.31]      Priority: Medium    S/P coronary artery bypass graft x 4 [Z95.1]     S/P left atrial appendage ligation [Z98.890]     Essential hypertension [I10]     Mixed hyperlipidemia [E78.2]     Type 2 diabetes mellitus with diabetic nephropathy, without long-term current use of insulin (HCC) [E11.21]     Coronary artery disease involving native coronary artery of native heart with unstable angina pectoris (HCC) [I25.110]     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

## 2021-06-01 NOTE — PROGRESS NOTES
Comprehensive Nutrition Assessment    Type and Reason for Visit:  Reassess    Nutrition Recommendations/Plan:   1. Continue current diet  2. Encourage po    Nutrition Assessment:  Pt nutritionally stable at this time s/p CABG. Pt is eating 100% at meals. Pt denies any n/v. Pt does not like the Ensure and not drinking them. RD to d/c supplement. Pt denies any other needs at this time. Malnutrition Assessment:  Malnutrition Status:  No malnutrition      Estimated Daily Nutrient Needs:  Energy (kcal):  1100 - 1400; Weight Used for Energy Requirements:  Admission (100 kg)     Protein (g):  146; Weight Used for Protein Requirements:  Ideal (2g/73kg)        Fluid (ml/day):  2000; Method Used for Fluid Requirements:  ml/Kg      Nutrition Related Findings:  I/O's: -3.3L; trace generalized edema      Wounds:  Surgical Incision       Current Nutrition Therapies:    DIET CARDIAC; Carb Control: 4 carb choices (60 gms)/meal; Daily Fluid Restriction: 2000 ml    Anthropometric Measures:  · Height: 5' 9\" (175.3 cm)  · Current Body Weight: 221 lb (100.2 kg)   · Admission Body Weight: 221 lb (100.2 kg)     · Ideal Body Weight: 160 lbs; % Ideal Body Weight 138.1 %   · BMI: 32.6  · BMI Categories: Obese Class 1 (BMI 30.0-34. 9)       Nutrition Diagnosis:   · Increased nutrient needs related to increase demand for energy/nutrients as evidenced by wounds (s/p OHS)      Nutrition Interventions:   Food and/or Nutrient Delivery:  Continue Current Diet  Nutrition Education/Counseling:  Education completed (Family attended CVU class)   Coordination of Nutrition Care:  Continue to monitor while inpatient    Goals:  PO to remain greater than 75% at meals       Nutrition Monitoring and Evaluation:   Behavioral-Environmental Outcomes:  None Identified   Food/Nutrient Intake Outcomes:  Food and Nutrient Intake  Physical Signs/Symptoms Outcomes:  Weight     Discharge Planning:    No discharge needs at this time     Electronically signed by Heather Becerra RD, CNSC, LD on 6/1/21 at 10:04 AM EDT    Contact: [phone]

## 2021-06-01 NOTE — PROGRESS NOTES
Hospital Medicine Progress Note     Date:  6/1/2021    PCP: Carlton Cross MD (Tel: [phone])    Date of Admission: 5/20/2021      Subjective  Patient states he feels better, was able to stand up for 1-1/2-minute today. Objective  Physical exam:  Vitals: /70   Pulse 67   Temp 97.3 °F (36.3 °C) (Temporal)   Resp 16   Ht 5' 9\" (1.753 m)   Wt 221 lb 12.5 oz (100.6 kg)   SpO2 97%   BMI 32.75 kg/m²   Gen: Not in distress. Alert. Head: Normocephalic. Atraumatic. Eyes: EOMI. Good acuity. ENT: Oral mucosa moist  Neck: No JVD. No obvious thyromegaly. CVS: Nml S1S2, + murmur, RRR  Pulmomary: Diminished breath sounds bilaterally, no wheezing  Gastrointestinal: Soft, NT/ND. Positive bowel sounds. Musculoskeletal: Trace edema. Warm  Neuro: No focal deficit. Moves extremity spontaneously. Psychiatry: Appropriate affect. Not agitated. Skin: Warm, dry with normal turgor. No rash      24HR INTAKE/OUTPUT:      Intake/Output Summary (Last 24 hours) at 6/1/2021 1843  Last data filed at 6/1/2021 1500  Gross per 24 hour   Intake 610.47 ml   Output 1725 ml   Net -1114.53 ml     I/O last 3 completed shifts: In: 850.5 [P.O.:720; I.V.:130.5]  Out: 1975 [Urine:1975]  No intake/output data recorded.       Meds:    midodrine  10 mg Oral TID WC    insulin glargine  18 Units Subcutaneous Nightly    insulin lispro  10 Units Subcutaneous TID     metoprolol tartrate  200 mg Oral BID    furosemide  20 mg Intravenous Daily    insulin lispro  0-18 Units Subcutaneous TID WC    insulin lispro  0-9 Units Subcutaneous Nightly    sodium chloride flush  10 mL Intravenous 2 times per day    [Held by provider] fondaparinux  2.5 mg Subcutaneous Daily    aspirin  325 mg Oral Daily    acetaminophen  1,000 mg Oral Q6H    magnesium oxide  400 mg Oral BID    [Held by provider] potassium chloride  10 mEq Oral TID WC    sennosides-docusate sodium  1 tablet Oral BID    [Held by provider] lisinopril  2.5 mg Oral Lunch *        Plan:  Severe multivessel CAD  -Status post CABG x4 on 5/25/2021  -Management per cardiothoracic surgery and cardiology      Hypotension  -Currently on midodrine  -Management per cardiothoracic surgery      Uncontrolled insulin-dependent diabetes mellitus type 2 with circulatory problem  -Blood sugars still variable  -Increase Lantus 25 units nightly, continue Humalog 10 units before meals and sliding scale insulin high, continue to monitor        Hypertension associated with diabetes  -Currently on Lopressor and IV Lasix per cardiothoracic surgery recommendations and midodrine secondary to hypotension          Diet: DIET CARDIAC; Carb Control: 4 carb choices (60 gms)/meal; Daily Fluid Restriction: 2000 ml    Activity: Up with assist  Prophylaxis: arixtra, per primary service    Code status: Full Code     ----------        Beatrice Linder MD  -------------------------------  Rounding hospitalist

## 2021-06-01 NOTE — PROGRESS NOTES
Occupational Therapy  Facility/Department: [facility]  Daily Treatment Note  NAME: Richi Jones  : 1950  MRN: [id number]    Date of Service: 2021    Discharge Recommendations:  Richi Jones scored a 12 on the AM-PAC ADL Inpatient form. Current research shows that an AM-PAC score of 17 or less is typically not associated with a discharge to the patient's home setting. Based on the patient's AM-PAC score and their current ADL deficits, it is recommended that the patient have 5-7 sessions per week of Occupational Therapy at d/c to increase the patient's independence. At this time, this patient demonstrates the endurance, and/or tolerance for 3 hours of therapy each day, with a treatment frequency of 5-7x/wk. Please see assessment section for further patient specific details. If patient discharges prior to next session this note will serve as a discharge summary. Please see below for the latest assessment towards goals. 5-7 sessions per week  OT Equipment Recommendations  Equipment Needed: No (continue to assess with progress)    Assessment   Performance deficits / Impairments: Decreased functional mobility ; Decreased strength;Decreased endurance;Decreased high-level IADLs;Decreased ADL status; Decreased balance  Assessment: pt ambulated for the 1st time since CABG, pt limited by fatigued and endurance. continue cotx for safety with ambulation. pt not at baseline and would benefit from ongoing skilled OT services in order to return to [facility]  Treatment Diagnosis: CABG x4  Prognosis: Good  History: pt independent at baseline  OT Education: OT Role;Transfer Training;Precautions; ADL Adaptive Strategies; Plan of Care;Family Education;Orientation  Patient Education: sternal precautions, role of OT--pt independent with education, continue to educate for carryover  REQUIRES OT FOLLOW UP: Yes  Activity Tolerance  Activity Tolerance: Patient Tolerated treatment well  Safety Devices  Safety Devices in Limits  Objective    ADL  Additional Comments: pt declined ADLs        Balance  Sitting Balance: Contact guard assistance  Standing Balance: Minimal assistance ([place]ultana to CGA)  Standing Balance  Time: ~5-7 minutes  Activity: mobility with 4WW ~17 ft. Comment: CGA progressing to [place]. pt fatigues quickly. Functional Mobility  Functional - Mobility Device: 4-Wheeled Walker  Activity: Other  Assist Level: Minimal assistance  Bed mobility  Comment: did not assess, pt in recliner at beginning and end of session  Transfers  Sit to stand: 2 Person assistance (modA of 2 with initial stand, Yessicaa Copping for second stand)  Stand to sit: Minimal assistance  Transfer Comments: cues for scooting to edge of recliner.  educated on sequence of sit to stand                       Cognition  Overall Cognitive Status: WFL     Perception  Overall Perceptual Status: WFL                                   Plan   Plan  Times per week: 3-5  Plan weeks: cotx for ambulation  Current Treatment Recommendations: Strengthening, Patient/Caregiver Education & Training, Functional Mobility Training, Endurance Training, Balance Training, Safety Education & Training, Self-Care / ADL, Home Management Training  Plan Comment: continue to stress sternal precautions  G-Code     OutComes Score                                                  AM-PAC Score        AM-[facility] Inpatient Daily Activity Raw Score: 12 (06/01/21 1615)  -PAC Inpatient ADL T-Scale Score : 30.6 (06/01/21 1615)  ADL Inpatient CMS 0-100% Score: 66.57 (06/01/21 1615)  ADL Inpatient CMS G-Code Modifier : CL (06/01/21 1615)    Goals  Short term goals  Time Frame for Short term goals: Prior to d/c  Short term goal 1: Pt will complete LB dressing with mod A---pt declined 6/1  Short term goal 2: Pt will complete toileting with min A---pt declined 6/1  Short term goal 3: Pt will complete bathing with mod A---pt declined 6/1  Short term goal 4: Pt will complete toilet transfers with min A using LRAD---transfer modA of 2 progressing to [address]  Patient Goals   Patient goals :  To be independent       Therapy Time   Individual Concurrent Group Co-treatment   Time In       1540   Time Out       1606   Minutes       26     Timed Code Treatment Minutes:  26 minutes    Total Treatment Minutes:  26 minutes      Nanci Ny OTR/L [id number]      Nanci Ny, OT

## 2021-06-01 NOTE — PROGRESS NOTES
Office : [phone]     Fax :[phone]       Nephrology Progress Note      Patient's Name: Jade Ricketts  8:08 AM  6/1/2021      Chief Complaint:    Chief Complaint   Patient presents with    Shortness of Breath     sent by Dr Teresa Cushing for abnormal EKG. was unable to get into see cardiologist. has been feeling \"not so good\", gets sob with activity for a couple months. denies cp. no n/v. History of Present Ilness:    Jade Ricketts is a 70 y.o. male with h/o DM 2, CKD 3 , DLP who came with complaints of 2-month history of progressive exertional shortness of breath and chest discomfort. Interval history :      No acute events overnight   Post CABG recovering. Urine output good  Minimal edema. Creatinine level  better      I/O last 3 completed shifts: In: 2706 [P.O.:720;  I.V.:254.5; IV Piggyback:100.5]  Out: [id number] [Urine:1550]    Past Medical History:   Diagnosis Date    CAD (coronary artery disease)     Diabetes mellitus ([facility] [address]     Elevated LFT's     Hyperlipidemia     Hypertension     Post poliomyelitis syndrome     Type II or unspecified type diabetes mellitus without mention of complication, not stated as uncontrolled        Past Surgical History:   Procedure Laterality Date    CLEFT PALATE REPAIR      CORONARY ARTERY BYPASS GRAFT N/A 5/25/2021    Urgent CABG Coronary Artery Bypass Graft x4 (LIMA to LAD, SVG to PDA, SVG sequenced to OM1 and OM2), Endoscopic vein harvest left saphenous vein, Left atrial appendage ligation with 40mm AtriClip, total cardiopulmonary bypass, doppler flow verification of bypass grafts, insertion of temporary ventricular pacing wires, transesophageal echocardiogram, 5 level bilateral intercostal nerve block with Ma    SKIN BIOPSY Left 7/20/2020    EXCISE SKIN LESION LEFT POST AURICULAR WITH FLAP, FROZEN SECTIONS performed by Aleah Mcdonnell MD at [facility]           Current Medications:    0.9 % sodium chloride infusion, PRN  insulin glargine (LANTUS;BASAGLAR) injection pen 18 Units, Nightly  insulin lispro (1 Unit Dial) 10 Units, TID WC  metoprolol tartrate (LOPRESSOR) tablet 200 mg, BID  0.9 % sodium chloride infusion, PRN  furosemide (LASIX) injection 20 mg, Daily  perflutren lipid microspheres (DEFINITY) injection 1.65 mg, ONCE PRN  insulin lispro (1 Unit Dial) 0-18 Units, TID WC  insulin lispro (1 Unit Dial) 0-9 Units, Nightly  sodium chloride flush 0.9 % injection 10 mL, 2 times per day  sodium chloride flush 0.9 % injection 10 mL, PRN  0.9 % sodium chloride infusion, PRN  [Held by provider] fondaparinux (ARIXTRA) injection 2.5 mg, Daily  ondansetron (ZOFRAN) injection 4 mg, Q6H PRN  metoclopramide (REGLAN) injection 10 mg, Q6H PRN  aspirin EC tablet 325 mg, Daily  acetaminophen (TYLENOL) tablet 1,000 mg, Q6H  traMADol (ULTRAM) tablet 50 mg, Q6H PRN   Or  traMADol (ULTRAM) tablet 100 mg, Q6H PRN  morphine (PF) injection 2 mg, Q2H PRN  magnesium oxide (MAG-OX) tablet 400 mg, BID  [Held by provider] potassium chloride (KLOR-CON) extended release tablet 10 mEq, TID WC  diphenhydrAMINE (BENADRYL) tablet 25 mg, Nightly PRN  zolpidem (AMBIEN) tablet 5 mg, Nightly PRN  sennosides-docusate sodium (SENOKOT-S) 8.6-50 MG tablet 1 tablet, BID  magnesium hydroxide (MILK OF MAGNESIA) 400 MG/5ML suspension 30 mL, Daily PRN  polyethylene glycol (GLYCOLAX) packet 17 g, Daily PRN  [Held by provider] lisinopril (PRINIVIL;ZESTRIL) tablet 2.5 mg, Lunch  pantoprazole (PROTONIX) tablet 40 mg, Daily  potassium chloride 20 mEq/50 mL IVPB (Central Line), PRN  magnesium sulfate 2000 mg in 50 mL IVPB premix, PRN  calcium chloride 1,000 mg in sodium chloride 0.9 % 100 mL IVPB, PRN  calcium chloride 2,000 mg in sodium chloride 0.9 % 100 mL IVPB, PRN  albuterol sulfate  (90 Base) MCG/ACT inhaler 2 puff, Q6H PRN  albumin human 5 % IV solution 25 g, PRN  lactated ringers infusion 250 mL, Continuous PRN  phenylephrine (TAN-SYNEPHRINE) 50 mg in dextrose 5 % 250 mL infusion, Continuous PRN  furosemide (LASIX) injection 20 mg, PRN  nitroGLYCERIN 50 mg in dextrose 5% 250 mL infusion, Continuous PRN  clevidipine (CLEVIPREX) infusion, Continuous PRN  glucose (GLUTOSE) 40 % oral gel 15 g, PRN  dextrose 50 % IV solution, PRN  glucagon (rDNA) injection 1 mg, PRN  dextrose 5 % solution, PRN        Physical exam:     Vitals:  /69   Pulse 61   Temp 99.2 °F (37.3 °C) (Core)   Resp 19   Ht 5' 9\" (1.753 m)   Wt 221 lb 12.5 oz (100.6 kg)   SpO2 99%   BMI 32.75 kg/m²   Constitutional:  OAA X3 NAD  Skin: no rash, turgor wnl  Heent:  eomi, mmm  Neck: no bruits or jvd noted  Cardiovascular:  S1, S2 without m/r/g  Respiratory: CTA B without w/r/r  Abdomen:  +bs, soft, nt, nd  Ext: no  lower extremity edema    Labs:  CBC:   Recent Labs     05/30/21 0418 05/30/21  1545 05/31/21  0330 06/01/21  0523   WBC 13.0*  --  13.2* 16.5*   HGB 9.1* 10.6* 10.9* 11.3*   *  --  90* 155     BMP:    Recent Labs     05/30/21 0418 05/31/21  0330 06/01/21  0523   * 135* 138   K 4.2 4.5 4.6    101 101   CO2 26 23 25   BUN 61* 59* 67*   CREATININE 1.5* 1.2 1.6*   GLUCOSE 126* 189* 178*     Ca/Mg/Phos:   Recent Labs     05/30/21 0418 05/31/21  0330 06/01/21  0523   CALCIUM 8.3 7.7* 8.7     Hepatic:   No results for input(s): AST, ALT, ALB, BILITOT, ALKPHOS in the last 72 hours. Troponin:   No results for input(s): TROPONINI in the last 72 hours. BNP: No results for input(s): BNP in the last 72 hours. Lipids:   No results for input(s): CHOL, TRIG, HDL, LDLCALC, LABVLDL in the last 72 hours. ABGs:   No results for input(s): PHART, PO2ART, [id number] in the last 72 hours.              IMAGING:  XR CHEST PORTABLE   Final Result   No pneumothorax following

## 2021-06-01 NOTE — PROGRESS NOTES
Assessment completed. See flowchart. Medication given. Weaning Cristian to sbp of 110 per Dr Niya Jeffers. Curry removed. Up in chair. VSS. Denies pain. Patient encouraged to use call light with any needs. Patient states understanding, call light in reach. Will continue to monitor.

## 2021-06-01 NOTE — CONSULTS
[facility]   Consult Note      Reason for Consult: Thrombocytopenia HIT screen positive   Requesting Physician:   Gary Sumner MD     CHIEF COMPLAINT:coronary artery disease     History Obtained From: patient    HISTORY OF PRESENT ILLNESS:    Mr Echo Perry is a 70year old man who was admitted on may 20 with shortness of breath He was found to have severe coronary artery disease and underwent coronary artery bypass on may 25. He was on heparin for about 5 days prior to his surgery He has been on arixtra at prophylactic dose after surgery He has had platelets which went from 160 to 97 He has had no evidence of bleeding or thrombosis. He remains on norepinephrine drip    He was healthy prior to admission except for type 2 diabetes on oral agents     Past Medical History:     has a past medical history of CAD (coronary artery disease), Diabetes mellitus ([address] Elevated LFT's, Hyperlipidemia, Hypertension, Post poliomyelitis syndrome, and Type II or unspecified type diabetes mellitus without mention of complication, not stated as uncontrolled.    Past Surgical History:    Past Surgical History:   Procedure Laterality Date    CLEFT PALATE REPAIR      CORONARY ARTERY BYPASS GRAFT N/A 5/25/2021    Urgent CABG Coronary Artery Bypass Graft x4 (LIMA to LAD, SVG to PDA, SVG sequenced to OM1 and OM2), Endoscopic vein harvest left saphenous vein, Left atrial appendage ligation with 40mm AtriClip, total cardiopulmonary bypass, doppler flow verification of bypass grafts, insertion of temporary ventricular pacing wires, transesophageal echocardiogram, 5 level bilateral intercostal nerve block with Ma    SKIN BIOPSY Left 7/20/2020    EXCISE SKIN LESION LEFT POST AURICULAR WITH FLAP, FROZEN SECTIONS performed by Liam Victor MD at [facility]        Current Medications:    Current Facility-Administered Medications   Medication Dose Route Frequency Provider Last Rate Last Admin    midodrine  magnesium oxide (MAG-OX) tablet 400 mg  400 mg Oral BID Tre Amaro MD   400 mg at 06/01/21 0842    [Held by provider] potassium chloride (KLOR-CON) extended release tablet 10 mEq  10 mEq Oral TID WC Tre Amaro MD   10 mEq at 05/26/21 1140    diphenhydrAMINE (BENADRYL) tablet 25 mg  25 mg Oral Nightly PRN Tre Amaro MD        zolpidem [employer] [facility]) tablet 5 mg  5 mg Oral Nightly PRN Ter Amaro MD        sennosides-docusate sodium (SENOKOT-S) 8.6-50 MG tablet 1 tablet  1 tablet Oral BID Tre Amaro MD   1 tablet at 06/01/21 0834    magnesium hydroxide (MILK OF MAGNESIA) 400 MG/5ML suspension 30 mL  30 mL Oral Daily PRN Tre Amaro MD   30 mL at 05/29/21 0758    polyethylene glycol (GLYCOLAX) packet 17 g  17 g Oral Daily PRN Tre Amaro MD   17 g at 05/29/21 0757    [Held by provider] lisinopril (PRINIVIL;ZESTRIL) tablet 2.5 mg  2.5 mg Oral Lunch Tre Amaro MD        pantoprazole (PROTONIX) tablet 40 mg  40 mg Oral Daily Tre Amaro MD   40 mg at 06/01/21 0834    potassium chloride 20 mEq/50 mL IVPB (Central Line)  20 mEq Intravenous PRN Tre Amaro MD   Stopped at 05/25/21 2156    magnesium sulfate 2000 mg in 50 mL IVPB premix  2,000 mg Intravenous PRN Tre Amaro MD        calcium chloride 1,000 mg in sodium chloride 0.9 % 100 mL IVPB  1,000 mg Intravenous PRN Tre Amaro MD        calcium chloride 2,000 mg in sodium chloride 0.9 % 100 mL IVPB  2,000 mg Intravenous PRN Tre Amaro MD   Stopped at 05/25/21 2054    albuterol sulfate  (90 Base) MCG/ACT inhaler 2 puff  2 puff Inhalation Q6H PRN Tre Amaro MD   2 puff at 05/28/21 0855    albumin human 5 % IV solution 25 g  25 g Intravenous PRN Tre Amaro MD   Stopped at 05/26/21 0508    lactated ringers infusion 250 mL  250 mL Intravenous Continuous PRN Tre Amaro MD        phenylephrine (TAN-SYNEPHRINE) 50 mg in dextrose 5 % 250 mL infusion  10 mcg/min Intravenous Continuous PRN bruising or ecchymoses, blood clots or swollen lymph nodes. · Allergic/Immunologic: No nasal congestion or hives. ·     PHYSICAL EXAM:    Vitals:  Vitals:    06/01/21 1830   BP: 102/70   Pulse: 67   Resp:    Temp:    SpO2: 97%      CONSTITUTIONAL:  awake, alert, cooperative, no apparent distress  EYES:  pupils equal, round and reactive to light, sclera clear and conjunctiva normal  ENT:  normocepalic, without obvious abnormality, atramatic  NECK:  supple, symmetrical, no jugular venous distension and no carotid bruits  HEMATOLOGIC/LYMPHATICS:  No cervical,supraclavicular or axillary lymphadenopathy  LUNGS:  No increased work of breathing and clear to auscultation  CARDIOVASCULAR: Regular rate and rhythm, normal S1 and S2, no murmur noted midline sternotomy is well healed   ABDOMEN:  Normal bowel sounds x 4, soft, non-distended, non-tender, no masses palpated, no hepatosplenomegally  MUSCULOSKELETAL:  full range of motion noted, tone is normal  EXTREMITIES: no LE edema  NEUROLOGIC:  Awake, alert, oriented to name, place and time. Motor skills grossly intact. SKIN:  normal skin color, texture, turgor and no jaundice. Appears intact. DATA:  General Labs:    CBC:   Recent Labs     05/30/21  0418 05/30/21  1545 05/31/21  0330 06/01/21  0523   WBC 13.0*  --  13.2* 16.5*   HGB 9.1* 10.6* 10.9* 11.3*   HCT 28.4* 32.7* 33.6* 35.5*   MCV 87.0  --  87.1 87.8   *  --  90* 155     BMP:   Recent Labs     05/30/21  0418 05/31/21  0330 06/01/21  0523   * 135* 138   K 4.2 4.5 4.6    101 101   CO2 26 23 25   BUN 61* 59* 67*   CREATININE 1.5* 1.2 1.6*     LIVER PROFILE: No results for input(s): AST, ALT, LIPASE, BILIDIR, BILITOT, ALKPHOS in the last 72 hours. Invalid input(s):   AMYLASE,  ALB  PT/INR:    Lab Results   Component Value Date    PROTIME 12.6 05/24/2021    INR 1.09 05/24/2021     PTT:    Lab Results   Component Value Date    APTT 61.0 05/25/2021    APTT 60.5 05/24/2021    APTT 60.4 05/23/2021 Magnesium:    Lab Results   Component Value Date    MG 2.00 05/28/2021    MG 2.00 05/27/2021    MG 2.20 05/26/2021       Imaging:  Echo Complete    Result Date: 5/21/2021  Transthoracic Echocardiography Report (TTE)  Demographics   Patient Name       Jt Chavez   Date of Study      05/21/2021         Gender              Male   Patient Number     [id number]         Date of Birth       1950   Visit Number       [id number]          Age                 70 year(s)   Accession Number   [id number]         Room Number         6572   Corporate ID       [id number]            Jade Cesar RDCS   Ordering Physician Geetha Marrero MD,                     Sabrina Powell MD      Physician           [facility]  Procedure Type of Study   TTE procedure:ECHOCARDIOGRAM COMPLETE 2D W DOPPLER W COLOR. Procedure Date Date: 05/21/2021 Start: 10:05 AM Study Location: [facility] - Echo Lab Technical Quality: Adequate visualization Indications:Dyspnea/SOB. Patient Status: Routine Height: 69 inches Weight: 221 pounds BSA: 2.16 m2 BMI: 32.64 kg/m2 BP: 153/87 mmHg  Conclusions   Summary  -Left ventricular cavity size is normal.  -Ejection fraction is visually estimated to be 50%. -The apex and apical septum appear hypokinetic.  -Grade I diastolic dysfunction with normal LV filling pressures. E/e' =  12.6.  -Left atrium is dilated. -Mild aortic stenosis with a peak velocity of 2.3m/s and a mean pressure  gradient of 11mmHg. -Mitral annular calcification is present. -Mild mitral regurgitation.  -Mild tricuspid regurgitation. RVSP = 36 mmHG.    Signature   ------------------------------------------------------------------  Electronically signed by Cinda Terry MD, [facility] (Interpreting  physician) on 05/21/2021 at 11:51 AM  ------------------------------------------------------------------   Findings   Left Ventricle  Left ventricular cavity size is normal.  Ejection fraction is visually estimated to be 50%. The apex and apical septum appear hypokinetic. Grade I diastolic dysfunction with normal LV filling pressures. E/e' = 12.6. Mitral Valve  Mitral annular calcification is present. Mild mitral regurgitation. Left Atrium  Left atrium is dilated. Aortic Valve  Mild aortic stenosis with a peak velocity of 2.3m/s and a mean pressure  gradient of 11mmHg. No evidence of aortic valve regurgitation. Aorta  The aortic root is normal in size. Right Ventricle  The right ventricle is normal in size and function. TAPSE = 2.1 cm. RVS velocity is 10.1 cm/s. RVSP = 36 mmHG. Tricuspid Valve  Tricuspid valve is structurally normal.  Mild tricuspid regurgitation. Right Atrium  The right atrial size is normal.   Pulmonic Valve  The pulmonic valve appears structurally normal.  No evidence of pulmonic valve regurgitation. Pericardial Effusion  No pericardial effusion noted. Pleural Effusion  No pleural effusion. Miscellaneous  IVC size is normal (<2.1cm) and collapses > 50% with respiration consistent  with normal RA pressure (3mmHg).   M-Mode/2D Measurements (cm)   LV Diastolic Dimension: 7.24 cm LV Systolic Dimension: 0.70 cm  LV Septum Diastolic: 3.51 cm  LV PW Diastolic: 9.64 cm        AO Root Dimension: 3.6 cm                                  LA Dimension: 4.3 cm                                  LA Area: 21.4 cm2  LVOT: 2 cm                      LA volume/Index: 67.7 ml /31 ml/m2  Doppler Measurements   AV Peak Velocity: 180 cm/s     MV Peak E-Wave: 82.3 cm/s  AV Peak Gradient: 12.96 mmHg   MV Peak A-Wave: 89.5 cm/s  AV Mean Gradient: 9 mmHg       MV E/A Ratio: 0.92  LVOT Peak Velocity: 164 cm/s  AV Area (Continuity):3.16 cm2  MV Max P mmHg                                 MV Vmax:317 cm/s  TR Velocity:289 cm/s  TR Gradient:33.41 mmHg  Estimated RAP:3 mmHg  Estimated RVSP: 36 mmHg  E' Septal Velocity: ------------------------------------------------------------------  Electronically signed by Andrea Shore MD, [facility] (Interpreting  physician) on 05/28/2021 at 03:42 PM  ------------------------------------------------------------------   Findings   Left Ventricle  Normal global systolic function with an ejection fraction estimated at 55%. There is mild concentric left ventricular hypertrophy noted. There is possible apical hypokinesis noted. Mitral Valve  Thickened mitral valve without evidence of stenosis. Systolic anterior motion of the mitral valve causing a left ventricular  outflow tract obstruction with a maximum resting pressure gradient of 201  mmHg. There is moderate eccentric mitral regurgitation. Aortic Valve  Aortic valve appears sclerotic but opens adequately. Right Ventricle  Pacer / ICD wire is visualized in the right ventricle. Right Atrium  Pacemaker / ICD lead is visualized in the right atrium. M-Mode/2D Measurements (cm)   LV Diastolic Dimension: 5.43 cm LV Systolic Dimension: 0.59 cm  LV Septum Diastolic: 4.05 cm  LV PW Diastolic: 1.73 cm        AO Root Dimension: 3.4 cm  Doppler Measurements   AV Peak Velocity: 359 cm/s  AV Peak Gradient: 51.55 mmHg  LVOT Peak Velocity: 634 cm/s   Aortic Valve   Peak Velocity: 359 cm/s  Peak Gradient: 51.55 mmHg  Aorta   Aortic Root: 3.4 cm  Ascending Aorta: 3.6 cm      XR CHEST (2 VW)    Result Date: 5/20/2021  EXAMINATION: TWO XRAY VIEWS OF THE CHEST 5/20/2021 1:13 pm COMPARISON: None. HISTORY: ORDERING SYSTEM PROVIDED HISTORY: Chest Discomfort TECHNOLOGIST PROVIDED HISTORY: Reason for exam:->Chest Discomfort Reason for Exam: Shortness of Breath (sent by Dr Robby Crow for abnormal EKG. was unable to get into see cardiologist. has been feeling \"not so good\", gets sob with activity for a couple months. denies cp. no n/v. ) Acuity: Acute Type of Exam: Initial FINDINGS: Heart size and pulmonary vasculature within normal limits. Lungs clear.  Costophrenic angles sharp     No active cardiopulmonary disease     XR CHEST PORTABLE    Result Date: 5/28/2021  EXAMINATION: ONE XRAY VIEW OF THE CHEST 5/28/2021 6:29 am COMPARISON: 05/25/2021 HISTORY: ORDERING SYSTEM PROVIDED HISTORY: post CT removal TECHNOLOGIST PROVIDED HISTORY: Reason for exam:->post CT removal Reason for Exam: Post CT removal Acuity: Unknown Type of Exam: Unknown Difficulty breathing. FINDINGS: Cardiac silhouette is mildly to moderately enlarged. Left-sided chest tube has been removed. No evident pneumothorax. Endotracheal tube and mediastinal drainage tube have also been removed. [place]Dav[place] catheter remains with its tip in the proximal right pulmonary artery. Moderate opacity present in the anterior segment of the right upper lobe and the medial right lung base. Mild-to-moderate increased opacity appears present in the left retrocardiac area. No sizable pleural effusion. Sternal wires and left atrial clip present. No pneumothorax following removal of the left chest tube. Development of moderate opacities within the right lung either atelectasis or pneumonia. No abnormal pulmonary vascular congestion or sizable pleural effusion. XR CHEST PORTABLE    Result Date: 5/25/2021  EXAMINATION: ONE XRAY VIEW OF THE CHEST 5/25/2021 7:10 pm COMPARISON: 05/20/2021 HISTORY: ORDERING SYSTEM PROVIDED HISTORY: Post op open heart surgery TECHNOLOGIST PROVIDED HISTORY: Reason for exam:->Post op open heart surgery Reason for Exam: Post op open heart surgery Acuity: Unknown Type of Exam: Unknown FINDINGS: Status post median sternotomy. [place] catheter terminates in the main pulmonary artery. Endotracheal tube in satisfactory position above the rose. Left chest tube. No pneumothorax.      Satisfactory appearance status post median sternotomy No pneumothorax     VL DUP CAROTID BILATERAL    Result Date: 5/21/2021  Carotid Duplex Study  Demographics   Patient Name       Vergil Halsted   Date of Study 05/21/2021         Gender              Male   Patient Number     [id number]         Date of Birth       1950   Visit Number       [id number]          Age                 70 year(s)   Accession Number   [id number]         Room Number         1888   Corporate ID       [id number]            Sonographer         Alexa Tran                                                            RVT   Ordering Physician Florence Aguayo   Interpreting        I Vascular                     CNP                Physician           Cinda Terry MD,                                                            [facility] [place]  Procedure Type of Study:   Cerebral:Carotid, VL CAROTID DUPLEX BILATERAL. Vascular Sonographer Report  Additional Indications:Pre OP C A B G. Impressions Right Impression The right internal carotid artery appears to have a <50% diameter reducing stenosis based on velocity criteria. The right external carotid artery appears to have >50% diameter reduction based on velocity criteria. The right common carotid artery demonstrates moderate diffuse plaque formation throughout. The right vertebral artery demonstrates normal antegrade flow. The right subclavian artery is visualized and demonstrates multiphasic flow. Left Impression The left internal carotid artery appears to have a <50% diameter reducing stenosis based on velocity criteria. The left mid to distal common carotid artery demonstrates moderate diffuse plaque formation. The left vertebral artery demonstrates normal antegrade flow. The left subclavian artery is visualized and demonstrates multiphasic flow. The left brachial pressure was not obtained due to IV placement . Conclusions   Summary   -The right internal carotid artery appears to have a <50% diameter reducing  stenosis based on velocity criteria. -The left internal carotid artery appears to have a <50% diameter reducing  stenosis based on velocity criteria.    Recommendations   -Recommend follow-up study in 12 months. Signature   ------------------------------------------------------------------  Electronically signed by Joi Esquivel MD, [facility] (Interpreting  physician) on 05/21/2021 at 05:47 PM  ------------------------------------------------------------------  Patient Status:Routine. [address] [place] [address] - Vascular Lab. Technical Quality:Adequate visualization. Plaque   - A plaque was found in the Right Prox ICA. The plaque characteristics are: uncomplicated category, medium echogenicity, minimal severity and heterogeneous texture. - A plaque was found in the Left Prox ICA. The plaque characteristics are: uncomplicated category, medium echogenicity, moderate severity and heterogeneous texture. Velocities are measured in cm/s ; Diameters are measured in mm Carotid Right Measurements +---------------+----+----+-----+----+ ! Location       ! PSV ! EDV ! Angle! RI  ! +---------------+----+----+-----+----+ ! Prox CCA       !92. 6!12. 1! 60   !0.74! +---------------+----+----+-----+----+ ! Mid CCA        !117 !31. 5!60   !0.73! +---------------+----+----+-----+----+ ! Dist CCA       !130 !25. 9!60   !0.8 ! +---------------+----+----+-----+----+ ! Prox ICA       !107 !29. 2! 60   !0.73! +---------------+----+----+-----+----+ ! Mid ICA        !64. 1! 18  !36   !0.72! +---------------+----+----+-----+----+ ! Dist ICA       !68. 4!24. 8!60   !0.62! +---------------+----+----+-----+----+ ! Prox ECA       !188 !    !61   !    ! +---------------+----+----+-----+----+ ! Vertebral      !30.7!    !60   !    ! +---------------+----+----+-----+----+ ! Prox Subclavian! 130 !    !60   !    ! +---------------+----+----+-----+----+   - There is antegrade vertebral flow noted on the right side. - Additional Measurements:ICAPSV/CCAPSV 0.91. ICAEDV/CCAEDV 1.21. Carotid Left Measurements +---------------+----+----+-----+----+ ! Location       ! PSV ! EDV ! Angle! RI  ! +---------------+----+----+-----+----+ ! Prox CCA       !67.5!19. 8!60   !0.71! [facility]       [id number]            Sonographer         Rosendo Avelar                                                            T   Ordering Physician Lilli Chavez   Interpreting        [facility]                     CNP                Physician           Christine Coker MD,                                                            [facility]  Procedure Type of Study:   Veins:Lower Extremity Vein Mapping, VASC PRE-OP VEIN MAPPING. Vascular Sonographer Report  Additional Indications:Pre Op C A B G. Impressions Right Impression The right greater saphenous vein was visualized between zone 1 and 8 and demonstrates total compressibility. Right GSV: Sapheno femoral junction mm GSV high thigh 6.8 mm GSV mid thigh 4.7 mm GSV low thigh 2.9 mm GSV knee 4.2 mm GSV high calf 3.3 mm GSV mid calf 2.6 mm GSV low calf 2.2 mm GSV ankle calf 2.1 mm. Left Impression The left greater saphenous vein was visualized between zone 1 and 8 and demonstrates total compressibility. Left GSV: Sapheno femoral junction mm GSV high thigh 4.5 mm GSV mid thigh 3.6 mm GSV low thigh 3.7 mm GSV knee 4 mm GSV high calf 3.3 mm GSV mid calf 2.6 mm GSV low calf 2 mm GSV ankle calf 1.6 mm. Conclusions   Signature   ------------------------------------------------------------------  Electronically signed by Christine Coker MD, [facility] (Interpreting  physician) on 05/21/2021 at 05:48 PM  ------------------------------------------------------------------  Patient Status:Routine. [address] [place] [address] Vascular Lab. Technical Quality:Adequate visualization. Velocities are measured in cm/s ; Diameters are measured in mm LE Vein Mapping +----------------------------------++--------+-----+----+--------+-----+ ! Superficial - Great Saphenous Vein! !Right   ! ! Left!        !     ! +----------------------------------++--------+-----+----+--------+-----+ ! Location                          ! !Diameter! Depth! !Diameter! Depth! +----------------------------------++--------+-----+----+--------+-----+ ! GSV High Thigh                    ! !6.8     !     !    !4.5     !     ! +----------------------------------++--------+-----+----+--------+-----+ ! GSV Mid Thigh                     ! !4.7     !     !    !3.6     !     ! +----------------------------------++--------+-----+----+--------+-----+ ! GSV Low Thigh                     !!2.9     !     !    !3.7     !     ! +----------------------------------++--------+-----+----+--------+-----+ ! GSV Knee                          !!4.2     !     !    !4       !     ! +----------------------------------++--------+-----+----+--------+-----+ ! GSV High Calf                     !!3.3     !     !    !3.3     !     ! +----------------------------------++--------+-----+----+--------+-----+ ! GSV Mid Calf                      !!2.6     !     !    !2.6     !     ! +----------------------------------++--------+-----+----+--------+-----+ ! GSV Low Calf                      !!2.2     !     !    !2       !     ! +----------------------------------++--------+-----+----+--------+-----+ ! GSV Ankle                         !!2.1     !     !    !1.6     !     ! +----------------------------------++--------+-----+----+--------+-----+    CT CHEST ABDOMEN PELVIS WO CONTRAST    Result Date: 5/21/2021  EXAMINATION: CT OF THE CHEST, ABDOMEN, AND PELVIS WITHOUT CONTRAST 5/21/2021 5:31 pm TECHNIQUE: CT of the chest, abdomen and pelvis was performed without the administration of intravenous contrast. Multiplanar reformatted images are provided for review. Dose modulation, iterative reconstruction, and/or weight based adjustment of the mA/kV was utilized to reduce the radiation dose to as low as reasonably achievable. COMPARISON: PA and lateral chest 05/20/2021.  HISTORY: ORDERING SYSTEM PROVIDED HISTORY: pre-op CABG TECHNOLOGIST PROVIDED HISTORY: Reason for exam:->pre-op CABG Additional Contrast?->None Reason for Exam: SOB  pre op CABG Acuity: Unknown FINDINGS: Chest: Mediastinum: Thyroid appears normal.  No mass, adenopathy, or pericardial effusion. Normal size of the heart and thoracic aorta. Heavy coronary calcifications present. Lungs/pleura: Lungs are clear. No acute airspace disease or pleural effusion. No pulmonary mass or suspicious pulmonary nodule. Soft Tissues/Bones: No acute abnormality. Abdomen/Pelvis: Organs: Liver is normal in appearance without worrisome focal lesion. 3 small left renal cysts with density measurements of less than 20. .  Other abdominal organs appear normal. GI/Bowel: Large amount of ingested food in the stomach. Normal appendix and terminal ileum. No acute abnormality of the GI tract. Very small hiatal hernia. Pelvis: Mild-to-moderate enlargement of the prostate. Urinary bladder appears normal.  No mass, adenopathy, or free fluid. Peritoneum/Retroperitoneum: No mass, adenopathy, or ascites. Normal size of the aorta. Bones/Soft Tissues: Mild S-shaped scoliosis. Moderate to severe lumbar scoliosis. No acute abnormality. No acute abnormality identified in the chest, abdomen, or pelvis. No finding worrisome for occult malignancy. Moderate enlargement of the prostate. Assessment & Plan: Thrombocytopenia platelet count has already recovered He has no evidence of current bleeding or thrombosis. He has intermediate risk of HIT based on 4 T calculation He was on arixtra intermittently which was stopped due to renal insufficiency  Will start argatroban drip while waiting for Serotonin releasing assay    Coronary artery disease post bypass and left atrial ligation patient remains on neosynephrine drip to maintain blood pressure     Diabetes mellitus type 2    History of cleft lip and palate surgeries     Post polio syndrome    Chronic kidney disease stage 3 A        I have discussed the above stated plan with the patient and they verbalized understanding and agreed with the plan.  Thank you for allowing us to participate in this patients care.     Jacki Bro MD  6/1/2021, 7:18 PM     Explained situation in detail to patient risk of bleeding on argatroban -risk of thrombotic events if HIT test is positive     Pharmacy will dose argatroban

## 2021-06-01 NOTE — FLOWSHEET NOTE
Cardiothoracic Surgery Progress Note    CC: s/p CABG, ANDREW  POD# 7    Subjective:  Hemodynamically stable on cristian gtt, RA, afebrile. Alert and oriented X 3. Weight DOWN this am.     WT:100.6 kg/100.9 kg   pre-op 98.3 kg    Vital Signs:   Vitals:    06/01/21 1100   BP: 116/78   Pulse: 68   Resp: 24   Temp: 99.8   SpO2: 99%      Gtts: Cristian @ 60 mcg/min    Physical Exam:   Cardiac:  NSR, + systolic murmur   Lungs: clear to auscultation, decreased bases bilaterally  Abdomen:  + BM  Vascular:  pulses all palpable   Extremities: generalized, non-pitting edema 1+  :  /550/400    Chest Tube(s) & Incision(s):    Sternum: C/D/I   Edges approximated, no drainage  Chest tube site: edges approximated, purse string suture intact   Left leg incision:  Edges well approximated, no drainage         Labs:   CBC:   Recent Labs     05/31/21  0330 06/01/21  0523   WBC 13.2* 16.5*   HGB 10.9* 11.3*   HCT 33.6* 35.5*   PLT 90* 155     BMP:   Recent Labs     05/31/21  0330 06/01/21  0523   K 4.5 4.6   CREATININE 1.2 1.6*   CALCIUM 7.7* 8.7       Assessment/Plan:  As per CC:     Plan:   -KATHERYN- continue BB. Goal , wean Cristian as tolerated. Will discuss Imelad Cassette with CVTS surgeon - further recommendations to follow.   -Thrombocytopenia- Platelets up to 019, HIT sent 5/31 and pending   -NORA- Diuresis and electrolytes per nephrology. Hold arixtra and K. No ACE. -DM2- continue SSI/prandial/lantus.  Will hold off on resuming home glucophage due to Cr  -Aggressive IS  -OOB to chair    Disp:  Plan for home with home care     Electronically signed by MONSE Ellison CNP on 6/1/2021 at 11:43 AM

## 2021-06-01 NOTE — PROGRESS NOTES
Physical Therapy  Facility/Department: [facility]  Daily Treatment Note  NAME: Jono Price  : 1950  MRN: [id number]    Date of Service: 2021    Discharge Recommendations:  Jono Price scored a 10/24 on the AM-PAC short mobility form. Current research shows that an AM-PAC score of 17 or less is typically not associated with a discharge to the patient's home setting. Based on the patient's AM-PAC score and their current functional mobility deficits, it is recommended that the patient have 5-7 sessions per week of Physical Therapy at d/c to increase the patient's independence. At this time, this patient demonstrates the endurance, and/or tolerance for 3 hours of therapy each day, with a treatment frequency of 5-7x/wk. Please see assessment section for further patient specific details. If patient discharges prior to next session this note will serve as a discharge summary. Please see below for the latest assessment towards goals. PT Equipment Recommendations  Other: TBD at next level of care, pt would need a w/c if he discharged home    Assessment   Body structures, Functions, Activity limitations: Decreased functional mobility ; Decreased strength;Decreased endurance;Decreased balance;Decreased ADL status  Assessment: Patient demonstrates impaired functional mobility s/p CABG with sternal precautions. Patient able to demonstrate sit <-> stand with mod A of 1 this date - cues for forward weight shift and use of cardiac pillow. Patient typically (I) prior to admission, active, was working part-time delivering cars for [employer]. Patient's goal is to regain his mobility and independence. Patient will continue to benefit from additional skilled PT intervention to facilitate safe mobility and to optimize (I) to promote return to prior level of function.   Treatment Diagnosis: impaired functional mobility  Prognosis: Good  Patient Education: Patient educated on role of PT, use of call light, sternal precautions, and PT recommendations - patient verbalizes understanding. Barriers to Learning: none  REQUIRES PT FOLLOW UP: Yes  Activity Tolerance  Activity Tolerance: Patient limited by fatigue;Patient limited by endurance  Activity Tolerance: limited mobility at bedside secondary to lines     Patient Diagnosis(es): The encounter diagnosis was Chest pain, unspecified type. has a past medical history of CAD (coronary artery disease), Diabetes mellitus ([address] Elevated LFT's, Hyperlipidemia, Hypertension, Post poliomyelitis syndrome, and Type II or unspecified type diabetes mellitus without mention of complication, not stated as uncontrolled. has a past surgical history that includes Cleft palate repair; [place] tooth extraction; skin biopsy (Left, 7/20/2020); and Coronary artery bypass graft (N/A, 5/25/2021). Restrictions  Restrictions/Precautions  Restrictions/Precautions: Fall Risk (high fall risk, up in chair for meals, progressive ambulation, diet cardiac - carb control, daily fluid restriction 2000ml, SWAN line)  Position Activity Restriction  Sternal Precautions: 5# Lifting Restrictions, No Pulling, No Pushing  Other position/activity restrictions: Marilu Serrato is a 70 y.o. male presented to the hospital with complaints of 2-month history of progressive exertional shortness of breath and chest discomfort. LHC revealed MVD. s/p CABG x4, ANDREW ligation 5/25/21. \"  Pt was extubated on 5/26. Subjective   General  Chart Reviewed: Yes  Family / Caregiver Present: Yes (wife)  Subjective  Subjective: Patient denies pain. General Comment  Comments: Patient seated in recliner upon arrival - agreeable to PT.  RN approval obtained prior to PT entry. Limited mobility options in room secondary to lines. Orientation  Orientation  Overall Orientation Status: Within Functional Limits    Objective      Transfers  Sit to Stand:  Moderate Assistance (to rollator with cardiac pillow/sternal precautions in place)  Stand to sit: Moderate Assistance (from rollator with cardiac pillow/sternal precautions in place)  Comment: standing tolerance x 90 seconds with CGA/min A for static standing balance and rollator; declined attempt at standing marching        Balance  Posture: Fair (forward flexed, rounded shoulders)  Sitting - Static: Good  Sitting - Dynamic: Good;-  Standing - Static: Fair;+  Standing - Dynamic: Poor;+  Comments: CGA/min A for static standing balance with wide base of support and rollator    AM-PAC Score  AM-PAC Inpatient Mobility Raw Score : 10 (06/01/21 1251)  AM-PAC Inpatient T-Scale Score : 32.29 (06/01/21 1251)  Mobility Inpatient CMS 0-100% Score: 76.75 (06/01/21 1251)  Mobility Inpatient CMS G-Code Modifier : CL (06/01/21 1251)          Goals  Short term goals  Time Frame for Short term goals:  To be met prior to DC - all goals ongoing 6/1  Short term goal 1: Pt will perform bed mobility with min A  Short term goal 2: Pt will perform sit to/from stand with min A  Short term goal 3: Pt will ambulate 21' with AAD and mod A  Short term goal 4: Pt will sit unsupported for 5 minutes and SBA  Patient Goals   Patient goals : \"to get stronger, get my independence back, go to rehab\"    Plan    Plan  Times per week: 3-5  Current Treatment Recommendations: Strengthening, Balance Training, Functional Mobility Training, Transfer Training, Gait Training, Neuromuscular Re-education, Safety Education & Training, Endurance Training, ROM, Home Exercise Program, Patient/Caregiver Education & Training, Equipment Evaluation, Education, & procurement  Safety Devices  Type of devices: Call light within reach, Chair alarm in place, Gait belt, Left in chair, Nurse notified     Therapy Time   Individual Concurrent Group Co-treatment   Time In 1137         Time Out 1203         Minutes 26              Timed Code Treatment Minutes: 26 minutes    Total Treatment Minutes: 26 Minutes    If patient discharges prior to next

## 2021-06-01 NOTE — PROGRESS NOTES
[facility], [place], operated by [facility] Daily Progress Note      Admit Date:  5/20/2021    Chief Complaint: CAD with unstable angina    Subjective:  Mr. Radha Maurice continues to improve. Not much in terms of complaints of discomfort or shortness of breath.     Objective:   BP (!) 85/56   Pulse 68   Temp 99.5 °F (37.5 °C) (Core)   Resp 23   Ht 5' 9\" (1.753 m)   Wt 221 lb 12.5 oz (100.6 kg)   SpO2 99%   BMI 32.75 kg/m²       Intake/Output Summary (Last 24 hours) at 6/1/2021 1305  Last data filed at 6/1/2021 1200  Gross per 24 hour   Intake 1075.03 ml   Output 1875 ml   Net -799.97 ml       TELEMETRY: Sinus     Physical Exam:  General:  Awake, alert, oriented x 3, NAD  Skin:  Warm and dry  Neck:  JVD normal  Chest: Decreased breath sounds  Cardiovascular:  RRR S1S2, no S3, 2/6 systolic at lower left sternal border, at apex  Abdomen:  Soft, ND, NT, No HSM  Extremities: Trace edema    Medications:    insulin glargine  18 Units Subcutaneous Nightly    insulin lispro  10 Units Subcutaneous TID     metoprolol tartrate  200 mg Oral BID    furosemide  20 mg Intravenous Daily    insulin lispro  0-18 Units Subcutaneous TID WC    insulin lispro  0-9 Units Subcutaneous Nightly    sodium chloride flush  10 mL Intravenous 2 times per day    [Held by provider] fondaparinux  2.5 mg Subcutaneous Daily    aspirin  325 mg Oral Daily    acetaminophen  1,000 mg Oral Q6H    magnesium oxide  400 mg Oral BID    [Held by provider] potassium chloride  10 mEq Oral TID     sennosides-docusate sodium  1 tablet Oral BID    [Held by provider] lisinopril  2.5 mg Oral Lunch    pantoprazole  40 mg Oral Daily      sodium chloride Stopped (05/27/21 0951)    lactated ringers      phenylephrine (TAN-SYNEPHRINE) 50mg/250mL infusion 70 mcg/min (06/01/21 1245)    dextrose       perflutren lipid microspheres, sodium chloride flush, sodium chloride, ondansetron, metoclopramide, traMADol **OR** traMADol, morphine, diphenhydrAMINE, zolpidem, magnesium hydroxide, polyethylene glycol, potassium chloride, magnesium sulfate, calcium chloride IVPB, calcium chloride IVPB, albuterol sulfate HFA, albumin human, lactated ringers, phenylephrine (TAN-SYNEPHRINE) 50mg/250mL infusion, furosemide, glucose, dextrose, glucagon (rDNA), dextrose    Lab Data:  CBC:   Recent Labs     05/30/21 0418 05/30/21  1545 05/31/21  0330 06/01/21  0523   WBC 13.0*  --  13.2* 16.5*   HGB 9.1* 10.6* 10.9* 11.3*   HCT 28.4* 32.7* 33.6* 35.5*   MCV 87.0  --  87.1 87.8   *  --  90* 155     BMP:   Recent Labs     05/30/21  0418 05/31/21  0330 06/01/21  0523   * 135* 138   K 4.2 4.5 4.6    101 101   CO2 26 23 25   BUN 61* 59* 67*   CREATININE 1.5* 1.2 1.6*     LIVER PROFILE:   No results for input(s): AST, ALT, LIPASE, BILIDIR, BILITOT, ALKPHOS in the last 72 hours. Invalid input(s): AMYLASE,  ALB  PT/INR:   No results for input(s): PROTIME, INR in the last 72 hours. APTT:   No results for input(s): APTT in the last 72 hours. BNP:  No results for input(s): BNP in the last 72 hours. Imaging/Procedures:   Limited Echo 5/28/2021:   Summary   -Limited exam per Dr. Alva Hernandez for hypotension and systolic anterior motion of mitral valve. Patient had a previous complete exam 5/21/2021.   -Normal global ejection fraction estimated at 55%. -There is possible apical hypokinesis noted. -There is mild concentric left ventricular hypertrophy noted. -Thickened mitral valve without evidence of stenosis. -Systolic anterior motion of the mitral valve is noted causing a left   ventricular outflow tract obstruction with a maximum resting pressure gradient of 201 mmHg. -There is moderate eccentric mitral regurgitation.     Cardiac surgical procedure 5/25/2021:  Urgent CABG Coronary Artery Bypass Graft x4 (LIMA to LAD, SVG to PDA, SVG sequenced to OM1 and OM2), Left atrial appendage ligation with 40mm AtriClip    ECHO:  5/21/21   Summary   -Left ventricular cavity size is normal.   -Ejection fraction is visually estimated to be 50%.  -The apex and apical septum appear hypokinetic.   -Grade I diastolic dysfunction with normal LV filling pressures. E/e' = 12.6.   -Left atrium is dilated.   -Mild aortic stenosis with a peak velocity of 2.3m/s and a mean pressure gradient of 11mmHg.   -Mitral annular calcification is present.   -Mild mitral regurgitation.   -Mild tricuspid regurgitation. RVSP = 36 mmHG.     Cath: 5/21/2021  Anatomy:   LM-70% diffuse  LAD-diffusely diseased, 80% proximal, 90% mid calcified  Cx-90% ostial  RCA-diffusely diseased 50% proximal and mid, 70% distal  RPDA-patent  LVEF-50%     Impression:  1.  Severe multivessel CAD. 2.  Preserved LV systolic function.     Plan:  1. [facility] surgical consultation for CABG. 2.  Patient has been statin intolerant to Livalo but unsure whether other statins have been tried. Stacey Cisse discuss trial of Crestor with him.     Carotid duplex: 5/21/21  Summary        -The right internal carotid artery appears to have a <50% diameter reducing    stenosis based on velocity criteria.    -The left internal carotid artery appears to have a <50% diameter reducing    stenosis based on velocity criteria         Assessment/Plan:  Principal Problem:    Coronary artery disease involving native coronary artery of native heart with unstable angina pectoris ([address]  Plan: Severe multivessel. S/P CABG POD #6    Active Problems:    Hypotension  Plan: Multifactorial.  Continues on Cristian-Synephrine but still significantly decreased and around 60 mcg/min. Aggravated by KATHERYN (systolic anterior motion) of the mitral valve. S/P coronary artery bypass graft x 4  Plan: Continues on Cristian-Synephrine currently but has decreased; wean as hemodynamics allow. Did have KATHERYN (systolic anterior motion) of the mitral valve of unclear etiology as no apparent HCM (hypertrophic cardiomyopathy). Possibly leaflet length and/or underfilled ventricle.   Recent limited echo documented markedly elevated gradient of 200 mmHg in the LVOT; preop it was 21 mmHg. Will need caution to avoid hypovolemia as this could lead to hypotension. POD #6. Hemoglobin now above 10. S/P left atrial appendage ligation  Plan: Stable. Type 2 diabetes mellitus with diabetic nephropathy, without long-term current use of insulin (HCC)  Plan: Chronic. Currently on insulin drip. Essential hypertension  Plan: Currently still hypotensive on Cristian-Synephrine but improving. Wean as hemodynamics allow. KATHERYN (systolic anterior motion) of the mitral valve still a problem      Mixed hyperlipidemia  Plan: Statin intolerant. Will need to start on Repatha/Praluent when able. Statin intolerance  Plan: Will need to start on Repatha/Praluent when able. Stage 3a chronic kidney disease  Plan: Chronic. Trend creatinine. Nephrology following. Post poliomyelitis syndrome  Plan: Chronic. He looks and feels much better today. Cristian-Synephrine with significant decrease in dose. Continue to wean as hemodynamics allow. KATHERYN (systolic anterior motion) of the mitral valve still a problem.       Karen Webber MD, MD 6/1/2021 1:05 PM    Critical care time: 35 minutes

## 2021-06-02 NOTE — PLAN OF CARE
Problem: Cardiovascular  Goal: No DVT, peripheral vascular complications  Outcome: Ongoing     Problem: Respiratory  Goal: No pulmonary complications  Outcome: Ongoing     Problem: Nutrition  Goal: Optimal nutrition therapy  Outcome: Ongoing     Problem: Falls - Risk of:  Goal: Will remain free from falls  Description: Will remain free from falls  Outcome: Ongoing

## 2021-06-02 NOTE — PLAN OF CARE
Problem: Cardiovascular  Goal: No DVT, peripheral vascular complications  Outcome: Met This Shift  Goal: Hemodynamic stability  Outcome: Ongoing  Goal: Anticoagulate/Hct stable  Outcome: Ongoing  Goal: Understanding of dietary restrictions  Outcome: Met This Shift     Problem: Respiratory  Goal: No pulmonary complications  Outcome: Met This Shift  Goal: O2 Sat > 90%  Outcome: Met This Shift  Goal: Supplemental O2 requirements decreased  Outcome: Met This Shift     Problem: Nutrition  Goal: Optimal nutrition therapy  Outcome: Met This Shift     Problem: Falls - Risk of:  Goal: Will remain free from falls  Outcome: Met This Shift  Goal: Absence of physical injury  Outcome: Met This Shift     Problem: Serum Glucose Level - Abnormal:  Goal: Ability to maintain appropriate glucose levels will improve  Outcome: Not Met This Shift     Problem: Sensory Perception - Impaired:  Goal: Ability to maintain a stable neurologic state will improve  Outcome: Met This Shift     Problem: Skin Integrity:  Goal: Will show no infection signs and symptoms  Outcome: Met This Shift  Goal: Absence of new skin breakdown  Outcome: Met This Shift

## 2021-06-02 NOTE — PROGRESS NOTES
[facility] Medicine Services  INPATIENT PROGRESS NOTE    Patient Name: Jonnie Sheets Jr.  Date of Admission: 5/10/2019  Today's Date: 05/11/19  Length of Stay: 1  Primary Care Physician: Dandy Burton MD    Subjective   Chief Complaint: SOA  HPI   Doing ok.  No CP.  Less SOA.  Coughing up less sputum today.  No wheezing  Afebrile        Review of Systems   Constitutional: Positive for fatigue. Negative for fever.   HENT: Negative for congestion and ear pain.    Eyes: Negative for redness and visual disturbance.   Respiratory: Positive for cough and shortness of breath. Negative for wheezing.    Cardiovascular: Negative for chest pain and palpitations.   Gastrointestinal: Negative for abdominal pain, diarrhea, nausea and vomiting.   Endocrine: Negative for cold intolerance and heat intolerance.   Genitourinary: Negative for dysuria and frequency.   Musculoskeletal: Negative for arthralgias and back pain.   Skin: Negative for rash and wound.   Neurological: Negative for dizziness and headaches.   Psychiatric/Behavioral: Negative for confusion. The patient is not nervous/anxious.           All pertinent negatives and positives are as above. All other systems have been reviewed and are negative unless otherwise stated.     Objective    Temp:  [98 °F (36.7 °C)-100.5 °F (38.1 °C)] 98.9 °F (37.2 °C)  Heart Rate:  [] 88  Resp:  [18-20] 20  BP: (114-152)/(64-90) 140/80  Physical Exam   Constitutional: He is oriented to person, place, and time. He appears well-developed and well-nourished.   HENT:   Head: Normocephalic and atraumatic.   Right Ear: External ear normal.   Left Ear: External ear normal.   Nose: Nose normal.   Mouth/Throat: Oropharynx is clear and moist.   Eyes: Conjunctivae and EOM are normal. Pupils are equal, round, and reactive to light. Right eye exhibits no discharge. Left eye exhibits no discharge. No scleral icterus.   Neck: Normal range of motion. Neck  Pt voided 175ml. Bladder scan after voiding per Dr. Collins Moya. Bladder scan shows 332ml. Messaged Dr Collins Moya. supple. No tracheal deviation present. No thyromegaly present.   Cardiovascular: Normal rate, regular rhythm, normal heart sounds and intact distal pulses. Exam reveals no gallop and no friction rub.   No murmur heard.  Pulmonary/Chest: Effort normal. No stridor. No respiratory distress. He has decreased breath sounds. He has no wheezes. He has no rales. He exhibits no tenderness.   Abdominal: Soft. Bowel sounds are normal. He exhibits no distension and no mass. There is no tenderness. There is no rebound and no guarding. No hernia.   Musculoskeletal: Normal range of motion. He exhibits no edema or deformity.   Lymphadenopathy:     He has no cervical adenopathy.   Neurological: He is alert and oriented to person, place, and time. He has normal reflexes. He displays normal reflexes. No cranial nerve deficit. He exhibits normal muscle tone. Coordination normal.   Skin: Skin is warm and dry. No rash noted. No erythema. No pallor.   Psychiatric: He has a normal mood and affect. His behavior is normal. Judgment and thought content normal.   Vitals reviewed.        Results Review:  I have reviewed the labs, radiology results, and diagnostic studies.    Laboratory Data:   Results from last 7 days   Lab Units 05/11/19  0756 05/10/19  0933   WBC 10*3/mm3 20.67* 15.63*   HEMOGLOBIN g/dL 11.3* 13.0*   HEMATOCRIT % 35.6* 40.4   PLATELETS 10*3/mm3 282 368        Results from last 7 days   Lab Units 05/11/19  0756 05/10/19  0933   SODIUM mmol/L 144 145   POTASSIUM mmol/L 3.7 3.6   CHLORIDE mmol/L 101 94*   CO2 mmol/L 31.0 39.0*   BUN mg/dL 14 11   CREATININE mg/dL 0.86 1.01   CALCIUM mg/dL 8.5 9.3   BILIRUBIN mg/dL 0.4 0.5   ALK PHOS U/L 52 66   ALT (SGPT) U/L <15 <15   AST (SGOT) U/L 38 55*   GLUCOSE mg/dL 143* 149*       Culture Data:   Blood Culture   Date Value Ref Range Status   05/10/2019 No growth at less than 24 hours  Preliminary       Radiology Data:   Imaging Results (last 24 hours)     Procedure Component Value Units  Date/Time    XR Chest 1 View [[id number]] Collected:  05/10/19 1533     Updated:  05/10/19 1537    Narrative:       EXAMINATION: XR CHEST 1 VW-. 5/10/2019 3:33 PM CDT     CHEST, ONE VIEW:     HISTORY: Cough and hypoxia     COMPARISON: 04/24/2019 and 04/24/2019     A single frontal chest radiograph was obtained.     FINDINGS:     Calcified granuloma appreciated in the right upper lobe.     The lungs are clear without acute infiltrates.     The heart is normal in size.     There is ectasia of the descending thoracic aorta observed, without  heart failure.     No acute osseous abnormalities identified.                                     Impression:       1. No acute cardiopulmonary process.     This report was finalized on 05/10/2019 15:33 by Dr. Max Ring MD.          I have reviewed the patient's current medications.     Assessment/Plan     Active Hospital Problems    Diagnosis   • Hypoxia          1.  COPD AE  -IV abx--Rocephin  -IV steroids to PO  -Nebs  -Mucinex  -Flutter  -Supplemental oxygen, wean as tolerated     2.  Sepsis  -IV abx     3.  C. Diff Colitis  -Completed therapy     4.  N/V  -Zofran  -Fluids     5.  Acute Hypoxic Respiratory Failure  -IV abx--Rocephin  -IV steroids to PO  -Nebs  -Mucinex  -Flutter  -Supplemental oxygen, wean as tolerated                    Discharge Planning: I expect the patient to be discharged to home in 1-2 days    Bandar Blanco MD   05/11/19   2:37 PM

## 2021-06-02 NOTE — CONSULTS
PICC line education:    -Risks  -Benefits  -Alternatives  -Procedure    Discussed the above with patient, verbalized understanding, answered all questions. Provided with information on PICC care to review. PICC tip verified via 3[employer] (Ok to use).  Reported off to patient's  Nurse Radha.

## 2021-06-02 NOTE — PROGRESS NOTES
CC: s/p CABG x 4 / ANDREW clip; CRF; HIT screen positive    No c/o  NSR  On room air  Only 1 kg above base weight  On 110 mcg/kg/min neosynephrine  Cr up to 1.9  CTAB RRR + sys murmur  As per CC   100cc 25 % albumin  D/C lasix (if okay with nephrology)  Place dual lumen PICC  Argatroban per hematology  Wean liana as roddy for SBP>100 -- continue midodrine

## 2021-06-02 NOTE — PROGRESS NOTES
Cardiovascular Progress Note      Chief Complaint:   Chief Complaint   Patient presents with    Shortness of Breath     sent by Dr Lucia Ramon for abnormal EKG. was unable to get into see cardiologist. has been feeling \"not so good\", gets sob with activity for a couple months. denies cp. no n/v. Impression/Recommendations:    71 y/o patient of Dr. Raleigh Aeljo:     MVCAD: S/P 4V CABG (LIMA-LAD, SVG-PDA, Sequential SVG- OM1-OM2) 5/25/21  ANDREW Clip  NORA/CKD  HIT positive   Diabetes mellitus  Hyperlipidemia  Statin intolerance     POD# 8  Progressing well functionally- ambulating without symptoms. Remains on Neosynephrine gtt at moderate doses for postoperative hypotension  Sinus rhythm 70s on high dose beta blockade (Metoprolol tartrate 200 mg bid) for LVOT gradient. HOCM murmur on exam today. Recommend repeat trial of additional AV armando blocker in Verapamil to further reduce obstructive gradient and attempt to wean Cristian. Avoid dropping preload- recommend holding Lasix today. No significant fluid overload on exam.   Anemia has stabilized with Hgb>10. Continue Aspirin  Reviewed with Dr. Quita Ariza. Discussed with Dr. Diamond Monzon. Interval History:   Pt. S/E. No events overnight. States yesterday was an improvement. Ambulated without chest pain, dyspnea, lightheadedness/dizziness. Tolerating PO. No edema. Tele: SR 70s no events  RIJ Central line  Phenylephrine 110 mcg/min  Argatroban gtt  3/6 HSM LSB     5/21/21 [facility]  Anatomy:   LM-70% diffuse  LAD-diffusely diseased, 80% proximal, 90% mid calcified  Cx-90% ostial  RCA-diffusely diseased 50% proximal and mid, 70% distal  RPDA-patent  LVEF-50%    ECHO:  5/21/21   Summary   -Left ventricular cavity size is normal.   -Ejection fraction is visually estimated to be 50%.  -The apex and apical septum appear hypokinetic.   -Grade I diastolic dysfunction with normal LV filling pressures.  E/e' = 12.6.   -Left atrium is dilated.   -Mild aortic stenosis with a peak velocity of 2.3m/s and a mean pressure gradient of 11mmHg.   -Mitral annular calcification is present.   -Mild mitral regurgitation.   -Mild tricuspid regurgitation. RVSP = 36 mmHG.       5/28/21   -Limited exam per Dr. Ghazala Howard for hypotension and systolic anterior motion   of mitral valve. Patient had a previous complete exam 5/21/2021.   -Normal global ejection fraction estimated at 55%. -There is possible apical hypokinesis noted. -There is mild concentric left ventricular hypertrophy noted. -Thickened mitral valve without evidence of stenosis. -Systolic anterior motion of the mitral valve is noted causing a left   ventricular outflow tract obstruction with a maximum resting pressure   gradient of 201 mmHg. -There is moderate eccentric mitral regurgitation.     Medications:  albumin human 25 % IV solution 25 g, Once  lidocaine PF 1 % injection 5 mL, Once  sodium chloride flush 0.9 % injection 5-40 mL, 2 times per day  sodium chloride flush 0.9 % injection 5-40 mL, PRN  0.9 % sodium chloride infusion, PRN  midodrine (PROAMATINE) tablet 10 mg, TID WC  insulin glargine (LANTUS;BASAGLAR) injection pen 25 Units, Nightly  argatroban infusion 50mg in 0.9% sodium chloride 50 mL (premix), Continuous  insulin lispro (1 Unit Dial) 10 Units, TID WC  metoprolol tartrate (LOPRESSOR) tablet 200 mg, BID  insulin lispro (1 Unit Dial) 0-18 Units, TID WC  insulin lispro (1 Unit Dial) 0-9 Units, Nightly  sodium chloride flush 0.9 % injection 10 mL, 2 times per day  sodium chloride flush 0.9 % injection 10 mL, PRN  0.9 % sodium chloride infusion, PRN  [Held by provider] fondaparinux (ARIXTRA) injection 2.5 mg, Daily  ondansetron (ZOFRAN) injection 4 mg, Q6H PRN  metoclopramide (REGLAN) injection 10 mg, Q6H PRN  aspirin EC tablet 325 mg, Daily  acetaminophen (TYLENOL) tablet 1,000 mg, Q6H  traMADol (ULTRAM) tablet 50 mg, Q6H PRN   Or  traMADol (ULTRAM) tablet 100 mg, Q6H PRN  morphine (PF) injection 2 mg, Q2H PRN  magnesium oxide (MAG-OX) tablet 400 mg, BID  [Held by provider] potassium chloride (KLOR-CON) extended release tablet 10 mEq, TID WC  diphenhydrAMINE (BENADRYL) tablet 25 mg, Nightly PRN  zolpidem (AMBIEN) tablet 5 mg, Nightly PRN  sennosides-docusate sodium (SENOKOT-S) 8.6-50 MG tablet 1 tablet, BID  magnesium hydroxide (MILK OF MAGNESIA) 400 MG/5ML suspension 30 mL, Daily PRN  polyethylene glycol (GLYCOLAX) packet 17 g, Daily PRN  pantoprazole (PROTONIX) tablet 40 mg, Daily  potassium chloride 20 mEq/50 mL IVPB (Central Line), PRN  magnesium sulfate 2000 mg in 50 mL IVPB premix, PRN  calcium chloride 1,000 mg in sodium chloride 0.9 % 100 mL IVPB, PRN  calcium chloride 2,000 mg in sodium chloride 0.9 % 100 mL IVPB, PRN  albuterol sulfate  (90 Base) MCG/ACT inhaler 2 puff, Q6H PRN  albumin human 5 % IV solution 25 g, PRN  lactated ringers infusion 250 mL, Continuous PRN  phenylephrine (TAN-SYNEPHRINE) 50 mg in dextrose 5 % 250 mL infusion, Continuous PRN  furosemide (LASIX) injection 20 mg, PRN  glucose (GLUTOSE) 40 % oral gel 15 g, PRN  dextrose 50 % IV solution, PRN  glucagon (rDNA) injection 1 mg, PRN  dextrose 5 % solution, PRN        I/O:     Intake/Output Summary (Last 24 hours) at 6/2/2021 0757  Last data filed at 6/2/2021 0416  Gross per 24 hour   Intake 2015.91 ml   Output 1250 ml   Net 765.91 ml       Physical Exam:    /80   Pulse 78   Temp 97 °F (36.1 °C) (Temporal)   Resp 16   Ht 5' 9\" (1.753 m)   Wt 220 lb 3.8 oz (99.9 kg)   SpO2 99%   BMI 32.52 kg/m²   Wt Readings from Last 3 Encounters:   06/02/21 220 lb 3.8 oz (99.9 kg)   05/20/21 223 lb 9.6 oz (101.4 kg)   03/12/21 216 lb (98 kg)       GENERAL: Well developed, well nourished, no acute distress  NEUROLOGICAL: Alert and oriented x3  PSYCH: Normal mood and affect   SKIN: Warm and dry, without lesions  HEENT: Normocephalic, atraumatic, Sclera non-icteric, mucous membranes moist  NECK: supple, JVP normal, thyroid not enlarged   CAROTID: Normal upstroke, no bruits  CARDIAC: Normal PMI, regular rate and rhythm, normal S1S2, no murmur, rub  RESPIRATORY: Normal respiratory effort, clear to auscultation bilaterally  EXTREMITIES: No cyanosis, clubbing or edema, palpable pulses bilaterally   MUSCULOSKELETAL: No joint swelling or tenderness, no chest wall tenderness  GASTROINTESTINAL:  soft, non-tender, no bruit    Data Review:  CBC:   Recent Labs     05/31/21  0330 06/01/21  0523 06/02/21  0400   WBC 13.2* 16.5* 16.6*   HGB 10.9* 11.3* 11.3*   HCT 33.6* 35.5* 35.5*   MCV 87.1 87.8 88.3   PLT 90* 155 183     BMP:   Recent Labs     05/31/21 0330 06/01/21  0523 06/02/21  0400   * 138 136   K 4.5 4.6 5.0    101 101   CO2 23 25 25   BUN 59* 67* 74*   CREATININE 1.2 1.6* 1.9*   GFRAA >60 52* 42*   MG  --   --  2.20       Recent Labs     06/01/21  2324 06/02/21  0400   APTT 52.9* 68.7*        Marie Peters DO, Hipolito [facility]  Interventional Cardiology     o: [phone]  [facility] [address], [address]      NOTE:  This report was transcribed using voice recognition software. Every effort was made to ensure accuracy; however, inadvertent computerized transcription errors may be present.

## 2021-06-02 NOTE — PROGRESS NOTES
[facility]   Progress Note      6/2/2021 9:37 AM        Name: Richi Jones . Admitted: 5/20/2021    SUBJECTIVE:  Doing okay. No new complaints. No bleeding.      Reviewed interval ancillary notes    Current Medications  lidocaine PF 1 % injection 5 mL, Once  sodium chloride flush 0.9 % injection 5-40 mL, 2 times per day  sodium chloride flush 0.9 % injection 5-40 mL, PRN  0.9 % sodium chloride infusion, PRN  midodrine (PROAMATINE) tablet 10 mg, TID WC  insulin glargine (LANTUS;BASAGLAR) injection pen 25 Units, Nightly  argatroban infusion 50mg in 0.9% sodium chloride 50 mL (premix), Continuous  insulin lispro (1 Unit Dial) 10 Units, TID WC  metoprolol tartrate (LOPRESSOR) tablet 200 mg, BID  insulin lispro (1 Unit Dial) 0-18 Units, TID WC  insulin lispro (1 Unit Dial) 0-9 Units, Nightly  sodium chloride flush 0.9 % injection 10 mL, 2 times per day  sodium chloride flush 0.9 % injection 10 mL, PRN  0.9 % sodium chloride infusion, PRN  [Held by provider] fondaparinux (ARIXTRA) injection 2.5 mg, Daily  ondansetron (ZOFRAN) injection 4 mg, Q6H PRN  metoclopramide (REGLAN) injection 10 mg, Q6H PRN  aspirin EC tablet 325 mg, Daily  acetaminophen (TYLENOL) tablet 1,000 mg, Q6H  traMADol (ULTRAM) tablet 50 mg, Q6H PRN   Or  traMADol (ULTRAM) tablet 100 mg, Q6H PRN  morphine (PF) injection 2 mg, Q2H PRN  magnesium oxide (MAG-OX) tablet 400 mg, BID  [Held by provider] potassium chloride (KLOR-CON) extended release tablet 10 mEq, TID WC  diphenhydrAMINE (BENADRYL) tablet 25 mg, Nightly PRN  zolpidem (AMBIEN) tablet 5 mg, Nightly PRN  sennosides-docusate sodium (SENOKOT-S) 8.6-50 MG tablet 1 tablet, BID  magnesium hydroxide (MILK OF MAGNESIA) 400 MG/5ML suspension 30 mL, Daily PRN  polyethylene glycol (GLYCOLAX) packet 17 g, Daily PRN  pantoprazole (PROTONIX) tablet 40 mg, Daily  potassium chloride 20 mEq/50 mL IVPB (Central Line), PRN  magnesium sulfate 2000 mg in 50 mL IVPB premix, PRN  calcium chloride 1,000 mg in sodium chloride 0.9 % 100 mL IVPB, PRN  calcium chloride 2,000 mg in sodium chloride 0.9 % 100 mL IVPB, PRN  albuterol sulfate  (90 Base) MCG/ACT inhaler 2 puff, Q6H PRN  albumin human 5 % IV solution 25 g, PRN  lactated ringers infusion 250 mL, Continuous PRN  phenylephrine (TAN-SYNEPHRINE) 50 mg in dextrose 5 % 250 mL infusion, Continuous PRN  furosemide (LASIX) injection 20 mg, PRN  glucose (GLUTOSE) 40 % oral gel 15 g, PRN  dextrose 50 % IV solution, PRN  glucagon (rDNA) injection 1 mg, PRN  dextrose 5 % solution, PRN        Objective:  /68   Pulse 76   Temp 97 °F (36.1 °C) (Temporal)   Resp 16   Ht 5' 9\" (1.753 m)   Wt 220 lb 3.8 oz (99.9 kg)   SpO2 99%   BMI 32.52 kg/m²     Intake/Output Summary (Last 24 hours) at 6/2/2021 3117  Last data filed at 6/2/2021 0816  Gross per 24 hour   Intake 2375.91 ml   Output 1300 ml   Net 1075.91 ml      Wt Readings from Last 3 Encounters:   06/02/21 220 lb 3.8 oz (99.9 kg)   05/20/21 223 lb 9.6 oz (101.4 kg)   03/12/21 216 lb (98 kg)       CONSTITUTIONAL:  awake, alert, cooperative, no apparent distress  EYES:  pupils equal, round and reactive to light, sclera clear and conjunctiva normal  ENT:  normocepalic, without obvious abnormality, atramatic  NECK:  supple, symmetrical, no jugular venous distension and no carotid bruits  HEMATOLOGIC/LYMPHATICS:  No cervical,supraclavicular or axillary lymphadenopathy  LUNGS:  No increased work of breathing and clear to auscultation  CARDIOVASCULAR: Regular rate and rhythm, normal S1 and S2, no murmur noted midline sternotomy is well healed   ABDOMEN:  Normal bowel sounds x 4, soft, non-distended, non-tender, no masses palpated, no hepatosplenomegally  MUSCULOSKELETAL:  full range of motion noted, tone is normal  EXTREMITIES: no LE edema  NEUROLOGIC:  Awake, alert, oriented to name, place and time. Motor skills grossly intact.   SKIN:  normal skin color, texture, turgor and no jaundice. Appears intact. Labs and Tests:  CBC:   Recent Labs     05/31/21  0330 06/01/21  0523 06/02/21  0400   WBC 13.2* 16.5* 16.6*   HGB 10.9* 11.3* 11.3*   PLT 90* 155 183     BMP:    Recent Labs     05/31/21  0330 06/01/21  0523 06/02/21  0400   * 138 136   K 4.5 4.6 5.0    101 101   CO2 23 25 25   BUN 59* 67* 74*   CREATININE 1.2 1.6* 1.9*   GLUCOSE 189* 178* 180*     Hepatic: No results for input(s): AST, ALT, ALB, BILITOT, ALKPHOS in the last 72 hours. ASSESSMENT AND PLAN    Principal Problem:    Coronary artery disease involving native coronary artery of native heart with unstable angina pectoris (HCC)  Active Problems:    Statin intolerance    Stage 3a chronic kidney disease    Post poliomyelitis syndrome    Type 2 diabetes mellitus with diabetic nephropathy, without long-term current use of insulin ([address]    Hypertension associated with diabetes ([address]    Mixed hyperlipidemia    S/P coronary artery bypass graft x 4    S/P left atrial appendage ligation  Resolved Problems:    * No resolved hospital problems. *      Thrombocytopenia platelet count has already recovered. He was on arixtra intermittently which was stopped due to renal insufficiency. HIT positive, YESICA pending. On argatroban drip. He has no evidence of current bleeding or thrombosis. Coronary artery disease post bypass and left atrial ligation patient remains on neosynephrine drip to maintain blood pressure      Diabetes mellitus type 2     History of cleft lip and palate surgeries      Post polio syndrome     Chronic kidney disease stage 3 [facility]  [facility]  [phone]    Patient was seen and examined. Agree with above. Platelet count is 545 today. Continue argatroban. Await YESICA.     Tato Smith MD

## 2021-06-02 NOTE — PROGRESS NOTES
Office : [phone]     Fax :[phone]       Nephrology Progress Note      Patient's Name: Kanika Mckeon  7:59 AM  6/2/2021      Chief Complaint:    Chief Complaint   Patient presents with    Shortness of Breath     sent by Dr Abhijit Haley for abnormal EKG. was unable to get into see cardiologist. has been feeling \"not so good\", gets sob with activity for a couple months. denies cp. no n/v. History of Present Ilness:    Kanika Mckeon is a 70 y.o. male with h/o DM 2, CKD 3 , DLP who came with complaints of 2-month history of progressive exertional shortness of breath and chest discomfort. Interval history :      Post CABG recovering. Urine output good  Minimal edema. Creatinine level increased to 1.9       I/O last 3 completed shifts: In: 2015.9 [P.O.:920;  I.V.:1095.9]  Out: 1500 [Urine:1500]    Past Medical History:   Diagnosis Date    CAD (coronary artery disease)     Diabetes mellitus ([facility]     Elevated LFT's     Hyperlipidemia     Hypertension     Post poliomyelitis syndrome     Type II or unspecified type diabetes mellitus without mention of complication, not stated as uncontrolled        Past Surgical History:   Procedure Laterality Date    CLEFT PALATE REPAIR      CORONARY ARTERY BYPASS GRAFT N/A 5/25/2021    Urgent CABG Coronary Artery Bypass Graft x4 (LIMA to LAD, SVG to PDA, SVG sequenced to OM1 and OM2), Endoscopic vein harvest left saphenous vein, Left atrial appendage ligation with 40mm AtriClip, total cardiopulmonary bypass, doppler flow verification of bypass grafts, insertion of temporary ventricular pacing wires, transesophageal echocardiogram, 5 level bilateral intercostal nerve block with Ma    SKIN BIOPSY Left 7/20/2020    EXCISE SKIN LESION LEFT POST AURICULAR WITH FLAP, FROZEN SECTIONS performed by Tai Jordan MD at [facility]           Current Medications:    albumin human 25 % IV solution 25 g, Once  lidocaine PF 1 % injection 5 mL, Once  sodium chloride flush 0.9 % injection 5-40 mL, 2 times per day  sodium chloride flush 0.9 % injection 5-40 mL, PRN  0.9 % sodium chloride infusion, PRN  midodrine (PROAMATINE) tablet 10 mg, TID WC  insulin glargine (LANTUS;BASAGLAR) injection pen 25 Units, Nightly  argatroban infusion 50mg in 0.9% sodium chloride 50 mL (premix), Continuous  insulin lispro (1 Unit Dial) 10 Units, TID WC  metoprolol tartrate (LOPRESSOR) tablet 200 mg, BID  insulin lispro (1 Unit Dial) 0-18 Units, TID WC  insulin lispro (1 Unit Dial) 0-9 Units, Nightly  sodium chloride flush 0.9 % injection 10 mL, 2 times per day  sodium chloride flush 0.9 % injection 10 mL, PRN  0.9 % sodium chloride infusion, PRN  [Held by provider] fondaparinux (ARIXTRA) injection 2.5 mg, Daily  ondansetron (ZOFRAN) injection 4 mg, Q6H PRN  metoclopramide (REGLAN) injection 10 mg, Q6H PRN  aspirin EC tablet 325 mg, Daily  acetaminophen (TYLENOL) tablet 1,000 mg, Q6H  traMADol (ULTRAM) tablet 50 mg, Q6H PRN   Or  traMADol (ULTRAM) tablet 100 mg, Q6H PRN  morphine (PF) injection 2 mg, Q2H PRN  magnesium oxide (MAG-OX) tablet 400 mg, BID  [Held by provider] potassium chloride (KLOR-CON) extended release tablet 10 mEq, TID WC  diphenhydrAMINE (BENADRYL) tablet 25 mg, Nightly PRN  zolpidem (AMBIEN) tablet 5 mg, Nightly PRN  sennosides-docusate sodium (SENOKOT-S) 8.6-50 MG tablet 1 tablet, BID  magnesium hydroxide (MILK OF MAGNESIA) 400 MG/5ML suspension 30 mL, Daily PRN  polyethylene glycol (GLYCOLAX) packet 17 g, Daily PRN  pantoprazole (PROTONIX) tablet 40 mg, Daily  potassium chloride 20 mEq/50 mL IVPB (Central Line), PRN  magnesium sulfate 2000 mg in 50 mL IVPB premix, PRN  calcium chloride 1,000 mg in sodium chloride 0.9 % 100 mL IVPB, PRN  calcium chloride 2,000 mg in sodium chloride 0.9 % 100 mL IVPB, PRN  albuterol sulfate  (90 Base) MCG/ACT inhaler 2 puff, Q6H PRN  albumin human 5 % IV solution 25 g, PRN  lactated ringers infusion 250 mL, Continuous PRN  phenylephrine (TAN-SYNEPHRINE) 50 mg in dextrose 5 % 250 mL infusion, Continuous PRN  furosemide (LASIX) injection 20 mg, PRN  glucose (GLUTOSE) 40 % oral gel 15 g, PRN  dextrose 50 % IV solution, PRN  glucagon (rDNA) injection 1 mg, PRN  dextrose 5 % solution, PRN        Physical exam:     Vitals:  /80   Pulse 78   Temp 97 °F (36.1 °C) (Temporal)   Resp 16   Ht 5' 9\" (1.753 m)   Wt 220 lb 3.8 oz (99.9 kg)   SpO2 99%   BMI 32.52 kg/m²   Constitutional:  OAA X3 NAD  Skin: no rash, turgor wnl  Heent:  eomi, mmm  Neck: no bruits or jvd noted  Cardiovascular:  S1, S2 without m/r/g  Respiratory: CTA B without w/r/r  Abdomen:  +bs, soft, nt, nd  Ext: no  lower extremity edema    Labs:  CBC:   Recent Labs     05/31/21 0330 06/01/21 0523 06/02/21  0400   WBC 13.2* 16.5* 16.6*   HGB 10.9* 11.3* 11.3*   PLT 90* 155 183     BMP:    Recent Labs     05/31/21 0330 06/01/21 0523 06/02/21  0400   * 138 136   K 4.5 4.6 5.0    101 101   CO2 23 25 25   BUN 59* 67* 74*   CREATININE 1.2 1.6* 1.9*   GLUCOSE 189* 178* 180*     Ca/Mg/Phos:   Recent Labs     05/31/21 0330 06/01/21 0523 06/02/21  0400   CALCIUM 7.7* 8.7 8.4   MG  --   --  2.20     Hepatic:   No results for input(s): AST, ALT, ALB, BILITOT, ALKPHOS in the last 72 hours. Troponin:   No results for input(s): TROPONINI in the last 72 hours. BNP: No results for input(s): BNP in the last 72 hours. Lipids:   No results for input(s): CHOL, TRIG, HDL, LDLCALC, LABVLDL in the last 72 hours. ABGs:   No results for input(s): PHART, PO2ART, [id number] in the last 72 hours. IMAGING:  XR CHEST PORTABLE   Final Result   No pneumothorax following removal of the left chest tube.   Development of   moderate opacities within the right lung either atelectasis or pneumonia. No   abnormal pulmonary vascular congestion or sizable pleural effusion. XR ABDOMEN FOR NG/OG/NE TUBE PLACEMENT   Final Result      XR CHEST PORTABLE   Final Result   Satisfactory appearance status post median sternotomy      No pneumothorax         CT CHEST ABDOMEN PELVIS WO CONTRAST   Final Result   No acute abnormality identified in the chest, abdomen, or pelvis. No finding   worrisome for occult malignancy. Moderate enlargement of the prostate. VL PRE OP VEIN MAPPING   Final Result      VL DUP CAROTID BILATERAL   Final Result      XR CHEST (2 VW)   Final Result   No active cardiopulmonary disease               A/p     1. Acute kidney injury on CKD stage 3A   NORA 2/2 ATN   Creatinine level increased   Creat 1.6 ---> 1.4 --> 1.6 ---> 1.9 --> 1.8 --> 1.5 ---> 1.6 ---. . 1.9   H/o DM 2    Good urine output    Hold lasix today         2. DM 2. Needs better control  On insulin    3. HTN . controlled   held lisinopril   Adjust meds. D/w cardiology     4. Dyslipidemia . Statins     5. Has multivessel coronary artery disease. S/p CABG.   Management per CT surgery    Ok to DC elida   Recommend to dose adjust all medications  based on renal functions  Monitor closely post CABG  Maintain SBP> 90 mmHg   Daily weights   AVOID NSAIDs  Avoid Nephrotoxins  Monitor Intake/Output  Call if significant decrease in urine output                 Thank you for allowing us to participate in care of Erick Rodriguez         Electronically signed by: Kade Price MD, 6/2/2021, 7:59 AM      [facility] [address]  Office : [phone]  Fax :[phone]

## 2021-06-02 NOTE — PROGRESS NOTES
Hospital Medicine Progress Note     Date:  6/2/2021    PCP: Jono Whitt MD (Tel: [phone])    Date of Admission: 5/20/2021      Subjective  Patient sitting up comfortably, was able to walk more yesterday with therapy. Patient's wife is at the bedside. No acute complaints. Objective  Physical exam:  Vitals: /76   Pulse 72   Temp 97.1 °F (36.2 °C) (Temporal)   Resp 16   Ht 5' 9\" (1.753 m)   Wt 220 lb 3.8 oz (99.9 kg)   SpO2 94%   BMI 32.52 kg/m²   Gen: Not in distress. Alert. Head: Normocephalic. Atraumatic. Eyes: EOMI. Good acuity. ENT: Oral mucosa moist  Neck: No JVD. No obvious thyromegaly. CVS: Nml S1S2, + murmur, RRR  Pulmomary: Diminished breath sounds bilaterally, no wheezing  Gastrointestinal: Soft, NT/ND. Positive bowel sounds. Musculoskeletal: Trace edema. Warm  Neuro: No focal deficit. Moves extremity spontaneously. Psychiatry: Appropriate affect. Not agitated. Skin: Warm, dry with normal turgor. No rash      24HR INTAKE/OUTPUT:      Intake/Output Summary (Last 24 hours) at 6/2/2021 1433  Last data filed at 6/2/2021 1414  Gross per 24 hour   Intake 1895.91 ml   Output 1125 ml   Net 770.91 ml     I/O last 3 completed shifts: In: 2015.9 [P.O.:920;  I.V.:1095.9]  Out: 1500 [Urine:1500]  I/O this shift:  In: 360 [P.O.:360]  Out: 400 [Urine:400]      Meds:    lidocaine 1 % injection  5 mL Intradermal Once    sodium chloride flush  5-40 mL Intravenous 2 times per day    midodrine  10 mg Oral TID WC    insulin glargine  25 Units Subcutaneous Nightly    insulin lispro  10 Units Subcutaneous TID WC    metoprolol tartrate  200 mg Oral BID    insulin lispro  0-18 Units Subcutaneous TID WC    insulin lispro  0-9 Units Subcutaneous Nightly    sodium chloride flush  10 mL Intravenous 2 times per day    [Held by provider] fondaparinux  2.5 mg Subcutaneous Daily    aspirin  325 mg Oral Daily    acetaminophen  1,000 mg Oral Q6H    magnesium oxide  400 mg Oral BID    Hypertension associated with diabetes ([facility] [address]    Mixed hyperlipidemia    S/P coronary artery bypass graft x 4    S/P left atrial appendage ligation  Resolved Problems:    * No resolved hospital problems.  *        Plan:  Severe multivessel CAD  -Status post CABG x4 on 5/25/2021  -Management per cardiothoracic surgery and cardiology      Hypotension  -Currently on midodrine  -Management per cardiothoracic surgery      Uncontrolled insulin-dependent diabetes mellitus type 2 with circulatory problem  -Blood sugars stable  -Continue Lantus 25 units nightly, continue Humalog 10 units before meals and sliding scale insulin high, continue to monitor        Hypertension associated with diabetes  -Currently on Lopressor and IV Lasix per cardiothoracic surgery recommendations and midodrine secondary to hypotension          Diet: DIET CARDIAC; Carb Control: 4 carb choices (60 gms)/meal; Daily Fluid Restriction: 2000 ml    Activity: Up with assist  Prophylaxis: Argatroban per primary service    Code status: Full Code     ----------        Imelda Chan MD  -------------------------------  Pati hospitalist

## 2021-06-03 NOTE — PROGRESS NOTES
Physical Therapy  Facility/Department: [facility]  Daily Treatment Note  Co-treatment with Occupational Therapy  NAME: Petrina Kussmaul  : 1950  MRN: [id number]    Date of Service: 6/3/2021    Discharge Recommendations:  Petrina Kussmaul scored a  on the AM-PAC short mobility form. Current research shows that an AM-PAC score of 17 or less is typically not associated with a discharge to the patient's home setting. Based on the patient's AM-PAC score and their current functional mobility deficits, it is recommended that the patient have 5-7 sessions per week of Physical Therapy at d/c to increase the patient's independence. At this time, this patient demonstrates the endurance, and/or tolerance for 3 hours of therapy each day, with a treatment frequency of 5-7x/wk. Please see assessment section for further patient specific details. If patient discharges prior to next session this note will serve as a discharge summary. Please see below for the latest assessment towards goals. PT Equipment Recommendations  Equipment Needed: No  Other: TBD at next level of care, pt would need a w/c if he discharged home    Assessment   Body structures, Functions, Activity limitations: Decreased functional mobility ; Decreased strength;Decreased endurance;Decreased balance;Decreased ADL status  Assessment: Pt is making slow progress with transfer and continues to require a varying assist level from Min A of 1 and Mod A of 2nd up to Mod A of 2 persons within a session. The patient demosntrated improved trunk control in the frontal plane for forward flexion with sit>stand transfer. Pt would benefit from continued co-treatment with occupational therapy to facilitate safe transfers and promote functional mobility.   Treatment Diagnosis: impaired functional mobility  Prognosis: Good  Clinical Presentation: Evolving  PT Education: PT Role;Plan of Care;General Safety;Transfer Training  Patient Education: Pt verbalizes understanding  Barriers to Learning: none  REQUIRES PT FOLLOW UP: Yes  Activity Tolerance  Activity Tolerance: Patient limited by fatigue;Patient limited by endurance  Activity Tolerance: Pt limited by SOB this date. Vitals at rest: BP 96/71, SpO2 98% on RA, 66 bpm. Vitals after ambulation: BP 90/63, unable to get SpO2 reading, HR 73 bpm.     Patient Diagnosis(es): The encounter diagnosis was Chest pain, unspecified type. has a past medical history of CAD (coronary artery disease), Diabetes mellitus ([facility] [address] Elevated LFT's, Hyperlipidemia, Hypertension, Post poliomyelitis syndrome, and Type II or unspecified type diabetes mellitus without mention of complication, not stated as uncontrolled. has a past surgical history that includes Cleft palate repair; [place] tooth extraction; skin biopsy (Left, 7/20/2020); and Coronary artery bypass graft (N/A, 5/25/2021). Restrictions  Restrictions/Precautions  Restrictions/Precautions: Fall Risk (high fall risk, up in chair for meals, progressive ambulation, diet cardiac - carb control, daily fluid restriction 2000ml, SWAN line)  Position Activity Restriction  Sternal Precautions: 5# Lifting Restrictions, No Pulling, No Pushing  Other position/activity restrictions: Shira Palma is a 70 y.o. male presented to the hospital with complaints of 2-month history of progressive exertional shortness of breath and chest discomfort. LHC revealed MVD. s/p CABG x4, ANDREW ligation 5/25/21. \"  Pt was extubated on 5/26. Subjective   General  Chart Reviewed: Yes  Response To Previous Treatment: Patient with no complaints from previous session. Family / Caregiver Present: Yes (spouse)  General Comment  Comments: Patient seated in recliner upon arrival - agreeable to PT.  RN approval obtained prior to PT entry. Pt appears down today.   Pain Screening  Patient Currently in Pain: Yes (pain in chest from coughing, RN notified)  Vital Signs  Patient Currently in Pain: Yes (pain in chest from coughing, RN notified)          Orientation  Orientation  Overall Orientation Status: Within Functional Limits     Cognition   See OT note    Objective    Bed mobility  Pt up in chair at beginning and end of session, no bed mobility observed this date    Transfers  Sit to Stand: 2 Person Assistance (Min A of 1 + Mod A of 2nd from recliner; Mod A of 2 persons from Rollator, BSC, and recliner)  Stand to sit: Moderate Assistance;Minimal Assistance (varied from Min-Mod A of 1 to Rollator, BSC, and recliner x2 trials)  Comment: To rollator with cues for cardiac pillow/sternal precautions. SBA for seated forward trunk flexion in recliner and scooting using hip walking technique. Ambulation  Ambulation?: Yes  Ambulation 1  Surface: level tile  Device: Rollator  Assistance: Contact guard assistance  Quality of Gait: Decreased step height and length bilaterally, decreased toe off bilaterally, decreased trunk rotation, forward flexed posture, increased medial/lateral sway  Distance: 10 + 3 ft  Comments: Moderate SOB with ambulation, pt fatigued quickly and required seated rest break. Cues for upright posture with minimal compliance this date. Pt requested use of BSC following ambulation trial, then was too fatigued to ambulate further. Balance  Posture: Fair (forward flexed, rounded shoulders)  Sitting - Static: Good  Sitting - Dynamic: Good;-  Standing - Static: Fair;+  Standing - Dynamic: +;Fair  Comments: CGA for static standing balance with wide base of support and rollator for 1 minute + 1 minute 45 sec     Other  Comment: Pt assisted to BSC, no voiding (RN notified), see OT note for assist with pericare.        AM-PAC Score  AM-PAC Inpatient Mobility Raw Score : 11 (06/03/21 1528)  AM-PAC Inpatient T-Scale Score : 33.86 (06/03/21 1528)  Mobility Inpatient CMS 0-100% Score: 72.57 (06/03/21 1528)  Mobility Inpatient CMS G-Code Modifier : CL (06/03/21 1528)       Goals  Short term goals  Time Frame for Short term goals:  To be met prior to DC  Short term goal 1: Pt will perform bed mobility with min A  Short term goal 2: Pt will perform sit to/from stand with min A  Short term goal 3: Pt will ambulate 21' with AAD and mod A  Short term goal 4: Pt will sit unsupported for 5 minutes and SBA  Patient Goals   Patient goals : ultimately regain independence    Plan    Plan  Times per week: 3-5  Current Treatment Recommendations: Strengthening, Balance Training, Functional Mobility Training, Transfer Training, Gait Training, Neuromuscular Re-education, Safety Education & Training, Endurance Training, ROM, Home Exercise Program, Patient/Caregiver Education & Training, Equipment Evaluation, Education, & procurement  Safety Devices  Type of devices: Call light within reach, Gait belt, Left in chair, Nurse notified     Therapy Time   Individual Concurrent Group Co-treatment   Time In       [phone]   Time Out       1446   Minutes       54   Timed Code Treatment Minutes: 181 Bharath Prasad, DPT #[id number]

## 2021-06-03 NOTE — PROGRESS NOTES
sodium  1 tablet Oral BID    pantoprazole  40 mg Oral Daily       Infusions:    sodium chloride      argatroban infusion 0.5 mcg/kg/min (06/03/21 1102)    sodium chloride Stopped (05/27/21 0951)    lactated ringers      phenylephrine (TAN-SYNEPHRINE) 50mg/250mL infusion 110 mcg/min (06/03/21 1040)    dextrose           PRN Meds: sodium chloride flush, sodium chloride, sodium chloride flush, sodium chloride, ondansetron, metoclopramide, traMADol **OR** traMADol, morphine, diphenhydrAMINE, zolpidem, magnesium hydroxide, polyethylene glycol, potassium chloride, magnesium sulfate, calcium chloride IVPB, calcium chloride IVPB, albuterol sulfate HFA, albumin human, lactated ringers, phenylephrine (TAN-SYNEPHRINE) 50mg/250mL infusion, furosemide, glucose, dextrose, glucagon (rDNA), dextrose    Labs/imaging:  CBC:   Recent Labs     06/01/21  0523 06/02/21  0400 06/03/21  0500   WBC 16.5* 16.6* 17.9*   HGB 11.3* 11.3* 10.0*    183 176         BMP:    Recent Labs     06/01/21  0523 06/02/21  0400 06/03/21  0500    136 130*   K 4.6 5.0 4.4    101 95*   CO2 25 25 21   BUN 67* 74* 75*   CREATININE 1.6* 1.9* 1.6*   GLUCOSE 178* 180* 365*         Hepatic: No results for input(s): AST, ALT, ALB, BILITOT, ALKPHOS in the last 72 hours. Troponin: No results for input(s): TROPONINI in the last 72 hours. BNP: No results for input(s): BNP in the last 72 hours. INR: No results for input(s): INR in the last 72 hours.         Reviewed imaging and reports noted      Assessment:  Principal Problem:    Coronary artery disease involving native coronary artery of native heart with unstable angina pectoris (HCC)  Active Problems:    Statin intolerance    Stage 3a chronic kidney disease    Post poliomyelitis syndrome    Type 2 diabetes mellitus with diabetic nephropathy, without long-term current use of insulin (HCC)    Hypertension associated with diabetes ([address] [address]    Mixed hyperlipidemia    S/P coronary artery bypass graft x 4    S/P left atrial appendage ligation  Resolved Problems:    * No resolved hospital problems.  *        Plan:  Severe multivessel CAD  -Status post CABG x4 on 5/25/2021  -Management per cardiothoracic surgery and cardiology      Hypotension  -Currently on midodrine  -Management per cardiothoracic surgery      Uncontrolled insulin-dependent diabetes mellitus type 2 with circulatory problem  -Blood sugars high this a.m.  -Increase Lantus from 25 to 30 units nightly, continue Humalog 10 units before meals and sliding scale insulin high, continue to monitor        Hypertension associated with diabetes  -Currently on Lopressor and IV Lasix per cardiothoracic surgery recommendations and midodrine secondary to hypotension          Diet: DIET CARDIAC; Carb Control: 4 carb choices (60 gms)/meal; Daily Fluid Restriction: 2000 ml    Activity: Up with assist  Prophylaxis: Argatroban per primary service    Code status: Full Code     ----------        Beatrice Linder MD  -------------------------------  Rounding hospitalist

## 2021-06-03 NOTE — PROGRESS NOTES
Neosynephrine drip titrated as low as 110. BP drops as low as 87 systolic while dosing. Monitor NSR with RBBB. Remains on room air with oxygen saturation between 92-97%/. Developed a non-productive cough during the night and Jer Jimenez notified. Bibasilar fine crackles noted now. Appetite poor. Basically drinks liquids with pills only. Does not like coffee or tea. Offered orange sherbert-ate 1/2. Zofran given for dry heaves. Flat affect noted on face majority of day. Family concerned about lack of progress.  PT/OT

## 2021-06-03 NOTE — PROGRESS NOTES
Office : [phone]     Fax :[phone]       Nephrology Progress Note      Patient's Name: Richi Jones  10:13 AM  6/3/2021      Chief Complaint:    Chief Complaint   Patient presents with    Shortness of Breath     sent by Dr Johnny Mack for abnormal EKG. was unable to get into see cardiologist. has been feeling \"not so good\", gets sob with activity for a couple months. denies cp. no n/v. History of Present Ilness:    Richi Jones is a 70 y.o. male with h/o DM 2, CKD 3 , DLP who came with complaints of 2-month history of progressive exertional shortness of breath and chest discomfort. Interval history :      Post CABG recovering. Urine output good  Minimal edema. Creatinine level increased to 1.9       I/O last 3 completed shifts:   In: 1709 [P.O.:1080; I.V.:501]  Out: 1090 [Urine:1090]    Past Medical History:   Diagnosis Date    CAD (coronary artery disease)     Diabetes mellitus ([facility] [address]     Elevated LFT's     Hyperlipidemia     Hypertension     Post poliomyelitis syndrome     Type II or unspecified type diabetes mellitus without mention of complication, not stated as uncontrolled        Past Surgical History:   Procedure Laterality Date    CLEFT PALATE REPAIR      CORONARY ARTERY BYPASS GRAFT N/A 5/25/2021    Urgent CABG Coronary Artery Bypass Graft x4 (LIMA to LAD, SVG to PDA, SVG sequenced to OM1 and OM2), Endoscopic vein harvest left saphenous vein, Left atrial appendage ligation with 40mm AtriClip, total cardiopulmonary bypass, doppler flow verification of bypass grafts, insertion of temporary ventricular pacing wires, transesophageal echocardiogram, 5 level bilateral intercostal nerve block with Ma    SKIN BIOPSY Left 7/20/2020    EXCISE SKIN LESION LEFT POST AURICULAR WITH FLAP, FROZEN SECTIONS performed by Kate Tatum MD at [facility]           Current Medications:    midodrine (PROAMATINE) tablet 15 mg, TID WC  fludrocortisone (FLORINEF) tablet 0.1 mg, Daily  lidocaine PF 1 % injection 5 mL, Once  sodium chloride flush 0.9 % injection 5-40 mL, 2 times per day  sodium chloride flush 0.9 % injection 5-40 mL, PRN  0.9 % sodium chloride infusion, PRN  insulin glargine (LANTUS;BASAGLAR) injection pen 25 Units, Nightly  argatroban infusion 50mg in 0.9% sodium chloride 50 mL (premix), Continuous  insulin lispro (1 Unit Dial) 10 Units, TID WC  metoprolol tartrate (LOPRESSOR) tablet 200 mg, BID  insulin lispro (1 Unit Dial) 0-18 Units, TID WC  insulin lispro (1 Unit Dial) 0-9 Units, Nightly  sodium chloride flush 0.9 % injection 10 mL, 2 times per day  sodium chloride flush 0.9 % injection 10 mL, PRN  0.9 % sodium chloride infusion, PRN  [Held by provider] fondaparinux (ARIXTRA) injection 2.5 mg, Daily  ondansetron (ZOFRAN) injection 4 mg, Q6H PRN  metoclopramide (REGLAN) injection 10 mg, Q6H PRN  aspirin EC tablet 325 mg, Daily  acetaminophen (TYLENOL) tablet 1,000 mg, Q6H  traMADol (ULTRAM) tablet 50 mg, Q6H PRN   Or  traMADol (ULTRAM) tablet 100 mg, Q6H PRN  morphine (PF) injection 2 mg, Q2H PRN  magnesium oxide (MAG-OX) tablet 400 mg, BID  [Held by provider] potassium chloride (KLOR-CON) extended release tablet 10 mEq, TID WC  diphenhydrAMINE (BENADRYL) tablet 25 mg, Nightly PRN  zolpidem (AMBIEN) tablet 5 mg, Nightly PRN  sennosides-docusate sodium (SENOKOT-S) 8.6-50 MG tablet 1 tablet, BID  magnesium hydroxide (MILK OF MAGNESIA) 400 MG/5ML suspension 30 mL, Daily PRN  polyethylene glycol (GLYCOLAX) packet 17 g, Daily PRN  pantoprazole (PROTONIX) tablet 40 mg, Daily  potassium chloride 20 mEq/50 mL IVPB (Central Line), PRN  magnesium sulfate 2000 mg in 50 mL IVPB premix, PRN  calcium chloride 1,000 mg in sodium chloride 0.9 % 100 mL IVPB, PRN  calcium chloride 2,000 mg in sodium chloride 0.9 % 100 mL IVPB, PRN  albuterol sulfate  (90 Base) MCG/ACT inhaler 2 puff, Q6H PRN  albumin human 5 % IV solution 25 g, PRN  lactated ringers infusion 250 mL, Continuous PRN  phenylephrine (TAN-SYNEPHRINE) 50 mg in dextrose 5 % 250 mL infusion, Continuous PRN  furosemide (LASIX) injection 20 mg, PRN  glucose (GLUTOSE) 40 % oral gel 15 g, PRN  dextrose 50 % IV solution, PRN  glucagon (rDNA) injection 1 mg, PRN  dextrose 5 % solution, PRN        Physical exam:     Vitals:  /68   Pulse 83   Temp 97.6 °F (36.4 °C) (Temporal)   Resp 16   Ht 5' 9\" (1.753 m)   Wt 220 lb 3.8 oz (99.9 kg)   SpO2 96%   BMI 32.52 kg/m²   Constitutional:  OAA X3 NAD  Skin: no rash, turgor wnl  Heent:  eomi, mmm  Neck: no bruits or jvd noted  Cardiovascular:  S1, S2 without m/r/g  Respiratory: CTA B without w/r/r  Abdomen:  +bs, soft, nt, nd  Ext: no  lower extremity edema    Labs:  CBC:   Recent Labs     06/01/21 0523 06/02/21  0400 06/03/21  0500   WBC 16.5* 16.6* 17.9*   HGB 11.3* 11.3* 10.0*    183 176     BMP:    Recent Labs     06/01/21 0523 06/02/21  0400 06/03/21  0500    136 130*   K 4.6 5.0 4.4    101 95*   CO2 25 25 21   BUN 67* 74* 75*   CREATININE 1.6* 1.9* 1.6*   GLUCOSE 178* 180* 365*     Ca/Mg/Phos:   Recent Labs     06/01/21 0523 06/02/21  0400 06/03/21  0500   CALCIUM 8.7 8.4 7.8*   MG  --  2.20  --      Hepatic:   No results for input(s): AST, ALT, ALB, BILITOT, ALKPHOS in the last 72 hours. Troponin:   No results for input(s): TROPONINI in the last 72 hours. BNP: No results for input(s): BNP in the last 72 hours. Lipids:   No results for input(s): CHOL, TRIG, HDL, LDLCALC, LABVLDL in the last 72 hours. ABGs:   No results for input(s): PHART, PO2ART, NQH2GIE in the last 72 hours. IMAGING:  XR CHEST PORTABLE   Final Result   No pneumothorax following removal of the left chest tube.   Development of   moderate opacities within the right lung either atelectasis or pneumonia. No   abnormal pulmonary vascular congestion or sizable pleural effusion. XR ABDOMEN FOR NG/OG/NE TUBE PLACEMENT   Final Result      XR CHEST PORTABLE   Final Result   Satisfactory appearance status post median sternotomy      No pneumothorax         CT CHEST ABDOMEN PELVIS WO CONTRAST   Final Result   No acute abnormality identified in the chest, abdomen, or pelvis. No finding   worrisome for occult malignancy. Moderate enlargement of the prostate. VL PRE OP VEIN MAPPING   Final Result      VL DUP CAROTID BILATERAL   Final Result      XR CHEST (2 VW)   Final Result   No active cardiopulmonary disease               A/p     1. Acute kidney injury on CKD stage 3A   NORA 2/2 ATN     Creat 1.6 today   Better   H/o DM 2    Good urine output    Hold lasix today         2. DM 2. Needs better control  On insulin    3. HTN . controlled   held lisinopril   Adjust meds. D/w cardiology     4. Dyslipidemia . Statins     5. Has multivessel coronary artery disease. S/p CABG.   Management per CT surgery      Recommend to dose adjust all medications  based on renal functions  Monitor closely post CABG  Maintain SBP> 90 mmHg   Daily weights   AVOID NSAIDs  Avoid Nephrotoxins  Monitor Intake/Output  Call if significant decrease in urine output                 Thank you for allowing us to participate in care of Nikia Fuchs         Electronically signed by: Maria Booker MD, 6/3/2021, 10:13 AM      [facility] [address]  Office : [phone]  Fax :[phone]

## 2021-06-03 NOTE — PROGRESS NOTES
[facility]   Progress Note      6/3/2021 8:16 AM        Name: Elvina Kawasaki . Admitted: 5/20/2021    SUBJECTIVE:  He is doing ok, offers no new complaints. No external bleeding.      Reviewed interval ancillary notes    Current Medications  midodrine (PROAMATINE) tablet 15 mg, TID WC  fludrocortisone (FLORINEF) tablet 0.1 mg, Daily  lidocaine PF 1 % injection 5 mL, Once  sodium chloride flush 0.9 % injection 5-40 mL, 2 times per day  sodium chloride flush 0.9 % injection 5-40 mL, PRN  0.9 % sodium chloride infusion, PRN  insulin glargine (LANTUS;BASAGLAR) injection pen 25 Units, Nightly  argatroban infusion 50mg in 0.9% sodium chloride 50 mL (premix), Continuous  insulin lispro (1 Unit Dial) 10 Units, TID WC  metoprolol tartrate (LOPRESSOR) tablet 200 mg, BID  insulin lispro (1 Unit Dial) 0-18 Units, TID WC  insulin lispro (1 Unit Dial) 0-9 Units, Nightly  sodium chloride flush 0.9 % injection 10 mL, 2 times per day  sodium chloride flush 0.9 % injection 10 mL, PRN  0.9 % sodium chloride infusion, PRN  [Held by provider] fondaparinux (ARIXTRA) injection 2.5 mg, Daily  ondansetron (ZOFRAN) injection 4 mg, Q6H PRN  metoclopramide (REGLAN) injection 10 mg, Q6H PRN  aspirin EC tablet 325 mg, Daily  acetaminophen (TYLENOL) tablet 1,000 mg, Q6H  traMADol (ULTRAM) tablet 50 mg, Q6H PRN   Or  traMADol (ULTRAM) tablet 100 mg, Q6H PRN  morphine (PF) injection 2 mg, Q2H PRN  magnesium oxide (MAG-OX) tablet 400 mg, BID  [Held by provider] potassium chloride (KLOR-CON) extended release tablet 10 mEq, TID WC  diphenhydrAMINE (BENADRYL) tablet 25 mg, Nightly PRN  zolpidem (AMBIEN) tablet 5 mg, Nightly PRN  sennosides-docusate sodium (SENOKOT-S) 8.6-50 MG tablet 1 tablet, BID  magnesium hydroxide (MILK OF MAGNESIA) 400 MG/5ML suspension 30 mL, Daily PRN  polyethylene glycol (GLYCOLAX) packet 17 g, Daily PRN  pantoprazole (PROTONIX) tablet 40 mg, Daily  potassium chloride 20 mEq/50 mL IVPB [employer]), PRN  magnesium sulfate 2000 mg in 50 mL IVPB premix, PRN  calcium chloride 1,000 mg in sodium chloride 0.9 % 100 mL IVPB, PRN  calcium chloride 2,000 mg in sodium chloride 0.9 % 100 mL IVPB, PRN  albuterol sulfate  (90 Base) MCG/ACT inhaler 2 puff, Q6H PRN  albumin human 5 % IV solution 25 g, PRN  lactated ringers infusion 250 mL, Continuous PRN  phenylephrine (TAN-SYNEPHRINE) 50 mg in dextrose 5 % 250 mL infusion, Continuous PRN  furosemide (LASIX) injection 20 mg, PRN  glucose (GLUTOSE) 40 % oral gel 15 g, PRN  dextrose 50 % IV solution, PRN  glucagon (rDNA) injection 1 mg, PRN  dextrose 5 % solution, PRN        Objective:  /66   Pulse 75   Temp 97.6 °F (36.4 °C) (Temporal)   Resp 16   Ht 5' 9\" (1.753 m)   Wt 220 lb 3.8 oz (99.9 kg)   SpO2 96%   BMI 32.52 kg/m²     Intake/Output Summary (Last 24 hours) at 6/3/2021 0816  Last data filed at 6/3/2021 0616  Gross per 24 hour   Intake 1221 ml   Output 865 ml   Net 356 ml      Wt Readings from Last 3 Encounters:   06/03/21 220 lb 3.8 oz (99.9 kg)   05/20/21 223 lb 9.6 oz (101.4 kg)   03/12/21 216 lb (98 kg)       General appearance:  Appears comfortable  Eyes: Sclera clear. Pupils equal.  ENT: Moist oral mucosa. Trachea midline, no adenopathy. Cardiovascular: Regular rhythm, normal S1, S2. No murmur. No edema in lower extremities  Respiratory: Not using accessory muscles. Good inspiratory effort. Clear to auscultation bilaterally, no wheeze or crackles. GI: Abdomen soft, no tenderness, not distended  Musculoskeletal: No cyanosis in digits, neck supple  Neurology: CN 2-12 grossly intact. No speech or motor deficits  Psych: Normal affect.  Alert and oriented in time, place and person  Skin: Warm, dry, normal turgor    Labs and Tests:  CBC:   Recent Labs     06/01/21  0523 06/02/21  0400 06/03/21  0500   WBC 16.5* 16.6* 17.9*   HGB 11.3* 11.3* 10.0*    183 176     BMP:    Recent Labs     06/01/21  0523 06/02/21  0400 06/03/21  0500    136 130*   K 4.6 5.0 4.4    101 95*   CO2 25 25 21   BUN 67* 74* 75*   CREATININE 1.6* 1.9* 1.6*   GLUCOSE 178* 180* 365*     Hepatic: No results for input(s): AST, ALT, ALB, BILITOT, ALKPHOS in the last 72 hours. ASSESSMENT AND PLAN    Principal Problem:    Coronary artery disease involving native coronary artery of native heart with unstable angina pectoris (HCC)  Active Problems:    Statin intolerance    Stage 3a chronic kidney disease    Post poliomyelitis syndrome    Type 2 diabetes mellitus with diabetic nephropathy, without long-term current use of insulin ([facility] [place] [address]    Hypertension associated with diabetes ([facility] [place] [address]    Mixed hyperlipidemia    S/P coronary artery bypass graft x 4    S/P left atrial appendage ligation  Resolved Problems:    * No resolved hospital problems. *      Thrombocytopenia   - platelet count has recovered. - He was on arixtra intermittently which was stopped due to renal insufficiency. - - HIT positive, YESICA pending.   - continues on argatroban drip.       Coronary artery disease post bypass and left atrial ligation patient remains on neosynephrine drip to maintain blood pressure      Diabetes mellitus type 2     History of cleft lip and palate surgeries      Post polio syndrome     Chronic kidney disease stage 3 A         Sandra Taylor CNP  [facility]    Patient stable remains on neosynephrine drip He has had no bleeding on argatroban   called lab and YESICA Was sent out from [facility] lab Results pending

## 2021-06-03 NOTE — PROGRESS NOTES
Occupational Therapy  Facility/Department: [facility]  Daily Treatment Note  NAME: Elvina Kawasaki  : 1950  MRN: [id number]    Date of Service: 6/3/2021    Discharge Recommendations:  Elvina Kawasaki scored a 13/24 on the AM-PAC ADL Inpatient form. Current research shows that an AM-PAC score of 17 or less is typically not associated with a discharge to the patient's home setting. Based on the patient's AM-PAC score and their current ADL deficits, it is recommended that the patient have 5-7 sessions per week of Occupational Therapy at d/c to increase the patient's independence. At this time, this patient demonstrates the endurance, and/or tolerance for 3 hours of therapy each day, with a treatment frequency of 5-7x/wk. Please see assessment section for further patient specific details. If patient discharges prior to next session this note will serve as a discharge summary. Please see below for the latest assessment towards goals. 5-7 sessions per week  OT Equipment Recommendations  Equipment Needed: No    Assessment   Performance deficits / Impairments: Decreased functional mobility ; Decreased strength;Decreased endurance;Decreased high-level IADLs;Decreased ADL status; Decreased balance  Assessment: pt not at baseline level of functioning, continues to be limited by SOB which impacts pt's endurance. pt continues to required w/c follow for safety with ambulation. pt not at baseline and would benefit from ongoing skilled OT services in order to return to PLOF  Treatment Diagnosis: CABG x4  Prognosis: Good  History: pt independent at baseline  Assistance / Modification: assist of 1-2  OT Education: OT Role;Transfer Training;Precautions; ADL Adaptive Strategies; Plan of Care;Family Education;Orientation  Patient Education: sternal precautions, role of OT--continue to educate for carryover  REQUIRES OT FOLLOW UP: Yes  Activity Tolerance  Activity Tolerance: Patient Tolerated treatment well  Activity Tolerance: limited by endurance  Safety Devices  Safety Devices in place: Yes  Type of devices: All fall risk precautions in place;Nurse notified; Left in chair;Call light within reach; Patient at risk for falls;Gait belt (no chair alarm, RN, Radha, aware. spouse in room with pt)         Patient Diagnosis(es): The encounter diagnosis was Chest pain, unspecified type. has a past medical history of CAD (coronary artery disease), Diabetes mellitus ([address] Elevated LFT's, Hyperlipidemia, Hypertension, Post poliomyelitis syndrome, and Type II or unspecified type diabetes mellitus without mention of complication, not stated as uncontrolled. has a past surgical history that includes Cleft palate repair; [place] tooth extraction; skin biopsy (Left, 7/20/2020); and Coronary artery bypass graft (N/A, 5/25/2021). Restrictions  Restrictions/Precautions  Restrictions/Precautions: Fall Risk (high fall risk, up in chair for meals, progressive ambulation, diet cardiac - carb control, daily fluid restriction 2000ml, SWAN line)  Position Activity Restriction  Sternal Precautions: 5# Lifting Restrictions, No Pulling, No Pushing  Other position/activity restrictions: Rex Mcdowell is a 70 y.o. male presented to the hospital with complaints of 2-month history of progressive exertional shortness of breath and chest discomfort. LHC revealed MVD. s/p CABG x4, ANDREW ligation 5/25/21. \"  Pt was extubated on 5/26. Subjective   General  Chart Reviewed: Yes  Patient assessed for rehabilitation services?: Yes  Additional Pertinent Hx: DM 2, HTN, CKD 3, post poliomyelitis syndrome  Response to previous treatment: Patient with no complaints from previous session  Family / Caregiver Present: Yes (spouse present)  Referring Practitioner: Irineo Plascencia MD  Diagnosis: s/p CABG X 4, ANDREW ligation  Subjective  Subjective: pt in recliner upon arrival and agreeable to OT cotx. reporting pain in his chest from coughing, however did not rate.  reports \"feeling Education & Training, Self-Care / ADL, Home Management Training  Plan Comment: continue to stress sternal precautions    AM-PAC Score        AM-PAC Inpatient Daily Activity Raw Score: 13 (06/03/21 1520)  AM-PAC Inpatient ADL T-Scale Score : 32.03 (06/03/21 1520)  ADL Inpatient CMS 0-100% Score: 63.03 (06/03/21 1520)  ADL Inpatient CMS G-Code Modifier : CL (06/03/21 1520)    Goals  Short term goals  Time Frame for Short term goals: Prior to d/c  Short term goal 1: Pt will complete LB dressing with mod A---pt declined 6/3  Short term goal 2: Pt will complete toileting with min A---total assist for reggie care 6/3  Short term goal 3: Pt will complete bathing with mod A---pt declined 6/3  Short term goal 4: Pt will complete toilet transfers with min A using LRAD---transfer modA of 2 to [place]+modA on 6/3  Short term goal 5: NEW GOAL: standing tolerance for ADL task 3-4 minutes  Patient Goals   Patient goals :  To be independent       Therapy Time   Individual Concurrent Group Co-treatment   Time In       1353   Time Out       1446   Minutes       53     Timed Code Treatment Minutes:  53 Minutes    Total Treatment Minutes:  53 minutes       Bridgette Blount OTR/L [id number]    Bridgette Blount OT

## 2021-06-03 NOTE — PROGRESS NOTES
chloride flush, sodium chloride, sodium chloride flush, sodium chloride, ondansetron, metoclopramide, traMADol **OR** traMADol, morphine, diphenhydrAMINE, zolpidem, magnesium hydroxide, polyethylene glycol, potassium chloride, magnesium sulfate, calcium chloride IVPB, calcium chloride IVPB, albuterol sulfate HFA, albumin human, lactated ringers, phenylephrine (TAN-SYNEPHRINE) 50mg/250mL infusion, furosemide, glucose, dextrose, glucagon (rDNA), dextrose    Lab Data:  CBC:   Recent Labs     06/01/21  0523 06/02/21  0400 06/03/21  0500   WBC 16.5* 16.6* 17.9*   HGB 11.3* 11.3* 10.0*   HCT 35.5* 35.5* 30.9*   MCV 87.8 88.3 88.8    183 176     BMP:   Recent Labs     06/01/21  0523 06/02/21  0400 06/03/21  0500    136 130*   K 4.6 5.0 4.4    101 95*   CO2 25 25 21   BUN 67* 74* 75*   CREATININE 1.6* 1.9* 1.6*     LIVER PROFILE:   No results for input(s): AST, ALT, LIPASE, BILIDIR, BILITOT, ALKPHOS in the last 72 hours. Invalid input(s): AMYLASE,  ALB  PT/INR:   No results for input(s): PROTIME, INR in the last 72 hours. APTT:   Recent Labs     06/02/21 0400 06/03/21  0500 06/03/21  0537   APTT 68.7* 88.7* 52.4*     BNP:  No results for input(s): BNP in the last 72 hours. Imaging/Procedures:   Limited Echo 5/28/2021:   Summary   -Limited exam per Dr. Gurpreet Serna for hypotension and systolic anterior motion of mitral valve. Patient had a previous complete exam 5/21/2021.   -Normal global ejection fraction estimated at 55%. -There is possible apical hypokinesis noted. -There is mild concentric left ventricular hypertrophy noted. -Thickened mitral valve without evidence of stenosis. -Systolic anterior motion of the mitral valve is noted causing a left   ventricular outflow tract obstruction with a maximum resting pressure gradient of 201 mmHg. -There is moderate eccentric mitral regurgitation.     Cardiac surgical procedure 5/25/2021:  Urgent CABG Coronary Artery Bypass Graft x4 (LIMA to LAD, valve of unclear etiology as no apparent HCM (hypertrophic cardiomyopathy). Possibly leaflet length and/or underfilled ventricle. Recent limited echo documented markedly elevated gradient of 200 mmHg in the LVOT; preop it was 21 mmHg. Will need caution to avoid hypovolemia as this could lead to hypotension. POD #9. Hemoglobin above 10. Did receive IV Lasix for a day or 2 which seem to correlate with increase in Cristian-Synephrine. S/P left atrial appendage ligation  Plan: Stable. Heparin-induced thrombocytopenia  Plan: Treated with argatroban. Platelets recovered. Hematology following. Type 2 diabetes mellitus with diabetic nephropathy, without long-term current use of insulin (HCC)  Plan: Chronic. Essential hypertension  Plan: Currently still hypotensive on Cristian-Synephrine but dose had increased and is now being weaned again. Wean as hemodynamics allow. KATHERYN (systolic anterior motion) of the mitral valve still a problem      Mixed hyperlipidemia  Plan: Statin intolerant. Will need to start on Repatha/Praluent when able. Statin intolerance  Plan: Will need to start on Repatha/Praluent when able. Stage 3a chronic kidney disease  Plan: Chronic. Trend creatinine. Nephrology following. Post poliomyelitis syndrome  Plan: Chronic. He is disappointed regarding continued length of stay. Cristian-Synephrine increased again but in the process of being weaned. Would avoid IV Lasix. Continue to wean Cristian-Synephrine as hemodynamics allow. KATHERYN (systolic anterior motion) of the mitral valve still a problem. Midodrine and Florinef added.     Kyler Borges MD, MD 6/3/2021 11:53 AM    Critical care time: 35 minutes

## 2021-06-03 NOTE — CONSULTS
Patient: Jade Ricketts  [id number]  Date: 6/3/2021      Chief Complaint: Weakness    History of Present Illness/Hospital Course:  57-year-old male with a history of CAD, diabetes, HTN, HLD, and post polio syndrome who was admitted on 5/20 with shortness of breath. Work-up revealed 4 vessel disease and he underwent a CABG x4 on 5/25 with Dr. Rico Montenegro. His postoperative recovery has been complicated by his post polio syndrome. He was evaluated by therapy and suggested to continue in an inpatient setting prior to returning home. 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 Prior Level of Function:  Independent    Current Level of Function:  Total assist     has a past medical history of CAD (coronary artery disease), Diabetes mellitus ([facility] [address] Elevated LFT's, Hyperlipidemia, Hypertension, Post poliomyelitis syndrome, and Type II or unspecified type diabetes mellitus without mention of complication, not stated as uncontrolled. has a past surgical history that includes Cleft palate repair; [place] tooth extraction; skin biopsy (Left, 7/20/2020); and Coronary artery bypass graft (N/A, 5/25/2021). reports that he has never smoked. He has never used smokeless tobacco. He reports that he does not drink alcohol and does not use drugs. family history includes Diabetes in his mother; High Blood Pressure in his brother, father, and mother. REVIEW OF SYSTEMS:   CONSTITUTIONAL: negative for fevers, chills, diaphoresis, appetite change, fatigue, night sweats and unexpected weight change. HEENT: negative for hearing loss, tinnitus, ear drainage, sinus pressure, nasal congestion, epistaxis and snoring. RESPIRATORY: Negative for hemoptysis, cough, sputum production. CARDIOVASCULAR: negative for chest pain, palpitations, exertional chest pressure/discomfort, edema, syncope.    GASTROINTESTINAL: negative for nausea, vomiting, diarrhea, constipation, blood in stool and abdominal pain. GENITOURINARY: negative for frequency, dysuria, urinary incontinence, decreased urine volume, and hematuria. HEMATOLOGIC/LYMPHATIC: negative for easy bruising, bleeding and lymphadenopathy. ALLERGIC/IMMUNOLOGIC: negative for recurrent infections, angioedema, anaphylaxis and drug reactions. ENDOCRINE: negative for weight changes and diabetic symptoms including polyuria, polydipsia and polyphagia. MUSCULOSKELETAL: negative for pain, joint swelling, decreased range of motion and muscle weakness. NEUROLOGICAL: negative for headaches, slurred speech, unilateral weakness. PSYCHIATRIC/BEHAVIORAL: negative for hallucinations, behavioral problems, confusion and agitation. All pertinent positives are noted in the HPI.     Physical Examination:  Vitals:   Patient Vitals for the past 24 hrs:   BP Temp Temp src Pulse Resp SpO2 Weight   06/03/21 0930 110/68 -- -- 83 -- -- --   06/03/21 0900 (!) 106/91 -- -- 81 -- -- --   06/03/21 0800 104/86 -- -- 79 -- -- --   06/03/21 0730 101/66 97.6 °F (36.4 °C) Temporal 75 16 -- --   06/03/21 0700 105/65 -- -- 74 -- -- --   06/03/21 0630 97/79 -- -- 75 -- -- --   06/03/21 0625 -- -- -- -- -- -- 220 lb 3.8 oz (99.9 kg)   06/03/21 0546 -- -- -- -- 16 96 % --   06/03/21 0531 99/77 -- -- 76 -- -- --   06/03/21 0500 102/69 -- -- 73 -- -- --   06/03/21 0430 115/77 -- -- 75 -- -- --   06/03/21 0400 113/78 -- -- 73 -- -- --   06/03/21 0330 104/76 97.5 °F (36.4 °C) Temporal 72 16 96 % --   06/03/21 0300 94/84 -- -- 75 -- -- --   06/03/21 0230 99/71 -- -- 70 -- -- --   06/03/21 0200 97/67 -- -- 72 -- -- --   06/03/21 0130 97/74 -- -- 71 -- -- --   06/03/21 0100 (!) 102/59 -- -- 73 -- -- --   06/03/21 0030 (!) 102/32 -- -- 69 -- -- --   06/03/21 0000 92/69 97.8 °F (36.6 °C) Temporal 69 16 96 % --   06/02/21 2330 88/69 -- -- 69 -- -- --   06/02/21 2300 100/64 -- -- 69 -- -- --   06/02/21 2230 (!) 86/59 -- -- 70 -- -- --   06/02/21 2200 80/64 -- -- 71 -- -- --   06/02/21 2100 105/70 -- -- 80 -- -- --   06/02/21 2000 109/69 97.8 °F (36.6 °C) Temporal 77 16 96 % --   06/02/21 1900 118/62 -- -- 73 -- -- --   06/02/21 1800 99/68 -- -- 73 -- -- --   06/02/21 1700 (!) 100/59 -- -- 71 -- -- --   06/02/21 1600 99/67 97.5 °F (36.4 °C) Temporal 69 16 94 % --   06/02/21 1516 97/64 -- -- 71 -- -- --   06/02/21 1417 (!) 129/91 -- -- 71 -- -- --   06/02/21 1300 118/76 -- -- 72 -- -- --   06/02/21 1200 103/62 97.1 °F (36.2 °C) Temporal 70 16 94 % --   06/02/21 1101 (!) 105/59 -- -- 68 -- -- --   06/02/21 1048 109/73 -- -- 72 -- -- --     Psych: Stable mood, normal judgement, normal affect. Const: No distress  Eyes: Conjunctiva noninjected, no icterus noted; pupils equal, round. HENT: Atraumatic, normocephalic; Oral mucosa moist  Neck: Trachea midline, neck supple. No thyromegaly noted. CV: No audible murmurs  Resp: No increased WOB, no audible wheezing   GI: Nondistended   Neuro: Alert, oriented, appropriate. Skin: No visible abnormalities  MSK: No joint abnormalities noted. No significant atrophy of the left lower extremity compared to right. Ext: No significant edema appreciated. No varicosities. Lab Results   Component Value Date    WBC 17.9 (H) 06/03/2021    HGB 10.0 (L) 06/03/2021    HCT 30.9 (L) 06/03/2021    MCV 88.8 06/03/2021     06/03/2021     Lab Results   Component Value Date    INR 1.09 05/24/2021    PROTIME 12.6 05/24/2021     Lab Results   Component Value Date    CREATININE 1.6 (H) 06/03/2021    BUN 75 (H) 06/03/2021     (L) 06/03/2021    K 4.4 06/03/2021    CL 95 (L) 06/03/2021    CO2 21 06/03/2021     Lab Results   Component Value Date    ALT 31 05/22/2021    AST 30 05/22/2021    ALKPHOS 88 05/22/2021    BILITOT 0.6 05/22/2021       Most recent echocardiogram revealed   Procedure     Type of Study      TTE procedure:ECHOCARDIOGRAM LIMITED.      Procedure Date  Date: 05/28/2021 Start: 02:21 PM     Study Location: Portable  Technical Quality: Adequate visualization     Additional Indications:Hypotension, Systolic anterior motion of mitral valve.     Patient Status: Routine     Height: 69 inches Weight: 221 pounds BSA: 2.16 m2 BMI: 32.64 kg/m2     BP: 135/66 mmHg      Conclusions      Summary   -Limited exam per Dr. Brie Perry for hypotension and systolic anterior motion   of mitral valve. Patient had a previous complete exam 5/21/2021.   -Normal global ejection fraction estimated at 55%. -There is possible apical hypokinesis noted. -There is mild concentric left ventricular hypertrophy noted. -Thickened mitral valve without evidence of stenosis. -Systolic anterior motion of the mitral valve is noted causing a left   ventricular outflow tract obstruction with a maximum resting pressure   gradient of 201 mmHg. -There is moderate eccentric mitral regurgitation.     Most recent imaging studies revealed   EXAMINATION:   ONE XRAY VIEW OF THE CHEST       5/28/2021 6:29 am       COMPARISON:   05/25/2021       HISTORY:   ORDERING SYSTEM PROVIDED HISTORY: post CT removal   TECHNOLOGIST PROVIDED HISTORY:   Reason for exam:->post CT removal   Reason for Exam: Post CT removal   Acuity: Unknown   Type of Exam: Unknown       Difficulty breathing.       FINDINGS:   Cardiac silhouette is mildly to moderately enlarged.  Left-sided chest tube   has been removed.  No evident pneumothorax.  Endotracheal tube and   mediastinal drainage tube have also been removed.  [place] catheter remains   with its tip in the proximal right pulmonary artery.  Moderate opacity   present in the anterior segment of the right upper lobe and the medial right   lung base.  Mild-to-moderate increased opacity appears present in the left   retrocardiac area.  No sizable pleural effusion.  Sternal wires and left   atrial clip present.           Impression   No pneumothorax following removal of the left chest tube.  Development of   moderate opacities within the right lung either atelectasis or pneumonia.  No   abnormal pulmonary vascular congestion or sizable pleural effusion. The above laboratory data have been reviewed. The above imaging data have been reviewed. The above medical testing have been reviewed. Body mass index is 32.52 kg/m². Assessment and Plan:  Debility: PT/OT  CAD: S/p CABG x4 on 5/20 with Dr. Mani Cotton. Sternal precautions  Post polio syndrome  Diabetes  HTN  HLD    Dispo: Patient is an appropriate ARU candidate. Able to accept when approved by primary team.  We will continue to follow. Thank you for the consultation. Marco Mccracken MD 6/3/2021, 10:45 AM     * This document was created using dictation software. While all precautions were taken to ensure accuracy, errors may have occurred. Please disregard any typographical errors.

## 2021-06-04 NOTE — PROGRESS NOTES
Physical Therapy  Facility/Department: [facility]  Daily Treatment Note  NAME: Jett Doyle  : 1950  MRN: [id number]    Date of Service: 2021    Discharge Recommendations:  Jett Doyle scored a  on the AM-PAC short mobility form. Current research shows that an AM-PAC score of 17 or less is typically not associated with a discharge to the patient's home setting. Based on the patient's AM-PAC score and their current functional mobility deficits, it is recommended that the patient have 5-7 sessions per week of Physical Therapy at d/c to increase the patient's independence. At this time, this patient demonstrates the endurance, and/or tolerance for 3 hours of therapy each day, with a treatment frequency of 5-7x/wk. Please see assessment section for further patient specific details. If patient discharges prior to next session this note will serve as a discharge summary. Please see below for the latest assessment towards goals. 5-7 sessions per week, 24 hour supervision or assist, Patient would benefit from continued therapy after discharge   PT Equipment Recommendations  Equipment Needed: No  Other: TBD at next level of care, pt would need a w/c if he discharged home    Assessment   Body structures, Functions, Activity limitations: Decreased functional mobility ; Decreased strength;Decreased endurance;Decreased balance;Decreased ADL status  Assessment: Patient appears to be having some anxiety which limits his ability to participate in therapy. He also exhibits BLE weakness, cardiovascular and balance deficits. Recommend 24 hour assist and continued therapy at d/c.   Treatment Diagnosis: impaired functional mobility  Prognosis: Good  Decision Making: High Complexity  Clinical Presentation: Evolving  PT Education: PT Role;Plan of Care;General Safety;Transfer Training  Patient Education: Pt verbalizes understanding  Barriers to Learning: none  REQUIRES PT FOLLOW UP: Yes  Activity Tolerance  Activity Tolerance: Patient limited by fatigue;Patient limited by endurance  Activity Tolerance: BP approximately 98/56 in sitting, RN aware and states MD wants BP at this level. Unable to obtain O2 reading. Patient Diagnosis(es): The encounter diagnosis was Chest pain, unspecified type. has a past medical history of CAD (coronary artery disease), Diabetes mellitus ([facility] [address] Elevated LFT's, Hyperlipidemia, Hypertension, Post poliomyelitis syndrome, and Type II or unspecified type diabetes mellitus without mention of complication, not stated as uncontrolled. has a past surgical history that includes Cleft palate repair; [place] tooth extraction; skin biopsy (Left, 7/20/2020); and Coronary artery bypass graft (N/A, 5/25/2021). Restrictions  Restrictions/Precautions  Restrictions/Precautions: Fall Risk (high fall risk, up in chair for meals, progressive ambulation, diet cardiac - carb control, daily fluid restriction 2000ml, SWAN line)  Required Braces or Orthoses?: No  Position Activity Restriction  Sternal Precautions: 5# Lifting Restrictions, No Pulling, No Pushing  Other position/activity restrictions: Billy Peña is a 70 y.o. male presented to the hospital with complaints of 2-month history of progressive exertional shortness of breath and chest discomfort. LHC revealed MVD. s/p CABG x4, ANDREW ligation 5/25/21. \"  Pt was extubated on 5/26. Subjective   General  Chart Reviewed: Yes  Response To Previous Treatment: Patient with no complaints from previous session. Family / Caregiver Present: Yes (spouse, Alberto Fuller)  Subjective  Subjective: Patient reports he was able to walk better before today. He feels like his legs won't work. General Comment  Comments: Patient seated in recliner chair w/ spouse present.   Pain Screening  Patient Currently in Pain: Denies  Vital Signs  Patient Currently in Pain: Denies       Orientation  Orientation  Overall Orientation Status: Within Functional Limits Objective   Bed mobility  Comment: Not observed, patient sitting up in recliner. Transfers  Sit to Stand: 2 Person Assistance;Maximum Assistance  Stand to sit: Maximum Assistance;2 Person Assistance  Comment: Patient required MAX x2 assist to stand each trial w/ verbal cues to utilize momentum swing and refrain from pushing or pulling w/ BUEs. He used BUE support via rollator for standing balance. Patient noted to maintain flexed posture in standing. Patient did not perform transfer. Ambulation  Ambulation?: Yes  Ambulation 1  Surface: level tile  Device: Rollator  Assistance: Contact guard assistance  Gait Deviations: Slow Adelaida;Decreased step length;Decreased step height; Increased DANIEL  Distance: 4' x2  Comments: Patient required only CGA during ambulation initially but then progressed to MAX assist d/t posterior lean/LOB. Patient reports he feels short of breath and then feels he can't move his legs any longer. He then requests to sit d/t fatigue. Unable to obtain O2 sat readings. For safety and to try to calm patient's anxiety, had patient return to room and stand from chair and try to perform marching in place. Patient was able to stand w/ MAX x2 assist but unable to lift either extremity. He returned to sitting in chair. Stairs/Curb  Stairs?: No     Balance  Posture: Fair  Sitting - Static: Good  Sitting - Dynamic: Good;-  Standing - Static: Fair;+  Standing - Dynamic: Fair;+  Exercises  Comments: Patient educated on and perform seated ankle pumps, LAQs, and seated hip flexion. Handout provided. AROM RLE (degrees)  RLE AROM: WFL  AROM LLE (degrees)  LLE AROM : WFL  Strength RLE  Comment: Functional weakness noted, great difficulty with sit-stand w/o use of BUEs. Strength LLE  Comment: Functional weakness noted, great difficulty with sit-stand w/o use of BUEs.     AM-PAC Score  -PAC Inpatient Mobility Raw Score : 11 (06/04/21 1300)  -PAC Inpatient T-Scale Score : 33.86 (06/04/21 1300)  Mobility Inpatient CMS 0-100% Score: 72.57 (06/04/21 1300)  Mobility Inpatient CMS G-Code Modifier : CL (06/04/21 1300)    Goals  Short term goals  Time Frame for Short term goals:  To be met prior to DC  Short term goal 1: Pt will perform bed mobility with min A  (Not met)  Short term goal 2: Pt will perform sit to/from stand with min A  (Not met)  Short term goal 3: Pt will ambulate 21' with AAD and mod A  (Not met)  Short term goal 4: Pt will sit unsupported for 5 minutes and SBA (Not met)  Patient Goals   Patient goals : ultimately regain independence    Plan    Plan  Times per week: 3-5  Current Treatment Recommendations: Strengthening, Balance Training, Functional Mobility Training, Transfer Training, Gait Training, Neuromuscular Re-education, Safety Education & Training, Endurance Training, ROM, Home Exercise Program, Patient/Caregiver Education & Training, Equipment Evaluation, Education, & procurement  Safety Devices  Type of devices: Call light within reach, Gait belt, Left in chair, Nurse notified  Restraints  Initially in place: No     Therapy Time   Individual Concurrent Group Co-treatment   Time In 1104         Time Out 1204         Minutes 60         Timed Code Treatment Minutes: Bharath Hogan, DPT, ATC-R [id number]

## 2021-06-04 NOTE — PROGRESS NOTES
[address] Daily Progress Note      Admit Date:  5/20/2021    Chief Complaint: CAD with unstable angina    Subjective:  Mr. Eufemia Billings no new complaints.       Objective:   BP 93/62   Pulse 65   Temp 97.5 °F (36.4 °C) (Temporal)   Resp 16   Ht 5' 9\" (1.753 m)   Wt 222 lb 3.6 oz (100.8 kg)   SpO2 92%   BMI 32.82 kg/m²       Intake/Output Summary (Last 24 hours) at 6/4/2021 1553  Last data filed at 6/4/2021 0957  Gross per 24 hour   Intake 2116.59 ml   Output 750 ml   Net 1366.59 ml       TELEMETRY: Sinus     Physical Exam:  General:  Awake, alert, oriented x 3, NAD  Skin:  Warm and dry  Neck:  JVD normal  Chest: Decreased breath sounds  Cardiovascular:  RRR S1S2, no S3, 2/6 systolic at lower left sternal border, at apex  Abdomen:  Soft, ND, NT, No HSM  Extremities: Trace edema    Medications:    metoprolol tartrate  150 mg Oral BID    insulin lispro  0-6 Units Subcutaneous TID WC    insulin lispro  0-3 Units Subcutaneous Nightly    midodrine  15 mg Oral TID WC    fludrocortisone  0.1 mg Oral Daily    insulin glargine  30 Units Subcutaneous Nightly    lidocaine 1 % injection  5 mL Intradermal Once    sodium chloride flush  5-40 mL Intravenous 2 times per day    insulin lispro  10 Units Subcutaneous TID WC    sodium chloride flush  10 mL Intravenous 2 times per day    aspirin  325 mg Oral Daily    acetaminophen  1,000 mg Oral Q6H    sennosides-docusate sodium  1 tablet Oral BID    pantoprazole  40 mg Oral Daily      sodium chloride      argatroban infusion 0.5 mcg/kg/min (06/04/21 5416)    sodium chloride Stopped (05/27/21 0951)    lactated ringers      phenylephrine (TAN-SYNEPHRINE) 50mg/250mL infusion 90 mcg/min (06/04/21 1006)    dextrose       sodium chloride flush, sodium chloride, sodium chloride flush, sodium chloride, ondansetron, metoclopramide, traMADol **OR** traMADol, morphine, diphenhydrAMINE, zolpidem, magnesium hydroxide, polyethylene glycol, potassium chloride, and apical septum appear hypokinetic.   -Grade I diastolic dysfunction with normal LV filling pressures. E/e' = 12.6.   -Left atrium is dilated.   -Mild aortic stenosis with a peak velocity of 2.3m/s and a mean pressure gradient of 11mmHg.   -Mitral annular calcification is present.   -Mild mitral regurgitation.   -Mild tricuspid regurgitation. RVSP = 36 mmHG.     Cath: 5/21/2021  Anatomy:   LM-70% diffuse  LAD-diffusely diseased, 80% proximal, 90% mid calcified  Cx-90% ostial  RCA-diffusely diseased 50% proximal and mid, 70% distal  RPDA-patent  LVEF-50%     Impression:  1.  Severe multivessel CAD. 2.  Preserved LV systolic function.     Plan:  1. [facility] surgical consultation for CABG. 2.  Patient has been statin intolerant to Livalo but unsure whether other statins have been tried. Carolina Londono discuss trial of Crestor with him.     Carotid duplex: 5/21/21  Summary        -The right internal carotid artery appears to have a <50% diameter reducing    stenosis based on velocity criteria.    -The left internal carotid artery appears to have a <50% diameter reducing    stenosis based on velocity criteria         Assessment/Plan:  Principal Problem:    Coronary artery disease involving native coronary artery of native heart with unstable angina pectoris ([address]  Plan: Severe multivessel. S/P CABG POD #10    Active Problems:    Hypotension  Plan: Multifactorial.  Continues on Cristian-Synephrine but now up to 110mcg/min. Aggravated by KATHERYN (systolic anterior motion) of the mitral valve. Midodrine and Florinef started. S/P coronary artery bypass graft x 4  Plan: Continues on Cristian-Synephrine currently but has decreased; wean as hemodynamics allow. Did have KATHERYN (systolic anterior motion) of the mitral valve of unclear etiology as no apparent HCM (hypertrophic cardiomyopathy). Possibly leaflet length and/or underfilled ventricle.   Recent limited echo documented markedly elevated gradient of 200 mmHg in the LVOT; preop it was 21 mmHg.  Will need caution to avoid hypovolemia as this could lead to hypotension. POD #9. Hemoglobin above 10. Did receive IV Lasix for a day or 2 which seem to correlate with increase in Cristian-Synephrine. S/P left atrial appendage ligation  Plan: Stable. Heparin-induced thrombocytopenia  Plan: Treated with argatroban. Platelets recovered. Hematology following. Type 2 diabetes mellitus with diabetic nephropathy, without long-term current use of insulin (HCC)  Plan: Chronic. Essential hypertension  Plan: Currently still hypotensive on Cristian-Synephrine but dose had increased and is now being weaned again. Wean as hemodynamics allow. KATHERYN (systolic anterior motion) of the mitral valve still a problem      Mixed hyperlipidemia  Plan: Statin intolerant. Will need to start on Repatha/Praluent when able. Statin intolerance  Plan: Will need to start on Repatha/Praluent when able. Stage 3a chronic kidney disease  Plan: Acute on chronic. Creatinine continues to increase. Nephrology following. Post poliomyelitis syndrome  Plan: Chronic. He is disappointed regarding continued length of stay. Cristian-Synephrine decreasing. Would avoid IV Lasix. Continue to wean Cristian-Synephrine as hemodynamics allow. KATHERYN (systolic anterior motion) of the mitral valve still a problem. Midodrine and Florinef added.       Adi Martini MD, MD 6/4/2021 3:53 PM

## 2021-06-04 NOTE — PROGRESS NOTES
[facility]   Progress Note      6/4/2021 8:19 AM        Name: Sigifredo Balderas . Admitted: 5/20/2021    SUBJECTIVE:  He is feeling better, no complaints, hoping to go to rehab soon.      Reviewed interval ancillary notes    Current Medications  midodrine (PROAMATINE) tablet 15 mg, TID WC  fludrocortisone (FLORINEF) tablet 0.1 mg, Daily  insulin glargine (LANTUS;BASAGLAR) injection pen 30 Units, Nightly  lidocaine PF 1 % injection 5 mL, Once  sodium chloride flush 0.9 % injection 5-40 mL, 2 times per day  sodium chloride flush 0.9 % injection 5-40 mL, PRN  0.9 % sodium chloride infusion, PRN  argatroban infusion 50mg in 0.9% sodium chloride 50 mL (premix), Continuous  insulin lispro (1 Unit Dial) 10 Units, TID WC  metoprolol tartrate (LOPRESSOR) tablet 200 mg, BID  insulin lispro (1 Unit Dial) 0-18 Units, TID WC  insulin lispro (1 Unit Dial) 0-9 Units, Nightly  sodium chloride flush 0.9 % injection 10 mL, 2 times per day  sodium chloride flush 0.9 % injection 10 mL, PRN  0.9 % sodium chloride infusion, PRN  [Held by provider] fondaparinux (ARIXTRA) injection 2.5 mg, Daily  ondansetron (ZOFRAN) injection 4 mg, Q6H PRN  metoclopramide (REGLAN) injection 10 mg, Q6H PRN  aspirin EC tablet 325 mg, Daily  acetaminophen (TYLENOL) tablet 1,000 mg, Q6H  traMADol (ULTRAM) tablet 50 mg, Q6H PRN   Or  traMADol (ULTRAM) tablet 100 mg, Q6H PRN  morphine (PF) injection 2 mg, Q2H PRN  magnesium oxide (MAG-OX) tablet 400 mg, BID  [Held by provider] potassium chloride (KLOR-CON) extended release tablet 10 mEq, TID WC  diphenhydrAMINE (BENADRYL) tablet 25 mg, Nightly PRN  zolpidem (AMBIEN) tablet 5 mg, Nightly PRN  sennosides-docusate sodium (SENOKOT-S) 8.6-50 MG tablet 1 tablet, BID  magnesium hydroxide (MILK OF MAGNESIA) 400 MG/5ML suspension 30 mL, Daily PRN  polyethylene glycol (GLYCOLAX) packet 17 g, Daily PRN  pantoprazole (PROTONIX) tablet 40 mg, Daily  potassium chloride 20 mEq/50 mL IVPB (Central Line), PRN  magnesium sulfate 2000 mg in 50 mL IVPB premix, PRN  calcium chloride 1,000 mg in sodium chloride 0.9 % 100 mL IVPB, PRN  calcium chloride 2,000 mg in sodium chloride 0.9 % 100 mL IVPB, PRN  albuterol sulfate  (90 Base) MCG/ACT inhaler 2 puff, Q6H PRN  albumin human 5 % IV solution 25 g, PRN  lactated ringers infusion 250 mL, Continuous PRN  phenylephrine (TAN-SYNEPHRINE) 50 mg in dextrose 5 % 250 mL infusion, Continuous PRN  furosemide (LASIX) injection 20 mg, PRN  glucose (GLUTOSE) 40 % oral gel 15 g, PRN  dextrose 50 % IV solution, PRN  glucagon (rDNA) injection 1 mg, PRN  dextrose 5 % solution, PRN        Objective:  /69   Pulse 71   Temp 97.5 °F (36.4 °C) (Temporal)   Resp 16   Ht 5' 9\" (1.753 m)   Wt 222 lb 3.6 oz (100.8 kg)   SpO2 92%   BMI 32.82 kg/m²     Intake/Output Summary (Last 24 hours) at 6/4/2021 0819  Last data filed at 6/4/2021 0537  Gross per 24 hour   Intake 2236.59 ml   Output 930 ml   Net 1306.59 ml      Wt Readings from Last 3 Encounters:   06/04/21 222 lb 3.6 oz (100.8 kg)   05/20/21 223 lb 9.6 oz (101.4 kg)   03/12/21 216 lb (98 kg)       General appearance:  Appears comfortable  Eyes: Sclera clear. Pupils equal.  ENT: Moist oral mucosa. Trachea midline, no adenopathy. Cardiovascular: Regular rhythm, normal S1, S2. No murmur. No edema in lower extremities  Respiratory: Not using accessory muscles. Good inspiratory effort. Clear to auscultation bilaterally, no wheeze or crackles. GI: Abdomen soft, no tenderness, not distended  Musculoskeletal: No cyanosis in digits, neck supple  Neurology: CN 2-12 grossly intact. No speech or motor deficits  Psych: Normal affect.  Alert and oriented in time, place and person  Skin: Warm, dry, normal turgor    Labs and Tests:  CBC:   Recent Labs     06/02/21  0400 06/03/21  0500 06/04/21  0530   WBC 16.6* 17.9* 17.3*   HGB 11.3* 10.0* 10.2*    176 208     BMP:    Recent Labs     06/02/21  0400 06/03/21  0500 06/04/21  0530    130* 137   K 5.0 4.4 5.1    95* 100   CO2 25 21 24   BUN 74* 75* 91*   CREATININE 1.9* 1.6* 2.4*   GLUCOSE 180* 365* 137*     Hepatic: No results for input(s): AST, ALT, ALB, BILITOT, ALKPHOS in the last 72 hours. ASSESSMENT AND PLAN    Principal Problem:    Coronary artery disease involving native coronary artery of native heart with unstable angina pectoris (HCC)  Active Problems:    Statin intolerance    Stage 3a chronic kidney disease    Post poliomyelitis syndrome    Type 2 diabetes mellitus with diabetic nephropathy, without long-term current use of insulin ([address]    Hypertension associated with diabetes ([address]    Mixed hyperlipidemia    S/P coronary artery bypass graft x 4    S/P left atrial appendage ligation  Resolved Problems:    * No resolved hospital problems. *      Thrombocytopenia   - platelet count has recovered. - He was on arixtra intermittently which was stopped due to renal insufficiency. - - HIT positive, YESICA pending.   - continues on argatroban drip. - if YESICA is positive, would recommend 3 months of anticoagulation  - if patient is ready to go to ARU and YESICA is still pending, could consider low dose Eliquis 2.5 mg BID. Coronary artery disease post bypass and left atrial ligation patient remains on neosynephrine drip to maintain blood pressure      Diabetes mellitus type 2     History of cleft lip and palate surgeries      Post polio syndrome     Chronic kidney disease stage 3 A         Kenya Milner CNP  [facility]    Patient was seen with TONG in the morning. Feeling well. Thrombocytopenia has resolved. Currently on argatroban. May consider switch to Eliquis 2.5 mg twice a day at present. YESICA pending. Michael Wagner.  Kaelyn Britt MD, [address]  Hematology and Oncology  [facility]  [phone]

## 2021-06-04 NOTE — PROGRESS NOTES
Office : [phone]     Fax :[phone]       Nephrology Progress Note      Patient's Name: Nancy Maloney  8:53 AM  6/4/2021      Chief Complaint:    Chief Complaint   Patient presents with    Shortness of Breath     sent by Dr Danny Orellana for abnormal EKG. was unable to get into see cardiologist. has been feeling \"not so good\", gets sob with activity for a couple months. denies cp. no n/v. History of Present Ilness:    Nancy Maloney is a 70 y.o. male with h/o DM 2, CKD 3 , DLP who came with complaints of 2-month history of progressive exertional shortness of breath and chest discomfort. Interval history :      Post CABG recovering. Had low blood pressure last night  Urine output dropped . In nonoliguric range  Minimal edema. Creatinine level increased to 1.9 ---> 2.4       I/O last 3 completed shifts: In: 2356.6 [P.O.:720;  I.V.:1636.6]  Out: 80 [Urine:930]    Past Medical History:   Diagnosis Date    CAD (coronary artery disease)     Diabetes mellitus ([facility]     Elevated LFT's     Hyperlipidemia     Hypertension     Post poliomyelitis syndrome     Type II or unspecified type diabetes mellitus without mention of complication, not stated as uncontrolled        Past Surgical History:   Procedure Laterality Date    CLEFT PALATE REPAIR      CORONARY ARTERY BYPASS GRAFT N/A 5/25/2021    Urgent CABG Coronary Artery Bypass Graft x4 (LIMA to LAD, SVG to PDA, SVG sequenced to OM1 and OM2), Endoscopic vein harvest left saphenous vein, Left atrial appendage ligation with 40mm AtriClip, total cardiopulmonary bypass, doppler flow verification of bypass grafts, insertion of temporary ventricular pacing wires, transesophageal echocardiogram, 5 level bilateral intercostal nerve block  (90 Base) MCG/ACT inhaler 2 puff, Q6H PRN  albumin human 5 % IV solution 25 g, PRN  lactated ringers infusion 250 mL, Continuous PRN  phenylephrine (TAN-SYNEPHRINE) 50 mg in dextrose 5 % 250 mL infusion, Continuous PRN  furosemide (LASIX) injection 20 mg, PRN  glucose (GLUTOSE) 40 % oral gel 15 g, PRN  dextrose 50 % IV solution, PRN  glucagon (rDNA) injection 1 mg, PRN  dextrose 5 % solution, PRN        Physical exam:     Vitals:  /69   Pulse 71   Temp 97.5 °F (36.4 °C) (Temporal)   Resp 16   Ht 5' 9\" (1.753 m)   Wt 222 lb 3.6 oz (100.8 kg)   SpO2 92%   BMI 32.82 kg/m²   Constitutional:  OAA X3 NAD  Skin: no rash, turgor wnl  Heent:  eomi, mmm  Neck: no bruits or jvd noted  Cardiovascular:  S1, S2 without m/r/g  Respiratory: CTA B without w/r/r  Abdomen:  +bs, soft, nt, nd  Ext: no  lower extremity edema    Labs:  CBC:   Recent Labs     06/02/21  0400 06/03/21  0500 06/04/21  0530   WBC 16.6* 17.9* 17.3*   HGB 11.3* 10.0* 10.2*    176 208     BMP:    Recent Labs     06/02/21  0400 06/03/21  0500 06/04/21  0530    130* 137   K 5.0 4.4 5.1    95* 100   CO2 25 21 24   BUN 74* 75* 91*   CREATININE 1.9* 1.6* 2.4*   GLUCOSE 180* 365* 137*     Ca/Mg/Phos:   Recent Labs     06/02/21  0400 06/03/21  0500 06/04/21  0530   CALCIUM 8.4 7.8* 8.3   MG 2.20  --   --      Hepatic:   No results for input(s): AST, ALT, ALB, BILITOT, ALKPHOS in the last 72 hours. Troponin:   No results for input(s): TROPONINI in the last 72 hours. BNP: No results for input(s): BNP in the last 72 hours. Lipids:   No results for input(s): CHOL, TRIG, HDL, LDLCALC, LABVLDL in the last 72 hours. ABGs:   No results for input(s): PHART, PO2ART, LTN0ONQ in the last 72 hours. IMAGING:  XR CHEST PORTABLE   Final Result   No pneumothorax following removal of the left chest tube. Development of   moderate opacities within the right lung either atelectasis or pneumonia.   No   abnormal pulmonary vascular congestion or sizable pleural effusion. XR ABDOMEN FOR NG/OG/NE TUBE PLACEMENT   Final Result      XR CHEST PORTABLE   Final Result   Satisfactory appearance status post median sternotomy      No pneumothorax         CT CHEST ABDOMEN PELVIS WO CONTRAST   Final Result   No acute abnormality identified in the chest, abdomen, or pelvis. No finding   worrisome for occult malignancy. Moderate enlargement of the prostate. VL PRE OP VEIN MAPPING   Final Result      VL DUP CAROTID BILATERAL   Final Result      XR CHEST (2 VW)   Final Result   No active cardiopulmonary disease               A/p     1. Acute kidney injury on CKD stage 3A   NORA 2/2 ATN   Creatinine increased to 2.4. Likely secondary to hemodynamic changes  Getting normal saline 500mL x 1  H/o DM 2      Hold lasix for now  Recommend to dose adjust all medications  based on renal functions  Maintain SBP> 90 mmHg   Daily weights   AVOID NSAIDs  Avoid Nephrotoxins  Monitor Intake/Output  Call if significant decrease in urine output         2. DM 2. Needs better control  On insulin    3. HTN . controlled   held lisinopril   Adjust meds. D/w cardiology     4. Dyslipidemia . Statins     5. Has multivessel coronary artery disease. S/p CABG.   Management per CT surgery                      Thank you for allowing us to participate in care of Noelle Venegasabhijit         Electronically signed by: Denise Murcia MD, 6/4/2021, 8:53 AM      [facility] [address]  Office : [phone]  Fax :[phone]

## 2021-06-04 NOTE — PLAN OF CARE
Problem: Cardiovascular  Goal: No DVT, peripheral vascular complications  Outcome: Ongoing  Goal: Hemodynamic stability  6/4/2021 0746 by MONSE Schwartz CNP  Outcome: Ongoing  6/3/2021 2211 by Becka Horner RN  Outcome: Ongoing  Goal: Anticoagulate/Hct stable  Outcome: Ongoing  Goal: Agreement to quit smoking  Outcome: Ongoing  Goal: Weight maintained or lost  Outcome: Ongoing  Goal: Understanding of dietary restrictions  Outcome: Ongoing     Problem: Respiratory  Goal: No pulmonary complications  9/2/2547 1664 by MONSE Schwartz CNP  Outcome: Ongoing  6/3/2021 2211 by Becka Horner RN  Outcome: Ongoing  Goal: O2 Sat > 90%  6/4/2021 0746 by MONSE Schwartz CNP  Outcome: Ongoing  6/3/2021 2211 by Becka Horner RN  Outcome: Ongoing  Goal: Supplemental O2 requirements decreased  Outcome: Ongoing  Goal: Agreement to quit smoking  Outcome: Ongoing  Goal: Pneumonia vaccine at discharge as needed  Outcome: Ongoing     Problem: Falls - Risk of:  Goal: Will remain free from falls  Description: Will remain free from falls  6/4/2021 0746 by MONSE Schwartz CNP  Outcome: Ongoing  6/3/2021 2211 by Becka Horner RN  Outcome: Ongoing  Goal: Absence of physical injury  Description: Absence of physical injury  Outcome: Ongoing     Problem: Discharge Planning:  Goal: Discharged to appropriate level of care  Description: Discharged to appropriate level of care  Outcome: Ongoing     Problem: Serum Glucose Level - Abnormal:  Goal: Ability to maintain appropriate glucose levels will improve  Description: Ability to maintain appropriate glucose levels will improve  6/4/2021 0746 by MONSE Schwartz CNP  Outcome: Ongoing  6/3/2021 2211 by Becka Horner RN  Outcome: Ongoing     Problem: Sensory Perception - Impaired:  Goal: Ability to maintain a stable neurologic state will improve  Description: Ability to maintain a stable neurologic state will improve  Outcome: Ongoing     Problem:

## 2021-06-04 NOTE — PROGRESS NOTES
 sodium chloride      argatroban infusion 0.5 mcg/kg/min (06/04/21 5425)    sodium chloride Stopped (05/27/21 0951)    lactated ringers      phenylephrine (TAN-SYNEPHRINE) 50mg/250mL infusion 90 mcg/min (06/04/21 1006)    dextrose           PRN Meds: sodium chloride flush, sodium chloride, sodium chloride flush, sodium chloride, ondansetron, metoclopramide, traMADol **OR** traMADol, morphine, diphenhydrAMINE, zolpidem, magnesium hydroxide, polyethylene glycol, potassium chloride, magnesium sulfate, calcium chloride IVPB, calcium chloride IVPB, albuterol sulfate HFA, albumin human, lactated ringers, phenylephrine (TAN-SYNEPHRINE) 50mg/250mL infusion, furosemide, glucose, dextrose, glucagon (rDNA), dextrose    Labs/imaging:  CBC:   Recent Labs     06/02/21  0400 06/03/21  0500 06/04/21  0530   WBC 16.6* 17.9* 17.3*   HGB 11.3* 10.0* 10.2*    176 208         BMP:    Recent Labs     06/02/21  0400 06/03/21  0500 06/04/21  0530    130* 137   K 5.0 4.4 5.1    95* 100   CO2 25 21 24   BUN 74* 75* 91*   CREATININE 1.9* 1.6* 2.4*   GLUCOSE 180* 365* 137*         Hepatic: No results for input(s): AST, ALT, ALB, BILITOT, ALKPHOS in the last 72 hours. Troponin: No results for input(s): TROPONINI in the last 72 hours. BNP: No results for input(s): BNP in the last 72 hours. INR: No results for input(s): INR in the last 72 hours.         Reviewed imaging and reports noted      Assessment:  Principal Problem:    Coronary artery disease involving native coronary artery of native heart with unstable angina pectoris (HCC)  Active Problems:    Statin intolerance    Stage 3a chronic kidney disease    Post poliomyelitis syndrome    Type 2 diabetes mellitus with diabetic nephropathy, without long-term current use of insulin ([address]    Hypertension associated with diabetes ([address]    Mixed hyperlipidemia    S/P coronary artery bypass graft x 4    S/P left atrial appendage ligation  Resolved Problems:    * No resolved hospital problems.  *        Plan:  Severe multivessel CAD  -Status post CABG x4 on 5/25/2021  -Management per cardiothoracic surgery and cardiology      Hypotension  -Currently on midodrine  -Management per cardiothoracic surgery      Uncontrolled insulin-dependent diabetes mellitus type 2 with circulatory problem  -Blood sugars improved  -Continue Lantus 30 units nightly,Humalog 10 units before meals and sliding scale insulin low, continue to monitor        Hypertension associated with diabetes  -Currently on Lopressor and IV Lasix per cardiothoracic surgery recommendations and midodrine secondary to hypotension          Diet: DIET CARDIAC; Carb Control: 4 carb choices (60 gms)/meal; Daily Fluid Restriction: 2000 ml    Activity: Up with assist  Prophylaxis: Argatroban per primary service    Code status: Full Code     ----------        Elinor Delong MD  -------------------------------  Rounding hospitalist

## 2021-06-04 NOTE — PLAN OF CARE
Problem: Cardiovascular  Goal: Hemodynamic stability  6/3/2021 2211 by Liam Stock RN  Outcome: Ongoing     Problem: Respiratory  Goal: No pulmonary complications  Outcome: Ongoing     Problem: Respiratory  Goal: O2 Sat > 90%  Outcome: Ongoing     Problem: Falls - Risk of:  Goal: Will remain free from falls  Description: Will remain free from falls  Outcome: Ongoing     Problem: Serum Glucose Level - Abnormal:  Goal: Ability to maintain appropriate glucose levels will improve  Description: Ability to maintain appropriate glucose levels will improve  Outcome: Ongoing     Problem: Skin Integrity:  Goal: Absence of new skin breakdown  Description: Absence of new skin breakdown  Outcome: Ongoing

## 2021-06-04 NOTE — PROGRESS NOTES
Joanna Kendall  6/4/2021  [id number]    Chief Complaint: Coronary artery disease involving native coronary artery of native heart with unstable angina pectoris ([address]    Subjective:   No overnight events. Nauseated today. Remains on Cristian.      ROS: No CP, SOB, dyspnea    Objective:  Patient Vitals for the past 24 hrs:   BP Temp Temp src Pulse Resp SpO2 Weight   06/04/21 0930 102/67 -- -- 70 -- -- --   06/04/21 0900 (!) 144/114 -- -- 71 -- -- --   06/04/21 0830 106/66 -- -- 70 -- -- --   06/04/21 0800 108/81 -- -- 75 -- -- --   06/04/21 0730 115/73 -- -- 77 -- -- --   06/04/21 0700 112/69 -- -- 71 -- -- --   06/04/21 0630 115/63 -- -- 79 -- -- --   06/04/21 0600 103/73 -- -- 76 -- -- --   06/04/21 0537 -- -- -- -- -- -- 222 lb 3.6 oz (100.8 kg)   06/04/21 0530 104/74 -- -- 64 -- -- --   06/04/21 0500 (!) 112/92 -- -- 61 -- -- --   06/04/21 0430 98/72 -- -- 62 -- -- --   06/04/21 0400 89/60 -- -- 60 -- -- --   06/04/21 0330 110/66 -- -- 60 -- -- --   06/04/21 0321 -- -- -- -- 16 92 % --   06/04/21 0300 87/64 97.5 °F (36.4 °C) Temporal 59 16 93 % --   06/04/21 0230 110/74 -- -- 59 -- -- --   06/04/21 0200 100/66 -- -- 59 -- -- --   06/04/21 0130 91/60 -- -- 59 -- -- --   06/04/21 0100 102/63 -- -- 60 -- -- --   06/04/21 0030 (!) 97/56 -- -- 57 -- -- --   06/04/21 0005 93/67 97.6 °F (36.4 °C) Temporal 58 16 95 % --   06/03/21 2330 90/60 -- -- 59 -- -- --   06/03/21 2300 (!) 87/47 -- -- 59 -- -- --   06/03/21 2230 90/63 -- -- 59 -- -- --   06/03/21 2217 (!) 76/57 -- -- 60 -- -- --   06/03/21 2213 (!) 73/57 -- -- 59 -- -- --   06/03/21 2200 (!) 71/52 -- -- 60 -- -- --   06/03/21 2130 (!) 84/56 -- -- 61 -- -- --   06/03/21 2117 107/65 -- -- 62 -- -- --   06/03/21 2030 (!) 88/59 -- -- 63 -- -- --   06/03/21 2009 -- -- -- -- 16 94 % --   06/03/21 2000 95/68 97.8 °F (36.6 °C) Temporal 63 16 95 % --   06/03/21 1930 88/63 -- -- 64 -- -- --   06/03/21 1900 122/76 -- -- 62 -- -- --   06/03/21 1830 95/60 -- -- 62 -- 93 % -- 06/03/21 1800 87/62 -- -- 63 -- 92 % --   06/03/21 1730 85/63 -- -- 63 -- 95 % --   06/03/21 1700 104/73 -- -- 64 -- -- --   06/03/21 1630 (!) 87/56 -- -- 64 -- 90 % --   06/03/21 1530 94/63 -- -- 65 -- -- --   06/03/21 1430 90/63 -- -- 70 -- -- --     Gen: No distress, pleasant. Resting in bedside chair  HEENT: Normocephalic, atraumatic. CV: No audible murmurs, well perfused extremities  Resp: No respiratory distress. No increased WOB  Abd: firm, distended, diffuse TTP  Ext: No edema. Neuro: Alert, oriented, appropriately interactive. Laboratory data: Available via EMR. Therapy progress:  PT  Position Activity Restriction  Sternal Precautions: 5# Lifting Restrictions, No Pulling, No Pushing  Other position/activity restrictions: Renita Luciano is a 70 y.o. male presented to the hospital with complaints of 2-month history of progressive exertional shortness of breath and chest discomfort. LHC revealed MVD. s/p CABG x4, ANDREW ligation 5/25/21. \"  Pt was extubated on 5/26. Objective     Sit to Stand: 2 Person Assistance, Maximum Assistance  Stand to sit: Maximum Assistance, 2 Person Assistance  Device: Rollator  Assistance: Contact guard assistance  Distance: 4' x2  OT  PT Equipment Recommendations  Equipment Needed: No  Other: TBD at next level of care, pt would need a w/c if he discharged home  Toilet - Technique: Stand step, To right  Equipment Used: Standard bedside commode  Toilet Transfers Comments: cues to avoid using B UEs, educated on reasoning -continue to educate for carryover  Assessment        SLP                Body mass index is 32.82 kg/m².     Assessment:  Patient Active Problem List   Diagnosis    Coronary artery disease involving native coronary artery of native heart with unstable angina pectoris (HCC)    Elevated LFTs    Post poliomyelitis syndrome    Gout    Type 2 diabetes mellitus with diabetic nephropathy, without long-term current use of insulin ([facility]    Hypertension

## 2021-06-04 NOTE — PROGRESS NOTES
Occupational Therapy  Facility/Department: [facility]  Daily Treatment Note  NAME: Noelle Francis  : 1950  MRN: [id number]    Date of Service: 2021    Discharge Recommendations:  Noelle Francis scored a 13/24 on the AM-PAC ADL Inpatient form. Current research shows that an AM-PAC score of 17 or less is typically not associated with a discharge to the patient's home setting. Based on the patient's AM-PAC score and their current ADL deficits, it is recommended that the patient have 5-7 sessions per week of Occupational Therapy at d/c to increase the patient's independence. At this time, this patient demonstrates the endurance, and/or tolerance for 3 hours of therapy each day, with a treatment frequency of 5-7x/wk. Please see assessment section for further patient specific details. If patient discharges prior to next session this note will serve as a discharge summary. Please see below for the latest assessment towards goals. 5-7 sessions per week  OT Equipment Recommendations  Equipment Needed: No  Other: CTA pending progress    Assessment   Performance deficits / Impairments: Decreased functional mobility ; Decreased strength;Decreased endurance;Decreased high-level IADLs;Decreased ADL status; Decreased balance  Assessment: pt not at baseline level of functioning, continues to be limited by SOB which impacts pt's endurance. pt continues to required w/c follow for safety with ambulation, however, ambulation limited this date d/t pt feeling weak and anxious.  pt not at baseline and would benefit from ongoing skilled OT services in order to return to PLOF  Treatment Diagnosis: CABG x4  Prognosis: Good  OT Education: OT Role;Transfer Training;Precautions;Plan of Care;Family Education;Orientation  Patient Education: sternal precautions, OT role- continue to educate for carryover  REQUIRES OT FOLLOW UP: Yes  Activity Tolerance  Activity Tolerance: Patient Tolerated treatment well  Activity Tolerance: recliner chair upon arrival, pleasant and agreeable to OT/PT cotx  Vital Signs  Patient Currently in Pain: Denies     Orientation  Orientation  Overall Orientation Status: Within Functional Limits    Objective    ADL  Additional Comments: Pt declined ADL particpation this date  Balance  Sitting Balance: Supervision  Standing Balance: Contact guard assistance  Standing Balance  Time: 5-7 minutes total  Activity: functional transfer, functional mobility, static stance edge of recliner  Comment: CGA progressing to max(A)x1 as pt fatigued. Standing tolerance limited d/t pt reported weakness, pt able to remain in stance for 1-2 minutes at a time this date; Pt very anxious in stance and requiring mx encouragement to remain in stance. Functional Mobility  Functional - Mobility Device: 4-Wheeled Walker  Activity: Other  Assist Level: Contact guard assistance  Functional Mobility Comments: flexed posture, w/c follow d/t fatigue and weakness. CGA progressing to max(A) a pt fatigued. Pt required max encouragement and increased time to complete mobility this date.   Bed mobility  Comment: unable to assess pt in recliner chair at SOS and EOS  Transfers  Sit to stand: 2 Person assistance;Dependent/Total  Stand to sit: Dependent/Total;2 Person assistance  Transfer Comments: max(A)x2 for all transfers, pt demo good adherence to sternal precautions  Cognition  Overall Cognitive Status: WFL  Perception  Overall Perceptual Status: WFL  Type of ROM/Therapeutic Exercise  Comment: educated on AROM BLE exercises, ankle pumps, long arch quad, marching completed 1x10 of each set and given a visual aid at EOS for carryover        Plan   Plan  Times per week: 3-5x/wk  Plan weeks: cotx for ambulation  Current Treatment Recommendations: Strengthening, Patient/Caregiver Education & Training, Functional Mobility Training, Endurance Training, Balance Training, Safety Education & Training, Self-Care / ADL, Home Management Training  Plan Comment: continue to stress sternal precautions    G-Code     OutComes Score                                                  AM-PAC Score        AM-PAC Inpatient Daily Activity Raw Score: 13 (06/04/21 1259)  AM-PAC Inpatient ADL T-Scale Score : 32.03 (06/04/21 1259)  ADL Inpatient CMS 0-100% Score: 63.03 (06/04/21 1259)  ADL Inpatient CMS G-Code Modifier : CL (06/04/21 1259)    Goals  Short term goals  Time Frame for Short term goals: Prior to d/c  Short term goal 1: Pt will complete LB dressing with mod A---pt declined 6/4  Short term goal 2: Pt will complete toileting with min A---total assist for reggie care 6/3  Short term goal 3: Pt will complete bathing with mod A---pt declined 6/4  Short term goal 4: Pt will complete toilet transfers with min A using LRAD---max(A)x2 for transfers 6/4  Short term goal 5: NEW GOAL: standing tolerance for ADL task 3-4 minutes- 1-2 minutes in stance this date x3 6/4  Patient Goals   Patient goals :  To be independent       Therapy Time   Individual Concurrent Group Co-treatment   Time In       1104   Time Out       1204   Minutes       60   Timed Code Treatment Minutes: Joey [address], [address] [place] [address] [id number]

## 2021-06-04 NOTE — FLOWSHEET NOTE
Patient complains of nausea and having dry heaves. Zofran given. Patient ate a few bites of ice cream and states \"that made me sick. \"

## 2021-06-05 NOTE — PROGRESS NOTES
CC: s/p CABG x 4 and ANDREW clip; KATHERYN / diastolic dysfunction; CRF; HIT screen positive -- YESICA pending    No c/o  No BM for several days  SR / SB 59-70  Echo this morning performed while I was at bedside -- no significant MR, EF 50-55% with LVH and low LV volumes; trivial pericardial effusion  Cristian 155  Cr up to 3.4  K 5.8  Hgb 10.1  CTAB RRR with soft sys murmur sternum stable  As per CC  Will schedule 25% albumin as ventricle underfilled and start bicarb gtt  Decrease lopressor to 100 mg BID  Wean Cristian for SBP>100  Transfuse for Hgb < 10  If diuretics needed per nephrology try low dose / with or after IVF  Bicarb gtt and follow Ca++

## 2021-06-05 NOTE — PROGRESS NOTES
[facility]cology [facility]ology [facility]   Progress Note    6/5/2021    SUBJECTIVE: patient weak and fatigued went back to bed this morning     OBJECTIVE:    Physical Assessment:  Vitals:  /82   Pulse 67   Temp 96.3 °F (35.7 °C) (Temporal)   Resp 16   Ht 5' 9\" (1.753 m)   Wt 222 lb 3.6 oz (100.8 kg)   SpO2 96%   BMI 32.82 kg/m²    24HR INTAKE/OUTPUT:      Intake/Output Summary (Last 24 hours) at 6/5/2021 0946  Last data filed at 6/5/2021 3926  Gross per 24 hour   Intake 2239.83 ml   Output 610 ml   Net 1629.83 ml       CONSTITUTIONAL:  Awake, alert & oriented x3  HEENT: PERRL, Neck: soft, supple, no cervical, supraclavicular or axillary adenopathy  RESPIRATORY:  No increased work of breathing, breath sounds decreased. CARDIOVASCULAR:  Cardiac S1/S2, RRR  GASTROINTESTINAL:  abdomen soft +BS x4, no hepatosplenomegaly  SKIN:  negative for rash and skin lesion(s)  MUSCULOSKELETAL:  negative for pain and muscle weakness  EXTREMITIES: Negative for Lower extremity edema  Has some bleeding from wound on left lower calf     Labs Results:    CBC:   Recent Labs     06/03/21  0500 06/04/21  0530 06/05/21  0415   WBC 17.9* 17.3* 17.6*   HGB 10.0* 10.2* 10.1*   HCT 30.9* 32.1* 31.6*   MCV 88.8 89.1 89.2    208 250     BMP:   Recent Labs     06/03/21  0500 06/04/21  0530 06/05/21  0415   * 137 131*   K 4.4 5.1 5.8*   CL 95* 100 95*   CO2 21 24 20*   BUN 75* 91* 104*   CREATININE 1.6* 2.4* 3.4*     LIVER PROFILE: No results for input(s): AST, ALT, LIPASE, BILIDIR, BILITOT, ALKPHOS in the last 72 hours. Invalid input(s):   AMYLASE,  ALB  PT/INR:    Lab Results   Component Value Date    PROTIME 12.6 05/24/2021    INR 1.09 05/24/2021     PTT:    Lab Results   Component Value Date    APTT 71.9 06/05/2021    APTT 67.6 06/04/2021    APTT 52.4 06/03/2021     UA:No results for input(s): NITRITE, COLORU, PHUR, LABCAST, WBCUA, RBCUA, MUCUS, TRICHOMONAS, YEAST, BACTERIA, CLARITYU, SPECGRAV, LEUKOCYTESUR, UROBILINOGEN, BILIRUBINUR, BLOODU, GLUCOSEU, AMORPHOUS in the last 72 hours. Invalid input(s): KETONESU  Magnesium:   Lab Results   Component Value Date    MG 2.20 06/02/2021    MG 2.00 05/28/2021    MG 2.00 05/27/2021     FRANCIS:  No results found for: ANATITER, FRANCIS    RADIOLOGY:    Echo Complete    Result Date: 5/21/2021  Transthoracic Echocardiography Report (TTE)  Demographics   Patient Name       Candelaria Steen   Date of Study      05/21/2021         Gender              Male   Patient Number     [id number]         Date of Birth       1950   Visit Number       [id number]          Age                 70 year(s)   Accession Number   [id number]         Room Number         8977   Corporate ID       [id number]            Sue Stephens RDCS   Ordering Physician Julio Juarez MD,                     Cassi Kay MD      Physician           [facility]  Procedure Type of Study   TTE procedure:ECHOCARDIOGRAM COMPLETE 2D W DOPPLER W COLOR. Procedure Date Date: 05/21/2021 Start: 10:05 AM Study Location: [facility] - Echo Lab Technical Quality: Adequate visualization Indications:Dyspnea/SOB. Patient Status: Routine Height: 69 inches Weight: 221 pounds BSA: 2.16 m2 BMI: 32.64 kg/m2 BP: 153/87 mmHg  Conclusions   Summary  -Left ventricular cavity size is normal.  -Ejection fraction is visually estimated to be 50%. -The apex and apical septum appear hypokinetic.  -Grade I diastolic dysfunction with normal LV filling pressures. E/e' =  12.6.  -Left atrium is dilated. -Mild aortic stenosis with a peak velocity of 2.3m/s and a mean pressure  gradient of 11mmHg. -Mitral annular calcification is present. -Mild mitral regurgitation.  -Mild tricuspid regurgitation. RVSP = 36 mmHG.    Signature   ------------------------------------------------------------------  Electronically signed by Jing Barr MD, [facility] (Interpreting  physician) on 2021 at 11:51 AM  ------------------------------------------------------------------   Findings   Left Ventricle  Left ventricular cavity size is normal.  Ejection fraction is visually estimated to be 50%. The apex and apical septum appear hypokinetic. Grade I diastolic dysfunction with normal LV filling pressures. E/e' = 12.6. Mitral Valve  Mitral annular calcification is present. Mild mitral regurgitation. Left Atrium  Left atrium is dilated. Aortic Valve  Mild aortic stenosis with a peak velocity of 2.3m/s and a mean pressure  gradient of 11mmHg. No evidence of aortic valve regurgitation. Aorta  The aortic root is normal in size. Right Ventricle  The right ventricle is normal in size and function. TAPSE = 2.1 cm. RVS velocity is 10.1 cm/s. RVSP = 36 mmHG. Tricuspid Valve  Tricuspid valve is structurally normal.  Mild tricuspid regurgitation. Right Atrium  The right atrial size is normal.   Pulmonic Valve  The pulmonic valve appears structurally normal.  No evidence of pulmonic valve regurgitation. Pericardial Effusion  No pericardial effusion noted. Pleural Effusion  No pleural effusion. Miscellaneous  IVC size is normal (<2.1cm) and collapses > 50% with respiration consistent  with normal RA pressure (3mmHg).   M-Mode/2D Measurements (cm)   LV Diastolic Dimension: 4.96 cm LV Systolic Dimension: 7.04 cm  LV Septum Diastolic: 2.54 cm  LV PW Diastolic: 4.15 cm        AO Root Dimension: 3.6 cm                                  LA Dimension: 4.3 cm                                  LA Area: 21.4 cm2  LVOT: 2 cm                      LA volume/Index: 67.7 ml /31 ml/m2  Doppler Measurements   AV Peak Velocity: 180 cm/s     MV Peak E-Wave: 82.3 cm/s  AV Peak Gradient: 12.96 mmHg   MV Peak A-Wave: 89.5 cm/s  AV Mean Gradient: 9 mmHg       MV E/A Ratio: 0.92  LVOT Peak Velocity: 164 cm/s  AV Area (Continuity):3.16 cm2  MV Max P mmHg ------------------------------------------------------------------   Findings   Left Ventricle  Left ventricular cavity size is normal.  Ejection fraction is visually estimated to be 50-55%. There is abnormal septal motion. E/e' = 16.8. Mitral Valve  Mitral regurgitation is present. Left Atrium  The left atrium is normal in size. Right Atrium  The right atrial size is normal.   Pericardial Effusion  There is a trivial pericardial effusion noted. There is no evidence of right atrial or right ventricular collapse. Pleural Effusion  No pleural effusion. M-Mode/2D Measurements (cm)    LA Area: 21.4 cm2   LA volume/Index: 58.9 ml /27 ml/m2  Doppler Measurements                                  MV Peak E-Wave: 99 cm/s                                 MV Peak A-Wave: 57 cm/s                                 MV E/A Ratio: 1.74   E' Septal Velocity: 5.98 cm/s  E' Lateral Velocity: 5.87 cm/s      Echo Limited    Result Date: 5/28/2021  Transthoracic Echocardiography Report (TTE)  Demographics   Patient Name       Princess Montemayor   Date of Study      05/28/2021         Gender              Male   Patient Number     [id number]         Date of Birth       1950   Visit Number       [id number]          Age                 70 year(s)   Accession Number   [id number]         Room Number         1953   Corporate ID       [id number]            Sonographer         Kristi Stock RVT   Ordering Physician Rossi Kemp MD, Interpreting        Hunter Urbano MD,                     [facility]               Physician           [facility]  Procedure Type of Study   TTE procedure:ECHOCARDIOGRAM LIMITED. Procedure Date Date: 05/28/2021 Start: 02:21 PM Study Location: Portable Technical Quality: Adequate visualization Additional Indications:Hypotension, Systolic anterior motion of mitral valve.  Patient Status: Routine Height: 69 inches Weight: 221 pounds BSA: 2.16 m2 BMI: 32.64 kg/m2 BP: 135/66 mmHg  Conclusions   Summary  -Limited exam per Dr. Rose Marie Stubbs for hypotension and systolic anterior motion  of mitral valve. Patient had a previous complete exam 5/21/2021.  -Normal global ejection fraction estimated at 55%. -There is possible apical hypokinesis noted. -There is mild concentric left ventricular hypertrophy noted. -Thickened mitral valve without evidence of stenosis. -Systolic anterior motion of the mitral valve is noted causing a left  ventricular outflow tract obstruction with a maximum resting pressure  gradient of 201 mmHg. -There is moderate eccentric mitral regurgitation. Signature   ------------------------------------------------------------------  Electronically signed by Sarah Pugh MD, [facility] (Interpreting  physician) on 05/28/2021 at 03:42 PM  ------------------------------------------------------------------   Findings   Left Ventricle  Normal global systolic function with an ejection fraction estimated at 55%. There is mild concentric left ventricular hypertrophy noted. There is possible apical hypokinesis noted. Mitral Valve  Thickened mitral valve without evidence of stenosis. Systolic anterior motion of the mitral valve causing a left ventricular  outflow tract obstruction with a maximum resting pressure gradient of 201  mmHg. There is moderate eccentric mitral regurgitation. Aortic Valve  Aortic valve appears sclerotic but opens adequately. Right Ventricle  Pacer / ICD wire is visualized in the right ventricle. Right Atrium  Pacemaker / ICD lead is visualized in the right atrium.   M-Mode/2D Measurements (cm)   LV Diastolic Dimension: 3.65 cm LV Systolic Dimension: 0.08 cm  LV Septum Diastolic: 6.58 cm  LV PW Diastolic: 6.81 cm        AO Root Dimension: 3.4 cm  Doppler Measurements   AV Peak Velocity: 359 cm/s  AV Peak Gradient: 51.55 mmHg  LVOT Peak Velocity: 634 cm/s   Aortic Valve   Peak Velocity: 359 cm/s  Peak Gradient: 51.55 mmHg  Aorta   Aortic [facility]                     CNP                Physician           Chhaya Zabala MD,                                                            [facility]  Procedure Type of Study:   Cerebral:Carotid, VL CAROTID DUPLEX BILATERAL. Vascular Sonographer Report  Additional Indications:Pre OP C A B G. Impressions Right Impression The right internal carotid artery appears to have a <50% diameter reducing stenosis based on velocity criteria. The right external carotid artery appears to have >50% diameter reduction based on velocity criteria. The right common carotid artery demonstrates moderate diffuse plaque formation throughout. The right vertebral artery demonstrates normal antegrade flow. The right subclavian artery is visualized and demonstrates multiphasic flow. Left Impression The left internal carotid artery appears to have a <50% diameter reducing stenosis based on velocity criteria. The left mid to distal common carotid artery demonstrates moderate diffuse plaque formation. The left vertebral artery demonstrates normal antegrade flow. The left subclavian artery is visualized and demonstrates multiphasic flow. The left brachial pressure was not obtained due to IV placement . Conclusions   Summary   -The right internal carotid artery appears to have a <50% diameter reducing  stenosis based on velocity criteria. -The left internal carotid artery appears to have a <50% diameter reducing  stenosis based on velocity criteria. Recommendations   -Recommend follow-up study in 12 months. Signature   ------------------------------------------------------------------  Electronically signed by Chhaya Zabala MD, [facility] (Interpreting  physician) on 05/21/2021 at 05:47 PM  ------------------------------------------------------------------  Patient Status:Routine. [address] [place] [address] - Vascular Lab. Technical Quality:Adequate visualization. Plaque   - A plaque was found in the Right Prox ICA.  The plaque characteristics are: uncomplicated category, medium echogenicity, minimal severity and heterogeneous texture. - A plaque was found in the Left Prox ICA. The plaque characteristics are: uncomplicated category, medium echogenicity, moderate severity and heterogeneous texture. Velocities are measured in cm/s ; Diameters are measured in mm Carotid Right Measurements +---------------+----+----+-----+----+ ! Location       ! PSV ! EDV ! Angle! RI  ! +---------------+----+----+-----+----+ ! Prox CCA       !92. 3!87. 1! 60   !0.74! +---------------+----+----+-----+----+ ! Mid CCA        !117 !31. 5!60   !0.73! +---------------+----+----+-----+----+ ! Dist CCA       !130 !25. 9!60   !0.8 ! +---------------+----+----+-----+----+ ! Prox ICA       !107 !29. 2! 60   !0.73! +---------------+----+----+-----+----+ ! Mid ICA        !64. 1! 18  !36   !0.72! +---------------+----+----+-----+----+ ! Dist ICA       !68. 2!57. 8!60   !0.62! +---------------+----+----+-----+----+ ! Prox ECA       !188 !    !61   !    ! +---------------+----+----+-----+----+ ! Vertebral      !30.7!    !60   !    ! +---------------+----+----+-----+----+ ! Prox Subclavian! 130 !    !60   !    ! +---------------+----+----+-----+----+   - There is antegrade vertebral flow noted on the right side. - Additional Measurements:ICAPSV/CCAPSV 0.91. ICAEDV/CCAEDV 1.21. Carotid Left Measurements +---------------+----+----+-----+----+ ! Location       ! PSV ! EDV ! Angle! RI  ! +---------------+----+----+-----+----+ ! Prox CCA       !67.5!19. 8!60   !0.71! +---------------+----+----+-----+----+ ! Mid CCA        !107 !26. 7! 60   !0.75! +---------------+----+----+-----+----+ ! Dist CCA       !101 !25. 5!60   !0.75! +---------------+----+----+-----+----+ ! Prox ICA       !133 !34. 9!60   !0.74! +---------------+----+----+-----+----+ ! Mid ICA        !133 !38. 1! 60   !0.71! +---------------+----+----+-----+----+ ! Dist ICA       ! 65  !27  !30   !0.58! +---------------+----+----+-----+----+ ! Prox ECA       !111 ! Vascular Sonographer Report  Additional Indications:Pre Op JOSEP POWELL Impressions Right Impression The right greater saphenous vein was visualized between zone 1 and 8 and demonstrates total compressibility. Right GSV: Sapheno femoral junction mm GSV high thigh 6.8 mm GSV mid thigh 4.7 mm GSV low thigh 2.9 mm GSV knee 4.2 mm GSV high calf 3.3 mm GSV mid calf 2.6 mm GSV low calf 2.2 mm GSV ankle calf 2.1 mm. Left Impression The left greater saphenous vein was visualized between zone 1 and 8 and demonstrates total compressibility. Left GSV: Sapheno femoral junction mm GSV high thigh 4.5 mm GSV mid thigh 3.6 mm GSV low thigh 3.7 mm GSV knee 4 mm GSV high calf 3.3 mm GSV mid calf 2.6 mm GSV low calf 2 mm GSV ankle calf 1.6 mm. Conclusions   Signature   ------------------------------------------------------------------  Electronically signed by Lilliana Reyes MD, [facility] (Interpreting  physician) on 05/21/2021 at 05:48 PM  ------------------------------------------------------------------  Patient Status:Routine. [address] [place] [address] Vascular Lab. Technical Quality:Adequate visualization. Velocities are measured in cm/s ; Diameters are measured in mm LE Vein Mapping +----------------------------------++--------+-----+----+--------+-----+ ! Superficial - Great Saphenous Vein! !Right   ! ! Left!        !     ! +----------------------------------++--------+-----+----+--------+-----+ ! Location                          ! !Diameter! Depth! !Diameter! Depth! +----------------------------------++--------+-----+----+--------+-----+ ! GSV High Thigh                    ! !6.8     !     !    !4.5     !     ! +----------------------------------++--------+-----+----+--------+-----+ ! GSV Mid Thigh                     ! !4.7     !     !    !3.6     !     ! +----------------------------------++--------+-----+----+--------+-----+ ! GSV Low Thigh                     !!2.9     !     !    !3.7     ! ! +----------------------------------++--------+-----+----+--------+-----+ ! GSV Knee                          !!4.2     !     !    !4       !     ! +----------------------------------++--------+-----+----+--------+-----+ ! GSV High Calf                     !!3.3     !     !    !3.3     !     ! +----------------------------------++--------+-----+----+--------+-----+ ! GSV Mid Calf                      !!2.6     !     !    !2.6     !     ! +----------------------------------++--------+-----+----+--------+-----+ ! GSV Low Calf                      !!2.2     !     !    !2       !     ! +----------------------------------++--------+-----+----+--------+-----+ ! GSV Ankle                         !!2.1     !     !    !1.6     !     ! +----------------------------------++--------+-----+----+--------+-----+    CT CHEST ABDOMEN PELVIS WO CONTRAST    Result Date: 5/21/2021  EXAMINATION: CT OF THE CHEST, ABDOMEN, AND PELVIS WITHOUT CONTRAST 5/21/2021 5:31 pm TECHNIQUE: CT of the chest, abdomen and pelvis was performed without the administration of intravenous contrast. Multiplanar reformatted images are provided for review. Dose modulation, iterative reconstruction, and/or weight based adjustment of the mA/kV was utilized to reduce the radiation dose to as low as reasonably achievable. COMPARISON: PA and lateral chest 05/20/2021. HISTORY: ORDERING SYSTEM PROVIDED HISTORY: pre-op CABG TECHNOLOGIST PROVIDED HISTORY: Reason for exam:->pre-op CABG Additional Contrast?->None Reason for Exam: SOB  pre op CABG Acuity: Unknown FINDINGS: Chest: Mediastinum: Thyroid appears normal.  No mass, adenopathy, or pericardial effusion. Normal size of the heart and thoracic aorta. Heavy coronary calcifications present. Lungs/pleura: Lungs are clear. No acute airspace disease or pleural effusion. No pulmonary mass or suspicious pulmonary nodule. Soft Tissues/Bones: No acute abnormality.  Abdomen/Pelvis: Organs: Liver is normal in appearance without worrisome focal lesion. 3 small left renal cysts with density measurements of less than 20. .  Other abdominal organs appear normal. GI/Bowel: Large amount of ingested food in the stomach. Normal appendix and terminal ileum. No acute abnormality of the GI tract. Very small hiatal hernia. Pelvis: Mild-to-moderate enlargement of the prostate. Urinary bladder appears normal.  No mass, adenopathy, or free fluid. Peritoneum/Retroperitoneum: No mass, adenopathy, or ascites. Normal size of the aorta. Bones/Soft Tissues: Mild S-shaped scoliosis. Moderate to severe lumbar scoliosis. No acute abnormality. No acute abnormality identified in the chest, abdomen, or pelvis. No finding worrisome for occult malignancy. Moderate enlargement of the prostate. Current Medications   metoprolol tartrate  100 mg Oral BID    albumin human  25 g Intravenous Q8H    insulin lispro  0-6 Units Subcutaneous TID WC    insulin lispro  0-3 Units Subcutaneous Nightly    midodrine  15 mg Oral TID WC    fludrocortisone  0.1 mg Oral Daily    insulin glargine  30 Units Subcutaneous Nightly    lidocaine 1 % injection  5 mL Intradermal Once    sodium chloride flush  5-40 mL Intravenous 2 times per day    insulin lispro  10 Units Subcutaneous TID WC    sodium chloride flush  10 mL Intravenous 2 times per day    aspirin  325 mg Oral Daily    acetaminophen  1,000 mg Oral Q6H    sennosides-docusate sodium  1 tablet Oral BID    pantoprazole  40 mg Oral Daily        ASSESSMENT & PLAN:  Thrombocytopenia has recovered He had positive screen for HIT remains on argatroban. Called lab [facility] test should be completed today or tomorrow.  Will continue argatroban for now Will consider low dose eliquis for dvt prophylaxis depending on cardiothoracic recommendations  S/pcoronary artery bypass x4   Worsening kidney disease receiving albumin  Postpolio syndrome  Type 2 diabetes with neuropathy   Statin intolerance         I have discussed the

## 2021-06-05 NOTE — PROGRESS NOTES
Message sent to Dr. Ravi Vigil on call doctor. Reported to doctor that patient had not voided since 1430. Bladder scanned patient and 103ml noted. Message read at [facility] by MD, no new orders at this time.

## 2021-06-05 NOTE — PROGRESS NOTES
Hospital Medicine Progress Note     Date:  6/5/2021    PCP: Giovanni Dakins, MD (Tel: [phone])    Date of Admission: 5/20/2021      Subjective  No acute complaints. He is getting tired of being in the hospital.      Objective  Physical exam:  Vitals: BP (!) 144/73   Pulse 62   Temp 96.3 °F (35.7 °C) (Temporal)   Resp 16   Ht 5' 9\" (1.753 m)   Wt 222 lb 3.6 oz (100.8 kg)   SpO2 98%   BMI 32.82 kg/m²   Gen: Not in distress. Alert. Head: Normocephalic. Atraumatic. Eyes: EOMI. Good acuity. ENT: Oral mucosa moist  Neck: No JVD. No obvious thyromegaly. CVS: Nml S1S2, + murmur, RRR  Pulmomary: Diminished breath sounds bilaterally, no wheezing  Gastrointestinal: Soft, NT/ND. Positive bowel sounds. Musculoskeletal: Trace edema. Warm  Neuro: No focal deficit. Moves extremity spontaneously. Psychiatry: Appropriate affect. Not agitated. Skin: Warm, dry with normal turgor. No rash      24HR INTAKE/OUTPUT:      Intake/Output Summary (Last 24 hours) at 6/5/2021 1158  Last data filed at 6/5/2021 1130  Gross per 24 hour   Intake 2544.88 ml   Output 310 ml   Net 2234.88 ml     I/O last 3 completed shifts: In: 2239.8 [P.O.:700; I.V.:1041.2; IV Piggyback:498.6]  Out: 610 [Urine:610]  I/O this shift:  In: 305.1 [I.V.:205.1;  IV Piggyback:100]  Out: -       Meds:    metoprolol tartrate  100 mg Oral BID    albumin human  25 g Intravenous Q8H    sodium zirconium cyclosilicate  10 g Oral TID    furosemide  40 mg Intravenous Once    insulin lispro  0-6 Units Subcutaneous TID WC    insulin lispro  0-3 Units Subcutaneous Nightly    midodrine  15 mg Oral TID WC    fludrocortisone  0.1 mg Oral Daily    insulin glargine  30 Units Subcutaneous Nightly    lidocaine 1 % injection  5 mL Intradermal Once    sodium chloride flush  5-40 mL Intravenous 2 times per day    insulin lispro  10 Units Subcutaneous TID WC    sodium chloride flush  10 mL Intravenous 2 times per day    aspirin  325 mg Oral Daily    acetaminophen  1,000 mg Oral Q6H    sennosides-docusate sodium  1 tablet Oral BID    pantoprazole  40 mg Oral Daily       Infusions:    sodium bicarbonate infusion 50 mL/hr at 06/05/21 1133    sodium chloride Stopped (06/05/21 1932)    argatroban infusion 0.4239 mcg/kg/min (06/05/21 1130)    sodium chloride Stopped (05/27/21 0951)    lactated ringers      phenylephrine (TAN-SYNEPHRINE) 50mg/250mL infusion 130 mcg/min (06/05/21 1130)    dextrose 100 mL/hr (06/05/21 0607)         PRN Meds: sodium chloride flush, sodium chloride, sodium chloride flush, sodium chloride, ondansetron, metoclopramide, traMADol **OR** traMADol, morphine, diphenhydrAMINE, zolpidem, magnesium hydroxide, polyethylene glycol, potassium chloride, magnesium sulfate, calcium chloride IVPB, calcium chloride IVPB, albuterol sulfate HFA, albumin human, lactated ringers, phenylephrine (TAN-SYNEPHRINE) 50mg/250mL infusion, furosemide, glucose, dextrose, glucagon (rDNA), dextrose    Labs/imaging:  CBC:   Recent Labs     06/03/21  0500 06/04/21  0530 06/05/21  0415   WBC 17.9* 17.3* 17.6*   HGB 10.0* 10.2* 10.1*    208 250         BMP:    Recent Labs     06/03/21  0500 06/04/21  0530 06/05/21  0415   * 137 131*   K 4.4 5.1 5.8*   CL 95* 100 95*   CO2 21 24 20*   BUN 75* 91* 104*   CREATININE 1.6* 2.4* 3.4*   GLUCOSE 365* 137* 172*         Hepatic: No results for input(s): AST, ALT, ALB, BILITOT, ALKPHOS in the last 72 hours. Troponin: No results for input(s): TROPONINI in the last 72 hours. BNP: No results for input(s): BNP in the last 72 hours. INR: No results for input(s): INR in the last 72 hours.         Reviewed imaging and reports noted      Assessment:  Principal Problem:    Coronary artery disease involving native coronary artery of native heart with unstable angina pectoris (HCC)  Active Problems:    Statin intolerance    Stage 3a chronic kidney disease (HCC)    Post poliomyelitis syndrome    Type 2 diabetes mellitus with diabetic nephropathy, without long-term current use of insulin ([address]    Hypertension associated with diabetes ([address]    Mixed hyperlipidemia    S/P coronary artery bypass graft x 4    S/P left atrial appendage ligation  Resolved Problems:    * No resolved hospital problems.  *        Plan:  Severe multivessel CAD  -Status post CABG x4 on 5/25/2021  -Management per cardiothoracic surgery and cardiology      Hypotension  -Currently on midodrine  -Management per cardiothoracic surgery      Uncontrolled insulin-dependent diabetes mellitus type 2 with circulatory problem  -Blood sugars improved  -Continue Lantus 30 units nightly,Humalog 10 units before meals and sliding scale insulin low, continue to monitor        Hypertension associated with diabetes  -Currently on Lopressor per cardiothoracic surgery recommendations and midodrine secondary to hypotension          Diet: DIET CARDIAC; Carb Control: 4 carb choices (60 gms)/meal; Daily Fluid Restriction: 2000 ml; Low Potassium    Activity: Up with assist  Prophylaxis: Argatroban per primary service    Code status: Full Code     ----------        Brenda Kitchen MD  -------------------------------  Pati hospitalist

## 2021-06-05 NOTE — PLAN OF CARE
Problem: Cardiovascular  Goal: No DVT, peripheral vascular complications  Outcome: Ongoing  Goal: Hemodynamic stability  Outcome: Ongoing  Goal: Anticoagulate/Hct stable  Outcome: Ongoing  Goal: Agreement to quit smoking  Outcome: Ongoing  Goal: Weight maintained or lost  Outcome: Ongoing  Goal: Understanding of dietary restrictions  Outcome: Ongoing     Problem: Respiratory  Goal: No pulmonary complications  Outcome: Ongoing  Goal: O2 Sat > 90%  Outcome: Ongoing  Goal: Supplemental O2 requirements decreased  Outcome: Ongoing  Goal: Agreement to quit smoking  Outcome: Ongoing  Goal: Pneumonia vaccine at discharge as needed  Outcome: Ongoing     Problem: Falls - Risk of:  Goal: Will remain free from falls  Description: Will remain free from falls  Outcome: Ongoing  Goal: Absence of physical injury  Description: Absence of physical injury  Outcome: Ongoing     Problem: Discharge Planning:  Goal: Discharged to appropriate level of care  Description: Discharged to appropriate level of care  Outcome: Ongoing     Problem: Serum Glucose Level - Abnormal:  Goal: Ability to maintain appropriate glucose levels will improve  Description: Ability to maintain appropriate glucose levels will improve  Outcome: Ongoing     Problem: Sensory Perception - Impaired:  Goal: Ability to maintain a stable neurologic state will improve  Description: Ability to maintain a stable neurologic state will improve  Outcome: Ongoing     Problem: Nutrition  Goal: Optimal nutrition therapy  Outcome: Ongoing     Problem: Skin Integrity:  Goal: Will show no infection signs and symptoms  Description: Will show no infection signs and symptoms  Outcome: Ongoing  Goal: Absence of new skin breakdown  Description: Absence of new skin breakdown  Outcome: Ongoing     Problem: Pain:  Goal: Pain level will decrease  Description: Pain level will decrease  Outcome: Ongoing  Goal: Control of acute pain  Description: Control of acute pain  Outcome: Ongoing  Goal: Control of chronic pain  Description: Control of chronic pain  Outcome: Ongoing

## 2021-06-05 NOTE — PROGRESS NOTES
Cardiovascular Progress Note      Chief Complaint:   Chief Complaint   Patient presents with    Shortness of Breath     sent by Dr Kvng Dutton for abnormal EKG. was unable to get into see cardiologist. has been feeling \"not so good\", gets sob with activity for a couple months. denies cp. no n/v. Impression/Recommendations:    71 y/o patient of Dr. Facundo Dunaway:     MVCAD: S/P 4V CABG (LIMA-LAD, SVG-PDA, Sequential SVG- OM1-OM2) 5/25/21  ANDREW Clip  NORA/CKD, worsening  HIT, on Argatroban   Diabetes mellitus  Hyperlipidemia  Statin intolerance       Postop hypotension: Remains on Neosynephrine gtt. Midodrine and Florinef started. Metoprolol tartrate for KATHERYN/LVOT gradient. Trace pericardial effusion on echo this morning. Avoid diuretics/dropping preload. No significant fluid overload on exam.   Anemia has stabilized. Continue Aspirin. Nephrology and Hematology following. Interval History:   Pt. S/E. No events overnight. States yesterday worked with PT and more tired this morning. Ambulated without chest pain, dyspnea, lightheadedness/dizziness. Tolerating PO. No edema. Tele: SR 70s no events  RIJ Central line  Phenylephrine 130 mcg/min  Argatroban gtt  3/6 HSM LSB     6/5/21 TTE   Summary   -This was a limited study for pericardial effusion. Dr. Jostin Crouch was present.   -Left ventricular cavity size is normal.   -Ejection fraction is visually estimated to be 50-55%. -There is abnormal septal motion.   -There is a trivial pericardial effusion noted. 5/21/21 [facility]  Anatomy:   LM-70% diffuse  LAD-diffusely diseased, 80% proximal, 90% mid calcified  Cx-90% ostial  RCA-diffusely diseased 50% proximal and mid, 70% distal  RPDA-patent  LVEF-50%    ECHO:  5/21/21   Summary   -Left ventricular cavity size is normal.   -Ejection fraction is visually estimated to be 50%.  -The apex and apical septum appear hypokinetic.   -Grade I diastolic dysfunction with normal LV filling pressures.  E/e' = 12.6.   -Left atrium is dilated.   -Mild aortic stenosis with a peak velocity of 2.3m/s and a mean pressure gradient of 11mmHg.   -Mitral annular calcification is present.   -Mild mitral regurgitation.   -Mild tricuspid regurgitation. RVSP = 36 mmHG.       5/28/21   -Limited exam per Dr. Jayla Morrison for hypotension and systolic anterior motion   of mitral valve. Patient had a previous complete exam 5/21/2021.   -Normal global ejection fraction estimated at 55%. -There is possible apical hypokinesis noted. -There is mild concentric left ventricular hypertrophy noted. -Thickened mitral valve without evidence of stenosis. -Systolic anterior motion of the mitral valve is noted causing a left   ventricular outflow tract obstruction with a maximum resting pressure   gradient of 201 mmHg. -There is moderate eccentric mitral regurgitation.     Medications:  sodium bicarbonate 100 mEq in dextrose 5 % 1,000 mL infusion, Continuous  metoprolol tartrate (LOPRESSOR) tablet 100 mg, BID  albumin human 25 % IV solution 25 g, Q8H  insulin lispro (1 Unit Dial) 0-6 Units, TID WC  insulin lispro (1 Unit Dial) 0-3 Units, Nightly  midodrine (PROAMATINE) tablet 15 mg, TID WC  fludrocortisone (FLORINEF) tablet 0.1 mg, Daily  insulin glargine (LANTUS;BASAGLAR) injection pen 30 Units, Nightly  lidocaine PF 1 % injection 5 mL, Once  sodium chloride flush 0.9 % injection 5-40 mL, 2 times per day  sodium chloride flush 0.9 % injection 5-40 mL, PRN  0.9 % sodium chloride infusion, PRN  argatroban infusion 50mg in 0.9% sodium chloride 50 mL (premix), Continuous  insulin lispro (1 Unit Dial) 10 Units, TID WC  sodium chloride flush 0.9 % injection 10 mL, 2 times per day  sodium chloride flush 0.9 % injection 10 mL, PRN  0.9 % sodium chloride infusion, PRN  ondansetron (ZOFRAN) injection 4 mg, Q6H PRN  metoclopramide (REGLAN) injection 10 mg, Q6H PRN  aspirin EC tablet 325 mg, Daily  acetaminophen (TYLENOL) tablet 1,000 mg, Q6H  traMADol (ULTRAM) tablet 50 mg, Q6H Normal PMI, regular rate and rhythm, normal S1S2, no murmur, rub  RESPIRATORY: Normal respiratory effort, clear to auscultation bilaterally  EXTREMITIES: No cyanosis, clubbing or edema, palpable pulses bilaterally   MUSCULOSKELETAL: No joint swelling or tenderness, no chest wall tenderness  GASTROINTESTINAL:  soft, non-tender, no bruit    Data Review:  CBC:   Recent Labs     06/03/21  0500 06/04/21  0530 06/05/21  0415   WBC 17.9* 17.3* 17.6*   HGB 10.0* 10.2* 10.1*   HCT 30.9* 32.1* 31.6*   MCV 88.8 89.1 89.2    208 250     BMP:   Recent Labs     06/03/21  0500 06/04/21  0530 06/05/21  0415   * 137 131*   K 4.4 5.1 5.8*   CL 95* 100 95*   CO2 21 24 20*   BUN 75* 91* 104*   CREATININE 1.6* 2.4* 3.4*   GFRAA 52* 32* 22*       Recent Labs     06/03/21  0537 06/04/21  0530 06/05/21  0415   APTT 52.4* 67.6* 71.9*        Aaron Frankel, DO, [facility]  Interventional Cardiology     o: [phone]  [address] [place] [address], [place] [id number] [facility], [address] [place] [address]      NOTE:  This report was transcribed using voice recognition software. Every effort was made to ensure accuracy; however, inadvertent computerized transcription errors may be present.

## 2021-06-05 NOTE — PROGRESS NOTES
Spoke with dr Shashi Webb. Updated on labs vitals and drips and I/o. Orders obtained.   Noy Dwyer RN

## 2021-06-05 NOTE — PROGRESS NOTES
transesophageal echocardiogram, 5 level bilateral intercostal nerve block with Ma    SKIN BIOPSY Left 7/20/2020    EXCISE SKIN LESION LEFT POST AURICULAR WITH FLAP, FROZEN SECTIONS performed by Lynne Stanford MD at [facility]           Current Medications:    metoprolol tartrate (LOPRESSOR) tablet 100 mg, BID  albumin human 25 % IV solution 25 g, Q8H  sodium bicarbonate 150 mEq in dextrose 5 % 1,000 mL infusion, Continuous  sodium zirconium cyclosilicate (LOKELMA) oral suspension 10 g, TID  furosemide (LASIX) injection 40 mg, Once  insulin lispro (1 Unit Dial) 0-6 Units, TID WC  insulin lispro (1 Unit Dial) 0-3 Units, Nightly  midodrine (PROAMATINE) tablet 15 mg, TID WC  fludrocortisone (FLORINEF) tablet 0.1 mg, Daily  insulin glargine (LANTUS;BASAGLAR) injection pen 30 Units, Nightly  lidocaine PF 1 % injection 5 mL, Once  sodium chloride flush 0.9 % injection 5-40 mL, 2 times per day  sodium chloride flush 0.9 % injection 5-40 mL, PRN  0.9 % sodium chloride infusion, PRN  insulin lispro (1 Unit Dial) 10 Units, TID WC  sodium chloride flush 0.9 % injection 10 mL, 2 times per day  sodium chloride flush 0.9 % injection 10 mL, PRN  0.9 % sodium chloride infusion, PRN  ondansetron (ZOFRAN) injection 4 mg, Q6H PRN  metoclopramide (REGLAN) injection 10 mg, Q6H PRN  aspirin EC tablet 325 mg, Daily  acetaminophen (TYLENOL) tablet 1,000 mg, Q6H  traMADol (ULTRAM) tablet 50 mg, Q6H PRN   Or  traMADol (ULTRAM) tablet 100 mg, Q6H PRN  morphine (PF) injection 2 mg, Q2H PRN  diphenhydrAMINE (BENADRYL) tablet 25 mg, Nightly PRN  zolpidem (AMBIEN) tablet 5 mg, Nightly PRN  sennosides-docusate sodium (SENOKOT-S) 8.6-50 MG tablet 1 tablet, BID  magnesium hydroxide (MILK OF MAGNESIA) 400 MG/5ML suspension 30 mL, Daily PRN  polyethylene glycol (GLYCOLAX) packet 17 g, Daily PRN  pantoprazole (PROTONIX) tablet 40 mg, Daily  potassium chloride 20 mEq/50 mL IVPB (Central Line), PRN  magnesium sulfate 2000 mg in 50 mL IVPB premix, PRN  calcium chloride 1,000 mg in sodium chloride 0.9 % 100 mL IVPB, PRN  calcium chloride 2,000 mg in sodium chloride 0.9 % 100 mL IVPB, PRN  albuterol sulfate  (90 Base) MCG/ACT inhaler 2 puff, Q6H PRN  albumin human 5 % IV solution 25 g, PRN  lactated ringers infusion 250 mL, Continuous PRN  phenylephrine (TAN-SYNEPHRINE) 50 mg in dextrose 5 % 250 mL infusion, Continuous PRN  furosemide (LASIX) injection 20 mg, PRN  glucose (GLUTOSE) 40 % oral gel 15 g, PRN  dextrose 50 % IV solution, PRN  glucagon (rDNA) injection 1 mg, PRN  dextrose 5 % solution, PRN        Physical exam:     Vitals:  BP (!) 116/54   Pulse 61   Temp 96.3 °F (35.7 °C) (Temporal)   Resp 16   Ht 5' 9\" (1.753 m)   Wt 222 lb 3.6 oz (100.8 kg)   SpO2 (!) 68%   BMI 32.82 kg/m²   Constitutional:  OAA X3 NAD  Skin: no rash, turgor wnl  Heent:  eomi, mmm  Neck: no bruits or jvd noted  Cardiovascular:  S1, S2 without m/r/g  Respiratory: CTA B without w/r/r  Abdomen:  +bs, soft, nt, nd  Ext: no  lower extremity edema    Labs:  CBC:   Recent Labs     06/03/21  0500 06/04/21  0530 06/05/21  0415   WBC 17.9* 17.3* 17.6*   HGB 10.0* 10.2* 10.1*    208 250     BMP:    Recent Labs     06/03/21  0500 06/04/21  0530 06/05/21  0415   * 137 131*   K 4.4 5.1 5.8*   CL 95* 100 95*   CO2 21 24 20*   BUN 75* 91* 104*   CREATININE 1.6* 2.4* 3.4*   GLUCOSE 365* 137* 172*     Ca/Mg/Phos:   Recent Labs     06/03/21  0500 06/04/21  0530 06/05/21  0415   CALCIUM 7.8* 8.3 8.2*     Hepatic:   No results for input(s): AST, ALT, ALB, BILITOT, ALKPHOS in the last 72 hours. Troponin:   No results for input(s): TROPONINI in the last 72 hours. BNP: No results for input(s): BNP in the last 72 hours. Lipids:   No results for input(s): CHOL, TRIG, HDL, LDLCALC, LABVLDL in the last 72 hours. ABGs:   No results for input(s): PHART, PO2ART, CLF4UBU in the last 72 hours.              IMAGING:  XR CHEST PORTABLE   Final Result   Left PICC projects in normal alignment. No pneumothorax. Increased left basilar opacity, atelectasis or airspace disease. There may   be a small component of pleural effusion. Stable cardiomegaly. XR CHEST PORTABLE   Final Result   No pneumothorax following removal of the left chest tube. Development of   moderate opacities within the right lung either atelectasis or pneumonia. No   abnormal pulmonary vascular congestion or sizable pleural effusion. XR ABDOMEN FOR NG/OG/NE TUBE PLACEMENT   Final Result      XR CHEST PORTABLE   Final Result   Satisfactory appearance status post median sternotomy      No pneumothorax         CT CHEST ABDOMEN PELVIS WO CONTRAST   Final Result   No acute abnormality identified in the chest, abdomen, or pelvis. No finding   worrisome for occult malignancy. Moderate enlargement of the prostate. VL PRE OP VEIN MAPPING   Final Result      VL DUP CAROTID BILATERAL   Final Result      XR CHEST (2 VW)   Final Result   No active cardiopulmonary disease               A/p     1. Acute kidney injury on CKD stage 3A   NORA 2/2 ATN   Creatinine increased to 2.4. Likely secondary to hemodynamic changes  Getting normal saline 500mL x 1  H/o DM 2        ECHO on 6/5 s/o diastolic dysfunction     Discussed plan of care , volume status with Dr Aziza Nguyen , would give lasix 40 mg iv x 1 now and watch for diuretic response , renal recovery     Recommend to dose adjust all medications  based on renal functions  Maintain SBP> 90 mmHg   Daily weights   AVOID NSAIDs  Avoid Nephrotoxins  Monitor Intake/Output  Call if significant decrease in urine output         2. DM 2. Needs better control  On insulin    3. HTN . controlled   held lisinopril   Adjust meds. D/w cardiology     4. Dyslipidemia . Statins     5. Has multivessel coronary artery disease. S/p CABG.   Management per CT surgery      Critical care time spent > 31 minutes at bed side                Thank you for allowing us to participate in care of Jono Price         Electronically signed by: Luis Guzman MD, 6/5/2021, 1:33 PM      [facility] [address]  Office : [phone]  Fax :[phone]

## 2021-06-05 NOTE — PROGRESS NOTES
MD updated on bladder scan of 171ml, and patient voiding 10ml of urine. No new orders at this time, will continue to monitor.

## 2021-06-06 NOTE — FLOWSHEET NOTE
Dr. Palmira Chan made aware of repeat lab values and current order for transfusion of 1u PC as well as daily Lovenox to start today.

## 2021-06-06 NOTE — PROGRESS NOTES
Office : [phone]     Fax :[phone]       Nephrology Progress Note      Patient's Name: Kanika Mckeon  1:25 PM  6/6/2021      Chief Complaint:    Chief Complaint   Patient presents with    Shortness of Breath     sent by Dr Abhijit Haley for abnormal EKG. was unable to get into see cardiologist. has been feeling \"not so good\", gets sob with activity for a couple months. denies cp. no n/v. History of Present Ilness:    Kanika Mckeon is a 70 y.o. male with h/o DM 2, CKD 3 , DLP who came with complaints of 2-month history of progressive exertional shortness of breath and chest discomfort. Interval history :    Sitting in chair   Continue to be on pressor support   Urine output dropped . In nonoliguric range  Minimal edema. Creatinine level increased to 1.9 ---> 2.4 ----> 3.4  --> 3.3  Low UOP  Not in acute respiratory distress  No diarrhea      I/O last 3 completed shifts:   In: 1972.5 [P.O.:905; I.V.:642.3; IV Piggyback:425.2]  Out: 940 [Urine:940]    Past Medical History:   Diagnosis Date    CAD (coronary artery disease)     Diabetes mellitus ([facility] [address]     Elevated LFT's     Hyperlipidemia     Hypertension     Post poliomyelitis syndrome     Type II or unspecified type diabetes mellitus without mention of complication, not stated as uncontrolled        Past Surgical History:   Procedure Laterality Date    CLEFT PALATE REPAIR      CORONARY ARTERY BYPASS GRAFT N/A 5/25/2021    Urgent CABG Coronary Artery Bypass Graft x4 (LIMA to LAD, SVG to PDA, SVG sequenced to OM1 and OM2), Endoscopic vein harvest left saphenous vein, Left atrial appendage ligation with 40mm AtriClip, total cardiopulmonary bypass, doppler flow verification of bypass grafts, insertion of temporary chloride 20 mEq/50 mL IVPB (Central Line), PRN  magnesium sulfate 2000 mg in 50 mL IVPB premix, PRN  calcium chloride 1,000 mg in sodium chloride 0.9 % 100 mL IVPB, PRN  calcium chloride 2,000 mg in sodium chloride 0.9 % 100 mL IVPB, PRN  albuterol sulfate  (90 Base) MCG/ACT inhaler 2 puff, Q6H PRN  albumin human 5 % IV solution 25 g, PRN  lactated ringers infusion 250 mL, Continuous PRN  phenylephrine (TAN-SYNEPHRINE) 50 mg in dextrose 5 % 250 mL infusion, Continuous PRN  furosemide (LASIX) injection 20 mg, PRN  glucose (GLUTOSE) 40 % oral gel 15 g, PRN  dextrose 50 % IV solution, PRN  glucagon (rDNA) injection 1 mg, PRN  dextrose 5 % solution, PRN        Physical exam:     Vitals:  /74   Pulse 67   Temp 97.9 °F (36.6 °C) (Temporal)   Resp 16   Ht 5' 9\" (1.753 m)   Wt 222 lb 3.6 oz (100.8 kg)   SpO2 90%   BMI 32.82 kg/m²   Constitutional:  OAA X3 NAD  Skin: no rash, turgor wnl  Heent:  eomi, mmm  Neck: no bruits or jvd noted  Cardiovascular:  S1, S2 without m/r/g  Respiratory: CTA B without w/r/r  Abdomen:  +bs, soft, nt, nd  Ext: no  lower extremity edema    Labs:  CBC:   Recent Labs     06/04/21  0530 06/05/21  0415 06/06/21  0358 06/06/21  0910   WBC 17.3* 17.6* 14.9*  --    HGB 10.2* 10.1* 8.5* 8.7*    250 220  --      BMP:    Recent Labs     06/05/21  0415 06/05/21  1330 06/06/21  0350   * 124* 130*   K 5.8* 5.2* 4.8   CL 95* 91* 92*   CO2 20* 20* 21   * 106* 105*   CREATININE 3.4* 3.2* 3.3*   GLUCOSE 172* 351* 120*     Ca/Mg/Phos:   Recent Labs     06/05/21  0415 06/05/21  1330 06/06/21  0350   CALCIUM 8.2* 9.0 9.1   MG  --   --  2.60*     Hepatic:   Recent Labs     06/06/21  0910   *   ALT 88*   BILITOT 4.1*   ALKPHOS 240*     Troponin:   No results for input(s): TROPONINI in the last 72 hours. BNP: No results for input(s): BNP in the last 72 hours. Lipids:   No results for input(s): CHOL, TRIG, HDL, LDLCALC, LABVLDL in the last 72 hours.   ABGs:   No results for input(s): PHART, PO2ART, ETS9UAN in the last 72 hours. IMAGING:  XR CHEST PORTABLE   Final Result   Vascular congestion. Bibasilar small pleural effusions and atelectasis or pulmonary edema,   increased in the interval.  Pneumonia is a differential possibility. XR CHEST PORTABLE   Final Result   Left PICC projects in normal alignment. No pneumothorax. Increased left basilar opacity, atelectasis or airspace disease. There may   be a small component of pleural effusion. Stable cardiomegaly. XR CHEST PORTABLE   Final Result   No pneumothorax following removal of the left chest tube. Development of   moderate opacities within the right lung either atelectasis or pneumonia. No   abnormal pulmonary vascular congestion or sizable pleural effusion. XR ABDOMEN FOR NG/OG/NE TUBE PLACEMENT   Final Result      XR CHEST PORTABLE   Final Result   Satisfactory appearance status post median sternotomy      No pneumothorax         CT CHEST ABDOMEN PELVIS WO CONTRAST   Final Result   No acute abnormality identified in the chest, abdomen, or pelvis. No finding   worrisome for occult malignancy. Moderate enlargement of the prostate. VL PRE OP VEIN MAPPING   Final Result      VL DUP CAROTID BILATERAL   Final Result      XR CHEST (2 VW)   Final Result   No active cardiopulmonary disease               A/p     1. Acute kidney injury on CKD stage 3A   NORA 2/2 ATN   Creatinine increased to 3.3  Likely secondary to hemodynamic changes  H/o DM 2        ECHO on 6/5 s/o diastolic dysfunction     Discussed plan of care with primary team , lasix prn for volume overload     Recommend to dose adjust all medications  based on renal functions  Maintain SBP> 90 mmHg   Daily weights   AVOID NSAIDs  Avoid Nephrotoxins  Monitor Intake/Output  Call if significant decrease in urine output         2. DM 2. Needs better control  On insulin    3. HTN .  controlled   held lisinopril Adjust meds. D/w cardiology     4. Dyslipidemia . Statins     5. Has multivessel coronary artery disease. S/p CABG.   Management per CT surgery    6 Hyperkalemia  Resolved   S/p lokelma     7 AGMA     Resolved    DC bicarb drip      Critical care time spent > 31 minutes at bed side                Thank you for allowing us to participate in care of Deyvi Dunbar         Electronically signed by: Carlos Vera MD, 6/6/2021, 1:25 PM      [facility] [address]  Office : [phone]  Fax :[phone]

## 2021-06-06 NOTE — PLAN OF CARE
Problem: Cardiovascular  Goal: No DVT, peripheral vascular complications  Outcome: Met This Shift  Goal: Hemodynamic stability  Outcome: Met This Shift  Goal: Anticoagulate/Hct stable  Outcome: Not Met This Shift  Goal: Agreement to quit smoking  Outcome: Completed  Goal: Weight maintained or lost  Outcome: Not Met This Shift  Goal: Understanding of dietary restrictions  Outcome: Met This Shift     Problem: Respiratory  Goal: No pulmonary complications  Outcome: Not Met This Shift  Goal: O2 Sat > 90%  Outcome: Ongoing  Goal: Supplemental O2 requirements decreased  Outcome: Ongoing  Goal: Agreement to quit smoking  Outcome: Completed     Problem: Nutrition  Goal: Optimal nutrition therapy  Outcome: Not Met This Shift     Problem: Falls - Risk of:  Goal: Will remain free from falls  Outcome: Met This Shift  Goal: Absence of physical injury  Outcome: Met This Shift     Problem: Serum Glucose Level - Abnormal:  Goal: Ability to maintain appropriate glucose levels will improve  Outcome: Ongoing     Problem: Sensory Perception - Impaired:  Goal: Ability to maintain a stable neurologic state will improve  Outcome: Met This Shift     Problem: Skin Integrity:  Goal: Will show no infection signs and symptoms  Outcome: Met This Shift  Goal: Absence of new skin breakdown  Outcome: Met This Shift     Problem: Pain:  Goal: Pain level will decrease  Outcome: Met This Shift  Goal: Control of acute pain  Outcome: Met This Shift  Goal: Control of chronic pain  Outcome: Completed

## 2021-06-06 NOTE — FLOWSHEET NOTE
BG 61. Not eating. Insulin held. Ensure provided with 44gm CHO. Consumed 100%. Repeat BG=63.  Recheck once beverage has had time to assimilate in 20 mins

## 2021-06-06 NOTE — PROGRESS NOTES
CC: s/p CABG / ANDREW; CRF; KATHERYN    No c/o  NSR  Cr 3.3 roughly stable  K 4.8  Hgb 8.7  On 20 of liana  CTAB RRR murmur louder today  As per CC  1 unit PRBC  20 mg lasix after unit  Start renal adjusted lovenox for DVT prophylaxis

## 2021-06-06 NOTE — CONSULTS
GASTROENTEROLOGY INPATIENT CONSULTATION      IDENTIFYING DATA/REASON FOR CONSULTATION   PATIENT:  Jono Price  MRN:  [id number]  ADMIT DATE: 5/20/2021  TIME OF EVALUATION: 6/6/2021 4:32 PM  HOSPITAL STAY:   LOS: 16 days     REASON FOR CONSULTATION: Abnormal liver function tests    HISTORY OF PRESENT ILLNESS   Jono Price is a 70 y.o. male who has a past history notable for CAD s/p CABG X4, CKD, DM-II, hyperlipidemia and hypertension who presented to [facility] on 5/20/2021 with exertional chest pain. The patient subsequently underwent CABG, with noted postoperative hypotension. We have been consulted regarding abnormal liver function tests following CABG. The patient was noted to have abnormal liver function test 6/6/2021, with AST/ALT of 144/88, with a total bilirubin of 4.1 and alkaline phosphatase of 240. Prior LFTs were normal 5/2021 and beyond. Patient seen and examined. He denies any complaints. He denies any abdominal pain. He denies any history of alcohol abuse, he denies any history of liver disease. Denies any family history of liver disease. He denies any IV drug abuse. He currently denies any abdominal pain. He has not had a bowel movement in approximately 2 days, but was passing normal stools prior to that.     PAST MEDICAL, SURGICAL, FAMILY, and SOCIAL HISTORY     Past Medical History:   Diagnosis Date    CAD (coronary artery disease)     Diabetes mellitus ([facility] [address]     Elevated LFT's     Hyperlipidemia     Hypertension     Post poliomyelitis syndrome     Type II or unspecified type diabetes mellitus without mention of complication, not stated as uncontrolled      Past Surgical History:   Procedure Laterality Date    CLEFT PALATE REPAIR      CORONARY ARTERY BYPASS GRAFT N/A 5/25/2021    Urgent CABG Coronary Artery Bypass Graft x4 (LIMA to LAD, SVG to PDA, SVG sequenced to OM1 and OM2), Endoscopic vein harvest left saphenous vein, Left atrial appendage ligation with 40mm AtriClip, total cardiopulmonary bypass, doppler flow verification of bypass grafts, insertion of temporary ventricular pacing wires, transesophageal echocardiogram, 5 level bilateral intercostal nerve block with Ma    SKIN BIOPSY Left 7/20/2020    EXCISE SKIN LESION LEFT POST AURICULAR WITH FLAP, FROZEN SECTIONS performed by Grisel Garzon MD at [facility] EXTRACTION       Family History   Problem Relation Age of Onset    High Blood Pressure Mother     Diabetes Mother     High Blood Pressure Father     High Blood Pressure Brother      Social History     Socioeconomic History    Marital status:      Spouse name: None    Number of children: 2    Years of education: None    Highest education level: None   Occupational History    None   Tobacco Use    Smoking status: Never Smoker    Smokeless tobacco: Never Used   Vaping Use    Vaping Use: Never used   Substance and Sexual Activity    Alcohol use: No     Alcohol/week: 0.0 standard drinks    Drug use: No    Sexual activity: Yes     Partners: Female   Other Topics Concern    None   Social History Narrative    None     Social Determinants of Health     Financial Resource Strain: Low Risk     Difficulty of Paying Living Expenses: Not hard at all   Food Insecurity: No Food Insecurity    Worried About Running Out of Food in the Last Year: Never true    Ron of Food in the Last Year: Never true   Transportation Needs: No Transportation Needs    Lack of Transportation (Medical): No    Lack of Transportation (Non-Medical):  No   Physical Activity:     Days of Exercise per Week:     Minutes of Exercise per Session:    Stress: No Stress Concern Present    Feeling of Stress : Not at all   Social Connections:     Frequency of Communication with Friends and Family:     Frequency of Social Gatherings with Friends and Family:     Attends Restorationist Services:     Active Member of Clubs or Organizations:     Attends Club or Organization Meetings:     Marital Status:    Intimate Partner Violence:     Fear of Current or Ex-Partner:     Emotionally Abused:     Physically Abused:     Sexually Abused:        MEDICATIONS   SCHEDULED:  [Held by provider] enoxaparin, 30 mg, Daily  metoprolol tartrate, 100 mg, BID  albumin human, 25 g, Q8H  insulin lispro, 0-6 Units, TID WC  insulin lispro, 0-3 Units, Nightly  midodrine, 15 mg, TID WC  fludrocortisone, 0.1 mg, Daily  insulin glargine, 30 Units, Nightly  lidocaine 1 % injection, 5 mL, Once  sodium chloride flush, 5-40 mL, 2 times per day  insulin lispro, 10 Units, TID WC  sodium chloride flush, 10 mL, 2 times per day  aspirin, 325 mg, Daily  sennosides-docusate sodium, 1 tablet, BID  pantoprazole, 40 mg, Daily      FLUIDS/DRIPS:     sodium chloride      sodium chloride 10 mL (06/06/21 1413)    sodium chloride Stopped (06/06/21 8757)    lactated ringers      phenylephrine (TAN-SYNEPHRINE) 50mg/250mL infusion 50 mcg/min (06/06/21 7613)    dextrose 100 mL/hr (06/05/21 6214)     PRNs: sodium chloride, , PRN  acetaminophen, 650 mg, Q6H PRN  sodium chloride flush, 5-40 mL, PRN  sodium chloride, 25 mL, PRN  sodium chloride flush, 10 mL, PRN  sodium chloride, 25 mL, PRN  ondansetron, 4 mg, Q6H PRN  metoclopramide, 10 mg, Q6H PRN  traMADol, 50 mg, Q6H PRN   Or  traMADol, 100 mg, Q6H PRN  morphine, 2 mg, Q2H PRN  diphenhydrAMINE, 25 mg, Nightly PRN  zolpidem, 5 mg, Nightly PRN  magnesium hydroxide, 30 mL, Daily PRN  polyethylene glycol, 17 g, Daily PRN  potassium chloride, 20 mEq, PRN  magnesium sulfate, 2,000 mg, PRN  calcium chloride IVPB, 1,000 mg, PRN  calcium chloride IVPB, 2,000 mg, PRN  albuterol sulfate HFA, 2 puff, Q6H PRN  albumin human, 25 g, PRN  lactated ringers, 250 mL, Continuous PRN  phenylephrine (TAN-SYNEPHRINE) 50mg/250mL infusion, 10 mcg/min, Continuous PRN  furosemide, 20 mg, PRN  glucose, 15 g, PRN  dextrose, 12.5 g, PRN  glucagon (rDNA), 1 mg, PRN  dextrose, 100 mL/hr, PRN      ALLERGIES:    Allergies   Allergen Reactions    Atorvastatin      Muscle weakness, failed every statin attempted    Livalo [Pitavastatin Calcium]      Muscle weakness        REVIEW OF SYSTEMS   A full 12 pt ROS is negative other than noted in HPI    PHYSICAL EXAM     Vitals:    06/06/21 1630 06/06/21 1700 06/06/21 1730 06/06/21 1800   BP: 119/78 (!) 127/93 118/76 105/70   Pulse: 65 65 65 70   Resp:    16   Temp:       TempSrc:       SpO2: 97% 97% 97% 95%   Weight:       Height:            Physical Exam:  Gen: Resting in bed, NAD   CV: RRR no MRG   Pul: CTAB, normal work of breathing, no wheezing  Abd: Good bowel sounds throughout, soft, NT/ND, no masses, no HSM   Ext: No edema, lower extremities wrapped, atraumatic  Neuro: No asterixis, no gross deficits, moves all 4 extremities, follows commands  Skin: No jaundice, spider angiomas, lundberg erythema    LABS AND IMAGING     Recent Results (from the past 24 hour(s))   POCT Glucose    Collection Time: 06/05/21  4:38 PM   Result Value Ref Range    POC Glucose 158 (H) 70 - 99 mg/dl    Performed on ACCU-CHEK    POCT Glucose    Collection Time: 06/05/21  8:08 PM   Result Value Ref Range    POC Glucose 123 (H) 70 - 99 mg/dl    Performed on ACCU-CHEK    Basic metabolic panel    Collection Time: 06/06/21  3:50 AM   Result Value Ref Range    Sodium 130 (L) 136 - 145 mmol/L    Potassium 4.8 3.5 - 5.1 mmol/L    Chloride 92 (L) 99 - 110 mmol/L    CO2 21 21 - 32 mmol/L    Anion Gap 17 (H) 3 - 16    Glucose 120 (H) 70 - 99 mg/dL     (HH) 7 - 20 mg/dL    CREATININE 3.3 (H) 0.8 - 1.3 mg/dL    GFR Non- 19 (A) >60    GFR  22 (A) >60    Calcium 9.1 8.3 - 10.6 mg/dL   APTT    Collection Time: 06/06/21  3:50 AM   Result Value Ref Range    aPTT 78.7 (H) 24.2 - 36.2 sec   Calcium, ionized    Collection Time: 06/06/21  3:50 AM   Result Value Ref Range    Calcium, Ion 1.07 (L) 1.12 - 1.32 mmol/L    pH, Filipe 7.404 7.350 - 7.450   Magnesium Collection Time: 06/06/21  3:50 AM   Result Value Ref Range    Magnesium 2.60 (H) 1.80 - 2.40 mg/dL   CBC    Collection Time: 06/06/21  3:58 AM   Result Value Ref Range    WBC 14.9 (H) 4.0 - 11.0 K/uL    RBC 2.99 (L) 4.20 - 5.90 M/uL    Hemoglobin 8.5 (L) 13.5 - 17.5 g/dL    Hematocrit 26.5 (L) 40.5 - 52.5 %    MCV 88.6 80.0 - 100.0 fL    MCH 28.4 26.0 - 34.0 pg    MCHC 32.1 31.0 - 36.0 g/dL    RDW 18.1 (H) 12.4 - 15.4 %    Platelets 831 723 - 696 K/uL    MPV 9.8 5.0 - 10.5 fL   POCT Glucose    Collection Time: 06/06/21  8:15 AM   Result Value Ref Range    POC Glucose 94 70 - 99 mg/dl    Performed on ACCU-CHEK    Hemoglobin and hematocrit, blood    Collection Time: 06/06/21  9:10 AM   Result Value Ref Range    Hemoglobin 8.7 (L) 13.5 - 17.5 g/dL    Hematocrit 26.7 (L) 40.5 - 52.5 %   APTT    Collection Time: 06/06/21  9:10 AM   Result Value Ref Range    aPTT 80.9 (H) 24.2 - 36.2 sec   Protime-INR    Collection Time: 06/06/21  9:10 AM   Result Value Ref Range    Protime 33.3 (H) 10.0 - 13.2 sec    INR 2.84 (H) 0.86 - 1.14   Fibrinogen    Collection Time: 06/06/21  9:10 AM   Result Value Ref Range    Fibrinogen 191 (L) 200 - 397 mg/dL   Hepatic function panel    Collection Time: 06/06/21  9:10 AM   Result Value Ref Range    Total Protein 6.7 6.4 - 8.2 g/dL    Albumin 4.4 3.4 - 5.0 g/dL    Alkaline Phosphatase 240 (H) 40 - 129 U/L    ALT 88 (H) 10 - 40 U/L     (H) 15 - 37 U/L    Total Bilirubin 4.1 (H) 0.0 - 1.0 mg/dL    Bilirubin, Direct 1.5 (H) 0.0 - 0.3 mg/dL    Bilirubin, Indirect 2.6 (H) 0.0 - 1.0 mg/dL   TYPE AND SCREEN    Collection Time: 06/06/21 10:45 AM   Result Value Ref Range    ABO/Rh A POS     Antibody Screen NEG    PREPARE RBC (CROSSMATCH), 1 Units    Collection Time: 06/06/21 10:45 AM   Result Value Ref Range    Product Code Blood Bank [id number]     Description Blood Bank Red Blood Cells, Leuko-reduced     Unit Number [id number]     Dispense Status Blood Bank transfused    POCT Glucose Collection Time: 06/06/21 10:55 AM   Result Value Ref Range    POC Glucose 148 (H) 70 - 99 mg/dl    Performed on ACCU-CHEK      Other Labs      Imaging      ASSESSMENT AND RECOMMENDATIONS   Remedios Petersen is a 70 y.o. male who has a past history notable for CAD s/p CABG X4, CKD, DM-II, hyperlipidemia and hypertension who presented to [facility] on 5/20/2021 with exertional chest pain. The patient subsequently underwent CABG, with noted postoperative hypotension. We have been consulted regarding abnormal liver function tests following CABG. IMPRESSION:    1. Abnormal liver function tests: Given normal liver function tests prior to CABG, suspect abnormal liver function tests are due to ischemic hepatopathy, which correlates with postoperative hypotension requiring vasopressors. Recommend monitoring daily LFTs. Given hyperbilirubinemia and coagulopathy, recommend consideration of hemolytic process. Hem/onc is following. 2. Coagulopathy: Hem/onc following. PT and PTT prolonged. 3. CAD s/p CABG    RECOMMENDATIONS:    - RUQ US  - Daily LFT's    If you have any questions or need any further information, please feel free to contact our consult team.  Thank you for allowing us to participate in the care of Remedios Petersen. Yadiel Lares.  [address]

## 2021-06-06 NOTE — PROGRESS NOTES
Cardiovascular Progress Note      Chief Complaint:   Chief Complaint   Patient presents with    Shortness of Breath     sent by Dr Ezio Feliciano for abnormal EKG. was unable to get into see cardiologist. has been feeling \"not so good\", gets sob with activity for a couple months. denies cp. no n/v. Impression/Recommendations:    71 y/o patient of Dr. Seth Agosto:     MVCAD: S/P 4V CABG (LIMA-LAD, SVG-PDA, Sequential SVG- OM1-OM2) 5/25/21  ANDREW Clip  NORA/CKD, worsening  TCP, recovered, HIT+ YESICA-  Anemia, stable  Diabetes mellitus  Hyperlipidemia  Statin intolerance       Postop hypotension: Neosynephrine gtt at its lowest in recent days following initiation of Midodrine and Florinef. Metoprolol tartrate for KATHERYN/LVOT gradient. Trace pericardial effusion on echo. Transfusion to Hgb greater than 10. Continue Aspirin. Off Argatroban per Hematology. Congestion on CXR this morning. Diuretics per Nephrology. Interval History:   Pt. S/E. Increased work and shallowness to breathing. No chest pain, palpitations, dizziness. Weight is stable at 222 lbs (1 lb above preop)  Tele: SR 70s no events    Phenylephrine down to 30 mcg/min     6/6/21 CXR  Vascular congestion.       Bibasilar small pleural effusions and atelectasis or pulmonary edema,   increased in the interval.  Pneumonia is a differential possibility. 6/5/21 TTE   Summary   -This was a limited study for pericardial effusion. Dr. Michael Paez was present.   -Left ventricular cavity size is normal.   -Ejection fraction is visually estimated to be 50-55%. -There is abnormal septal motion.   -There is a trivial pericardial effusion noted. 5/21/21 [facility]  Anatomy:   LM-70% diffuse  LAD-diffusely diseased, 80% proximal, 90% mid calcified  Cx-90% ostial  RCA-diffusely diseased 50% proximal and mid, 70% distal  RPDA-patent  LVEF-50%    ECHO:  5/21/21   Summary   -Left ventricular cavity size is normal.   -Ejection fraction is visually estimated to be 50%.    -The apex and apical septum appear hypokinetic.   -Grade I diastolic dysfunction with normal LV filling pressures. E/e' = 12.6.   -Left atrium is dilated.   -Mild aortic stenosis with a peak velocity of 2.3m/s and a mean pressure gradient of 11mmHg.   -Mitral annular calcification is present.   -Mild mitral regurgitation.   -Mild tricuspid regurgitation. RVSP = 36 mmHG.       5/28/21   -Limited exam per Dr. Teresa Doshi for hypotension and systolic anterior motion   of mitral valve. Patient had a previous complete exam 5/21/2021.   -Normal global ejection fraction estimated at 55%. -There is possible apical hypokinesis noted. -There is mild concentric left ventricular hypertrophy noted. -Thickened mitral valve without evidence of stenosis. -Systolic anterior motion of the mitral valve is noted causing a left   ventricular outflow tract obstruction with a maximum resting pressure   gradient of 201 mmHg. -There is moderate eccentric mitral regurgitation.     Medications:  0.9 % sodium chloride infusion, PRN  enoxaparin (LOVENOX) injection 30 mg, Daily  acetaminophen (TYLENOL) tablet 650 mg, Q6H PRN  metoprolol tartrate (LOPRESSOR) tablet 100 mg, BID  albumin human 25 % IV solution 25 g, Q8H  insulin lispro (1 Unit Dial) 0-6 Units, TID WC  insulin lispro (1 Unit Dial) 0-3 Units, Nightly  midodrine (PROAMATINE) tablet 15 mg, TID WC  fludrocortisone (FLORINEF) tablet 0.1 mg, Daily  insulin glargine (LANTUS;BASAGLAR) injection pen 30 Units, Nightly  lidocaine PF 1 % injection 5 mL, Once  sodium chloride flush 0.9 % injection 5-40 mL, 2 times per day  sodium chloride flush 0.9 % injection 5-40 mL, PRN  0.9 % sodium chloride infusion, PRN  insulin lispro (1 Unit Dial) 10 Units, TID WC  sodium chloride flush 0.9 % injection 10 mL, 2 times per day  sodium chloride flush 0.9 % injection 10 mL, PRN  0.9 % sodium chloride infusion, PRN  ondansetron (ZOFRAN) injection 4 mg, Q6H PRN  metoclopramide (REGLAN) injection 10 mg, Q6H PRN  aspirin EC tablet 325 mg, Daily  traMADol (ULTRAM) tablet 50 mg, Q6H PRN   Or  traMADol (ULTRAM) tablet 100 mg, Q6H PRN  morphine (PF) injection 2 mg, Q2H PRN  diphenhydrAMINE (BENADRYL) tablet 25 mg, Nightly PRN  zolpidem (AMBIEN) tablet 5 mg, Nightly PRN  sennosides-docusate sodium (SENOKOT-S) 8.6-50 MG tablet 1 tablet, BID  magnesium hydroxide (MILK OF MAGNESIA) 400 MG/5ML suspension 30 mL, Daily PRN  polyethylene glycol (GLYCOLAX) packet 17 g, Daily PRN  pantoprazole (PROTONIX) tablet 40 mg, Daily  potassium chloride 20 mEq/50 mL IVPB (Central Line), PRN  magnesium sulfate 2000 mg in 50 mL IVPB premix, PRN  calcium chloride 1,000 mg in sodium chloride 0.9 % 100 mL IVPB, PRN  calcium chloride 2,000 mg in sodium chloride 0.9 % 100 mL IVPB, PRN  albuterol sulfate  (90 Base) MCG/ACT inhaler 2 puff, Q6H PRN  albumin human 5 % IV solution 25 g, PRN  lactated ringers infusion 250 mL, Continuous PRN  phenylephrine (TAN-SYNEPHRINE) 50 mg in dextrose 5 % 250 mL infusion, Continuous PRN  furosemide (LASIX) injection 20 mg, PRN  glucose (GLUTOSE) 40 % oral gel 15 g, PRN  dextrose 50 % IV solution, PRN  glucagon (rDNA) injection 1 mg, PRN  dextrose 5 % solution, PRN        I/O:     Intake/Output Summary (Last 24 hours) at 6/6/2021 1537  Last data filed at 6/6/2021 1413  Gross per 24 hour   Intake 1697.32 ml   Output 1065 ml   Net 632.32 ml       Physical Exam:    /65   Pulse 71   Temp 97.9 °F (36.6 °C) (Temporal)   Resp 16   Ht 5' 9\" (1.753 m)   Wt 222 lb 3.6 oz (100.8 kg)   SpO2 93%   BMI 32.82 kg/m²   Wt Readings from Last 3 Encounters:   06/05/21 222 lb 3.6 oz (100.8 kg)   05/20/21 223 lb 9.6 oz (101.4 kg)   03/12/21 216 lb (98 kg)       GENERAL: Well developed, well nourished, no acute distress  NEUROLOGICAL: Alert and oriented x3  PSYCH: Normal mood and affect   SKIN: Warm and dry, without lesions  HEENT: Normocephalic, atraumatic, Sclera non-icteric, mucous membranes moist  NECK: supple, JVP

## 2021-06-06 NOTE — FLOWSHEET NOTE
Assessment completed as charted. Resting comfortably in chair with eyes closed on arrival. Stated he had some respiratory issues during hs-felt like \"I couldn't catch my breath! \"  NSR 60-70's; no ectopy  Murmur very audible today  BP stable- weaning Cristian as tolerated. BB 100mg BID  Creatinine stable: 3.2-3.3; Voiding   K+ 4.8-> hold Lokelma til Nephro eval  HH 8.5/26.5 (10/31) after IVF infused throughout yesterday  Increasing SOB- LLL w/ decreased BS; +Dry cough. R/A spo2 88%. Couldn't catch his breath when walking to the door. Enc c/db; I.S. to 1000. Acapella provided. Monitor I/O  Afebrile. WBC 17.6-> 14.9.

## 2021-06-06 NOTE — PROGRESS NOTES
[facility]cology [facility]ology [facility]   Progress Note    6/6/2021    SUBJECTIVE: patient had episode of dyspnea last evening  Is on oxygen this morning Has some persistent bleeding from the left ankle wound     OBJECTIVE:    Physical Assessment:  Vitals:  /81   Pulse 67   Temp 96 °F (35.6 °C) (Temporal)   Resp 17   Ht 5' 9\" (1.753 m)   Wt 222 lb 3.6 oz (100.8 kg)   SpO2 93%   BMI 32.82 kg/m²    24HR INTAKE/OUTPUT:      Intake/Output Summary (Last 24 hours) at 6/6/2021 0904  Last data filed at 6/6/2021 0753  Gross per 24 hour   Intake 2057.62 ml   Output 940 ml   Net 1117.62 ml       CONSTITUTIONAL:  Awake, alert & oriented x3  HEENT: PERRL, Neck: soft, supple, no cervical, supraclavicular or axillary adenopathy  RESPIRATORY:  No increased work of breathing, breath sounds decreased. CARDIOVASCULAR:  Cardiac S1/S2, RRR  GASTROINTESTINAL:  abdomen soft +BS x4, no hepatosplenomegaly  SKIN:  negative for rash and skin lesion(s)  MUSCULOSKELETAL:  negative for pain and muscle weakness  EXTREMITIES:has a lot of bruising left lower extremity nurses report bleeding from left ankle area   Labs Results:    CBC:   Recent Labs     06/04/21  0530 06/05/21  0415 06/06/21  0358   WBC 17.3* 17.6* 14.9*   HGB 10.2* 10.1* 8.5*   HCT 32.1* 31.6* 26.5*   MCV 89.1 89.2 88.6    250 220     BMP:   Recent Labs     06/05/21  0415 06/05/21  1330 06/06/21  0350   * 124* 130*   K 5.8* 5.2* 4.8   CL 95* 91* 92*   CO2 20* 20* 21   * 106* 105*   CREATININE 3.4* 3.2* 3.3*     LIVER PROFILE: No results for input(s): AST, ALT, LIPASE, BILIDIR, BILITOT, ALKPHOS in the last 72 hours. Invalid input(s):   AMYLASE,  ALB  PT/INR:    Lab Results   Component Value Date    PROTIME 12.6 05/24/2021    INR 1.09 05/24/2021     PTT:    Lab Results   Component Value Date    APTT 78.7 06/06/2021    APTT 71.9 06/05/2021    APTT 67.6 06/04/2021     UA:No results for input(s): NITRITE, COLORU, PHUR, LABCAST, WBCUA, RBCUA, MUCUS, TRICHOMONAS, YEAST, BACTERIA, CLARITYU, SPECGRAV, LEUKOCYTESUR, UROBILINOGEN, BILIRUBINUR, BLOODU, GLUCOSEU, AMORPHOUS in the last 72 hours. Invalid input(s): KETONESU  Magnesium:   Lab Results   Component Value Date    MG 2.60 06/06/2021    MG 2.20 06/02/2021    MG 2.00 05/28/2021     FRANCIS:  No results found for: ANATITER, FRANCIS    RADIOLOGY:    Echo Complete    Result Date: 5/21/2021  Transthoracic Echocardiography Report (TTE)  Demographics   Patient Name       Sorto [place]   Date of Study      05/21/2021         Gender              Male   Patient Number     [id number]         Date of Birth       1950   Visit Number       [id number]          Age                 70 year(s)   Accession Number   [id number]         Room Number         7950   Corporate ID       [id number]            Libra Morejon RDCS   Ordering Physician Rosa Araya MD,                     Shannan Bee MD      Physician           [facility]  Procedure Type of Study   TTE procedure:ECHOCARDIOGRAM COMPLETE 2D W DOPPLER W COLOR. Procedure Date Date: 05/21/2021 Start: 10:05 AM Study Location: [facility] - Echo Lab Technical Quality: Adequate visualization Indications:Dyspnea/SOB. Patient Status: Routine Height: 69 inches Weight: 221 pounds BSA: 2.16 m2 BMI: 32.64 kg/m2 BP: 153/87 mmHg  Conclusions   Summary  -Left ventricular cavity size is normal.  -Ejection fraction is visually estimated to be 50%. -The apex and apical septum appear hypokinetic.  -Grade I diastolic dysfunction with normal LV filling pressures. E/e' =  12.6.  -Left atrium is dilated. -Mild aortic stenosis with a peak velocity of 2.3m/s and a mean pressure  gradient of 11mmHg. -Mitral annular calcification is present. -Mild mitral regurgitation.  -Mild tricuspid regurgitation. RVSP = 36 mmHG.    Signature ------------------------------------------------------------------  Electronically signed by Josy Ruiz MD, [address] (Interpreting  physician) on 05/21/2021 at 11:51 AM  ------------------------------------------------------------------   Findings   Left Ventricle  Left ventricular cavity size is normal.  Ejection fraction is visually estimated to be 50%. The apex and apical septum appear hypokinetic. Grade I diastolic dysfunction with normal LV filling pressures. E/e' = 12.6. Mitral Valve  Mitral annular calcification is present. Mild mitral regurgitation. Left Atrium  Left atrium is dilated. Aortic Valve  Mild aortic stenosis with a peak velocity of 2.3m/s and a mean pressure  gradient of 11mmHg. No evidence of aortic valve regurgitation. Aorta  The aortic root is normal in size. Right Ventricle  The right ventricle is normal in size and function. TAPSE = 2.1 cm. RVS velocity is 10.1 cm/s. RVSP = 36 mmHG. Tricuspid Valve  Tricuspid valve is structurally normal.  Mild tricuspid regurgitation. Right Atrium  The right atrial size is normal.   Pulmonic Valve  The pulmonic valve appears structurally normal.  No evidence of pulmonic valve regurgitation. Pericardial Effusion  No pericardial effusion noted. Pleural Effusion  No pleural effusion. Miscellaneous  IVC size is normal (<2.1cm) and collapses > 50% with respiration consistent  with normal RA pressure (3mmHg).   M-Mode/2D Measurements (cm)   LV Diastolic Dimension: 9.52 cm LV Systolic Dimension: 4.68 cm  LV Septum Diastolic: 5.90 cm  LV PW Diastolic: 7.99 cm        AO Root Dimension: 3.6 cm                                  LA Dimension: 4.3 cm                                  LA Area: 21.4 cm2  LVOT: 2 cm                      LA volume/Index: 67.7 ml /31 ml/m2  Doppler Measurements   AV Peak Velocity: 180 cm/s     MV Peak E-Wave: 82.3 cm/s  AV Peak Gradient: 12.96 mmHg   MV Peak A-Wave: 89.5 cm/s  AV Mean Gradient: 9 mmHg       MV E/A Alice Mccarthy, [facility]  (Interpreting physician) on 06/05/2021 at 09:33 AM  ------------------------------------------------------------------   Findings   Left Ventricle  Left ventricular cavity size is normal.  Ejection fraction is visually estimated to be 50-55%. There is abnormal septal motion. E/e' = 16.8. Mitral Valve  Mitral regurgitation is present. Left Atrium  The left atrium is normal in size. Right Atrium  The right atrial size is normal.   Pericardial Effusion  There is a trivial pericardial effusion noted. There is no evidence of right atrial or right ventricular collapse. Pleural Effusion  No pleural effusion. M-Mode/2D Measurements (cm)    LA Area: 21.4 cm2   LA volume/Index: 58.9 ml /27 ml/m2  Doppler Measurements                                  MV Peak E-Wave: 99 cm/s                                 MV Peak A-Wave: 57 cm/s                                 MV E/A Ratio: 1.74   E' Septal Velocity: 5.98 cm/s  E' Lateral Velocity: 5.87 cm/s      Echo Limited    Result Date: 5/28/2021  Transthoracic Echocardiography Report (TTE)  Demographics   Patient Name       Nato Bush   Date of Study      05/28/2021         Gender              Male   Patient Number     [id number]         Date of Birth       1950   Visit Number       [id number]          Age                 70 year(s)   Accession Number   [id number]         Room Number         4620   Corporate ID       [id number]            Sonographer         Cesar Stock T   Ordering Physician Harsha Jefferson MD, Interpreting        Jagjit Chan MD,                     [facility] [place]               Physician           [facility] [place]  Procedure Type of Study   TTE procedure:ECHOCARDIOGRAM LIMITED. Procedure Date Date: 05/28/2021 Start: 02:21 PM Study Location: Portable Technical Quality: Adequate visualization Additional Indications:Hypotension, Systolic anterior motion of mitral valve. Patient Status: Routine Height: 69 inches Weight: 221 pounds BSA: 2.16 m2 BMI: 32.64 kg/m2 BP: 135/66 mmHg  Conclusions   Summary  -Limited exam per Dr. Deyanira Calderón for hypotension and systolic anterior motion  of mitral valve. Patient had a previous complete exam 5/21/2021.  -Normal global ejection fraction estimated at 55%. -There is possible apical hypokinesis noted. -There is mild concentric left ventricular hypertrophy noted. -Thickened mitral valve without evidence of stenosis. -Systolic anterior motion of the mitral valve is noted causing a left  ventricular outflow tract obstruction with a maximum resting pressure  gradient of 201 mmHg. -There is moderate eccentric mitral regurgitation. Signature   ------------------------------------------------------------------  Electronically signed by Osei Martinez MD, [facility] (Interpreting  physician) on 05/28/2021 at 03:42 PM  ------------------------------------------------------------------   Findings   Left Ventricle  Normal global systolic function with an ejection fraction estimated at 55%. There is mild concentric left ventricular hypertrophy noted. There is possible apical hypokinesis noted. Mitral Valve  Thickened mitral valve without evidence of stenosis. Systolic anterior motion of the mitral valve causing a left ventricular  outflow tract obstruction with a maximum resting pressure gradient of 201  mmHg. There is moderate eccentric mitral regurgitation. Aortic Valve  Aortic valve appears sclerotic but opens adequately. Right Ventricle  Pacer / ICD wire is visualized in the right ventricle. Right Atrium  Pacemaker / ICD lead is visualized in the right atrium.   M-Mode/2D Measurements (cm)   LV Diastolic Dimension: 6.32 cm LV Systolic Dimension: 7.73 cm  LV Septum Diastolic: 8.76 cm  LV PW Diastolic: 0.36 cm        AO Root Dimension: 3.4 cm  Doppler Measurements   AV Peak Velocity: 359 cm/s  AV Peak Gradient: 51.55 mmHg  LVOT Peak Velocity: 634 cm/s Aortic Valve   Peak Velocity: 359 cm/s  Peak Gradient: 51.55 mmHg  Aorta   Aortic Root: 3.4 cm  Ascending Aorta: 3.6 cm      XR CHEST (2 VW)    Result Date: 5/20/2021  EXAMINATION: TWO XRAY VIEWS OF THE CHEST 5/20/2021 1:13 pm COMPARISON: None. HISTORY: ORDERING SYSTEM PROVIDED HISTORY: Chest Discomfort TECHNOLOGIST PROVIDED HISTORY: Reason for exam:->Chest Discomfort Reason for Exam: Shortness of Breath (sent by Dr Gracy Cook for abnormal EKG. was unable to get into see cardiologist. has been feeling \"not so good\", gets sob with activity for a couple months. denies cp. no n/v. ) Acuity: Acute Type of Exam: Initial FINDINGS: Heart size and pulmonary vasculature within normal limits. Lungs clear. Costophrenic angles sharp     No active cardiopulmonary disease     XR CHEST PORTABLE    Result Date: 6/5/2021  EXAMINATION: ONE XRAY VIEW OF THE CHEST 6/5/2021 6:02 am COMPARISON: 05/25/2021 HISTORY: ORDERING SYSTEM PROVIDED HISTORY: r/o effusion TECHNOLOGIST PROVIDED HISTORY: Reason for exam:->r/o effusion FINDINGS: Sternotomy wires and left atrial appendage clip are present. A left PICC terminates over the superior vena cava. Multiple supportive tubing devices have been removed in the interval, including endotracheal tube, [place]-Davon catheter and mediastinal/chest drains. The cardiac silhouette is globally prominent, and unchanged. The pulmonary vessels are normal in caliber. There is hazy opacity in the right base. Left PICC projects in normal alignment. No pneumothorax. Increased left basilar opacity, atelectasis or airspace disease. There may be a small component of pleural effusion. Stable cardiomegaly.      XR CHEST PORTABLE    Result Date: 5/28/2021  EXAMINATION: ONE XRAY VIEW OF THE CHEST 5/28/2021 6:29 am COMPARISON: 05/25/2021 HISTORY: ORDERING SYSTEM PROVIDED HISTORY: post CT removal TECHNOLOGIST PROVIDED HISTORY: Reason for exam:->post CT removal Reason for Exam: Post CT removal Acuity: Unknown Type of Exam: Unknown Difficulty breathing. FINDINGS: Cardiac silhouette is mildly to moderately enlarged. Left-sided chest tube has been removed. No evident pneumothorax. Endotracheal tube and mediastinal drainage tube have also been removed. [place]-Davon catheter remains with its tip in the proximal right pulmonary artery. Moderate opacity present in the anterior segment of the right upper lobe and the medial right lung base. Mild-to-moderate increased opacity appears present in the left retrocardiac area. No sizable pleural effusion. Sternal wires and left atrial clip present. No pneumothorax following removal of the left chest tube. Development of moderate opacities within the right lung either atelectasis or pneumonia. No abnormal pulmonary vascular congestion or sizable pleural effusion. XR CHEST PORTABLE    Result Date: 5/25/2021  EXAMINATION: ONE XRAY VIEW OF THE CHEST 5/25/2021 7:10 pm COMPARISON: 05/20/2021 HISTORY: ORDERING SYSTEM PROVIDED HISTORY: Post op open heart surgery TECHNOLOGIST PROVIDED HISTORY: Reason for exam:->Post op open heart surgery Reason for Exam: Post op open heart surgery Acuity: Unknown Type of Exam: Unknown FINDINGS: Status post median sternotomy. [place]-Davon catheter terminates in the main pulmonary artery. Endotracheal tube in satisfactory position above the rose. Left chest tube. No pneumothorax.      Satisfactory appearance status post median sternotomy No pneumothorax     VL DUP CAROTID BILATERAL    Result Date: 5/21/2021  Carotid Duplex Study  Demographics   Patient Name       Jt Chavez   Date of Study      05/21/2021         Gender              Male   Patient Number     [id number]         Date of Birth       1950   Visit Number       [id number]          Age                 70 year(s)   Accession Number   [id number]         Room Number         3781   Corporate ID       [id number]            Sonographer         Alexa Tran RVT   Ordering Physician Sol Webber   Interpreting        [facility]                     CNP                Physician           Venkat Andrade MD,                                                            [facility]  Procedure Type of Study:   Cerebral:Carotid, VL CAROTID DUPLEX BILATERAL. Vascular Sonographer Report  Additional Indications:Pre OP C A B G. Impressions Right Impression The right internal carotid artery appears to have a <50% diameter reducing stenosis based on velocity criteria. The right external carotid artery appears to have >50% diameter reduction based on velocity criteria. The right common carotid artery demonstrates moderate diffuse plaque formation throughout. The right vertebral artery demonstrates normal antegrade flow. The right subclavian artery is visualized and demonstrates multiphasic flow. Left Impression The left internal carotid artery appears to have a <50% diameter reducing stenosis based on velocity criteria. The left mid to distal common carotid artery demonstrates moderate diffuse plaque formation. The left vertebral artery demonstrates normal antegrade flow. The left subclavian artery is visualized and demonstrates multiphasic flow. The left brachial pressure was not obtained due to IV placement . Conclusions   Summary   -The right internal carotid artery appears to have a <50% diameter reducing  stenosis based on velocity criteria. -The left internal carotid artery appears to have a <50% diameter reducing  stenosis based on velocity criteria. Recommendations   -Recommend follow-up study in 12 months. Signature   ------------------------------------------------------------------  Electronically signed by Venkat Andrade MD, [facility] (Interpreting  physician) on 05/21/2021 at 05:47 PM  ------------------------------------------------------------------  Patient Status:Routine. [address] [place] [address] - Vascular Lab.  Technical Quality:Adequate visualization. Plaque   - A plaque was found in the Right Prox ICA. The plaque characteristics are: uncomplicated category, medium echogenicity, minimal severity and heterogeneous texture. - A plaque was found in the Left Prox ICA. The plaque characteristics are: uncomplicated category, medium echogenicity, moderate severity and heterogeneous texture. Velocities are measured in cm/s ; Diameters are measured in mm Carotid Right Measurements +---------------+----+----+-----+----+ ! Location       ! PSV ! EDV ! Angle! RI  ! +---------------+----+----+-----+----+ ! Prox CCA       !92. 6!53. 1! 60   !0.74! +---------------+----+----+-----+----+ ! Mid CCA        !117 !31. 5!60   !0.73! +---------------+----+----+-----+----+ ! Dist CCA       !130 !25. 9!60   !0.8 ! +---------------+----+----+-----+----+ ! Prox ICA       !107 !29. 2! 60   !0.73! +---------------+----+----+-----+----+ ! Mid ICA        !64. 1! 18  !36   !0.72! +---------------+----+----+-----+----+ ! Dist ICA       !68. 9!78. 8!60   !0.62! +---------------+----+----+-----+----+ ! Prox ECA       !188 !    !61   !    ! +---------------+----+----+-----+----+ ! Vertebral      !30.7!    !60   !    ! +---------------+----+----+-----+----+ ! Prox Subclavian! 130 !    !60   !    ! +---------------+----+----+-----+----+   - There is antegrade vertebral flow noted on the right side. - Additional Measurements:ICAPSV/CCAPSV 0.91. ICAEDV/CCAEDV 1.21. Carotid Left Measurements +---------------+----+----+-----+----+ ! Location       ! PSV ! EDV ! Angle! RI  ! +---------------+----+----+-----+----+ ! Prox CCA       !67.5!19. 8!60   !0.71! +---------------+----+----+-----+----+ ! Mid CCA        !107 !26. 7! 60   !0.75! +---------------+----+----+-----+----+ ! Dist CCA       !101 !25. 5!60   !0.75! +---------------+----+----+-----+----+ ! Prox ICA       !133 !34. 9!60   !0.74! +---------------+----+----+-----+----+ ! Mid ICA        !133 !38. 1! 60   !0.71!  +---------------+----+----+-----+----+ !Dist ICA       ! 65  !27  !30   !0.58! +---------------+----+----+-----+----+ ! Prox ECA       !111 !    !61   !    ! +---------------+----+----+-----+----+ ! Vertebral      !72.4!    !60   !    ! +---------------+----+----+-----+----+ ! Prox Subclavian! 106 !    !60   !    ! +---------------+----+----+-----+----+   - There is antegrade vertebral flow noted on the left side. - Additional Measurements:ICAPSV/CCAPSV 1.24. ICAEDV/CCAEDV 1.92. XR ABDOMEN FOR NG/OG/NE TUBE PLACEMENT    Result Date: 5/25/2021  EXAMINATION: ONE SUPINE XRAY VIEW(S) OF THE ABDOMEN 5/25/2021 7:21 pm COMPARISON: None. HISTORY: ORDERING SYSTEM PROVIDED HISTORY: Confirmation of course of NG/OG/NE tube and location of tip of tube TECHNOLOGIST PROVIDED HISTORY: Reason for exam:->Confirmation of course of NG/OG/NE tube and location of tip of tube Portable? ->Yes Reason for Exam: og placement Acuity: Acute Type of Exam: Initial FINDINGS: Tip of NG tube is seen in the left upper quadrant, tip pointing to the patient's left, in the region of the gastric antrum. Multiple artifacts overlie the patient Nonspecific gaseous distention seen throughout the abdomen.   Degenerative change and scoliosis seen within the spine     VL PRE OP VEIN MAPPING    Result Date: 5/21/2021  Lower Extremities Vein Mapping  Demographics   Patient Name       Lance Bautista   Date of Study      05/21/2021         Gender              Male   Patient Number     [id number]         Date of Birth       1950   Visit Number       [id number]          Age                 70 year(s)   Accession Number   [id number]         Room Number         1481   Corporate ID       [id number]            Sonographer         Judith Tee RVT   Ordering Physician Nellie Price   Interpreting        [facility]                     CNP                Physician           Conor Loya MD, [facility]  Procedure Type of Study:   Veins:Lower Extremity Vein Mapping, VASC PRE-OP VEIN MAPPING. Vascular Sonographer Report  Additional Indications:Pre Op JOSEP POWELL Impressions Right Impression The right greater saphenous vein was visualized between zone 1 and 8 and demonstrates total compressibility. Right GSV: Sapheno femoral junction mm GSV high thigh 6.8 mm GSV mid thigh 4.7 mm GSV low thigh 2.9 mm GSV knee 4.2 mm GSV high calf 3.3 mm GSV mid calf 2.6 mm GSV low calf 2.2 mm GSV ankle calf 2.1 mm. Left Impression The left greater saphenous vein was visualized between zone 1 and 8 and demonstrates total compressibility. Left GSV: Sapheno femoral junction mm GSV high thigh 4.5 mm GSV mid thigh 3.6 mm GSV low thigh 3.7 mm GSV knee 4 mm GSV high calf 3.3 mm GSV mid calf 2.6 mm GSV low calf 2 mm GSV ankle calf 1.6 mm. Conclusions   Signature   ------------------------------------------------------------------  Electronically signed by Lilliana Reyes MD, [facility] [place] (Interpreting  physician) on 05/21/2021 at 05:48 PM  ------------------------------------------------------------------  Patient Status:Routine. [address] [place] [address] - Vascular Lab. Technical Quality:Adequate visualization. Velocities are measured in cm/s ; Diameters are measured in mm LE Vein Mapping +----------------------------------++--------+-----+----+--------+-----+ ! Superficial - Great Saphenous Vein! !Right   ! ! Left!        !     ! +----------------------------------++--------+-----+----+--------+-----+ ! Location                          ! !Diameter! Depth! !Diameter! Depth! +----------------------------------++--------+-----+----+--------+-----+ ! GSV High Thigh                    ! !6.8     !     !    !4.5     !     ! +----------------------------------++--------+-----+----+--------+-----+ ! GSV Mid Thigh                     ! !4.7     !     !    !3.6     ! ! +----------------------------------++--------+-----+----+--------+-----+ ! GSV Low Thigh                     !!2.9     !     !    !3.7     !     ! +----------------------------------++--------+-----+----+--------+-----+ ! GSV Knee                          !!4.2     !     !    !4       !     ! +----------------------------------++--------+-----+----+--------+-----+ ! GSV High Calf                     !!3.3     !     !    !3.3     !     ! +----------------------------------++--------+-----+----+--------+-----+ ! GSV Mid Calf                      !!2.6     !     !    !2.6     !     ! +----------------------------------++--------+-----+----+--------+-----+ ! GSV Low Calf                      !!2.2     !     !    !2       !     ! +----------------------------------++--------+-----+----+--------+-----+ ! GSV Ankle                         !!2.1     !     !    !1.6     !     ! +----------------------------------++--------+-----+----+--------+-----+    CT CHEST ABDOMEN PELVIS WO CONTRAST    Result Date: 5/21/2021  EXAMINATION: CT OF THE CHEST, ABDOMEN, AND PELVIS WITHOUT CONTRAST 5/21/2021 5:31 pm TECHNIQUE: CT of the chest, abdomen and pelvis was performed without the administration of intravenous contrast. Multiplanar reformatted images are provided for review. Dose modulation, iterative reconstruction, and/or weight based adjustment of the mA/kV was utilized to reduce the radiation dose to as low as reasonably achievable. COMPARISON: PA and lateral chest 05/20/2021. HISTORY: ORDERING SYSTEM PROVIDED HISTORY: pre-op CABG TECHNOLOGIST PROVIDED HISTORY: Reason for exam:->pre-op CABG Additional Contrast?->None Reason for Exam: SOB  pre op CABG Acuity: Unknown FINDINGS: Chest: Mediastinum: Thyroid appears normal.  No mass, adenopathy, or pericardial effusion. Normal size of the heart and thoracic aorta. Heavy coronary calcifications present. Lungs/pleura: Lungs are clear. No acute airspace disease or pleural effusion.   No pulmonary mass or suspicious pulmonary nodule. Soft Tissues/Bones: No acute abnormality. Abdomen/Pelvis: Organs: Liver is normal in appearance without worrisome focal lesion. 3 small left renal cysts with density measurements of less than 20. .  Other abdominal organs appear normal. GI/Bowel: Large amount of ingested food in the stomach. Normal appendix and terminal ileum. No acute abnormality of the GI tract. Very small hiatal hernia. Pelvis: Mild-to-moderate enlargement of the prostate. Urinary bladder appears normal.  No mass, adenopathy, or free fluid. Peritoneum/Retroperitoneum: No mass, adenopathy, or ascites. Normal size of the aorta. Bones/Soft Tissues: Mild S-shaped scoliosis. Moderate to severe lumbar scoliosis. No acute abnormality. No acute abnormality identified in the chest, abdomen, or pelvis. No finding worrisome for occult malignancy. Moderate enlargement of the prostate.        Current Medications   metoprolol tartrate  100 mg Oral BID    albumin human  25 g Intravenous Q8H    sodium zirconium cyclosilicate  10 g Oral TID    furosemide  40 mg Intravenous Once    insulin lispro  0-6 Units Subcutaneous TID WC    insulin lispro  0-3 Units Subcutaneous Nightly    midodrine  15 mg Oral TID WC    fludrocortisone  0.1 mg Oral Daily    insulin glargine  30 Units Subcutaneous Nightly    lidocaine 1 % injection  5 mL Intradermal Once    sodium chloride flush  5-40 mL Intravenous 2 times per day    insulin lispro  10 Units Subcutaneous TID WC    sodium chloride flush  10 mL Intravenous 2 times per day    aspirin  325 mg Oral Daily    acetaminophen  1,000 mg Oral Q6H    sennosides-docusate sodium  1 tablet Oral BID    pantoprazole  40 mg Oral Daily        ASSESSMENT & PLAN:  Thrombocytopenia screening test for hit positive platelets have recovered quickly and YESICA negative Patient does appear to need some dvt prophylaxis options lovenox probably 30 daily due to renal failure or low dose eliquis     Hypoxia new last evening will request portable chest xray    MVCAD S/P cbg left atrial clip  KATHERYN remains on neosynephrine dose being reduced midodrine florinef metoprolol    Acute kidney injury stable    Diabetes mellitus    Hyperlipidemia statin intolerant     Post polio syndrome     Patient has had drop in hemoglobin will repeat    ptt prolonged despite stopping argatroban will recheck baseline aptt was normal prior to starting heparin   Will check liver enzymes normal on admission have not been repeated prolonged aptt may be due to slow clearance of argatroban  Hold starting prophylactic anticoagulation until aptt normalizes             I have discussed the above stated plan with the patient and they verbalized understanding and agreed with the plan. Thank you for allowing us to participate in this patients care.     Vilma Monterroso MD, 6/6/2021, 9:04 AM

## 2021-06-06 NOTE — PROGRESS NOTES
Hospital Medicine Progress Note     Date:  6/6/2021    PCP: Abbi Katz MD (Tel: [phone])    Date of Admission: 5/20/2021      Subjective  Patient looking worse this morning, states he is having worsening shortness of breath with exertion and feels very fatigued. Objective  Physical exam:  Vitals: /65   Pulse 71   Temp 97.9 °F (36.6 °C) (Temporal)   Resp 16   Ht 5' 9\" (1.753 m)   Wt 222 lb 3.6 oz (100.8 kg)   SpO2 93%   BMI 32.82 kg/m²   Gen: Not in distress. Alert. Head: Normocephalic. Atraumatic. Eyes: EOMI. Good acuity. ENT: Oral mucosa moist  Neck: No JVD. No obvious thyromegaly. CVS: Nml S1S2, + murmur, RRR  Pulmomary: Diminished breath sounds bilaterally, bibasilar rales  Gastrointestinal: Soft, NT/ND. Positive bowel sounds. Musculoskeletal: Trace edema. Warm  Neuro: No focal deficit. Moves extremity spontaneously. Psychiatry: Appropriate affect. Not agitated. Skin: Warm, dry with normal turgor. No rash      24HR INTAKE/OUTPUT:      Intake/Output Summary (Last 24 hours) at 6/6/2021 1415  Last data filed at 6/6/2021 0800  Gross per 24 hour   Intake 1697.32 ml   Output 565 ml   Net 1132.32 ml     I/O last 3 completed shifts: In: 1972.5 [P.O.:905; I.V.:642.3; IV Piggyback:425.2]  Out: 940 [Urine:940]  I/O this shift:  In: 325.2 [P.O.:240;  I.V.:85.2]  Out: -       Meds:    enoxaparin  30 mg Subcutaneous Daily    metoprolol tartrate  100 mg Oral BID    albumin human  25 g Intravenous Q8H    insulin lispro  0-6 Units Subcutaneous TID WC    insulin lispro  0-3 Units Subcutaneous Nightly    midodrine  15 mg Oral TID WC    fludrocortisone  0.1 mg Oral Daily    insulin glargine  30 Units Subcutaneous Nightly    lidocaine 1 % injection  5 mL Intradermal Once    sodium chloride flush  5-40 mL Intravenous 2 times per day    insulin lispro  10 Units Subcutaneous TID WC    sodium chloride flush  10 mL Intravenous 2 times per day    aspirin  325 mg Oral Daily    ([facility] [address]    Hypertension associated with diabetes ([facility] [address]    Mixed hyperlipidemia    S/P coronary artery bypass graft x 4    S/P left atrial appendage ligation    Essential hypertension    Acute renal failure with tubular necrosis (HCC)    Acute diastolic CHF (congestive heart failure) ([facility] [address]  Resolved Problems:    * No resolved hospital problems.  *        Plan:  Severe multivessel CAD  -Status post CABG x4 on 5/25/2021  -Management per cardiothoracic surgery and cardiology      Hypotension  -Currently on midodrine  -Management per cardiothoracic surgery      Uncontrolled insulin-dependent diabetes mellitus type 2 with circulatory problem  -Blood sugars improved  -Continue Lantus 30 units nightly,Humalog 10 units before meals and sliding scale insulin low, continue to monitor        Hypertension associated with diabetes  -Currently on Lopressor per cardiothoracic surgery recommendations and midodrine secondary to hypotension          Diet: DIET CARDIAC; Carb Control: 4 carb choices (60 gms)/meal; Daily Fluid Restriction: 2000 ml; Low Potassium    Activity: Up with assist  Prophylaxis: Argatroban per primary service    Code status: Full Code     ----------        Imelda Chan MD  -------------------------------  Rounding hospitalist

## 2021-06-07 NOTE — PROGRESS NOTES
Office : [phone]     Fax :[phone]       Nephrology Progress Note      Patient's Name: Petrina Kussmaul  7:27 AM  6/7/2021      Chief Complaint:    Chief Complaint   Patient presents with    Shortness of Breath     sent by Dr Mont Lanes for abnormal EKG. was unable to get into see cardiologist. has been feeling \"not so good\", gets sob with activity for a couple months. denies cp. no n/v. History of Present Ilness:    Petrina Kussmaul is a 70 y.o. male with h/o DM 2, CKD 3 , DLP who came with complaints of 2-month history of progressive exertional shortness of breath and chest discomfort. Interval history :      C/o Mild SOB   Minimal edema. Creatinine level increased to 1.9 ---> 2.4 ----> 3.4  BUN elevated   UOP improved with diuretic   Got PRBC yesterday   Off pressors     I/O last 3 completed shifts: In: 1468.9 [P.O.:540; I.V.:403.9;  Blood:325; IV Piggyback:200]  Out: 1870 [Urine:1870]    Past Medical History:   Diagnosis Date    CAD (coronary artery disease)     Diabetes mellitus ([facility] [address]     Elevated LFT's     Hyperlipidemia     Hypertension     Post poliomyelitis syndrome     Type II or unspecified type diabetes mellitus without mention of complication, not stated as uncontrolled        Past Surgical History:   Procedure Laterality Date    CLEFT PALATE REPAIR      CORONARY ARTERY BYPASS GRAFT N/A 5/25/2021    Urgent CABG Coronary Artery Bypass Graft x4 (LIMA to LAD, SVG to PDA, SVG sequenced to OM1 and OM2), Endoscopic vein harvest left saphenous vein, Left atrial appendage ligation with 40mm AtriClip, total cardiopulmonary bypass, doppler flow verification of bypass grafts, insertion of temporary ventricular pacing wires, transesophageal echocardiogram, 5 level bilateral intercostal nerve block with Ma    SKIN BIOPSY Left 7/20/2020    EXCISE SKIN LESION LEFT POST AURICULAR WITH FLAP, FROZEN SECTIONS performed by Nereida Cotton MD at [facility]           Current Medications:    verapamil (CALAN) tablet 40 mg, 3 times per day  0.9 % sodium chloride infusion, PRN  [Held by provider] enoxaparin (LOVENOX) injection 30 mg, Daily  acetaminophen (TYLENOL) tablet 650 mg, Q6H PRN  bisacodyl (DULCOLAX) EC tablet 5 mg, Daily PRN  metoprolol tartrate (LOPRESSOR) tablet 100 mg, BID  albumin human 25 % IV solution 25 g, Q8H  insulin lispro (1 Unit Dial) 0-6 Units, TID WC  insulin lispro (1 Unit Dial) 0-3 Units, Nightly  midodrine (PROAMATINE) tablet 15 mg, TID WC  fludrocortisone (FLORINEF) tablet 0.1 mg, Daily  insulin glargine (LANTUS;BASAGLAR) injection pen 30 Units, Nightly  lidocaine PF 1 % injection 5 mL, Once  sodium chloride flush 0.9 % injection 5-40 mL, 2 times per day  sodium chloride flush 0.9 % injection 5-40 mL, PRN  0.9 % sodium chloride infusion, PRN  insulin lispro (1 Unit Dial) 10 Units, TID WC  sodium chloride flush 0.9 % injection 10 mL, 2 times per day  sodium chloride flush 0.9 % injection 10 mL, PRN  0.9 % sodium chloride infusion, PRN  ondansetron (ZOFRAN) injection 4 mg, Q6H PRN  metoclopramide (REGLAN) injection 10 mg, Q6H PRN  aspirin EC tablet 325 mg, Daily  traMADol (ULTRAM) tablet 50 mg, Q6H PRN   Or  traMADol (ULTRAM) tablet 100 mg, Q6H PRN  morphine (PF) injection 2 mg, Q2H PRN  diphenhydrAMINE (BENADRYL) tablet 25 mg, Nightly PRN  zolpidem (AMBIEN) tablet 5 mg, Nightly PRN  sennosides-docusate sodium (SENOKOT-S) 8.6-50 MG tablet 1 tablet, BID  magnesium hydroxide (MILK OF MAGNESIA) 400 MG/5ML suspension 30 mL, Daily PRN  polyethylene glycol (GLYCOLAX) packet 17 g, Daily PRN  pantoprazole (PROTONIX) tablet 40 mg, Daily  potassium chloride 20 mEq/50 mL IVPB (Central Line), PRN  magnesium sulfate 2000 mg in 50 mL IVPB premix, PRN  calcium chloride 1,000 mg in sodium chloride 0.9 % 100 mL IVPB, PRN  calcium chloride 2,000 mg in sodium chloride 0.9 % 100 mL IVPB, PRN  albuterol sulfate  (90 Base) MCG/ACT inhaler 2 puff, Q6H PRN  albumin human 5 % IV solution 25 g, PRN  lactated ringers infusion 250 mL, Continuous PRN  phenylephrine (TAN-SYNEPHRINE) 50 mg in dextrose 5 % 250 mL infusion, Continuous PRN  furosemide (LASIX) injection 20 mg, PRN  glucose (GLUTOSE) 40 % oral gel 15 g, PRN  dextrose 50 % IV solution, PRN  glucagon (rDNA) injection 1 mg, PRN  dextrose 5 % solution, PRN        Physical exam:     Vitals:  /65   Pulse 67   Temp 97.5 °F (36.4 °C)   Resp 18   Ht 5' 9\" (1.753 m)   Wt 227 lb 11.8 oz (103.3 kg)   SpO2 96%   BMI 33.63 kg/m²   Constitutional:  OAA X3 NAD  Skin: no rash, turgor wnl  Heent:  eomi, mmm  Neck: no bruits or jvd noted  Cardiovascular:  S1, S2 without m/r/g  Respiratory: decrease air entry at bases   Abdomen:  +bs, soft, nt, nd  Ext: mild   lower extremity edema    Labs:  CBC:   Recent Labs     06/05/21  0415 06/06/21  0358 06/06/21  0910 06/07/21  0355   WBC 17.6* 14.9*  --  17.7*   HGB 10.1* 8.5* 8.7* 9.5*    220  --  240     BMP:    Recent Labs     06/05/21  1330 06/06/21  0350 06/07/21  0355   * 130* 131*   K 5.2* 4.8 4.7   CL 91* 92* 92*   CO2 20* 21 23   * 105* 120*   CREATININE 3.2* 3.3* 3.4*   GLUCOSE 351* 120* 98     Ca/Mg/Phos:   Recent Labs     06/05/21  1330 06/06/21  0350 06/07/21  0355   CALCIUM 9.0 9.1 9.5   MG  --  2.60*  --      Hepatic:   Recent Labs     06/06/21  0910 06/07/21  0355   * 104*   ALT 88* 84*   BILITOT 4.1* 4.6*   ALKPHOS 240* 329*     Troponin:   No results for input(s): TROPONINI in the last 72 hours. BNP: No results for input(s): BNP in the last 72 hours. Lipids:   No results for input(s): CHOL, TRIG, HDL, LDLCALC, LABVLDL in the last 72 hours.   ABGs:   No results for input(s): PHART, PO2ART, ACJ6BSY in the last 72 hours.             IMAGING:  XR CHEST PORTABLE   Final Result   Vascular congestion. Bibasilar small pleural effusions and atelectasis or pulmonary edema,   increased in the interval.  Pneumonia is a differential possibility. XR CHEST PORTABLE   Final Result   Left PICC projects in normal alignment. No pneumothorax. Increased left basilar opacity, atelectasis or airspace disease. There may   be a small component of pleural effusion. Stable cardiomegaly. XR CHEST PORTABLE   Final Result   No pneumothorax following removal of the left chest tube. Development of   moderate opacities within the right lung either atelectasis or pneumonia. No   abnormal pulmonary vascular congestion or sizable pleural effusion. XR ABDOMEN FOR NG/OG/NE TUBE PLACEMENT   Final Result      XR CHEST PORTABLE   Final Result   Satisfactory appearance status post median sternotomy      No pneumothorax         CT CHEST ABDOMEN PELVIS WO CONTRAST   Final Result   No acute abnormality identified in the chest, abdomen, or pelvis. No finding   worrisome for occult malignancy. Moderate enlargement of the prostate. VL PRE OP VEIN MAPPING   Final Result      VL DUP CAROTID BILATERAL   Final Result      XR CHEST (2 VW)   Final Result   No active cardiopulmonary disease         US GALLBLADDER RUQ    (Results Pending)         A/p     1. Acute kidney injury on CKD stage 3A   NORA 2/2 ATN Likely secondary to hemodynamic changes  Creatinine increased to 3.4    H/o DM 2  ECHO on 6/5 s/o diastolic dysfunction   Give lasix 20 mg iv x 1     Recommend to dose adjust all medications  based on renal functions  Maintain SBP> 90 mmHg   Daily weights   AVOID NSAIDs  Avoid Nephrotoxins  Monitor Intake/Output  Call if significant decrease in urine output   No need for renal replacement therapy       2. DM 2. Needs better control  On insulin    3. HTN .      In normal range   held lisinopril   Off pressors   On midodrine       4. Dyslipidemia . Statins     5. Has multivessel coronary artery disease. S/p CABG. Management per CT surgery    6 Hyperkalemia  Resolved     7. Hyponatremia.  2/2 hypervolemia           Thank you for allowing us to participate in care of [facility]         Electronically signed by: Rico Villagomez MD, 6/7/2021, 7:27 AM    CC time 35 minutes   [facility] [address]  Office : [phone]  Fax :[phone]

## 2021-06-07 NOTE — FLOWSHEET NOTE
RN spoke with Dr. Chai Howe with orders for 1 unit of Packed red blood cells and ABG as soon as arterial line is placed. RN spoke with Dr. Kate Washburn who is placing orders for CRRT if SBP is 90. Dr. Carroll Campos is currently placing arterial line.

## 2021-06-07 NOTE — PROGRESS NOTES
Physical/Occupational Therapy  Sparkstani Singh  PT/OT treatment attempted. RN, Levis Kussmaul initially reporting pt could participate in therapy, but cited increasing SOB this date. Later, RN reporting x2 RNs assisting pt to chair and pt continuing to struggle with SOB. Requesting therapy HOLD at this time and possibly check back later pending improved respiratory status. PT/OT will follow-up with pt later this date as schedule and medical status allow. Thank you,  Alva Villagran, PT, DPT, [id number]  Cheryl Nichols, [facility], OTR/L [id number]  Per conversation early pm with RN, Levis Kussmaul, continuing to recommend HOLD this date secondary to pt with continued respiratory distress and recent bloody BM. PT/OT will HOLD this date and follow-up with pt tomorrow as schedule and medical status allows.   Thank you,  Alva Villagran, PT, DPT, [id number]  Cheryl Nichols, [address] [facility], OTR/L [id number]

## 2021-06-07 NOTE — FLOWSHEET NOTE
Call placed to Dr. Amy Bashir office concerning patient having bloody stool. Will await return call.

## 2021-06-07 NOTE — PROGRESS NOTES
Received call from RN   Low BP   Has been started on liana drip     Urine output dropped significantly 2/2 hypoperfusion   On BIPAP  Lactic acid is elevated   BUN  elevated     Will rpt BMP     D/w Dr. Orin Zabala   will start CRRT   Dr. Orin Zabala will place vas cath    Consent given by his wife ,he also agreed for renal replacement therapy     Will need to be stable with SBP > 90 or MAP > 60 to start CRRT   D/w RN in detail.          got rpt BMP results     Results for Riya Holbrook (MRN [id number]) as of 6/7/2021 20:52    6/7/2021 20:21  Sodium: 133 (L)  Potassium: 5.8 (H)  Chloride: 89 (L)  CO2: 14 (LL)  Creatinine: 4.0 (H)  Anion Gap: 30 (H)  GFR Non-: 15 (A)  GFR : 18 (A)  Glucose: 74  Calcium: 9.2    CRRT mainly to correct acidosis

## 2021-06-07 NOTE — FLOWSHEET NOTE
Assisted to bedside commode with 2 nurse assist. Patient had a medium to large soft stool with bright red blood mixed with stool and in the commode.  Patient assisted back to chair with 2 nurse assist.

## 2021-06-07 NOTE — PROGRESS NOTES
[address] Daily Progress Note      Admit Date:  5/20/2021    Chief Complaint   Patient presents with    Shortness of Breath     sent by Dr Johnny Mack for abnormal EKG. was unable to get into see cardiologist. has been feeling \"not so good\", gets sob with activity for a couple months. denies cp. no n/v. Subjective:  Mr. Za Trejo denies exertional chest pain,  PND, palpitations, light-headedness, or edema. Off liana. Has multiple bloody BM. No urine output today. SOB at rest. Started on verpamil.     Objective:   /62   Pulse 60   Temp 96.4 °F (35.8 °C) (Temporal)   Resp 18   Ht 5' 9\" (1.753 m)   Wt 227 lb 11.8 oz (103.3 kg)   SpO2 97%   BMI 33.63 kg/m²       Intake/Output Summary (Last 24 hours) at 6/7/2021 1404  Last data filed at 6/7/2021 1247  Gross per 24 hour   Intake 983.72 ml   Output 2140 ml   Net -1156.28 ml       TELEMETRY: Sinus     Physical Exam:  General:  Awake, alert, oriented x 3, NAD  Skin:  Warm and dry  Neck:  JVD +2  Chest:  normal air entry  Cardiovascular:  RRR S1S2, no S3, + FEI  Abdomen:  Soft, ND, NT, No HSM  Extremities:  1+ edema    Medications:    verapamil  40 mg Oral 3 times per day    albuterol  2.5 mg Nebulization 4x daily    insulin glargine  20 Units Subcutaneous Nightly    [Held by provider] enoxaparin  30 mg Subcutaneous Daily    metoprolol tartrate  100 mg Oral BID    insulin lispro  0-6 Units Subcutaneous TID WC    insulin lispro  0-3 Units Subcutaneous Nightly    midodrine  15 mg Oral TID WC    fludrocortisone  0.1 mg Oral Daily    lidocaine 1 % injection  5 mL Intradermal Once    sodium chloride flush  5-40 mL Intravenous 2 times per day    insulin lispro  10 Units Subcutaneous TID WC    sodium chloride flush  10 mL Intravenous 2 times per day    aspirin  325 mg Oral Daily    sennosides-docusate sodium  1 tablet Oral BID    pantoprazole  40 mg Oral Daily      sodium chloride 10 mL (06/06/21 1413)    dextrose 100 mL/hr (06/05/21 1788) bisacodyl, sodium chloride flush, sodium chloride, sodium chloride flush, ondansetron, metoclopramide, traMADol **OR** traMADol, morphine, diphenhydrAMINE, zolpidem, magnesium hydroxide, polyethylene glycol, potassium chloride, magnesium sulfate, calcium chloride IVPB, calcium chloride IVPB, albuterol sulfate HFA, furosemide, glucose, dextrose, glucagon (rDNA), dextrose    Lab Data:  CBC:   Recent Labs     06/05/21  0415 06/06/21  0358 06/06/21  0910 06/07/21  0355 06/07/21  1255   WBC 17.6* 14.9*  --  17.7*  --    HGB 10.1* 8.5* 8.7* 9.5* 9.4*   HCT 31.6* 26.5* 26.7* 29.9* 29.7*   MCV 89.2 88.6  --  88.6  --     220  --  240  --      BMP:   Recent Labs     06/05/21  1330 06/06/21  0350 06/07/21  0355   * 130* 131*   K 5.2* 4.8 4.7   CL 91* 92* 92*   CO2 20* 21 23   * 105* 120*   CREATININE 3.2* 3.3* 3.4*     LIVER PROFILE:   Recent Labs     06/06/21  0910 06/07/21  0355   * 104*   ALT 88* 84*   BILIDIR 1.5*  --    BILITOT 4.1* 4.6*   ALKPHOS 240* 329*     PT/INR:   Recent Labs     06/06/21  0910 06/07/21  0355   PROTIME 33.3* 29.9*   INR 2.84* 2.55*     APTT:   Recent Labs     06/06/21  0350 06/06/21  0910 06/07/21  0355   APTT 78.7* 80.9* 64.6*     BNP:  No results for input(s): BNP in the last 72 hours. IMAGING: none    Assessment/Plan:  Principal Problem:    Coronary artery disease involving native coronary artery of native heart without angina pectoris  Plan: Severe 3VD s/p CABG  Active Problems:    Stage 3a chronic kidney disease ([facility] [address]  Plan: creatinine stable. Monitor. Careful diuresis. Likely has cardiorenal syndrom      Acute diastolic CHF (congestive heart failure) (HCC)  Plan: concern for HOCM that progressed to severe dynamic obstruction with KATHERYN post CABG. Careful diuresis. Ok for verapamil or lopessor.  Bradycardia improves obstruction May benefit from high dose bb and RV pacing from Sacha Garcia MD, MD 6/7/2021 2:04 PM

## 2021-06-07 NOTE — PROGRESS NOTES
Gastroenterology Progress Note    Jaylen Hernandez is a 70 y.o. male patient. Principal Problem:    Coronary artery disease involving native coronary artery of native heart without angina pectoris  Active Problems:    Statin intolerance    Stage 3a chronic kidney disease (HCC)    Post poliomyelitis syndrome    Type 2 diabetes mellitus with diabetic nephropathy, without long-term current use of insulin (HCC)    Hypertension associated with diabetes ([facility] [address]    Mixed hyperlipidemia    S/P coronary artery bypass graft x 4    S/P left atrial appendage ligation    Essential hypertension    Acute renal failure with tubular necrosis (HCC)    Acute diastolic CHF (congestive heart failure) ([address]  Resolved Problems:    * No resolved hospital problems. *      SUBJECTIVE:  No BM for 3-4 days then passed brown stool with streaks of red blood. A little more blood with another BM. Pt reports anal pain when passing stool. No abdominal pain. ROS:  No fever, chills  No chest pain, palpitations  No SOB, cough  Gastrointestinal ROS: see above    Physical    VITALS:  /62   Pulse 60   Temp 96.4 °F (35.8 °C) (Temporal)   Resp 18   Ht 5' 9\" (1.753 m)   Wt 227 lb 11.8 oz (103.3 kg)   SpO2 97%   BMI 33.63 kg/m²   TEMPERATURE:  Current - Temp: 96.4 °F (35.8 °C);  Max - Temp  Av.3 °F (36.3 °C)  Min: 96.4 °F (35.8 °C)  Max: 97.8 °F (36.6 °C)    NAD  Regular rate   Lungs CTA Bilaterally  Abdomen soft, ND, NT,  Bowel sounds normal.    Data    Data Review:    Recent Labs     21  0415 21  0358 21  0910 21  0355 21  1255   WBC 17.6* 14.9*  --  17.7*  --    HGB 10.1* 8.5* 8.7* 9.5* 9.4*   HCT 31.6* 26.5* 26.7* 29.9* 29.7*   MCV 89.2 88.6  --  88.6  --     220  --  240  --      Recent Labs     21  1330 21  0350 21  0355   * 130* 131*   K 5.2* 4.8 4.7   CL 91* 92* 92*   CO2 20* 21 23   * 105* 120*   CREATININE 3.2* 3.3* 3.4*     Recent Labs 06/06/21  0910 06/07/21  0355   * 104*   ALT 88* 84*   BILIDIR 1.5*  --    BILITOT 4.1* 4.6*   ALKPHOS 240* 329*     No results for input(s): LIPASE, AMYLASE in the last 72 hours. Recent Labs     06/06/21  0910 06/07/21  0355   PROTIME 33.3* 29.9*   INR 2.84* 2.55*     No results for input(s): PTT in the last 72 hours. RUQ US 6/7/21  Impression:     Fatty liver. No cholelithiasis or ancillary evidence of acute cholecystitis. ASSESSMENT :    1. Abnormal liver function tests: Given normal liver function tests prior to CABG, suspect abnormal liver function tests are due to ischemic hepatopathy, which correlates with postoperative hypotension requiring vasopressors. Recommend monitoring daily LFTs. US with fatty liver and no gallstones or biliary dilation. 2. Coagulopathy: Hem/onc following. PT and PTT prolonged. Could be from argatroban and prolonged clearance per heme notes. 3. CAD s/p CABG  4. Hematochezia - constipation, then anal pain with BM with blood. Could be anal fissure or hemorrhoidal bleeding. PT declined rectal exam. No prior colonoscopy. ddx also includes AVM, diverticular, neoplasm. hgb stable. 5. Hypotension: per ct surg/cardio teams  6. Renal insuff: renal following    PLAN :  - monitor liver enzymes  - fiber and miralax  - recommend future colonoscopy at some point for bleeding but pt unsure if would proceed with one. Discussed with Dr. Joi Jarquin, [facility]  [facility]    I have personally performed a face to face diagnostic evaluation on this patient. I have interviewed and examined the patient and I agree with the findings and recommended plan of care. In summary, my findings and plan are the following: abnl lft and blood per rectum s/p cabg, abd soft, transminases improved/bili slight increased, hbg stable.  Agree lft most likley recovering ischemic hepatitis, u/s ok, will check viral hep panel for completeness but lft ok preop, for blood per rectum rec rectal exam vs flex sig intially, later colonoscopy when more stable cardio/renal/heme status, but pt declines exam for now, states pcp dr Aidee Alcantar has rec screening colonoscopy in past but pt refused. Agree fiber/miralax to avoid constipation.        Rachel Osman MD  [address] [facility] [address]

## 2021-06-07 NOTE — PROGRESS NOTES
Hospital Medicine Progress Note     Date:  6/7/2021    PCP: Slava Fox MD (Tel: [phone])    Date of Admission: 5/20/2021      Subjective  Patient sitting up in chair, states he is feeling more short of breath and and feels more fatigued, wife is at the bedside. Objective  Physical exam:  Vitals: /62   Pulse 60   Temp 96.4 °F (35.8 °C) (Temporal)   Resp 18   Ht 5' 9\" (1.753 m)   Wt 227 lb 11.8 oz (103.3 kg)   SpO2 97%   BMI 33.63 kg/m²   Gen: Not in distress. Alert. Head: Normocephalic. Atraumatic. Eyes: EOMI. Good acuity. ENT: Oral mucosa moist  Neck: No JVD. No obvious thyromegaly. CVS: Nml S1S2, + murmur, RRR  Pulmomary: Diminished breath sounds bilaterally, bibasilar rales  Gastrointestinal: Soft, NT/ND. Positive bowel sounds. Musculoskeletal: Trace edema. Warm  Neuro: No focal deficit. Moves extremity spontaneously. Psychiatry: Appropriate affect. Not agitated. Skin: Warm, dry with normal turgor. No rash      24HR INTAKE/OUTPUT:      Intake/Output Summary (Last 24 hours) at 6/7/2021 1604  Last data filed at 6/7/2021 1500  Gross per 24 hour   Intake 658.72 ml   Output 1300 ml   Net -641.28 ml     I/O last 3 completed shifts: In: 983.7 [P.O.:420; I.V.:138.7; Blood:325; IV Piggyback:100]  Out: 8130 [Urine:1650]  No intake/output data recorded.       Meds:    verapamil  40 mg Oral 3 times per day    albuterol  2.5 mg Nebulization 4x daily    insulin glargine  20 Units Subcutaneous Nightly    psyllium  1 packet Oral BID    polyethylene glycol  17 g Oral Daily    [Held by provider] enoxaparin  30 mg Subcutaneous Daily    metoprolol tartrate  100 mg Oral BID    insulin lispro  0-6 Units Subcutaneous TID WC    insulin lispro  0-3 Units Subcutaneous Nightly    midodrine  15 mg Oral TID WC    fludrocortisone  0.1 mg Oral Daily    lidocaine 1 % injection  5 mL Intradermal Once    sodium chloride flush  5-40 mL Intravenous 2 times per day    insulin lispro  10 Units S/P left atrial appendage ligation    Essential hypertension    Acute renal failure with tubular necrosis (HCC)    Acute diastolic CHF (congestive heart failure) ([facility] [address]  Resolved Problems:    * No resolved hospital problems.  *        Plan:  Severe multivessel CAD  -Status post CABG x4 on 5/25/2021  -Management per cardiothoracic surgery and cardiology      Hypotension  -Currently on midodrine  -Management per cardiothoracic surgery      Uncontrolled insulin-dependent diabetes mellitus type 2 with circulatory problem  -Blood sugars improved  -Continue Lantus 30 units nightly,Humalog 10 units before meals and sliding scale insulin low, continue to monitor        Hypertension associated with diabetes  -Currently on Lopressor per cardiothoracic surgery recommendations and midodrine secondary to hypotension          Diet: DIET CARDIAC; Carb Control: 4 carb choices (60 gms)/meal; Daily Fluid Restriction: 2000 ml; Low Potassium    Activity: Up with assist  Prophylaxis: Argatroban per primary service    Code status: Full Code     ----------        Siddhartha Simms MD  -------------------------------  Pati hospitalist

## 2021-06-07 NOTE — CARE COORDINATION
Chart reviewed for discharge planning. Barrier(s) to discharge-patient has had a difficult course s/p CABG, he was transitioned off liana gtt in the past 24 hours, his renal function is worsening (/ creat  3.4 today from 103/ 3.3 yesterday)    Tentative discharge plan-[facility] is following    Tentative discharge date-when medically stable    *Case management will continue to follow progress and update discharge plan as needed.       LATONYA GalvezN, CCM, RN  [facility]  [phone]

## 2021-06-07 NOTE — FLOWSHEET NOTE
Patient  complained of increased shortness of breath during ultrasound test. Audible wheezing throughout lung fields. Respiratory therapist called for treatment and O2 increased to 4 liters NC from 3 liters. O2 sat on 3 liters was 88-90%.

## 2021-06-07 NOTE — PROGRESS NOTES
sulfate 2000 mg in 50 mL IVPB premix, PRN  calcium chloride 1,000 mg in sodium chloride 0.9 % 100 mL IVPB, PRN  calcium chloride 2,000 mg in sodium chloride 0.9 % 100 mL IVPB, PRN  albuterol sulfate  (90 Base) MCG/ACT inhaler 2 puff, Q6H PRN  albumin human 5 % IV solution 25 g, PRN  lactated ringers infusion 250 mL, Continuous PRN  phenylephrine (TAN-SYNEPHRINE) 50 mg in dextrose 5 % 250 mL infusion, Continuous PRN  furosemide (LASIX) injection 20 mg, PRN  glucose (GLUTOSE) 40 % oral gel 15 g, PRN  dextrose 50 % IV solution, PRN  glucagon (rDNA) injection 1 mg, PRN  dextrose 5 % solution, PRN        Objective:  /74   Pulse 73   Temp 97.4 °F (36.3 °C) (Temporal)   Resp 18   Ht 5' 9\" (1.753 m)   Wt 227 lb 11.8 oz (103.3 kg)   SpO2 94%   BMI 33.63 kg/m²     Intake/Output Summary (Last 24 hours) at 6/7/2021 0837  Last data filed at 6/7/2021 0612  Gross per 24 hour   Intake 1143.72 ml   Output 1870 ml   Net -726.28 ml      Wt Readings from Last 3 Encounters:   06/07/21 227 lb 11.8 oz (103.3 kg)   05/20/21 223 lb 9.6 oz (101.4 kg)   03/12/21 216 lb (98 kg)       General appearance:  Appears comfortable  Eyes: Sclera clear. Pupils equal.  ENT: Moist oral mucosa. Trachea midline, no adenopathy. Cardiovascular: Regular rhythm, normal S1, S2. No murmur. No edema in lower extremities  Respiratory: Not using accessory muscles. Good inspiratory effort. Clear to auscultation bilaterally, no wheeze or crackles. GI: Abdomen soft, no tenderness, not distended  Musculoskeletal: No cyanosis in digits, neck supple  Neurology: CN 2-12 grossly intact. No speech or motor deficits  Psych: Normal affect.  Alert and oriented in time, place and person  Skin: Warm, dry, normal turgor    Labs and Tests:  CBC:   Recent Labs     06/05/21  0415 06/06/21  0358 06/06/21  0910 06/07/21  0355   WBC 17.6* 14.9*  --  17.7*   HGB 10.1* 8.5* 8.7* 9.5*    220  --  240     BMP:    Recent Labs     06/05/21  1330 06/06/21  0350 06/07/21  0355   * 130* 131*   K 5.2* 4.8 4.7   CL 91* 92* 92*   CO2 20* 21 23   * 105* 120*   CREATININE 3.2* 3.3* 3.4*   GLUCOSE 351* 120* 98     Hepatic:   Recent Labs     06/06/21  0910 06/07/21  0355   * 104*   ALT 88* 84*   BILITOT 4.1* 4.6*   ALKPHOS 240* 329*       ASSESSMENT AND PLAN    Principal Problem:    Coronary artery disease involving native coronary artery of native heart without angina pectoris  Active Problems:    Statin intolerance    Stage 3a chronic kidney disease (HCC)    Post poliomyelitis syndrome    Type 2 diabetes mellitus with diabetic nephropathy, without long-term current use of insulin (HCC)    Hypertension associated with diabetes ([address]    Mixed hyperlipidemia    S/P coronary artery bypass graft x 4    S/P left atrial appendage ligation    Essential hypertension    Acute renal failure with tubular necrosis (HCC)    Acute diastolic CHF (congestive heart failure) (HCC)  Resolved Problems:    * No resolved hospital problems. *      Thrombocytopenia   - platelet count remains wnl at 240k.   - He was on arixtra intermittently which was stopped due to renal insufficiency. - - HIT positive, YESICA negative  - argatroban drip discontinued  - currently on prophylactic dose lovenox     Coronary artery disease post bypass and left atrial ligation      Diabetes mellitus type 2     History of cleft lip and palate surgeries      Post polio syndrome     Chronic kidney disease stage 3 A      Elevated liver enzymes  - GI following  - US of gallbladder today      Deya Oswald CNP  [facility]    Patient was seen with TONG in the morning. Hemoglobin has improved. No recurrent thrombocytopenia. Currently on prophylactic Lovenox. Min Begum.  Ramiro Red MD, [address]  Hematology and Oncology  [facility]  [phone]

## 2021-06-08 NOTE — PROGRESS NOTES
Gastroenterology Progress Note    Lia Estrada is a 70 y.o. male patient. Principal Problem:    Coronary artery disease involving native coronary artery of native heart without angina pectoris  Active Problems:    Statin intolerance    Stage 3a chronic kidney disease (HCC)    Post poliomyelitis syndrome    Type 2 diabetes mellitus with diabetic nephropathy, without long-term current use of insulin (HCC)    Hypertension associated with diabetes ([facility] [address]    Mixed hyperlipidemia    S/P coronary artery bypass graft x 4    S/P left atrial appendage ligation    Essential hypertension    Acute renal failure with tubular necrosis (HCC)    Acute diastolic CHF (congestive heart failure) ([address]  Resolved Problems:    * No resolved hospital problems. *      SUBJECTIVE:  Events of last night noted. Pt sedated on vent. ROS:  Unable to obtain    Physical    VITALS:  BP (!) 81/53   Pulse 55   Temp 96.5 °F (35.8 °C) (Temporal)   Resp 15   Ht 5' 9\" (1.753 m)   Wt 240 lb 1.3 oz (108.9 kg)   SpO2 97%   BMI 35.45 kg/m²   TEMPERATURE:  Current - Temp: 96.5 °F (35.8 °C);  Max - Temp  Av.5 °F (35.8 °C)  Min: 96 °F (35.6 °C)  Max: 97.4 °F (36.3 °C)    NAD  Bradycardia   Lungs CTA Bilaterally  Abdomen soft, ND, NT,  Bowel sounds normal.    Data    Data Review:    Recent Labs     21  0355 21  1930 21  2103 21  2306 21  0539   WBC 17.7* 16.0*  --   --  20.9*   HGB 9.5* 8.0* 9.6* 9.2* 9.7*   HCT 29.9* 25.5*  --  30.2* 30.1*   MCV 88.6 90.0  --   --  88.5    226  --   --  219     Recent Labs     21  0105 21  0539   * 136 134*   K 5.8* 4.1 4.0   CL 89* 87* 87*   CO2 14* 21 26   PHOS  --  7.5* 6.7*   * 125* 124*   CREATININE 4.0* 4.0* 3.9*     Recent Labs     21  0910 21  0355 21  0105 21  0539   * 104* 2,097* 3,613*   ALT 88* 84* 657* 944*   BILIDIR 1.5*  --   --   --    BILITOT 4.1* 4.6* 7.4* 7.9*   ALKPHOS 240* ischemic hepatitis, follow as bp stabalizes.        Eulalio Norton MD  [address]

## 2021-06-08 NOTE — PROGRESS NOTES
[address] Daily Progress Note      Admit Date:  5/20/2021    Chief Complaint   Patient presents with    Shortness of Breath     sent by Dr Florence Doyle for abnormal EKG. was unable to get into see cardiologist. has been feeling \"not so good\", gets sob with activity for a couple months. denies cp. no n/v. Subjective:  Mr. Maria T Arcos overnight had respiratory arrest, metabolic acidosis, hypotension and bradycardia requiring resuscitation and intubation/ventilation. Neosyn resumed.  Plan for CRRT    Objective:   /75   Pulse 68   Temp 97.7 °F (36.5 °C)   Resp 16   Ht 5' 9\" (1.753 m)   Wt 240 lb 1.3 oz (108.9 kg)   SpO2 99%   BMI 35.45 kg/m²       Intake/Output Summary (Last 24 hours) at 6/8/2021 1331  Last data filed at 6/8/2021 1206  Gross per 24 hour   Intake 3167.56 ml   Output 475 ml   Net 2692.56 ml       TELEMETRY: Sinus     Physical Exam:  General:  Intubated and sedated  Skin:  Warm and dry  Neck:  JVD +2  Chest:  normal air entry  Cardiovascular:  RRR S1S2, no S3, + FEI  Abdomen:  Soft, ND, NT, No HSM  Extremities:  1+ edema    Medications:    ampicillin-sulbactam  3,000 mg Intravenous Q12H    fluconazole  200 mg Intravenous Q24H    metroNIDAZOLE  500 mg Intravenous Q8H    [Held by provider] verapamil  40 mg Oral 3 times per day    albuterol  2.5 mg Nebulization 4x daily    psyllium  1 packet Oral BID    polyethylene glycol  17 g Oral Daily    furosemide  40 mg Intravenous Once    sodium zirconium cyclosilicate  5 g Oral TID    metoprolol tartrate  50 mg Oral BID    rocuronium  50 mg Intravenous Once    sodium bicarbonate  50 mEq Intravenous Once    [Held by provider] enoxaparin  30 mg Subcutaneous Daily    insulin lispro  0-6 Units Subcutaneous TID WC    insulin lispro  0-3 Units Subcutaneous Nightly    midodrine  15 mg Oral TID WC    fludrocortisone  0.1 mg Oral Daily    lidocaine 1 % injection  5 mL Intradermal Once    sodium chloride flush  5-40 mL Intravenous 2 times per day    sodium chloride flush  10 mL Intravenous 2 times per day    aspirin  325 mg Oral Daily    sennosides-docusate sodium  1 tablet Oral BID    pantoprazole  40 mg Oral Daily      sodium chloride      sodium chloride      sodium chloride      prismaSol BGK 4/2.5      phenylephrine (TAN-SYNEPHRINE) 50mg/250mL infusion 250 mcg/min (06/08/21 1044)    sodium bicarbonate infusion Stopped (06/08/21 0629)    prismaSATE BGK 4/2.5      prismaSol BGK 4/2.5      vasopressin (Septic Shock) infusion 0.04 Units/min (06/08/21 1230)    dexmedetomidine 2 mcg/kg/hr (06/08/21 1225)    DOPamine 10 mcg/kg/min (06/08/21 1000)    norepinephrine Stopped (06/08/21 0253)    sodium chloride 10 mL (06/06/21 1413)    dextrose 100 mL/hr (06/05/21 0607)     sodium chloride, heparin (porcine), potassium chloride, magnesium sulfate, calcium gluconate **OR** calcium gluconate **OR** calcium gluconate **OR** calcium gluconate, sodium phosphate IVPB **OR** sodium phosphate IVPB **OR** sodium phosphate IVPB **OR** sodium phosphate IVPB, acetaminophen, perflutren lipid microspheres, bisacodyl, sodium chloride flush, sodium chloride, sodium chloride flush, ondansetron, metoclopramide, traMADol **OR** traMADol, morphine, diphenhydrAMINE, zolpidem, magnesium hydroxide, potassium chloride, magnesium sulfate, calcium chloride IVPB, calcium chloride IVPB, albuterol sulfate HFA, furosemide, glucose, dextrose, glucagon (rDNA), dextrose    Lab Data:  CBC:   Recent Labs     06/07/21  0355 06/07/21  1930 06/07/21  2306 06/08/21  0539 06/08/21  0834   WBC 17.7* 16.0*  --  20.9*  --    HGB 9.5* 8.0* 9.2* 9.7* 11.1*   HCT 29.9* 25.5* 30.2* 30.1*  --    MCV 88.6 90.0  --  88.5  --     226  --  219  --      BMP:   Recent Labs     06/08/21  0105 06/08/21  0539 06/08/21  0835    134* 134*   K 4.1 4.0 4.5   CL 87* 87* 88*   CO2 21 26 26   PHOS 7.5* 6.7*  --    * 124* 118*   CREATININE 4.0* 3.9* 3.6*     LIVER PROFILE: Recent Labs     06/06/21  0910 06/08/21  0105 06/08/21  0539 06/08/21  0835   * 2,097* 3,613* 3,249*   ALT 88* 657* 944* 933*   BILIDIR 1.5*  --   --   --    BILITOT 4.1* 7.4* 7.9* 7.8*   ALKPHOS 240* 310* 333* 330*     PT/INR:   Recent Labs     06/06/21  0910 06/07/21  0355 06/07/21 1930   PROTIME 33.3* 29.9* 40.5*   INR 2.84* 2.55* 3.45*     APTT:   Recent Labs     06/07/21  0355 06/07/21 1930 06/08/21  0539   APTT 64.6* 78.1* 69.8*     BNP:  No results for input(s): BNP in the last 72 hours. IMAGING: none    Assessment/Plan:  Principal Problem:    Coronary artery disease involving native coronary artery of native heart without angina pectoris  Plan: Severe 3VD s/p CABG  Active Problems:    ESRD: plan for CRRT. ATN that keep progressing. Likely has cardiorenal syndrome as well. 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 Improved with neosyn and high SVR. Eventually need to wean off neosyn and start lopressor. May benefit from septal ablation.  Difficult to manage volume statis                 Michael Shetty MD, MD 6/8/2021 1:31 PM

## 2021-06-08 NOTE — PROGRESS NOTES
Code Blue:    2037: Code Blue called. 1 amp Bicarb pushed, 1 amp Epi given, Compressions started  2038: 1 amp Bicarb given   2040: Pulse check, femoral site checked, positive pulse noted  2042: I gram calcium gluconate given, D50 1 amp, and 10 units of insulin given  2049: Etomidate 30 given IVP per Dr. Maureen Kirk  2050: Pt intubated, 7.5 tube, 24@ lip, poistive color change, BL breath sounds  2052: OG inserted, 60 @ lip  2100: 10units of Vasopressin given IVP per verbal order  2107: 1 amp Bicarb given per ABG results    MD at bedside.  CXR ordered for ETT placement, ECHO called in for STAT ECHO

## 2021-06-08 NOTE — PROGRESS NOTES
CC: s/p CABG x 4  / ANDREW clip; acute on CRF; increased LFT's; KATHERYN of mitral valve improving; metabolic acidosis resolved (ARF vs sepsis)    Intubated and lightly sedated  Arousable and appropriately responsive  SB ~55    (off levo, dopamine down to 10, liana down to 250  Cr 3.9  WBC 20.9    ABG 7.39/44.9/77.8/27.8  BE +3  CTAB RRR no murmur sternum stable  As per CC  D/C bicarb gtt  Continue ABX -- cultures pending  Wean liana for SBP>115 -- keep dopamine for now  GI for LFT's  Check AXR  Diuresis / CRRT / HD per nephrology  Keep intubated today

## 2021-06-08 NOTE — PROGRESS NOTES
Occupational/Physical Therapy    OT tx session on hold. Pt currently intubated and not appropriate for therapy at this time. Will sign off for occupational therapy. Please re-consult when medically appropriate. Electronically signed by JENNI Del Toro/L on 6/8/2021 at 12:13 PM      PT noting OT charting prior to attempt. PT discussing with RN, Radha. Reports pt current medical status inappropriate for therapy interventions at this time, with indications for CRRT and currently intubated. Discussed D/C'ing orders at this time and RN voicing agreement. PT will also sign-off at this time. Please re-order therapy for re-evaluations once pt medically appropriate.     Thank you,  Jonathan Simmons, PT, DPT, [id number]

## 2021-06-08 NOTE — PROGRESS NOTES
/64 at present. Patient agitated. Fentanyl on hold for now until order cleared with Dr Deyvi Suresh.  Will give Versed 2 mg IV slowly as ordered and monitor BP

## 2021-06-08 NOTE — OP NOTE
Operative Note      Patient: Fredi Salas  YOB: 1950  MRN: [id number]    Date of Procedure: 6/7/2021    Pre-Op Diagnosis: lactic acidosis, ARF and hypotension, need for more central IV access    Post-Op Diagnosis: Same       Procedure: Right femoral venous 7 Fr triple lumen catheter placement    Surgeon(s): Ruben Ariza MD    Anesthesia: 5 cc local 1% lidocaine    Estimated Blood Loss (mL): 15KA    Complications: None    Findings: All ports eulalia back and flushed easily. Tolerated well. Detailed Description of Procedure:   After brief witnessed arrest after a 'dry heave', the patient responded to epinephrine and bicarb. He was awake and spoke with his wife before intubation as he had a severe metabolic acidosis with some respiratory compensation. Dr. Maureen Kirk performed intubation. As the patient was requiring more drips,  I spoke with his wife about risks, benefits, alternatives and expectations for informed consent for central line placement. Dr. Maureen Kirk is planning a right IJ vascath and the patient has a left arm PICC, so I sterilely prepped and draped the right groin, infused lidocaine along the anticipated cannulation tract and placed a 7 Fr triple lumen catheter using modified Seldinger technique and a single dilator. The catheter was sewn to the skin. All ports eulalia-back dark blood and flushed easily. The patient tolerated the procedure well without apparent complications. He remains in critical condition in the CVICU.     Electronically signed by Ruben Ariza MD on 6/7/2021 at 9:41 PM

## 2021-06-08 NOTE — PROGRESS NOTES
Starting to wean Neosynephrine once CRRT started. Intermittent drop in oxygen saturation to 92% and BP down to 334 systolic noted. Both return to previous levels without intervention.

## 2021-06-08 NOTE — PROGRESS NOTES
Elvina Kawasaki  6/8/2021  [id number]    Rehab Brief:    Noted decline in patient's overall status. Currently intubated on pressors as well as initiating CRRT today. We will follow peripherally pending clinical course. Thank you for the consultation.     Camryn Gaviria MD 6/8/2021 10:49 AM

## 2021-06-08 NOTE — PLAN OF CARE
Problem: Cardiovascular  Goal: Hemodynamic stability  Note: Pulse rate and rhythm, peripheral pulses, and capillary refill assessed every shift with assessment. General color and body temperature monitored throughout shift and with vitals. Assess for edema with head to toe assessment. Administer treatments and medications as ordered. Monitor patient's weight.

## 2021-06-08 NOTE — PROCEDURES
Vas-Cath insertion Procedure Note    Procedure: Insertion of Central Venous Catheter    Indications: Need for hemodialysis/CRRT    Procedure Details   Informed consent was obtained for the procedure, including sedation. Risks of lung perforation, hemorrhage, arrhythmia, and adverse drug reaction were discussed. Under sterile conditions the skin above the on the right internal jugular vein was prepped with betadine and covered with a sterile drape. Local anesthesia was applied to the skin and subcutaneous tissues. Ultrasound was used to identify the right internal jugular vein. Under direct ultrasound visualization the needle was inserted into the jugular vein with good blood return. A guide wire was then passed easily through the catheter. There were no arrhythmias. The catheter was then withdrawn. A trial assist catheter was then placed into the vein. . The catheter was sutured into place. Findings: There were no changes to vital signs. Catheter was flushed with 30 cc NS. Patient did tolerate procedure well. EBL = 50 cc    Recommendations:  CXR ordered to verify placement. Addendum: Chest x-ray confirmed good position of the Vas-Cath and no evidence of pneumothorax.     Total time of procedure   minutes

## 2021-06-08 NOTE — PROGRESS NOTES
Office : [phone]     Fax :[phone]         Renal Progress Note  Subjective:   Admit Date: 5/20/2021     NARDA Connor is a 70 y.o. male with h/o DM 2, CKD 3 , DLP who came with complaints of 2-month history of progressive exertional shortness of breath and chest discomfort.   s/p CABG X 4        Interval History:     Condition worsened last evening   Was hypotensive and bradycardic   Intubated because of acute respiratory failure  Was on 4 pressors overnight   In acute renal failure   Was severely acidotic     Improved BP overnight   HR around 60   Started to make some urine   Edema +    On light sedation   fio2 80 %     DIET DIET CARDIAC; Carb Control: 4 carb choices (60 gms)/meal; Daily Fluid Restriction: 2000 ml; Low Potassium  Medications:   Scheduled Meds:   ampicillin-sulbactam  3,000 mg Intravenous Q12H    fluconazole  200 mg Intravenous Q24H    metroNIDAZOLE  500 mg Intravenous Q8H    [Held by provider] verapamil  40 mg Oral 3 times per day    albuterol  2.5 mg Nebulization 4x daily    psyllium  1 packet Oral BID    polyethylene glycol  17 g Oral Daily    furosemide  40 mg Intravenous Once    sodium zirconium cyclosilicate  5 g Oral TID    calcium gluconate-NaCl  1,000 mg Intravenous Once    metoprolol tartrate  50 mg Oral BID    sodium bicarbonate        etomidate        insulin regular        etomidate        rocuronium  50 mg Intravenous Once    rocuronium        norepinephrine        vasopressin        vasopressin        norepinephrine        sodium bicarbonate        sodium bicarbonate  50 mEq Intravenous Once    [Held by provider] enoxaparin  30 mg Subcutaneous Daily    insulin lispro  0-6 Units Subcutaneous TID     insulin lispro  0-3 Units Subcutaneous Nightly    midodrine  15 mg Oral TID     fludrocortisone  0.1 mg Oral Daily    lidocaine 1 % injection  5 mL Intradermal Once    sodium chloride flush  5-40 mL Intravenous 2 times per day    sodium chloride flush  10 mL Intravenous 2 times per day    aspirin  325 mg Oral Daily    sennosides-docusate sodium  1 tablet Oral BID    pantoprazole  40 mg Oral Daily     Continuous Infusions:   phenylephrine (TAN-SYNEPHRINE) 50mg/250mL infusion 250 mcg/min (06/08/21 0654)    sodium bicarbonate infusion Stopped (06/08/21 0629)    sodium chloride      sodium chloride      prismaSATE BGK 4/2.5      prismaSol BGK 2/0      prismaSol BGK 4/2.5      DOPamine      vasopressin (Septic Shock) infusion 0.04 Units/min (06/08/21 0629)    dexmedetomidine 1.1 mcg/kg/hr (06/08/21 0632)    DOPamine 10 mcg/kg/min (06/08/21 0600)    norepinephrine Stopped (06/08/21 0253)    sodium chloride 10 mL (06/06/21 1413)    dextrose 100 mL/hr (06/05/21 0607)       Labs:  CBC:   Recent Labs     06/07/21 0355 06/07/21 1930 06/07/21 2103 06/07/21  2306 06/08/21  0539   WBC 17.7* 16.0*  --   --  20.9*   HGB 9.5* 8.0* 9.6* 9.2* 9.7*    226  --   --  219     BMP:    Recent Labs     06/07/21 2021 06/08/21 0105 06/08/21  0539   * 136 134*   K 5.8* 4.1 4.0   CL 89* 87* 87*   CO2 14* 21 26   * 125* 124*   CREATININE 4.0* 4.0* 3.9*   GLUCOSE 74 71 123*     Ca/Mg/Phos:   Recent Labs     06/06/21  0350 06/07/21 2021 06/08/21 0105 06/08/21  0539   CALCIUM 9.1 9.2 9.5 9.3   MG 2.60*  --  2.50* 2.50*   PHOS  --   --  7.5* 6.7*     Hepatic:   Recent Labs     06/07/21  0355 06/08/21  0105 06/08/21  0539   * 2,097* 3,613*   ALT 84* 657* 944*   BILITOT 4.6* 7.4* 7.9*   ALKPHOS 329* 310* 333*     Troponin: No results for input(s): TROPONINI in the last 72 hours.   BNP: No Persistent large amount of opacity in the left retrocardiac area either due   to pneumonia or atelectasis. Small left pleural effusion. Mild right   basilar atelectasis. Overall the lungs appear clearer than on the prior   study. US GALLBLADDER RUQ   Final Result   Fatty liver. No cholelithiasis or ancillary evidence of acute cholecystitis. XR CHEST PORTABLE   Final Result   Vascular congestion. Bibasilar small pleural effusions and atelectasis or pulmonary edema,   increased in the interval.  Pneumonia is a differential possibility. XR CHEST PORTABLE   Final Result   Left PICC projects in normal alignment. No pneumothorax. Increased left basilar opacity, atelectasis or airspace disease. There may   be a small component of pleural effusion. Stable cardiomegaly. XR CHEST PORTABLE   Final Result   No pneumothorax following removal of the left chest tube. Development of   moderate opacities within the right lung either atelectasis or pneumonia. No   abnormal pulmonary vascular congestion or sizable pleural effusion. XR ABDOMEN FOR NG/OG/NE TUBE PLACEMENT   Final Result      XR CHEST PORTABLE   Final Result   Satisfactory appearance status post median sternotomy      No pneumothorax         CT CHEST ABDOMEN PELVIS WO CONTRAST   Final Result   No acute abnormality identified in the chest, abdomen, or pelvis. No finding   worrisome for occult malignancy. Moderate enlargement of the prostate. VL PRE OP VEIN MAPPING   Final Result      VL DUP CAROTID BILATERAL   Final Result      XR CHEST (2 VW)   Final Result   No active cardiopulmonary disease             Assessment/Plan     1.   Acute kidney injury on CKD stage 3A   NORA 2/2 ATN   Creat was 3.4 yesterday   Had another episode of ATN yesterday after significant drop in BP   Cardiogenic shock   Low urine ouput   Has Severe acidosis   Hold all BP meds       Was too unstable to initiate CRRT last night   Today BP better   Still on 3 pressors   Will start CRRT. Requested VAS  cath placement with critical care .            Recommend to dose adjust all medications  based on renal functions  Maintain SBP> 90 mmHg   Daily weights   AVOID NSAIDs  Avoid Nephrotoxins  Monitor Intake/Output    2. Acute respiratory failure. Intubated and vent support   fio2 80 %   Will try UF with CRRT if tolerates  Consulted critical care      3. Metabolic acidosis 2/2 lactic acidosis   Improving with better perfusion     4. DM 2. Needs better control  On insulin    5. S/p CABG.  CTS and cardiology on board           CC time 50 minutes     Lucinda Martinez MD , MD  [facility] [address] [place] [address] 18 07

## 2021-06-08 NOTE — PROGRESS NOTES
Comprehensive Nutrition Assessment    Type and Reason for Visit:  Reassess    Nutrition Recommendations/Plan:   1. Continue NPO status    Nutrition Assessment:  Pt intubated last night. Pt is s/p CABG. Pt was eating well, but now NPO. Pt has phenylephrine, dopamine, and vasopressin running at this time. Pt may undergo CRRT. Malnutrition Assessment:  Malnutrition Status:  No malnutrition      Estimated Daily Nutrient Needs:  Energy (kcal):  1100 - 1400; Weight Used for Energy Requirements:  Admission     Protein (g):  146; Weight Used for Protein Requirements:  Ideal        Fluid (ml/day):  2000; Method Used for Fluid Requirements:  1 ml/kcal      Nutrition Related Findings:  I/O's: -508, RLE +1, LLE +2, generalized trace edema.       Wounds:  Surgical Incision       Current Nutrition Therapies:    DIET CARDIAC; Carb Control: 4 carb choices (60 gms)/meal; Daily Fluid Restriction: 2000 ml; Low Potassium    Anthropometric Measures:  · Height: 5' 9\" (175.3 cm)  · Current Body Weight: 240 lb (108.9 kg)   · Admission Body Weight: 221 lb (100.2 kg)     · Ideal Body Weight: 160 lbs; % Ideal Body Weight 150 %   · BMI: 35.4  · BMI Categories: Obese Class 2 (BMI 35.0 -39.9)       Nutrition Diagnosis:   · Increased nutrient needs related to increase demand for energy/nutrients as evidenced by wounds    · Inadequate energy intake related to inadequate protein-energy intake as evidenced by NPO or clear liquid status due to medical condition, intubation      Nutrition Interventions:   Food and/or Nutrient Delivery:  Continue NPO  Nutrition Education/Counseling:  Education completed   Coordination of Nutrition Care:  Continue to monitor while inpatient    Goals:  Nutrition support initiated by f/u once pt hemodynamically stable       Nutrition Monitoring and Evaluation:   Behavioral-Environmental Outcomes:  None Identified   Food/Nutrient Intake Outcomes:  Enteral Nutrition Intake/Tolerance  Physical Signs/Symptoms Outcomes:

## 2021-06-08 NOTE — PROCEDURES
[address] [place] [address] PROCEDURE NOTE    Patient name: Clark Connor . YOB: 1950     Procedure: Emergent endotracheal intubation  Indication: Respiratory failure    The patient was consented for intubation   Given Etomidate 30mg IV for procedure. The patient was intubated with Glidescope guidance. Endotracheal tube was visualized passing through vocal cords. End tidal CO2 indicator color change observed. Breath sounds auscultated bilaterally. Endotracheal tube secured at  23 cm at the lip. CXR ordered. No immediate complications. Mallampati score - class II      Procedure: Emergent arterial line placement for hemodynamic monitoring    Pt was consented for the procedure. Pt was sterilely prepped and draped. Ultrasound guidance was used and right brachial artery was entered without difficulty, good blood return. No immediate complications. Appropriate waveform was noted on the monitor.      Estimated blood loss - 5 mL    Ann Gutierrez MD   6/7/2021 11:20 PM

## 2021-06-08 NOTE — PROGRESS NOTES
Total of 4 units FFP infused absorbed and discontinued. No adverse reactions noted. Dr Calhoun Apt here. Insertion of triple lumen dialysis catheter completed without difficulty.  Stat portable chest x-ray ordered

## 2021-06-08 NOTE — PROGRESS NOTES
Dr Collins Moya called for updates, hemodynamics and gtt rates reviewed with MD. Per Dr Collins Moya, ok to place vascath and begin CRRT as long as SBP remains above 100. Message sent to Dr Adolfo Tuttle for vascath placement.

## 2021-06-08 NOTE — PROGRESS NOTES
CRRT orders reviewed and flowrates, fluids, and lines on CRRT machine verified by myself and Lexx Felix

## 2021-06-08 NOTE — PROGRESS NOTES
[facility]   Progress Note      6/8/2021 8:03 AM        Name: Wu Fraga .               Admitted: 5/20/2021    SUBJECTIVE:  Intubated and sedated, on CRRT    Reviewed interval ancillary notes    Current Medications  ampicillin-sulbactam (UNASYN) 3000 mg ivpb minibag, Q12H  fluconazole (DIFLUCAN) in 0.9 % sodium chloride IVPB 200 mg, Q24H  metronidazole (FLAGYL) 500 mg in NaCl 100 mL IVPB premix, Q8H  [Held by provider] verapamil (CALAN) tablet 40 mg, 3 times per day  albuterol (PROVENTIL) nebulizer solution 2.5 mg, 4x daily  psyllium (HYDROCIL) 95 % packet 1 packet, BID  polyethylene glycol (GLYCOLAX) packet 17 g, Daily  furosemide (LASIX) injection 40 mg, Once  phenylephrine (TAN-SYNEPHRINE) 50 mg in dextrose 5 % 250 mL infusion, Continuous  sodium zirconium cyclosilicate (LOKELMA) oral suspension 5 g, TID  sodium bicarbonate 100 mEq in dextrose 5 % 1,000 mL infusion, Continuous  0.9 % sodium chloride infusion, PRN  calcium gluconate 1000 mg in sodium chloride 50 mL, Once  sodium chloride 0.9 % infusion,   prismaSATE BGK 4/2.5 dialysis solution, Continuous  prismaSol BGK 2/0 dialysis solution, Continuous  prismaSol BGK 4/2.5 dialysis solution, Continuous  potassium chloride 20 mEq/50 mL IVPB (Central Line), PRN  magnesium sulfate 2000 mg in 50 mL IVPB premix, PRN  calcium gluconate 1000 mg in sodium chloride 50 mL, PRN   Or  calcium gluconate 2000 mg in sodium chloride 100 mL, PRN   Or  calcium gluconate 3,000 mg in dextrose 5 % 100 mL IVPB, PRN   Or  calcium gluconate 4,000 mg in dextrose 5 % 100 mL IVPB, PRN  sodium phosphate 6 mmol in dextrose 5 % 250 mL IVPB, PRN   Or  sodium phosphate 12 mmol in dextrose 5 % 250 mL IVPB, PRN   Or  sodium phosphate 18 mmol in dextrose 5 % 500 mL IVPB, PRN   Or  sodium phosphate 24 mmol in dextrose 5 % 500 mL IVPB, PRN  metoprolol tartrate (LOPRESSOR) tablet 50 mg, BID  acetaminophen (TYLENOL) tablet 1,000 mg, Q6H PRN  DOPamine (INTROPIN) 1.6-5 MG/ML-% infusion,   sodium bicarbonate 8.4 % injection,   etomidate (AMIDATE) 2 MG/ML injection,   insulin regular (HUMULIN R;NOVOLIN R) 100 UNIT/ML injection,   etomidate (AMIDATE) 2 MG/ML injection,   rocuronium (ZEMURON) injection 50 mg, Once  rocuronium (ZEMURON) 50 MG/5ML injection,   norepinephrine (LEVOPHED) 64 MCG/ML infusion SOLN,   perflutren lipid microspheres (DEFINITY) injection 1.65 mg, ONCE PRN  vasopressin (VASOSTRICT) 20 UNIT/ML injection,   vasopressin 20 Units in dextrose 5 % 100 mL infusion, Continuous  vasopressin (VASOSTRICT) 20 UNIT/ML injection,   dexmedetomidine (PRECEDEX) 400 mcg in sodium chloride 0.9 % 100 mL infusion, Continuous  DOPamine (INTROPIN) 400 mg in dextrose 5 % 250 mL infusion, Continuous  norepinephrine (LEVOPHED) 64 MCG/ML infusion SOLN,   norepinephrine (LEVOPHED) 16 mg in dextrose 5% 250 mL infusion, Continuous  sodium bicarbonate 8.4 % injection,   sodium bicarbonate 8.4 % injection 50 mEq, Once  [Held by provider] enoxaparin (LOVENOX) injection 30 mg, Daily  bisacodyl (DULCOLAX) EC tablet 5 mg, Daily PRN  insulin lispro (1 Unit Dial) 0-6 Units, TID WC  insulin lispro (1 Unit Dial) 0-3 Units, Nightly  midodrine (PROAMATINE) tablet 15 mg, TID WC  fludrocortisone (FLORINEF) tablet 0.1 mg, Daily  lidocaine PF 1 % injection 5 mL, Once  sodium chloride flush 0.9 % injection 5-40 mL, 2 times per day  sodium chloride flush 0.9 % injection 5-40 mL, PRN  0.9 % sodium chloride infusion, PRN  sodium chloride flush 0.9 % injection 10 mL, 2 times per day  sodium chloride flush 0.9 % injection 10 mL, PRN  ondansetron (ZOFRAN) injection 4 mg, Q6H PRN  metoclopramide (REGLAN) injection 10 mg, Q6H PRN  aspirin EC tablet 325 mg, Daily  traMADol (ULTRAM) tablet 50 mg, Q6H PRN   Or  traMADol (ULTRAM) tablet 100 mg, Q6H PRN  morphine (PF) injection 2 mg, Q2H PRN  diphenhydrAMINE (BENADRYL) tablet 25 mg, Nightly PRN  zolpidem (AMBIEN) tablet 5 mg, Nightly PRN  sennosides-docusate sodium (SENOKOT-S) 8.6-50 MG tablet 1 tablet, BID  magnesium hydroxide (MILK OF MAGNESIA) 400 MG/5ML suspension 30 mL, Daily PRN  pantoprazole (PROTONIX) tablet 40 mg, Daily  potassium chloride 20 mEq/50 mL IVPB (Central Line), PRN  magnesium sulfate 2000 mg in 50 mL IVPB premix, PRN  calcium chloride 1,000 mg in sodium chloride 0.9 % 100 mL IVPB, PRN  calcium chloride 2,000 mg in sodium chloride 0.9 % 100 mL IVPB, PRN  albuterol sulfate  (90 Base) MCG/ACT inhaler 2 puff, Q6H PRN  furosemide (LASIX) injection 20 mg, PRN  glucose (GLUTOSE) 40 % oral gel 15 g, PRN  dextrose 50 % IV solution, PRN  glucagon (rDNA) injection 1 mg, PRN  dextrose 5 % solution, PRN        Objective:  BP (!) 81/53   Pulse 55   Temp 96.5 °F (35.8 °C) (Temporal)   Resp 15   Ht 5' 9\" (1.753 m)   Wt 240 lb 1.3 oz (108.9 kg)   SpO2 97%   BMI 35.45 kg/m²     Intake/Output Summary (Last 24 hours) at 6/8/2021 0803  Last data filed at 6/8/2021 0600  Gross per 24 hour   Intake 2499.56 ml   Output 350 ml   Net 2149.56 ml      Wt Readings from Last 3 Encounters:   06/08/21 240 lb 1.3 oz (108.9 kg)   05/20/21 223 lb 9.6 oz (101.4 kg)   03/12/21 216 lb (98 kg)       General appearance:  Appears comfortable, sedated. ENT: Moist oral mucosa. Trachea midline, no adenopathy. Cardiovascular: Regular rhythm, normal S1, S2. No murmur. No edema in lower extremities  Respiratory: Not using accessory muscles. Good inspiratory effort. Clear to auscultation bilaterally, no wheeze or crackles.    GI: Abdomen soft, no tenderness, not distended  Musculoskeletal: No cyanosis in digits, neck supple  Skin: Warm, dry, normal turgor    Labs and Tests:  CBC:   Recent Labs     06/07/21  0355 06/07/21  1930 06/07/21  2103 06/07/21  2306 06/08/21  0539   WBC 17.7* 16.0*  --   --  20.9*   HGB 9.5* 8.0* 9.6* 9.2* 9.7*    226  --   --  219     BMP:    Recent Labs     06/07/21 2021 06/08/21 0105 06/08/21  0539   * 136 134*   K 5.8* 4.1 4.0   CL 89* 87* 87*   CO2 14* 21 26 * 125* 124*   CREATININE 4.0* 4.0* 3.9*   GLUCOSE 74 71 123*     Hepatic:   Recent Labs     06/07/21  0355 06/08/21  0105 06/08/21  0539   * 2,097* 3,613*   ALT 84* 657* 944*   BILITOT 4.6* 7.4* 7.9*   ALKPHOS 329* 310* 333*       ASSESSMENT AND PLAN    Principal Problem:    Coronary artery disease involving native coronary artery of native heart without angina pectoris  Active Problems:    Statin intolerance    Stage 3a chronic kidney disease (HCC)    Post poliomyelitis syndrome    Type 2 diabetes mellitus with diabetic nephropathy, without long-term current use of insulin (HCC)    Hypertension associated with diabetes ([address]    Mixed hyperlipidemia    S/P coronary artery bypass graft x 4    S/P left atrial appendage ligation    Essential hypertension    Acute renal failure with tubular necrosis (HCC)    Acute diastolic CHF (congestive heart failure) (HCC)  Resolved Problems:    * No resolved hospital problems. *      Thrombocytopenia   - platelet count remains wnl   - He was on arixtra intermittently which was stopped due to renal insufficiency.  - - HIT positive, YESICA negative  - argatroban drip discontinued  - currently on prophylactic dose lovenox     Coronary artery disease post bypass and left atrial ligation      Diabetes mellitus type 2     History of cleft lip and palate surgeries      Post polio syndrome     Chronic kidney disease stage 3 A      Elevated liver enzymes  - GI following  - US of gallbladder 6/7/21 showed fatty liver    Respiratory failure  - intubated 6/7/21       Vanda [place], VY  [facility]    Sedated and intubated after episode of severe acidosis and hypotension last night

## 2021-06-08 NOTE — PROGRESS NOTES
Called [facility]. Discussed with RN. Now on 4 pressors. Levophed just added. Systolic blood pressure around 60. Has an art line. In Cardiogenic shock. He is too unstable to start CRRT. Will not be able to tolerate CRRT.    Condition is critical. Pharmacy called and the script sent in to Pharmacy needed to be either Cardizem CD, Delcor XR , or tiazac ER . Kat has in her notes Tiazac ER so I will refax to [facility] . [phone] ref # [id number].

## 2021-06-08 NOTE — CONSULTS
P Pulmonary and Critical Care   Consult Note      Reason for Consult: Postop respiratory failure, NORA  Requesting Physician: Dr Christiane Mclean    Subjective:   [facility] / HPI:                The patient is a 70 y.o. male with significant past medical history of:      Diagnosis Date    CAD (coronary artery disease)     Diabetes mellitus ([address]     Elevated LFT's     Hyperlipidemia     Hypertension     Post poliomyelitis syndrome     Type II or unspecified type diabetes mellitus without mention of complication, not stated as uncontrolled      Patient was admitted with chest pain. Ultimately, he had CABG x4 and left atrial appendage clip. Postoperatively he has developed acute on chronic kidney disease and looks like he is going to need some CRRT. He has a pressor requirement on vasopressin, norepinephrine and Cristian-Synephrine. He is also receiving some Precedex for sedation but is responsive on the ventilator. Additionally, his wife was at the bedside during my evaluation.       Past Surgical History:        Procedure Laterality Date    CLEFT PALATE REPAIR      CORONARY ARTERY BYPASS GRAFT N/A 5/25/2021    Urgent CABG Coronary Artery Bypass Graft x4 (LIMA to LAD, SVG to PDA, SVG sequenced to OM1 and OM2), Endoscopic vein harvest left saphenous vein, Left atrial appendage ligation with 40mm AtriClip, total cardiopulmonary bypass, doppler flow verification of bypass grafts, insertion of temporary ventricular pacing wires, transesophageal echocardiogram, 5 level bilateral intercostal nerve block with Ma    SKIN BIOPSY Left 7/20/2020    EXCISE SKIN LESION LEFT POST AURICULAR WITH FLAP, FROZEN SECTIONS performed by Jerry Berry MD at [facility]       Current Medications:    Current Facility-Administered Medications: ampicillin-sulbactam (UNASYN) 3000 mg ivpb minibag, 3,000 mg, Intravenous, Q12H  fluconazole (DIFLUCAN) in 0.9 % sodium chloride IVPB 200 mg, 200 mg, Intravenous, Q24H  metronidazole (FLAGYL) 500 mg in NaCl 100 mL IVPB premix, 500 mg, Intravenous, Q8H  0.9 % sodium chloride infusion, , Intravenous, PRN  sodium chloride 0.9 % infusion, , ,   sodium chloride 0.9 % infusion, , ,   prismaSol BGK 4/2.5 dialysis solution, , Dialysis, Continuous  heparin (porcine) injection 5,500 Units, 5,500 Units, Subcutaneous, PRN  pantoprazole (PROTONIX) injection 40 mg, 40 mg, Intravenous, Daily  [Held by provider] verapamil (CALAN) tablet 40 mg, 40 mg, Oral, 3 times per day  albuterol (PROVENTIL) nebulizer solution 2.5 mg, 2.5 mg, Nebulization, 4x daily  psyllium (HYDROCIL) 95 % packet 1 packet, 1 packet, Oral, BID  polyethylene glycol (GLYCOLAX) packet 17 g, 17 g, Oral, Daily  furosemide (LASIX) injection 40 mg, 40 mg, Intravenous, Once  phenylephrine (TAN-SYNEPHRINE) 50 mg in dextrose 5 % 250 mL infusion,  mcg/min, Intravenous, Continuous  sodium zirconium cyclosilicate (LOKELMA) oral suspension 5 g, 5 g, Oral, TID  sodium bicarbonate 100 mEq in dextrose 5 % 1,000 mL infusion, , Intravenous, Continuous  prismaSATE BGK 4/2.5 dialysis solution, , Dialysis, Continuous  prismaSol BGK 4/2.5 dialysis solution, , Dialysis, Continuous  potassium chloride 20 mEq/50 mL IVPB (Central Line), 20 mEq, Intravenous, PRN  magnesium sulfate 2000 mg in 50 mL IVPB premix, 2,000 mg, Intravenous, PRN  calcium gluconate 1000 mg in sodium chloride 50 mL, 1,000 mg, Intravenous, PRN **OR** calcium gluconate 2000 mg in sodium chloride 100 mL, 2,000 mg, Intravenous, PRN **OR** calcium gluconate 3,000 mg in dextrose 5 % 100 mL IVPB, 3,000 mg, Intravenous, PRN **OR** calcium gluconate 4,000 mg in dextrose 5 % 100 mL IVPB, 4,000 mg, Intravenous, PRN  sodium phosphate 6 mmol in dextrose 5 % 250 mL IVPB, 6 mmol, Intravenous, PRN **OR** sodium phosphate 12 mmol in dextrose 5 % 250 mL IVPB, 12 mmol, Intravenous, PRN **OR** sodium phosphate 18 mmol in dextrose 5 % 500 mL IVPB, 18 mmol, Intravenous, PRN **OR** sodium phosphate 24 mmol in dextrose 5 % 500 mL IVPB, 24 mmol, Intravenous, PRN  metoprolol tartrate (LOPRESSOR) tablet 50 mg, 50 mg, Oral, BID  acetaminophen (TYLENOL) tablet 1,000 mg, 1,000 mg, Oral, Q6H PRN  rocuronium (ZEMURON) injection 50 mg, 50 mg, Intravenous, Once  perflutren lipid microspheres (DEFINITY) injection 1.65 mg, 1.5 mL, Intravenous, ONCE PRN  vasopressin 20 Units in dextrose 5 % 100 mL infusion, 0.01-0.03 Units/min, Intravenous, Continuous  dexmedetomidine (PRECEDEX) 400 mcg in sodium chloride 0.9 % 100 mL infusion, 0.2-1.4 mcg/kg/hr, Intravenous, Continuous  DOPamine (INTROPIN) 400 mg in dextrose 5 % 250 mL infusion, 2-20 mcg/kg/min, Intravenous, Continuous  norepinephrine (LEVOPHED) 16 mg in dextrose 5% 250 mL infusion, 2-100 mcg/min, Intravenous, Continuous  sodium bicarbonate 8.4 % injection 50 mEq, 50 mEq, Intravenous, Once  [Held by provider] enoxaparin (LOVENOX) injection 30 mg, 30 mg, Subcutaneous, Daily  bisacodyl (DULCOLAX) EC tablet 5 mg, 5 mg, Oral, Daily PRN  insulin lispro (1 Unit Dial) 0-6 Units, 0-6 Units, Subcutaneous, TID WC  insulin lispro (1 Unit Dial) 0-3 Units, 0-3 Units, Subcutaneous, Nightly  midodrine (PROAMATINE) tablet 15 mg, 15 mg, Oral, TID WC  fludrocortisone (FLORINEF) tablet 0.1 mg, 0.1 mg, Oral, Daily  lidocaine PF 1 % injection 5 mL, 5 mL, Intradermal, Once  sodium chloride flush 0.9 % injection 5-40 mL, 5-40 mL, Intravenous, 2 times per day  sodium chloride flush 0.9 % injection 5-40 mL, 5-40 mL, Intravenous, PRN  0.9 % sodium chloride infusion, 25 mL, Intravenous, PRN  sodium chloride flush 0.9 % injection 10 mL, 10 mL, Intravenous, 2 times per day  sodium chloride flush 0.9 % injection 10 mL, 10 mL, Intravenous, PRN  ondansetron (ZOFRAN) injection 4 mg, 4 mg, Intravenous, Q6H PRN  metoclopramide (REGLAN) injection 10 mg, 10 mg, Intravenous, Q6H PRN  aspirin EC tablet 325 mg, 325 mg, Oral, Daily  traMADol (ULTRAM) tablet 50 mg, 50 mg, Oral, Q6H PRN **OR** traMADol (ULTRAM) tablet 100 mg, 100 mg, Oral, Q6H PRN  morphine (PF) injection 2 mg, 2 mg, Intravenous, Q2H PRN  diphenhydrAMINE (BENADRYL) tablet 25 mg, 25 mg, Oral, Nightly PRN  zolpidem (AMBIEN) tablet 5 mg, 5 mg, Oral, Nightly PRN  sennosides-docusate sodium (SENOKOT-S) 8.6-50 MG tablet 1 tablet, 1 tablet, Oral, BID  magnesium hydroxide (MILK OF MAGNESIA) 400 MG/5ML suspension 30 mL, 30 mL, Oral, Daily PRN  albuterol sulfate  (90 Base) MCG/ACT inhaler 2 puff, 2 puff, Inhalation, Q6H PRN  furosemide (LASIX) injection 20 mg, 20 mg, Intravenous, PRN  glucose (GLUTOSE) 40 % oral gel 15 g, 15 g, Oral, PRN  dextrose 50 % IV solution, 12.5 g, Intravenous, PRN  glucagon (rDNA) injection 1 mg, 1 mg, Intramuscular, PRN  dextrose 5 % solution, 100 mL/hr, Intravenous, PRN    Allergies   Allergen Reactions    Atorvastatin      Muscle weakness, failed every statin attempted    Livalo [Pitavastatin Calcium]      Muscle weakness       Social History:    TOBACCO:   reports that he has never smoked. He has never used smokeless tobacco.  ETOH:   reports no history of alcohol use. Patient currently lives independently  Environmental/chemical exposure: None known    Family History:       Problem Relation Age of Onset    High Blood Pressure Mother     Diabetes Mother     High Blood Pressure Father     High Blood Pressure Brother      REVIEW OF SYSTEMS:    ROS is unobtainable due to his critical illness.       Objective:   PHYSICAL EXAM:      VITALS:  /75   Pulse 68   Temp 97.7 °F (36.5 °C)   Resp 16   Ht 5' 9\" (1.753 m)   Wt 240 lb 1.3 oz (108.9 kg)   SpO2 99%   BMI 35.45 kg/m²      24HR INTAKE/OUTPUT:      Intake/Output Summary (Last 24 hours) at 6/8/2021 1341  Last data filed at 6/8/2021 1206  Gross per 24 hour   Intake 3167.56 ml   Output 475 ml   Net 2692.56 ml     CONSTITUTIONAL: On mechanical ventilation and sedated but responsive  NECK:  Supple, symmetrical, trachea midline, no adenopathy, thyroid symmetric, not enlarged and no tenderness, skin normal  LUNGS:  no increased work of breathing and clear to auscultation. No accessory muscle use  CARDIOVASCULAR: S1 and S2, no edema and no JVD  ABDOMEN:  normal bowel sounds, mildly distended but soft and no masses palpated, and no tenderness to palpation. No hepatospleenomegaly  LYMPHADENOPATHY:  no axillary or supraclavicular adenopathy. No cervical adnenopathy  PSYCHIATRIC: Sedated but responsive seems appropriate. MUSCULOSKELETAL: No obvious misalignment or effusion of the joints. No clubbing, cyanosis of the digits. SKIN:  normal skin color, texture, turgor and no redness, warmth, or swelling. No palpable nodules    DATA:    Old records have been reviewed    CBC:  Recent Labs     06/07/21  0355 06/07/21  1930 06/07/21  2306 06/08/21  0539 06/08/21  0834   WBC 17.7* 16.0*  --  20.9*  --    RBC 3.38* 2.84*  --  3.40*  --    HGB 9.5* 8.0* 9.2* 9.7* 11.1*   HCT 29.9* 25.5* 30.2* 30.1*  --     226  --  219  --    MCV 88.6 90.0  --  88.5  --    MCH 28.2 28.1  --  28.5  --    MCHC 31.8 31.3  --  32.2  --    RDW 18.7* 19.1*  --  18.0*  --       BMP:  Recent Labs     06/08/21  0105 06/08/21  0539 06/08/21  0835    134* 134*   K 4.1 4.0 4.5   CL 87* 87* 88*   CO2 21 26 26   * 124* 118*   CREATININE 4.0* 3.9* 3.6*   CALCIUM 9.5 9.3 9.5   GLUCOSE 71 123* 137*      ABG:  Recent Labs     06/08/21  0408 06/08/21  0534 06/08/21  0834   PHART 7.387 7.399 7.449   KVB2OWL 44.4 44.9 42.2   PO2ART 103.1 77.8 76.7   XIN9VNB 26.7 27.8 29.6*   V8QWWWKP 98 95 96   BEART 2 3 5*     Procalcitonin  No results for input(s): PROCAL in the last 72 hours. No results found for: BNP  Lab Results   Component Value Date    TROPONINI 0.02 (H) 05/20/2021           Radiology Review:  All pertinent images / reports were reviewed as a part of this visit. Assessment:     1. Postoperative respiratory failure  2. Acute on chronic kidney disease  3. CAD status post CABG  4.  Ischemic hepatitis      Plan:     I have reviewed laboratories, medical records and images for this visit  Status post CABG. Has had some hypotension which is led to worsening renal failure and ischemic hepatitis. Is now in need of CRRT  I have been asked to place a Vas-Cath  Patient has significant coagulopathy with INR 3.4 and PTT near 70. We will transfuse 4 units fresh frozen plasma in advance of the procedure  Discussed risks of the procedure with the patient's wife including bleeding and pneumothorax  Patient remains on mechanical ventilation. ABG is acceptable though he is requiring a high FiO2  We will repeat chest x-ray after the Vas-Cath is placed  Anticipate maintaining mechanical ventilation today at least.  Continues to require vasopressin, norepinephrine and Cristian-Synephrine in support of hypotension. However, pressor need is downtrending overall. Does show evidence of ischemic hepatitis. We will continue to monitor. GI is also following. Total critical care time caring for this patient with life threatening illness, including direct patient contact, management of life support systems, review of data including imaging and labs, discussions with other team members and physicians is at least 32 minutes so far today, excluding procedures.

## 2021-06-08 NOTE — PROGRESS NOTES
Hospital Medicine Progress Note     Date:  6/8/2021    PCP: Indy Arauz MD (Tel: [phone])    Date of Admission: 5/20/2021      Subjective  Became more hypoxic overnight was emergently intubated on pressors started CRRT this morning      Objective  Physical exam:  Vitals: BP (!) 146/81   Pulse 63   Temp 97.6 °F (36.4 °C) (Core)   Resp (!) 33   Ht 5' 9\" (1.753 m)   Wt 240 lb 1.3 oz (108.9 kg)   SpO2 99%   BMI 35.45 kg/m²   Gen: intubated and sedated  Head: Normocephalic. Atraumatic. Eyes: EOMI.   ENT: Oral mucosa moist  Neck: No JVD. No obvious thyromegaly. CVS: Nml S1S2, + murmur, RRR cap refill < 2sec  Pulmomary: coarse breath sounds bilaterally  Gastrointestinal: Soft, NT/ND. Positive bowel sounds. Neuro: ibtubated and sedated  Psychiatry: intubated and sedated  Skin: Warm, dry with normal turgor. No rash      24HR INTAKE/OUTPUT:      Intake/Output Summary (Last 24 hours) at 6/8/2021 1147  Last data filed at 6/8/2021 1123  Gross per 24 hour   Intake 2963.56 ml   Output 520 ml   Net 2443. 56 ml     I/O last 3 completed shifts: In: 2499.6 [P.O.:120; I.V.:1869.6; Blood:300; IV Piggyback:209.9]  Out: 475 [Urine:450; Emesis/NG output:25]  I/O this shift:   In: 584 [I.V.:10; Blood:534; NG/GT:40]  Out: 270 [Urine:95; Emesis/NG output:175]      Meds:    ampicillin-sulbactam  3,000 mg Intravenous Q12H    fluconazole  200 mg Intravenous Q24H    metroNIDAZOLE  500 mg Intravenous Q8H    [Held by provider] verapamil  40 mg Oral 3 times per day    albuterol  2.5 mg Nebulization 4x daily    psyllium  1 packet Oral BID    polyethylene glycol  17 g Oral Daily    furosemide  40 mg Intravenous Once    sodium zirconium cyclosilicate  5 g Oral TID    metoprolol tartrate  50 mg Oral BID    rocuronium  50 mg Intravenous Once    sodium bicarbonate  50 mEq Intravenous Once    [Held by provider] enoxaparin  30 mg Subcutaneous Daily    insulin lispro  0-6 Units Subcutaneous TID     insulin lispro 0-3 Units Subcutaneous Nightly    midodrine  15 mg Oral TID WC    fludrocortisone  0.1 mg Oral Daily    lidocaine 1 % injection  5 mL Intradermal Once    sodium chloride flush  5-40 mL Intravenous 2 times per day    sodium chloride flush  10 mL Intravenous 2 times per day    aspirin  325 mg Oral Daily    sennosides-docusate sodium  1 tablet Oral BID    pantoprazole  40 mg Oral Daily       Infusions:    sodium chloride      sodium chloride      sodium chloride      phenylephrine (TAN-SYNEPHRINE) 50mg/250mL infusion 250 mcg/min (06/08/21 1044)    sodium bicarbonate infusion Stopped (06/08/21 0629)    prismaSATE BGK 4/2.5      prismaSol BGK 2/0      prismaSol BGK 4/2.5      vasopressin (Septic Shock) infusion 0.04 Units/min (06/08/21 0629)    dexmedetomidine 1.1 mcg/kg/hr (06/08/21 1134)    DOPamine 10 mcg/kg/min (06/08/21 1000)    norepinephrine Stopped (06/08/21 0253)    sodium chloride 10 mL (06/06/21 1413)    dextrose 100 mL/hr (06/05/21 0607)         PRN Meds: sodium chloride, potassium chloride, magnesium sulfate, calcium gluconate **OR** calcium gluconate **OR** calcium gluconate **OR** calcium gluconate, sodium phosphate IVPB **OR** sodium phosphate IVPB **OR** sodium phosphate IVPB **OR** sodium phosphate IVPB, acetaminophen, perflutren lipid microspheres, bisacodyl, sodium chloride flush, sodium chloride, sodium chloride flush, ondansetron, metoclopramide, traMADol **OR** traMADol, morphine, diphenhydrAMINE, zolpidem, magnesium hydroxide, potassium chloride, magnesium sulfate, calcium chloride IVPB, calcium chloride IVPB, albuterol sulfate HFA, furosemide, glucose, dextrose, glucagon (rDNA), dextrose    Labs/imaging:  CBC:   Recent Labs     06/07/21  0355 06/07/21  1930 06/07/21  2306 06/08/21  0539 06/08/21  0834   WBC 17.7* 16.0*  --  20.9*  --    HGB 9.5* 8.0* 9.2* 9.7* 11.1*    226  --  219  --          BMP:    Recent Labs     06/08/21  0105 06/08/21  0539 06/08/21  0835   NA 136 134* 134*   K 4.1 4.0 4.5   CL 87* 87* 88*   CO2 21 26 26   * 124* 118*   CREATININE 4.0* 3.9* 3.6*   GLUCOSE 71 123* 137*         Hepatic:   Recent Labs     06/08/21  0105 06/08/21  0539 06/08/21  0835   AST 2,097* 3,613* 3,249*   * 944* 933*   BILITOT 7.4* 7.9* 7.8*   ALKPHOS 310* 333* 330*       Troponin: No results for input(s): TROPONINI in the last 72 hours. BNP: No results for input(s): BNP in the last 72 hours. INR:   Recent Labs     06/06/21  0910 06/07/21  0355 06/07/21  1930   INR 2.84* 2.55* 3.45*           Reviewed imaging and reports noted      Assessment:  Principal Problem:    Coronary artery disease involving native coronary artery of native heart without angina pectoris  Active Problems:    Statin intolerance    Stage 3a chronic kidney disease (HCC)    Post poliomyelitis syndrome    Type 2 diabetes mellitus with diabetic nephropathy, without long-term current use of insulin (HCC)    Hypertension associated with diabetes ([facility] [address]    Mixed hyperlipidemia    S/P coronary artery bypass graft x 4    S/P left atrial appendage ligation    Essential hypertension    Acute renal failure with tubular necrosis (HCC)    Acute diastolic CHF (congestive heart failure) ([facility] [address]  Resolved Problems:    * No resolved hospital problems. *        Plan:  Acute hypoxic respiratory failure ?  Secondary to pna  -on vent   - pulm consulted  - on antibiotics, fu cultures,  MRSA swab    Septic shock secondary to above vs cardiogenic shock from [facility]  - on pressors, MAP>65    NORA on ckd3: nephro  On CRRT now    Elevated lfts: likely secondary to ischemic hepathopathy secondary to posoperative hypotension  - monitor  - gi on board    Hemotochezia: hgb stable,   - gi on board will need colonscopy in future    Severe multivessel CAD  -Status post CABG x4 on 5/25/2021  -Management per cardiothoracic surgery and cardiology          Uncontrolled insulin-dependent diabetes mellitus type 2 with circulatory problem  -Blood sugars improved  -Continue Lantus 30 units nightly,Humalog 10 units before meals and sliding scale insulin low, continue to monitor        Diet: DIET CARDIAC; Carb Control: 4 carb choices (60 gms)/meal; Daily Fluid Restriction: 2000 ml; Low Potassium    Activity: Up with assist      Code status: Full Code     ----------    I spent 35 minutures proviiding critical care serviices to his hemodynmaically unstable patient  Ryann Pollard MD  -------------------------------  Rounding hospitalist

## 2021-06-08 NOTE — PLAN OF CARE
Nutrition Problem #1: Increased nutrient needs  Intervention: Food and/or Nutrient Delivery: Continue NPO  Nutritional Goals: Nutrition support initiated by f/u once pt hemodynamically stable

## 2021-06-08 NOTE — PROGRESS NOTES
D:  Report received from previous shift RN, Baldemar Gutierrez. See nursing flow sheet for assessment and document flow sheet for vital signs/hemodynamic status. A:  Instructed patient on plan of care and goals for the day. R: Patient unable to verbalize understanding due to mechanical ventilation. Wife and granddaughter in to visit. Updates provided. Physicians in to visit this morning. Discussed plan of care. To get FFP prior to insertion of Vas Cath insertion due to elevated coags.

## 2021-06-09 NOTE — PROGRESS NOTES
Versed effective. Less vagal symptoms noted. Wakes up TV will decrease. Tries to push OETT out with tongue. Picking at linens. Dopamine remains at 5 mcg/kg/min and Vasopressin remains at 0.04 units/min.  Wife kept updated throughout the day

## 2021-06-09 NOTE — PROGRESS NOTES
Gastroenterology Progress Note    Nancy Maloney is a 70 y.o. male patient. Principal Problem:    Coronary artery disease involving native coronary artery of native heart without angina pectoris  Active Problems:    Statin intolerance    Stage 3a chronic kidney disease (HCC)    Post poliomyelitis syndrome    Type 2 diabetes mellitus with diabetic nephropathy, without long-term current use of insulin (HCC)    Hypertension associated with diabetes ([address]    Mixed hyperlipidemia    S/P coronary artery bypass graft x 4    S/P left atrial appendage ligation    Essential hypertension    Acute renal failure with tubular necrosis (HCC)    Acute diastolic CHF (congestive heart failure) ([address]    Acute hypoxemic respiratory failure (HCC)  Resolved Problems:    * No resolved hospital problems. *      SUBJECTIVE:  Pt sedated on vent. Had a smear of stool yesterday that was [place]. \"    ROS:  Unable to obtain    Physical    VITALS:  /75   Pulse 66   Temp 98.4 °F (36.9 °C) (Core) Comment (Src): bladder  Resp 11   Ht 5' 9\" (1.753 m)   Wt 247 lb 2.2 oz (112.1 kg)   SpO2 99%   BMI 36.50 kg/m²   TEMPERATURE:  Current - Temp: 98.4 °F (36.9 °C);  Max - Temp  Av.8 °F (37.1 °C)  Min: 97.9 °F (36.6 °C)  Max: 99.4 °F (37.4 °C)    NAD  RRR  Lungs CTA Bilaterally  Abdomen soft, ND, NT,  Bowel sounds normal.    Data    Data Review:    Recent Labs     21  1930 21  2306 21  0539 21  17521  04521  0749   WBC 16.0*  --  20.9*  --  15.9*  --    HGB 8.0* 9.2* 9.7* 10.4* 8.9* 10.6*   HCT 25.5* 30.2* 30.1*  --  27.8*  --    MCV 90.0  --  88.5  --  89.4  --      --  219  --  182  --      Recent Labs     21  0539 21  1925 21  04521  0748   * 135* 134* 133*   K 4.0 4.5 4.8 4.8   CL 87* 91* 95* 95*   CO2 26 25 23 24   PHOS 6.7* 5.2* 4.6  --    * 101* 75* 70*   CREATININE 3.9* 3.0* 2.2* 2.1*     Recent Labs     21  0459 examined the patient and I agree with the findings and recommended plan of care. In summary, my findings and plan are the following: remains intubated, bp improving/ weaning off pressors, abd soft, transaminases improved/ bili slight elevated all c/w improving ischemic hepatitis as bp improves, hbg stable/no signif gi bleed.        Berhane Contreras MD  [address]

## 2021-06-09 NOTE — PROGRESS NOTES
Rested quietly after administration of Versed. BP remained stable while eyes closed and quiet. When aroused -TV on vent down to 13 at times and BP decreases with it. Must be reminded to take a deep breath. CRRT continues. Wife visits and kept updated of status or changes. Dopamine down to 5 mcg/kg/min. Giving Midodrine and Florinef today. See flow sheets.

## 2021-06-09 NOTE — PROGRESS NOTES
[address] Daily Progress Note      Admit Date:  5/20/2021    Chief Complaint   Patient presents with    Shortness of Breath     sent by Dr Cuevas Neither for abnormal EKG. was unable to get into see cardiologist. has been feeling \"not so good\", gets sob with activity for a couple months. denies cp. no n/v. Subjective:  Mr. Daya Villegas off liana, low dose of dopamine.      Objective:   /75   Pulse 68   Temp 97.8 °F (36.6 °C) (Core)   Resp 15   Ht 5' 9\" (1.753 m)   Wt 247 lb 2.2 oz (112.1 kg)   SpO2 98%   BMI 36.50 kg/m²       Intake/Output Summary (Last 24 hours) at 6/9/2021 1457  Last data filed at 6/9/2021 1402  Gross per 24 hour   Intake 4810.43 ml   Output 2830 ml   Net 1980.43 ml       TELEMETRY: Sinus     Physical Exam:  General:  Intubated and sedated  Skin:  Warm and dry  Neck:  JVD +2  Chest:  normal air entry  Cardiovascular:  RRR S1S2, no S3,no FEI  Abdomen:  Soft, ND, NT, No HSM  Extremities:  1+ edema    Medications:    fentanNYL  50 mcg Intravenous Once    metroNIDAZOLE  500 mg Intravenous Q8H    pantoprazole  40 mg Intravenous Daily    fluconazole  400 mg Intravenous Q24H    ampicillin-sulbactam  3,000 mg Intravenous Q8H    insulin lispro  0-6 Units Subcutaneous Q4H    [Held by provider] verapamil  40 mg Oral 3 times per day    albuterol  2.5 mg Nebulization 4x daily    psyllium  1 packet Oral BID    polyethylene glycol  17 g Oral Daily    furosemide  40 mg Intravenous Once    metoprolol tartrate  50 mg Oral BID    sodium bicarbonate  50 mEq Intravenous Once    [Held by provider] enoxaparin  30 mg Subcutaneous Daily    midodrine  15 mg Oral TID WC    fludrocortisone  0.1 mg Oral Daily    lidocaine 1 % injection  5 mL Intradermal Once    sodium chloride flush  5-40 mL Intravenous 2 times per day    aspirin  325 mg Oral Daily    sennosides-docusate sodium  1 tablet Oral BID      fentaNYL      sodium chloride      prismaSol BGK 4/2.5 500 mL/hr at 06/09/21 1120    phenylephrine (TAN-SYNEPHRINE) 50mg/250mL infusion Stopped (06/08/21 1727)    sodium bicarbonate infusion Stopped (06/08/21 0629)    prismaSATE BGK 4/2.5 1,000 mL/hr at 06/09/21 1119    prismaSol BGK 4/2.5 500 mL/hr at 06/09/21 1120    vasopressin (Septic Shock) infusion 0.04 Units/min (06/09/21 1356)    dexmedetomidine 1.4 mcg/kg/hr (06/09/21 1356)    DOPamine 5 mcg/kg/min (06/09/21 1356)    norepinephrine Stopped (06/08/21 0253)    sodium chloride 10 mL/hr at 06/09/21 1356    dextrose 100 mL/hr (06/05/21 0607)     midazolam, sodium chloride, heparin (porcine), phenol, potassium chloride, magnesium sulfate, calcium gluconate **OR** calcium gluconate **OR** calcium gluconate **OR** calcium gluconate, sodium phosphate IVPB **OR** sodium phosphate IVPB **OR** sodium phosphate IVPB **OR** sodium phosphate IVPB, bisacodyl, sodium chloride flush, sodium chloride, ondansetron, metoclopramide, traMADol **OR** traMADol, morphine, diphenhydrAMINE, zolpidem, magnesium hydroxide, albuterol sulfate HFA, furosemide, glucose, dextrose, glucagon (rDNA), dextrose    Lab Data:  CBC:   Recent Labs     06/07/21  1930 06/07/21  2306 06/08/21  0539 06/08/21  1759 06/09/21  0459 06/09/21  0749   WBC 16.0*  --  20.9*  --  15.9*  --    HGB 8.0* 9.2* 9.7* 10.4* 8.9* 10.6*   HCT 25.5* 30.2* 30.1*  --  27.8*  --    MCV 90.0  --  88.5  --  89.4  --      --  219  --  182  --      BMP:   Recent Labs     06/08/21  0539 06/08/21  1925 06/09/21 0459 06/09/21  0748   * 135* 134* 133*   K 4.0 4.5 4.8 4.8   CL 87* 91* 95* 95*   CO2 26 25 23 24   PHOS 6.7* 5.2* 4.6  --    * 101* 75* 70*   CREATININE 3.9* 3.0* 2.2* 2.1*     LIVER PROFILE:   Recent Labs     06/08/21 1925 06/09/21 0459 06/09/21  0748   AST 2,282* 1,626* 1,537*   * 698* 685*   BILITOT 7.0* 8.0* 8.4*   ALKPHOS 273* 277* 280*     PT/INR:   Recent Labs     06/07/21 1930 06/08/21 1925 06/09/21 0459   PROTIME 40.5* 35.5* 39.5*   INR 3.45* 3.03* 3.36*

## 2021-06-09 NOTE — PROGRESS NOTES
[facility]   Progress Note      6/9/2021 8:55 AM        Name: Seth Greenwood . Admitted: 5/20/2021    SUBJECTIVE:  Remains sedated and intubated.      Reviewed interval ancillary notes    Current Medications  midazolam PF (VERSED) injection 2 mg, Q2H PRN  fentaNYL 10 mcg/mL infusion, Continuous  fentaNYL (SUBLIMAZE) injection 50 mcg, Once  metronidazole (FLAGYL) 500 mg in NaCl 100 mL IVPB premix, Q8H  0.9 % sodium chloride infusion, PRN  prismaSol BGK 4/2.5 dialysis solution, Continuous  heparin (porcine) injection 5,500 Units, PRN  pantoprazole (PROTONIX) injection 40 mg, Daily  fluconazole (DIFLUCAN) in 0.9 % sodium chloride IVPB 400 mg, Q24H  ampicillin-sulbactam (UNASYN) 3000 mg ivpb minibag, Q8H  insulin lispro (1 Unit Dial) 0-6 Units, Q4H  phenol 1.4 % mouth spray 1 spray, Q2H PRN  [Held by provider] verapamil (CALAN) tablet 40 mg, 3 times per day  albuterol (PROVENTIL) nebulizer solution 2.5 mg, 4x daily  psyllium (HYDROCIL) 95 % packet 1 packet, BID  polyethylene glycol (GLYCOLAX) packet 17 g, Daily  furosemide (LASIX) injection 40 mg, Once  phenylephrine (TAN-SYNEPHRINE) 50 mg in dextrose 5 % 250 mL infusion, Continuous  sodium bicarbonate 100 mEq in dextrose 5 % 1,000 mL infusion, Continuous  prismaSATE BGK 4/2.5 dialysis solution, Continuous  prismaSol BGK 4/2.5 dialysis solution, Continuous  potassium chloride 20 mEq/50 mL IVPB (Central Line), PRN  magnesium sulfate 2000 mg in 50 mL IVPB premix, PRN  calcium gluconate 1000 mg in sodium chloride 50 mL, PRN   Or  calcium gluconate 2000 mg in sodium chloride 100 mL, PRN   Or  calcium gluconate 3,000 mg in dextrose 5 % 100 mL IVPB, PRN   Or  calcium gluconate 4,000 mg in dextrose 5 % 100 mL IVPB, PRN  sodium phosphate 6 mmol in dextrose 5 % 250 mL IVPB, PRN   Or  sodium phosphate 12 mmol in dextrose 5 % 250 mL IVPB, PRN   Or  sodium phosphate 18 mmol in dextrose 5 % 500 mL IVPB, PRN   Or  sodium phosphate 24 mmol in dextrose 5 6/9/2021 0855  Last data filed at 6/9/2021 0036  Gross per 24 hour   Intake 5288.93 ml   Output 2380 ml   Net 2908.93 ml      Wt Readings from Last 3 Encounters:   06/09/21 247 lb 2.2 oz (112.1 kg)   05/20/21 223 lb 9.6 oz (101.4 kg)   03/12/21 216 lb (98 kg)       General appearance: Sedated  Cardiovascular: Regular rhythm, normal S1, S2. No murmur. No edema in lower extremities  Respiratory: Intubated with mechanical vent support. GI: Abdomen soft, no tenderness, not distended  Musculoskeletal: No cyanosis in digits, neck supple  Skin: Warm, dry, normal turgor    Labs and Tests:  CBC:   Recent Labs     06/07/21  1930 06/08/21  0539 06/08/21  1759 06/09/21  0459 06/09/21  0749   WBC 16.0* 20.9*  --  15.9*  --    HGB 8.0* 9.7* 10.4* 8.9* 10.6*    219  --  182  --      BMP:    Recent Labs     06/08/21 1925 06/09/21  0459 06/09/21  0748   * 134* 133*   K 4.5 4.8 4.8   CL 91* 95* 95*   CO2 25 23 24   * 75* 70*   CREATININE 3.0* 2.2* 2.1*   GLUCOSE 82 91 65*     Hepatic:   Recent Labs     06/08/21 1925 06/09/21  0459 06/09/21  0748   AST 2,282* 1,626* 1,537*   * 698* 685*   BILITOT 7.0* 8.0* 8.4*   ALKPHOS 273* 277* 280*       ASSESSMENT AND PLAN    Principal Problem:    Coronary artery disease involving native coronary artery of native heart without angina pectoris  Active Problems:    Statin intolerance    Stage 3a chronic kidney disease (HCC)    Post poliomyelitis syndrome    Type 2 diabetes mellitus with diabetic nephropathy, without long-term current use of insulin (HCC)    Hypertension associated with diabetes ([address]    Mixed hyperlipidemia    S/P coronary artery bypass graft x 4    S/P left atrial appendage ligation    Essential hypertension    Acute renal failure with tubular necrosis (HCC)    Acute diastolic CHF (congestive heart failure) (HCC)    Acute hypoxemic respiratory failure (HCC)  Resolved Problems:    * No resolved hospital problems. *         Thrombocytopenia   - platelet count remains wnl   - He was on arixtra intermittently which was stopped due to renal insufficiency. 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  [facility].  [phone]

## 2021-06-09 NOTE — PROGRESS NOTES
Hospital Medicine Progress Note     Date:  6/9/2021    PCP: Ben Roque MD (Tel: [phone])    Date of Admission: 5/20/2021      Subjective  No of new-still on dopamine and vasopressin had CRRT yesterday is intubated and sedated, blood cell count trending downward  Objective  Physical exam:  Vitals: /75   Pulse 77   Temp 99.3 °F (37.4 °C) (Core)   Resp 17   Ht 5' 9\" (1.753 m)   Wt 247 lb 2.2 oz (112.1 kg)   SpO2 97%   BMI 36.50 kg/m²   Gen: intubated and sedated  Head: Normocephalic. Atraumatic. Eyes: EOMI.   ENT: Oral mucosa moist  Neck: No JVD. No obvious thyromegaly. CVS: Nml S1S2, + murmur, RRR cap refill < 2sec  Pulmomary: coarse breath sounds bilaterally  Gastrointestinal: Soft, NT/ND. Positive bowel sounds. Neuro: ibtubated and sedated  Psychiatry: intubated and sedated  Skin: Warm, dry with normal turgor. No rash      24HR INTAKE/OUTPUT:      Intake/Output Summary (Last 24 hours) at 6/9/2021 0945  Last data filed at 6/9/2021 0900  Gross per 24 hour   Intake 5410.65 ml   Output 2452 ml   Net 2958.65 ml     I/O last 3 completed shifts: In: 5239.2 [I.V.:2970.2;  Blood:1104.3; NG/GT:40; IV Piggyback:1124.7]  Out: 2528 [Urine:646; Emesis/NG output:225]  I/O this shift:  In: 211.5 [I.V.:153.6; IV Piggyback:57.9]  Out: 152       Meds:    fentanNYL  50 mcg Intravenous Once    metroNIDAZOLE  500 mg Intravenous Q8H    pantoprazole  40 mg Intravenous Daily    fluconazole  400 mg Intravenous Q24H    ampicillin-sulbactam  3,000 mg Intravenous Q8H    insulin lispro  0-6 Units Subcutaneous Q4H    [Held by provider] verapamil  40 mg Oral 3 times per day    albuterol  2.5 mg Nebulization 4x daily    psyllium  1 packet Oral BID    polyethylene glycol  17 g Oral Daily    furosemide  40 mg Intravenous Once    metoprolol tartrate  50 mg Oral BID    rocuronium  50 mg Intravenous Once    sodium bicarbonate  50 mEq Intravenous Once    [Held by provider] enoxaparin  30 mg Subcutaneous 06/09/21 0459 06/09/21  0748   AST 2,282* 1,626* 1,537*   * 698* 685*   BILITOT 7.0* 8.0* 8.4*   ALKPHOS 273* 277* 280*       Troponin: No results for input(s): TROPONINI in the last 72 hours. BNP: No results for input(s): BNP in the last 72 hours. INR:   Recent Labs     06/07/21 1930 06/08/21 1925 06/09/21 0459   INR 3.45* 3.03* 3.36*           Reviewed imaging and reports noted      Assessment:  Principal Problem:    Coronary artery disease involving native coronary artery of native heart without angina pectoris  Active Problems:    Statin intolerance    Stage 3a chronic kidney disease (HCC)    Post poliomyelitis syndrome    Type 2 diabetes mellitus with diabetic nephropathy, without long-term current use of insulin (HCC)    Hypertension associated with diabetes ([facility]    Mixed hyperlipidemia    S/P coronary artery bypass graft x 4    S/P left atrial appendage ligation    Essential hypertension    Acute renal failure with tubular necrosis (HCC)    Acute diastolic CHF (congestive heart failure) ([facility]    Acute hypoxemic respiratory failure (HCC)  Resolved Problems:    * No resolved hospital problems. *        Plan:  Acute hypoxic respiratory failure ? Secondary to pna  -on vent   - pulm consulted  - on antibiotics, fu cultures,  MRSA swab  - picc line culture growin staph epi ?  Contiaminant, ctvs removing picc line     Septic shock secondary to above vs cardiogenic shock from [facility]  - on pressors, MAP>65    NORA on ckd3: nephro  On CRRT now    Elevated lfts: likely secondary to ischemic hepathopathy secondary to posoperative hypotension  - monitor  - gi on board    Hemotochezia: hgb stable,   - gi on board will need colonscopy in future    Severe multivessel CAD  -Status post CABG x4 on 5/25/2021  -Management per cardiothoracic surgery and cardiolog    Uncontrolled insulin-dependent diabetes mellitus type 2 with circulatory problem: Hyperglycemia now Lantus discontinued just sliding scale insulin monitor closely        Diet: DIET CARDIAC; Carb Control: 4 carb choices (60 gms)/meal; Daily Fluid Restriction: 2000 ml; Low Potassium    Activity: Up with assist      Code status: Full Code     ----------    I spent 37 minutures proviiding critical care serviices to his hemodynmaically unstable patient  Sayra Main MD  -------------------------------  Rounding hospitalist

## 2021-06-09 NOTE — PROGRESS NOTES
CC: s/p CABG x 4 / ANDREW; acute on CRF improcing on CRRT; elevated LFT's improving    Intubated and sedated, but responsive  No BM  NSR at 70  On 10mcg/kg/min dopamine and 0.4 vasopressin  Staph epi DNA on PICC line blood culture  On 80% FIO2  PaCO2 45 this morning (lower volumes when he bucks the vent)  CTAB RRR with no murmur  As per CC  D/C PICC line wean dopamine as roddy  PRN versed for agitation  Wean vent as roddy  GI for LFT's and bloody smears / constipation  CRRT / diuresis per nephrology  Wean dopamine to 2.5 mcg/kg/min as roddy and then hold  Give midodrine per OG  Continue abx

## 2021-06-09 NOTE — PROGRESS NOTES
06/09/21 0821   Vent Patient Data   Plateau Pressure 24 [id number]   Static Compliance 30.5 mL/cmH2O   Dynamic Compliance 29 mL/cmH2O

## 2021-06-09 NOTE — PROGRESS NOTES
Office : [phone]     Fax :[phone]         Renal Progress Note  Subjective:   Admit Date: 5/20/2021     NARDA Spangler is a 70 y.o. male with h/o DM 2, CKD 3 , DLP who came with complaints of 2-month history of progressive exertional shortness of breath and chest discomfort.   s/p CABG X 4        Interval History:           BP stable on 2 pressors   On CRRT   Tolerating well. BUN/creatinine levels trended down  Started to make some urine   Edema +    On light sedation   fio2 85 %     I/O last 3 completed shifts: In: 5239.2 [I.V.:2970.2;  Blood:1104.3; NG/GT:40; IV Piggyback:1124.7]  Out: 2528 [Urine:646; Emesis/NG output:225]  I/O this shift:  In: 211.5 [I.V.:153.6; IV Piggyback:57.9]  Out: 152         DIET DIET CARDIAC; Carb Control: 4 carb choices (60 gms)/meal; Daily Fluid Restriction: 2000 ml; Low Potassium  Medications:   Scheduled Meds:   metroNIDAZOLE  500 mg Intravenous Q8H    pantoprazole  40 mg Intravenous Daily    fluconazole  400 mg Intravenous Q24H    ampicillin-sulbactam  3,000 mg Intravenous Q8H    insulin lispro  0-6 Units Subcutaneous Q4H    [Held by provider] verapamil  40 mg Oral 3 times per day    albuterol  2.5 mg Nebulization 4x daily    psyllium  1 packet Oral BID    polyethylene glycol  17 g Oral Daily    furosemide  40 mg Intravenous Once    metoprolol tartrate  50 mg Oral BID    rocuronium  50 mg Intravenous Once    sodium bicarbonate  50 mEq Intravenous Once    [Held by provider] enoxaparin  30 mg Subcutaneous Daily    midodrine  15 mg Oral TID WC    fludrocortisone  0.1 mg Oral Daily    lidocaine 1 % injection  5 mL Intradermal Once    sodium chloride flush  5-40 mL Intravenous 2 times per day    sodium chloride flush  10 mL Intravenous 2 times per day    aspirin  325 mg Oral Daily    sennosides-docusate sodium  1 tablet Oral BID     Continuous Infusions:   sodium chloride      prismaSol BGK 4/2.5 500 mL/hr at 06/09/21 0107    phenylephrine (TAN-SYNEPHRINE) 50mg/250mL infusion Stopped (06/08/21 1727)    sodium bicarbonate infusion Stopped (06/08/21 0629)    prismaSATE BGK 4/2.5 1,000 mL/hr at 06/09/21 0611    prismaSol BGK 4/2.5 500 mL/hr at 06/09/21 0110    vasopressin (Septic Shock) infusion 0.04 Units/min (06/09/21 0805)    dexmedetomidine 1.4 mcg/kg/hr (06/09/21 0805)    DOPamine 7.5 mcg/kg/min (06/09/21 0805)    norepinephrine Stopped (06/08/21 0253)    sodium chloride Stopped (06/06/21 1554)    dextrose 100 mL/hr (06/05/21 0607)       Labs:  CBC:   Recent Labs     06/07/21 1930 06/08/21  0539 06/08/21 1759 06/09/21  0459 06/09/21  0749   WBC 16.0* 20.9*  --  15.9*  --    HGB 8.0* 9.7* 10.4* 8.9* 10.6*    219  --  182  --      BMP:    Recent Labs     06/08/21 1925 06/09/21 0459 06/09/21  0748   * 134* 133*   K 4.5 4.8 4.8   CL 91* 95* 95*   CO2 25 23 24   * 75* 70*   CREATININE 3.0* 2.2* 2.1*   GLUCOSE 82 91 65*     Ca/Mg/Phos:   Recent Labs     06/08/21 0105 06/08/21  0539 06/08/21 1925 06/09/21 0459 06/09/21  0748   CALCIUM 9.5 9.3 8.8 8.5 8.7   MG 2.50* 2.50* 2.30  --   --    PHOS 7.5* 6.7* 5.2* 4.6  --      Hepatic:   Recent Labs     06/08/21  0835 06/08/21  1925 06/09/21  0459 06/09/21  0748   AST 3,249* 2,282* 1,626*  --    * 775* 698* 685*   BILITOT 7.8* 7.0* 8.0* 8.4*   ALKPHOS 330* 273* 277* 280*     Troponin: No results for input(s): TROPONINI in the last 72 hours. BNP: No results for input(s): BNP in the last 72 hours. Lipids: No results for input(s): CHOL, TRIG, HDL, LDLCALC, LABVLDL in the last 72 hours.   ABGs:   Recent Labs     06/09/21  0749 PHART 7.345*   PO2ART 70.9*   ERU9TMZ 47.9*     INR:   Recent Labs     06/07/21  1930 06/08/21  1925 06/09/21  0459   INR 3.45* 3.03* 3.36*     UA:No results for input(s): Anniston Raw, GLUCOSEU, BILIRUBINUR, Blossom Duran, BLOODU, PHUR, PROTEINU, UROBILINOGEN, NITRU, LEUKOCYTESUR, Scherrie Cho in the last 72 hours. Urine Microscopic: No results for input(s): LABCAST, BACTERIA, COMU, HYALCAST, WBCUA, RBCUA, EPIU in the last 72 hours. Urine Culture: No results for input(s): LABURIN in the last 72 hours. Urine Chemistry: No results for input(s): Dean Heady, PROTEINUR, NAUR in the last 72 hours. Objective:   Vitals: /75   Pulse 76   Temp 99.4 °F (37.4 °C) (Core)   Resp 18   Ht 5' 9\" (1.753 m)   Wt 247 lb 2.2 oz (112.1 kg)   SpO2 96%   BMI 36.50 kg/m²    Wt Readings from Last 3 Encounters:   06/09/21 247 lb 2.2 oz (112.1 kg)   05/20/21 223 lb 9.6 oz (101.4 kg)   03/12/21 216 lb (98 kg)      24HR INTAKE/OUTPUT:      Intake/Output Summary (Last 24 hours) at 6/9/2021 3629  Last data filed at 6/9/2021 1884  Gross per 24 hour   Intake 5288.93 ml   Output 2380 ml   Net 2908.93 ml     Constitutional:  Intubated   NECK: supple, no JVD  Cardiovascular:  S1, S2 without m/r/g  Respiratory:  CTA B without w/r/r  Abdomen: +bs, soft, nt  Ext:  1 + LE edema. Mainly prominent in thigh area     Assessment and Plan:       IMAGING:  XR ABDOMEN (KUB) (SINGLE AP VIEW)   Final Result   Addendum 1 of 1   ADDENDUM:   There is an overall paucity of bowel gas. There is some retained contrast    in   the colon which is collapsed. The bowel gas pattern is nonspecific. Final   Triple-lumen catheter as above. XR CHEST PORTABLE   Final Result   Interval placement of tubes and lines as above. XR ABDOMEN FOR NG/OG/NE TUBE PLACEMENT   Final Result   Status post gastric tube and ET tube placement in good position with no   change in the left PICC line.       Stable cardiomegaly with mild central pulmonary congestion and mild discoid   atelectasis or scarring along lung bases. Esophageal probe in place and a left PICC line in place. XR CHEST PORTABLE   Final Result   Persistent large amount of opacity in the left retrocardiac area either due   to pneumonia or atelectasis. Small left pleural effusion. Mild right   basilar atelectasis. Overall the lungs appear clearer than on the prior   study. US GALLBLADDER RUQ   Final Result   Fatty liver. No cholelithiasis or ancillary evidence of acute cholecystitis. XR CHEST PORTABLE   Final Result   Vascular congestion. Bibasilar small pleural effusions and atelectasis or pulmonary edema,   increased in the interval.  Pneumonia is a differential possibility. XR CHEST PORTABLE   Final Result   Left PICC projects in normal alignment. No pneumothorax. Increased left basilar opacity, atelectasis or airspace disease. There may   be a small component of pleural effusion. Stable cardiomegaly. XR CHEST PORTABLE   Final Result   No pneumothorax following removal of the left chest tube. Development of   moderate opacities within the right lung either atelectasis or pneumonia. No   abnormal pulmonary vascular congestion or sizable pleural effusion. XR ABDOMEN FOR NG/OG/NE TUBE PLACEMENT   Final Result      XR CHEST PORTABLE   Final Result   Satisfactory appearance status post median sternotomy      No pneumothorax         CT CHEST ABDOMEN PELVIS WO CONTRAST   Final Result   No acute abnormality identified in the chest, abdomen, or pelvis. No finding   worrisome for occult malignancy. Moderate enlargement of the prostate. VL PRE OP VEIN MAPPING   Final Result      VL DUP CAROTID BILATERAL   Final Result      XR CHEST (2 VW)   Final Result   No active cardiopulmonary disease             Assessment/Plan     1.   Acute kidney injury on CKD stage 3A   NORA 2/2 ATN   Acidosis better. Tolerating CRRT  Doing ultrafiltration to keep even only at this time   Blood pressure still soft and on 2 pressors    Recommend to dose adjust all medications  based on renal functions  Maintain SBP> 90 mmHg   Daily weights   AVOID NSAIDs  Avoid Nephrotoxins  Monitor Intake/Output    2. Acute respiratory failure. Intubated and vent support   fio2 85 %   Appreciate critical care help.     3. Metabolic acidosis 2/2 lactic acidosis   Improving with better perfusion     4. DM 2. Needs better control  On insulin    5. S/p CABG. CTS and cardiology on board       Continue same dialysis solution today  Monitor electrolytes. Replace calcium as needed.     CC time 39  minutes     Loreto Simpson MD , MD  [facility] [address] [place] [address] 18 07

## 2021-06-10 NOTE — PROGRESS NOTES
Cardiothoracic Surgery Progress Note    CC: s/p CABG X 4/ANDREW, acute on CRF, elevated LFTs, KATHERYN  POD# 16    Subjective:  Hemodynamically stable on vasopressin and dopamine gtts, 90% FIO2 vent, afebrile. Weight continues to trend up. WT:112.6 kg/112.1 kg   pre-op 98.3 kg    Vital Signs:   BP (!) 102/59   Pulse 72   Temp 97.2 °F (36.2 °C) (Core)   Resp 14   Ht 5' 9\" (1.753 m)   SpO2 94%       Gtts: Vasopressin 0.2units/min, dopamine 5 mcg/kg/min    Physical Exam:   Cardiac:  NSR, + murmur   Lungs: clear to auscultation, crackles bases bilaterally. Vent IMV 18, PSV 15, peep 5, FIO2 90%  Abdomen:  + BM post op, hypoactive X 4, distended   Vascular:  Bilateral feet cold, toes mottled, pulses all palpable   Extremities: generalized, non-pitting edema 2+, jaundiced  : On CRRT   Chest Tube(s) & Incision(s):    Sternum: stable,C/D/I, no drainage  Chest tube site: C/D/I    Left leg incision:  Ace wrap C/D/I        Labs:   CBC:   Recent Labs     21  0459 06/10/21  0606 06/10/21  0812   WBC 15.9* 10.1  --    HGB 8.9* 9.0* 10.2*   HCT 27.8* 28.1*  --     155  --      BMP:   Recent Labs     21  0748 21  2042   K 4.5 4.8 4.1   CREATININE 3.0* 2.1* 1.3   CALCIUM 8.8 8.7 8.2*   MG 2.30  --  2.20     PT/INR:   Recent Labs     21  0459   PROTIME 35.5* 39.5*   INR 3.03* 3.36*     IMAGIN/10/2021  Support hardware remains in satisfactory position.  The heart is enlarged with interval worsening of central pulmonary venous congestion.  Bilateral perihilar and lower lobe airspace disease has increased along with small bilateral pleural effusions. Assessment/Plan:  As per CC:     Plan:   -Blood cultures- staph epi DNA on PICC line culture. PICC dc'd  and culture pending. Afebrile. WBC trending down. Continue Unasyn, Flagyl, and diflucan. Sputum culture pending from . Urine culture ordered.   -Elevated LFTs, bloody smears- GI on board.  US of gallbladder  showed fatty liver. Coagulopathy - INR 3.36. Continue to hold blood thinners. -KATHERYN- + murmur. Goal - Leave Vasopressin at .03 units/min  Wean Dopamine to 2.5 mcg/kg/min. If patient not bradycardic, wean to off   May start Cristian if needed for goal   Restart Midodrine   - Thrombocytopenia resolved- initial HIT positive , YESICA negative. Argatroban off. -ARF on CRF- nephrology on board and managing CRRT and electrolytes. Will start taking fluid off today. -Hypoxia- Vent IMV 18, PSV 15, Peep 5, FIO2 90%. Xiang on board. Weight up 14 kg from pre-op. Ok to start Fentanyl gtt.   -Compression boots     Electronically signed by MONSE Alston - CNP on 6/10/2021 at 8:28 AM     Attending Supervising Eleazar Farah  The patient met the criteria for indirect supervision. I discussed the findings and plans with the nurse practitioner and agree as documented in her note .     Electronically signed by Warner Lynne MD on 6/10/21 at 11:37 AM EDT

## 2021-06-10 NOTE — PROGRESS NOTES
[facility]  Progress note      Reason for Consult: Postop respiratory failure, NORA  Requesting Physician: Dr Taye Lezama    Subjective:   [address] [facility] / HPI:                The patient is a 70 y.o. male with significant past medical history of:      Diagnosis Date    CAD (coronary artery disease)     Diabetes mellitus ([facility] [address]     Elevated LFT's     Hyperlipidemia     Hypertension     Post poliomyelitis syndrome     Type II or unspecified type diabetes mellitus without mention of complication, not stated as uncontrolled      Interval history: Patient remains sedated on mechanical ventilation. On max dose Precedex with as needed Versed. Continues also to require vasopressin and dopamine and support of blood pressure.       Past Surgical History:        Procedure Laterality Date    CLEFT PALATE REPAIR      CORONARY ARTERY BYPASS GRAFT N/A 5/25/2021    Urgent CABG Coronary Artery Bypass Graft x4 (LIMA to LAD, SVG to PDA, SVG sequenced to OM1 and OM2), Endoscopic vein harvest left saphenous vein, Left atrial appendage ligation with 40mm AtriClip, total cardiopulmonary bypass, doppler flow verification of bypass grafts, insertion of temporary ventricular pacing wires, transesophageal echocardiogram, 5 level bilateral intercostal nerve block with Ma    SKIN BIOPSY Left 7/20/2020    EXCISE SKIN LESION LEFT POST AURICULAR WITH FLAP, FROZEN SECTIONS performed by Nara Diehl MD at [facility]       Current Medications:    Current Facility-Administered Medications: phenylephrine (TAN-SYNEPHRINE) 50 mg in dextrose 5 % 250 mL infusion,  mcg/min, Intravenous, Continuous  midazolam PF (VERSED) injection 2 mg, 2 mg, Intravenous, Q2H PRN  fentaNYL 10 mcg/mL infusion, 12.5-200 mcg/hr, Intravenous, Continuous  metronidazole (FLAGYL) 500 mg in NaCl 100 mL IVPB premix, 500 mg, Intravenous, Q8H  0.9 % sodium chloride infusion, , Intravenous, PRN  prismaSol BGK 4/2.5 dialysis solution, , Dialysis, Continuous  heparin (porcine) injection 5,500 Units, 5,500 Units, Subcutaneous, PRN  pantoprazole (PROTONIX) injection 40 mg, 40 mg, Intravenous, Daily  fluconazole (DIFLUCAN) in 0.9 % sodium chloride IVPB 400 mg, 400 mg, Intravenous, Q24H  ampicillin-sulbactam (UNASYN) 3000 mg ivpb minibag, 3,000 mg, Intravenous, Q8H  insulin lispro (1 Unit Dial) 0-6 Units, 0-6 Units, Subcutaneous, Q4H  phenol 1.4 % mouth spray 1 spray, 1 spray, Mouth/Throat, Q2H PRN  [Held by provider] verapamil (CALAN) tablet 40 mg, 40 mg, Oral, 3 times per day  albuterol (PROVENTIL) nebulizer solution 2.5 mg, 2.5 mg, Nebulization, 4x daily  psyllium (HYDROCIL) 95 % packet 1 packet, 1 packet, Oral, BID  polyethylene glycol (GLYCOLAX) packet 17 g, 17 g, Oral, Daily  furosemide (LASIX) injection 40 mg, 40 mg, Intravenous, Once  phenylephrine (TAN-SYNEPHRINE) 50 mg in dextrose 5 % 250 mL infusion,  mcg/min, Intravenous, Continuous  prismaSATE BGK 4/2.5 dialysis solution, , Dialysis, Continuous  prismaSol BGK 4/2.5 dialysis solution, , Dialysis, Continuous  potassium chloride 20 mEq/50 mL IVPB (Central Line), 20 mEq, Intravenous, PRN  magnesium sulfate 2000 mg in 50 mL IVPB premix, 2,000 mg, Intravenous, PRN  calcium gluconate 1000 mg in sodium chloride 50 mL, 1,000 mg, Intravenous, PRN **OR** calcium gluconate 2000 mg in sodium chloride 100 mL, 2,000 mg, Intravenous, PRN **OR** calcium gluconate 3,000 mg in dextrose 5 % 100 mL IVPB, 3,000 mg, Intravenous, PRN **OR** calcium gluconate 4,000 mg in dextrose 5 % 100 mL IVPB, 4,000 mg, Intravenous, PRN  sodium phosphate 6 mmol in dextrose 5 % 250 mL IVPB, 6 mmol, Intravenous, PRN **OR** sodium phosphate 12 mmol in dextrose 5 % 250 mL IVPB, 12 mmol, Intravenous, PRN **OR** sodium phosphate 18 mmol in dextrose 5 % 500 mL IVPB, 18 mmol, Intravenous, PRN **OR** sodium phosphate 24 mmol in dextrose 5 % 500 mL IVPB, 24 mmol, Intravenous, PRN  metoprolol tartrate (LOPRESSOR) Intramuscular, PRN  dextrose 5 % solution, 100 mL/hr, Intravenous, PRN    Allergies   Allergen Reactions    Atorvastatin      Muscle weakness, failed every statin attempted    Livalo [Pitavastatin Calcium]      Muscle weakness       Social History:    TOBACCO:   reports that he has never smoked. He has never used smokeless tobacco.  ETOH:   reports no history of alcohol use. Patient currently lives independently  Environmental/chemical exposure: None known    Family History:       Problem Relation Age of Onset    High Blood Pressure Mother     Diabetes Mother     High Blood Pressure Father     High Blood Pressure Brother      REVIEW OF SYSTEMS:    ROS is unobtainable due to his critical illness. Objective:   PHYSICAL EXAM:      VITALS:  BP (!) 102/59   Pulse 71   Temp 97.3 °F (36.3 °C) (Core)   Resp 17   Ht 5' 9\" (1.753 m)   Wt 248 lb 3.8 oz (112.6 kg)   SpO2 97%   BMI 36.66 kg/m²      24HR INTAKE/OUTPUT:      Intake/Output Summary (Last 24 hours) at 6/10/2021 1152  Last data filed at 6/10/2021 1101  Gross per 24 hour   Intake 2195.22 ml   Output 1702 ml   Net 493.22 ml     CONSTITUTIONAL: On mechanical ventilation and sedated but responsive  NECK:  Supple, symmetrical, trachea midline, no adenopathy, thyroid symmetric, not enlarged and no tenderness, skin normal  LUNGS:  no increased work of breathing and clear to auscultation. No accessory muscle use  CARDIOVASCULAR: S1 and S2, no edema and no JVD  ABDOMEN:  normal bowel sounds, mildly distended but soft and no masses palpated, and no tenderness to palpation. No hepatospleenomegaly  LYMPHADENOPATHY:  no axillary or supraclavicular adenopathy. No cervical adnenopathy  PSYCHIATRIC: Sedated but responsive seems appropriate. MUSCULOSKELETAL: No obvious misalignment or effusion of the joints. No clubbing, cyanosis of the digits. SKIN:  normal skin color, texture, turgor and no redness, warmth, or swelling.  No palpable nodules    DATA:    Old his hypertrophic cardiomyopathy. Remains critically ill  Discussed with Urbano Barnett and Yomaira Crowell    Total critical care time caring for this patient with life threatening illness, including direct patient contact, management of life support systems, review of data including imaging and labs, discussions with other team members and physicians is at least 32 minutes so far today, excluding procedures.

## 2021-06-10 NOTE — PROGRESS NOTES
Gastroenterology Progress Note    Gil Briggs is a 70 y.o. male patient. Principal Problem:    Coronary artery disease involving native coronary artery of native heart without angina pectoris  Active Problems:    Statin intolerance    Stage 3a chronic kidney disease (HCC)    Post poliomyelitis syndrome    Type 2 diabetes mellitus with diabetic nephropathy, without long-term current use of insulin (HCC)    Hypertension associated with diabetes ([address]    Mixed hyperlipidemia    S/P coronary artery bypass graft x 4    S/P left atrial appendage ligation    Essential hypertension    Acute renal failure with tubular necrosis (HCC)    Acute diastolic CHF (congestive heart failure) ([address]    Acute hypoxemic respiratory failure (HCC)  Resolved Problems:    * No resolved hospital problems. *      SUBJECTIVE:  Pt sedated on vent. No BM.     ROS:  Unable to obtain    Physical    VITALS:  BP (!) 102/59   Pulse 72   Temp 97.2 °F (36.2 °C) (Core)   Resp 14   Ht 5' 9\" (1.753 m)   Wt 248 lb 3.8 oz (112.6 kg)   SpO2 94%   BMI 36.66 kg/m²   TEMPERATURE:  Current - Temp: 97.2 °F (36.2 °C);  Max - Temp  Av.5 °F (36.4 °C)  Min: 97 °F (36.1 °C)  Max: 99.3 °F (37.4 °C)    NAD  RRR  Lungs CTA Bilaterally  Abdomen soft, ND, NT,  Bowel sounds normal.    Data    Data Review:    Recent Labs     21  0539 21  0459 21  0749 06/10/21  0606   WBC 20.9* 15.9*  --  10.1   HGB 9.7* 8.9* 10.6* 9.0*   HCT 30.1* 27.8*  --  28.1*   MCV 88.5 89.4  --  90.7    182  --  155     Recent Labs     21  1925 21  0459 21  0748 21  2042   * 134* 133* 135*   K 4.5 4.8 4.8 4.1   CL 91* 95* 95* 99   CO2 25 23 24 24   PHOS 5.2* 4.6  --  2.8   * 75* 70* 44*   CREATININE 3.0* 2.2* 2.1* 1.3     Recent Labs     21  0459 21  0748 21   AST 1,626* 1,537* 1,008*   * 685* 623*   BILITOT 8.0* 8.4* 9.6*   ALKPHOS 277* 280* 266*     No results for input(s): LIPASE, care.  In summary, my findings and plan are the following: remains intubated/ on pressors/ on crrt, abd soft, transaminases cont improve and bili slow rise all c/w recovering ischemic colitis, hbg stable with no gi bleeding. rec cont maintain bp. In d/w dr Sukhjinder Gr will check kub in am, 6/8 kub (for line placment) read as nonspecific paucity of bowel gas.      Carylon Call, MD  [address]

## 2021-06-10 NOTE — PROGRESS NOTES
[facility]   Progress Note      6/10/2021 8:42 AM        Name: Ralph Ashton . Admitted: 5/20/2021    SUBJECTIVE:  He remains sedated, on ventilator, mottling noted on feet bilaterally, spoke with RN at bedside.      Reviewed interval ancillary notes    Current Medications  midazolam PF (VERSED) injection 2 mg, Q2H PRN  fentaNYL 10 mcg/mL infusion, Continuous  fentaNYL (SUBLIMAZE) injection 50 mcg, Once  metronidazole (FLAGYL) 500 mg in NaCl 100 mL IVPB premix, Q8H  0.9 % sodium chloride infusion, PRN  prismaSol BGK 4/2.5 dialysis solution, Continuous  heparin (porcine) injection 5,500 Units, PRN  pantoprazole (PROTONIX) injection 40 mg, Daily  fluconazole (DIFLUCAN) in 0.9 % sodium chloride IVPB 400 mg, Q24H  ampicillin-sulbactam (UNASYN) 3000 mg ivpb minibag, Q8H  insulin lispro (1 Unit Dial) 0-6 Units, Q4H  phenol 1.4 % mouth spray 1 spray, Q2H PRN  [Held by provider] verapamil (CALAN) tablet 40 mg, 3 times per day  albuterol (PROVENTIL) nebulizer solution 2.5 mg, 4x daily  psyllium (HYDROCIL) 95 % packet 1 packet, BID  polyethylene glycol (GLYCOLAX) packet 17 g, Daily  furosemide (LASIX) injection 40 mg, Once  phenylephrine (TAN-SYNEPHRINE) 50 mg in dextrose 5 % 250 mL infusion, Continuous  sodium bicarbonate 100 mEq in dextrose 5 % 1,000 mL infusion, Continuous  prismaSATE BGK 4/2.5 dialysis solution, Continuous  prismaSol BGK 4/2.5 dialysis solution, Continuous  potassium chloride 20 mEq/50 mL IVPB (Central Line), PRN  magnesium sulfate 2000 mg in 50 mL IVPB premix, PRN  calcium gluconate 1000 mg in sodium chloride 50 mL, PRN   Or  calcium gluconate 2000 mg in sodium chloride 100 mL, PRN   Or  calcium gluconate 3,000 mg in dextrose 5 % 100 mL IVPB, PRN   Or  calcium gluconate 4,000 mg in dextrose 5 % 100 mL IVPB, PRN  sodium phosphate 6 mmol in dextrose 5 % 250 mL IVPB, PRN   Or  sodium phosphate 12 mmol in dextrose 5 % 250 mL IVPB, PRN   Or  sodium phosphate 18 mmol in dextrose 5 % 500 mL IVPB, PRN   Or  sodium phosphate 24 mmol in dextrose 5 % 500 mL IVPB, PRN  metoprolol tartrate (LOPRESSOR) tablet 50 mg, BID  vasopressin 20 Units in dextrose 5 % 100 mL infusion, Continuous  dexmedetomidine (PRECEDEX) 400 mcg in sodium chloride 0.9 % 100 mL infusion, Continuous  DOPamine (INTROPIN) 400 mg in dextrose 5 % 250 mL infusion, Continuous  norepinephrine (LEVOPHED) 16 mg in dextrose 5% 250 mL infusion, Continuous  sodium bicarbonate 8.4 % injection 50 mEq, Once  [Held by provider] enoxaparin (LOVENOX) injection 30 mg, Daily  bisacodyl (DULCOLAX) EC tablet 5 mg, Daily PRN  [Held by provider] midodrine (PROAMATINE) tablet 15 mg, TID WC  fludrocortisone (FLORINEF) tablet 0.1 mg, Daily  lidocaine PF 1 % injection 5 mL, Once  sodium chloride flush 0.9 % injection 5-40 mL, 2 times per day  sodium chloride flush 0.9 % injection 5-40 mL, PRN  0.9 % sodium chloride infusion, PRN  ondansetron (ZOFRAN) injection 4 mg, Q6H PRN  metoclopramide (REGLAN) injection 10 mg, Q6H PRN  aspirin EC tablet 325 mg, Daily  traMADol (ULTRAM) tablet 50 mg, Q6H PRN   Or  traMADol (ULTRAM) tablet 100 mg, Q6H PRN  morphine (PF) injection 2 mg, Q2H PRN  diphenhydrAMINE (BENADRYL) tablet 25 mg, Nightly PRN  zolpidem (AMBIEN) tablet 5 mg, Nightly PRN  sennosides-docusate sodium (SENOKOT-S) 8.6-50 MG tablet 1 tablet, BID  magnesium hydroxide (MILK OF MAGNESIA) 400 MG/5ML suspension 30 mL, Daily PRN  albuterol sulfate  (90 Base) MCG/ACT inhaler 2 puff, Q6H PRN  furosemide (LASIX) injection 20 mg, PRN  glucose (GLUTOSE) 40 % oral gel 15 g, PRN  dextrose 50 % IV solution, PRN  glucagon (rDNA) injection 1 mg, PRN  dextrose 5 % solution, PRN        Objective:  BP (!) 102/59   Pulse 72   Temp 97.2 °F (36.2 °C) (Core)   Resp 14   Ht 5' 9\" (1.753 m)   Wt 248 lb 3.8 oz (112.6 kg)   SpO2 94%   BMI 36.66 kg/m²     Intake/Output Summary (Last 24 hours) at 6/10/2021 0842  Last data filed at 6/10/2021 0800  Gross per 24 hour Intake 1982.26 ml   Output 1821 ml   Net 161.26 ml      Wt Readings from Last 3 Encounters:   06/10/21 248 lb 3.8 oz (112.6 kg)   05/20/21 223 lb 9.6 oz (101.4 kg)   03/12/21 216 lb (98 kg)       General appearance:  Appears comfortable  Eyes: Sclera clear. Pupils equal.  ENT: Moist oral mucosa. Trachea midline, no adenopathy. Cardiovascular: Regular rhythm, normal S1, S2. No murmur. No edema in lower extremities  Respiratory: Not using accessory muscles. Good inspiratory effort. Clear to auscultation bilaterally, no wheeze or crackles. GI: Abdomen soft, no tenderness, not distended  Musculoskeletal: No cyanosis in digits, neck supple  Neurology: CN 2-12 grossly intact. No speech or motor deficits  Psych: Normal affect.  Alert and oriented in time, place and person  Skin: Warm, dry, normal turgor    Labs and Tests:  CBC:   Recent Labs     06/08/21  0539 06/09/21  0459 06/09/21  0749 06/10/21  0606 06/10/21  0812   WBC 20.9* 15.9*  --  10.1  --    HGB 9.7* 8.9* 10.6* 9.0* 10.2*    182  --  155  --      BMP:    Recent Labs     06/09/21  0459 06/09/21  0748 06/09/21 2042   * 133* 135*   K 4.8 4.8 4.1   CL 95* 95* 99   CO2 23 24 24   BUN 75* 70* 44*   CREATININE 2.2* 2.1* 1.3   GLUCOSE 91 65* 77     Hepatic:   Recent Labs     06/09/21  0459 06/09/21  0748 06/09/21 2042   AST 1,626* 1,537* 1,008*   * 685* 623*   BILITOT 8.0* 8.4* 9.6*   ALKPHOS 277* 280* 266*       ASSESSMENT AND PLAN    Principal Problem:    Coronary artery disease involving native coronary artery of native heart without angina pectoris  Active Problems:    Statin intolerance    Stage 3a chronic kidney disease (HCC)    Post poliomyelitis syndrome    Type 2 diabetes mellitus with diabetic nephropathy, without long-term current use of insulin (HCC)    Hypertension associated with diabetes (HCC)    Mixed hyperlipidemia    S/P coronary artery bypass graft x 4    S/P left atrial appendage ligation    Essential hypertension    Acute renal failure with tubular necrosis (HCC)    Acute diastolic CHF (congestive heart failure) ([address]    Acute hypoxemic respiratory failure (HCC)  Resolved Problems:    * No resolved hospital problems. *      Thrombocytopenia   - platelet count remains wnl   - He was on arixtra intermittently which was stopped due to renal insufficiency.  - - HIT positive, YESICA negative  - argatroban drip discontinued  - continue prophylactic dose lovenox     Coronary artery disease post bypass and left atrial ligation      Diabetes mellitus type 2     History of cleft lip and palate surgeries      Post polio syndrome     Chronic kidney disease stage 3 A       Elevated liver enzymes  - Likely due to ischemic hepatopathy  - GI following  - US of gallbladder 6/7/21 showed fatty liver  - bilirubin up to 9.6     Respiratory failure  - intubated 6/7/21        Sherry Matson CNP  [facility]    Now off prophylactic dose lovenox received ffp for placement of dialysis catheter Liver enzymes continue to worsen

## 2021-06-10 NOTE — PROGRESS NOTES
fludrocortisone  0.1 mg Oral Daily    lidocaine 1 % injection  5 mL Intradermal Once    sodium chloride flush  5-40 mL Intravenous 2 times per day    aspirin  325 mg Oral Daily    sennosides-docusate sodium  1 tablet Oral BID       Infusions:    fentaNYL      sodium chloride      prismaSol BGK 4/2.5 500 mL/hr at 06/10/21 0740    phenylephrine (TAN-SYNEPHRINE) 50mg/250mL infusion Stopped (06/08/21 1727)    sodium bicarbonate infusion Stopped (06/08/21 0629)    prismaSATE BGK 4/2.5 1,000 mL/hr at 06/10/21 0740    prismaSol BGK 4/2.5 500 mL/hr at 06/10/21 0740    vasopressin (Septic Shock) infusion 0.03 Units/min (06/10/21 0859)    dexmedetomidine 1.4 mcg/kg/hr (06/10/21 0819)    DOPamine 5 mcg/kg/min (06/10/21 0406)    norepinephrine Stopped (06/08/21 0253)    sodium chloride Stopped (06/09/21 1855)    dextrose 100 mL/hr (06/05/21 0607)         PRN Meds: midazolam, sodium chloride, heparin (porcine), phenol, potassium chloride, magnesium sulfate, calcium gluconate **OR** calcium gluconate **OR** calcium gluconate **OR** calcium gluconate, sodium phosphate IVPB **OR** sodium phosphate IVPB **OR** sodium phosphate IVPB **OR** sodium phosphate IVPB, bisacodyl, sodium chloride flush, sodium chloride, ondansetron, metoclopramide, traMADol **OR** traMADol, morphine, diphenhydrAMINE, zolpidem, magnesium hydroxide, albuterol sulfate HFA, furosemide, glucose, dextrose, glucagon (rDNA), dextrose    Labs/imaging:  CBC:   Recent Labs     06/08/21  0539 06/09/21 0459 06/09/21  0749 06/10/21  0606 06/10/21  0812   WBC 20.9* 15.9*  --  10.1  --    HGB 9.7* 8.9* 10.6* 9.0* 10.2*    182  --  155  --          BMP:    Recent Labs     06/09/21 0459 06/09/21  0748 06/09/21 2042   * 133* 135*   K 4.8 4.8 4.1   CL 95* 95* 99   CO2 23 24 24   BUN 75* 70* 44*   CREATININE 2.2* 2.1* 1.3   GLUCOSE 91 65* 77         Hepatic:   Recent Labs     06/09/21 0459 06/09/21  0748 06/09/21 2042   AST 1,626* 1,537* 1,008* * 685* 623*   BILITOT 8.0* 8.4* 9.6*   ALKPHOS 277* 280* 266*       Troponin: No results for input(s): TROPONINI in the last 72 hours. BNP: No results for input(s): BNP in the last 72 hours. INR:   Recent Labs     06/07/21  1930 06/08/21  1925 06/09/21  0459   INR 3.45* 3.03* 3.36*           Reviewed imaging and reports noted      Assessment:  Principal Problem:    Coronary artery disease involving native coronary artery of native heart without angina pectoris  Active Problems:    Statin intolerance    Stage 3a chronic kidney disease (HCC)    Post poliomyelitis syndrome    Type 2 diabetes mellitus with diabetic nephropathy, without long-term current use of insulin (HCC)    Hypertension associated with diabetes ([address]    Mixed hyperlipidemia    S/P coronary artery bypass graft x 4    S/P left atrial appendage ligation    Essential hypertension    Acute renal failure with tubular necrosis (HCC)    Acute diastolic CHF (congestive heart failure) ([facility] [address]    Acute hypoxemic respiratory failure (HCC)  Resolved Problems:    * No resolved hospital problems. *        Plan:  Acute hypoxic respiratory failure ?  Secondary to pna and KATHERYN with CHF  -on vent , xray woresning pulmonary edema will discuss with surgery , critical care and nephro  - pulm consulted  - on antibiotics, fu cultures,  MRSA swab  - picc line culture growin staph epi likely contaminant    Septic shock secondary to above vs cardiogenic shock from [facility]  - on pressors, MAP>65    NORA on ckd3: nephro  On CRRT now    Elevated lfts: likely secondary to ischemic hepathopathy secondary to posoperative hypotension  - monitor  - gi on board    Hemotochezia: hgb stable,   - gi on board will need colonscopy in future    Severe multivessel CAD  -Status post CABG x4 on 5/25/2021  -Management per cardiothoracic surgery and cardiolog    Uncontrolled insulin-dependent diabetes mellitus type 2 with circulatory problem: Hyperglycemia now Lantus discontinued just sliding scale insulin monitor closely        Diet: Diet NPO Exceptions are: Sips with Meds    Activity: Up with assist      Code status: Full Code     ----------    I spent 35 minutures proviiding critical care serviices to his hemodynmaically unstable patient  Ava Castillo MD  -------------------------------  Rounding hospitalist

## 2021-06-10 NOTE — PLAN OF CARE
Problem: Cardiovascular  Goal: No DVT, peripheral vascular complications  Outcome: Ongoing  Goal: Hemodynamic stability  Outcome: Ongoing  Goal: Anticoagulate/Hct stable  Outcome: Ongoing  Goal: Weight maintained or lost  Outcome: Ongoing  Goal: Understanding of dietary restrictions  Outcome: Ongoing     Problem: Respiratory  Goal: No pulmonary complications  Outcome: Ongoing  Goal: O2 Sat > 90%  Outcome: Ongoing  Goal: Supplemental O2 requirements decreased  Outcome: Ongoing  Goal: Pneumonia vaccine at discharge as needed  Outcome: Ongoing     Problem: Nutrition  Goal: Optimal nutrition therapy  Outcome: Ongoing     Problem: Falls - Risk of:  Goal: Will remain free from falls  Description: Will remain free from falls  Outcome: Ongoing  Goal: Absence of physical injury  Description: Absence of physical injury  Outcome: Ongoing     Problem: Discharge Planning:  Goal: Discharged to appropriate level of care  Description: Discharged to appropriate level of care  Outcome: Ongoing     Problem: Serum Glucose Level - Abnormal:  Goal: Ability to maintain appropriate glucose levels will improve  Description: Ability to maintain appropriate glucose levels will improve  Outcome: Ongoing     Problem: Sensory Perception - Impaired:  Goal: Ability to maintain a stable neurologic state will improve  Description: Ability to maintain a stable neurologic state will improve  Outcome: Ongoing     Problem: Skin Integrity:  Goal: Will show no infection signs and symptoms  Description: Will show no infection signs and symptoms  Outcome: Ongoing  Goal: Absence of new skin breakdown  Description: Absence of new skin breakdown  Outcome: Ongoing     Problem: Pain:  Goal: Pain level will decrease  Description: Pain level will decrease  Outcome: Ongoing  Goal: Control of acute pain  Description: Control of acute pain  Outcome: Ongoing     Problem: Non-Violent Restraints  Goal: Removal from restraints as soon as assessed to be safe  Outcome: Ongoing  Goal: No harm/injury to patient while restraints in use  Outcome: Ongoing  Goal: Patient's dignity will be maintained  Outcome: Ongoing

## 2021-06-10 NOTE — PROGRESS NOTES
Office : [phone]     Fax :[phone]         Renal Progress Note  Subjective:   Admit Date: 5/20/2021     NARDA Balderas is a 70 y.o. male with h/o DM 2, CKD 3 , DLP who came with complaints of 2-month history of progressive exertional shortness of breath and chest discomfort.   s/p CABG X 4        Interval History:       BP stable on 2 pressors   On CRRT   Tolerating well. BUN/creatinine levels trended down  Started to make some urine   Edema ++    On vent support   fio2 85 %     I/O last 3 completed shifts:   In: 1694.6 [I.V.:1190.4; IV Piggyback:504.2]  Out: 1762   I/O this shift:  In: 377.4 [I.V.:180.9; IV Piggyback:196.6]  Out: 139         DIET Diet NPO Exceptions are: Sips with Meds  Medications:   Scheduled Meds:   fentanNYL  50 mcg Intravenous Once    metroNIDAZOLE  500 mg Intravenous Q8H    pantoprazole  40 mg Intravenous Daily    fluconazole  400 mg Intravenous Q24H    ampicillin-sulbactam  3,000 mg Intravenous Q8H    insulin lispro  0-6 Units Subcutaneous Q4H    [Held by provider] verapamil  40 mg Oral 3 times per day    albuterol  2.5 mg Nebulization 4x daily    psyllium  1 packet Oral BID    polyethylene glycol  17 g Oral Daily    furosemide  40 mg Intravenous Once    metoprolol tartrate  50 mg Oral BID    sodium bicarbonate  50 mEq Intravenous Once    [Held by provider] enoxaparin  30 mg Subcutaneous Daily    [Held by provider] midodrine  15 mg Oral TID     fludrocortisone  0.1 mg Oral Daily    lidocaine 1 % injection  5 mL Intradermal Once    sodium chloride flush  5-40 mL Intravenous 2 times per day    aspirin  325 mg Oral Daily    sennosides-docusate sodium  1 tablet Oral BID     Continuous Infusions:   fentaNYL      sodium chloride      prismaSol BGK 4/2.5 500 mL/hr at 06/10/21 0740    phenylephrine (TAN-SYNEPHRINE) 50mg/250mL infusion Stopped (06/08/21 1727)    sodium bicarbonate infusion Stopped (06/08/21 0629)    prismaSATE BGK 4/2.5 1,000 mL/hr at 06/10/21 0740    prismaSol BGK 4/2.5 500 mL/hr at 06/10/21 0740    vasopressin (Septic Shock) infusion 0.02 Units/min (06/10/21 0251)    dexmedetomidine 1.4 mcg/kg/hr (06/10/21 0819)    DOPamine 5 mcg/kg/min (06/10/21 0406)    norepinephrine Stopped (06/08/21 0253)    sodium chloride Stopped (06/09/21 1855)    dextrose 100 mL/hr (06/05/21 0607)       Labs:  CBC:   Recent Labs     06/08/21  0539 06/09/21  0459 06/09/21  0749 06/10/21  0606 06/10/21  0812   WBC 20.9* 15.9*  --  10.1  --    HGB 9.7* 8.9* 10.6* 9.0* 10.2*    182  --  155  --      BMP:    Recent Labs     06/09/21 0459 06/09/21  0748 06/09/21 2042   * 133* 135*   K 4.8 4.8 4.1   CL 95* 95* 99   CO2 23 24 24   BUN 75* 70* 44*   CREATININE 2.2* 2.1* 1.3   GLUCOSE 91 65* 77     Ca/Mg/Phos:   Recent Labs     06/08/21  0539 06/08/21  1925 06/09/21  0459 06/09/21  0748 06/09/21 2042   CALCIUM 9.3 8.8 8.5 8.7 8.2*   MG 2.50* 2.30  --   --  2.20   PHOS 6.7* 5.2* 4.6  --  2.8     Hepatic:   Recent Labs     06/09/21  0459 06/09/21  0748 06/09/21  2042   AST 1,626* 1,537* 1,008*   * 685* 623*   BILITOT 8.0* 8.4* 9.6*   ALKPHOS 277* 280* 266*     Troponin: No results for input(s): TROPONINI in the last 72 hours. BNP: No results for input(s): BNP in the last 72 hours. Lipids: No results for input(s): CHOL, TRIG, HDL, LDLCALC, LABVLDL in the last 72 hours.   ABGs:   Recent Labs     06/10/21  0812   PHART 7.337*   PO2ART 79.5   VGP2BIK 50.7*     INR:   Recent Labs     06/07/21  1930 06/08/21  1925 06/09/21  0459   INR 3.45* 3.03* 3.36*     UA:No results for input(s): LYUDMILA Yeboah, Maddie Shanta, UROBILINOGEN, NITRU, LEUKOCYTESUR, April Climes in the last 72 hours. Urine Microscopic: No results for input(s): LABCAST, BACTERIA, COMU, HYALCAST, WBCUA, RBCUA, EPIU in the last 72 hours. Urine Culture: No results for input(s): LABURIN in the last 72 hours. Urine Chemistry: No results for input(s): Vera Filter, PROTEINUR, NAUR in the last 72 hours. Objective:   Vitals: BP (!) 102/59   Pulse 72   Temp 97.2 °F (36.2 °C) (Core)   Resp 14   Ht 5' 9\" (1.753 m)   Wt 248 lb 3.8 oz (112.6 kg)   SpO2 94%   BMI 36.66 kg/m²    Wt Readings from Last 3 Encounters:   06/10/21 248 lb 3.8 oz (112.6 kg)   05/20/21 223 lb 9.6 oz (101.4 kg)   03/12/21 216 lb (98 kg)      24HR INTAKE/OUTPUT:      Intake/Output Summary (Last 24 hours) at 6/10/2021 0855  Last data filed at 6/10/2021 0800  Gross per 24 hour   Intake 1982.26 ml   Output 1821 ml   Net 161.26 ml     Constitutional:  Intubated   NECK: supple, no JVD  Cardiovascular:  S1, S2 without m/r/g  Respiratory:  Decreased air entry bases   Abdomen: +bs, soft, nt  Ext:  1 + LE edema. Mainly prominent in thigh area     Assessment and Plan:       IMAGING:  XR CHEST PORTABLE   Final Result   Worsening CHF/pulmonary edema. XR ABDOMEN (KUB) (SINGLE AP VIEW)   Final Result   Addendum 1 of 1   ADDENDUM:   There is an overall paucity of bowel gas. There is some retained contrast    in   the colon which is collapsed. The bowel gas pattern is nonspecific. Final   Triple-lumen catheter as above. XR CHEST PORTABLE   Final Result   Interval placement of tubes and lines as above. XR ABDOMEN FOR NG/OG/NE TUBE PLACEMENT   Final Result   Status post gastric tube and ET tube placement in good position with no   change in the left PICC line. Stable cardiomegaly with mild central pulmonary congestion and mild discoid   atelectasis or scarring along lung bases.       Esophageal probe in place and a left PICC line in place. XR CHEST PORTABLE   Final Result   Persistent large amount of opacity in the left retrocardiac area either due   to pneumonia or atelectasis. Small left pleural effusion. Mild right   basilar atelectasis. Overall the lungs appear clearer than on the prior   study. US GALLBLADDER RUQ   Final Result   Fatty liver. No cholelithiasis or ancillary evidence of acute cholecystitis. XR CHEST PORTABLE   Final Result   Vascular congestion. Bibasilar small pleural effusions and atelectasis or pulmonary edema,   increased in the interval.  Pneumonia is a differential possibility. XR CHEST PORTABLE   Final Result   Left PICC projects in normal alignment. No pneumothorax. Increased left basilar opacity, atelectasis or airspace disease. There may   be a small component of pleural effusion. Stable cardiomegaly. XR CHEST PORTABLE   Final Result   No pneumothorax following removal of the left chest tube. Development of   moderate opacities within the right lung either atelectasis or pneumonia. No   abnormal pulmonary vascular congestion or sizable pleural effusion. XR ABDOMEN FOR NG/OG/NE TUBE PLACEMENT   Final Result      XR CHEST PORTABLE   Final Result   Satisfactory appearance status post median sternotomy      No pneumothorax         CT CHEST ABDOMEN PELVIS WO CONTRAST   Final Result   No acute abnormality identified in the chest, abdomen, or pelvis. No finding   worrisome for occult malignancy. Moderate enlargement of the prostate. VL PRE OP VEIN MAPPING   Final Result      VL DUP CAROTID BILATERAL   Final Result      XR CHEST (2 VW)   Final Result   No active cardiopulmonary disease             Assessment/Plan     1. Acute kidney injury on CKD stage 3A   NORA 2/2 ATN   Acidosis better. Tolerating CRRT  Doing ultrafiltration .  Has increased edema   Will increase the ultrafiltration after discussion with CTS   Blood pressure soft and on 2 pressors  Monitor electrolytes. Replace calcium as needed. Recommend to dose adjust all medications  based on renal functions  Maintain SBP> 90 mmHg   Daily weights   AVOID NSAIDs  Avoid Nephrotoxins  Monitor Intake/Output    2. Acute respiratory failure. Intubated and vent support   fio2 85 %   Appreciate critical care input. Had discussion . In agreement to start more ultrafiltration if ok with CTS.      3. Metabolic acidosis 2/2 lactic acidosis   Improving with better perfusion     4. DM 2. Needs better control  On insulin    5. S/p CABG.  CTS and cardiology on board                     CC time 40  minutes     Denise Murcia MD , MD  [facility] [address] [place] [address] 18 07

## 2021-06-11 NOTE — PROGRESS NOTES
[facility]   Progress Note      6/11/2021 8:11 AM        Name: Gil Briggs .               Admitted: 5/20/2021    SUBJECTIVE:  He remains sedated, on ventilator, continues on CRRT, spoke with RN at bedside    Reviewed interval ancillary notes    Current Medications  phenylephrine (TAN-SYNEPHRINE) 50 mg in dextrose 5 % 250 mL infusion, Continuous  midazolam PF (VERSED) injection 2 mg, Q2H PRN  fentaNYL 10 mcg/mL infusion, Continuous  metronidazole (FLAGYL) 500 mg in NaCl 100 mL IVPB premix, Q8H  prismaSol BGK 4/2.5 dialysis solution, Continuous  heparin (porcine) injection 5,500 Units, PRN  pantoprazole (PROTONIX) injection 40 mg, Daily  fluconazole (DIFLUCAN) in 0.9 % sodium chloride IVPB 400 mg, Q24H  ampicillin-sulbactam (UNASYN) 3000 mg ivpb minibag, Q8H  insulin lispro (1 Unit Dial) 0-6 Units, Q4H  phenol 1.4 % mouth spray 1 spray, Q2H PRN  [Held by provider] verapamil (CALAN) tablet 40 mg, 3 times per day  albuterol (PROVENTIL) nebulizer solution 2.5 mg, 4x daily  psyllium (HYDROCIL) 95 % packet 1 packet, BID  polyethylene glycol (GLYCOLAX) packet 17 g, Daily  furosemide (LASIX) injection 40 mg, Once  prismaSATE BGK 4/2.5 dialysis solution, Continuous  prismaSol BGK 4/2.5 dialysis solution, Continuous  potassium chloride 20 mEq/50 mL IVPB (Central Line), PRN  magnesium sulfate 2000 mg in 50 mL IVPB premix, PRN  calcium gluconate 1000 mg in sodium chloride 50 mL, PRN   Or  calcium gluconate 2000 mg in sodium chloride 100 mL, PRN   Or  calcium gluconate 3,000 mg in dextrose 5 % 100 mL IVPB, PRN   Or  calcium gluconate 4,000 mg in dextrose 5 % 100 mL IVPB, PRN  sodium phosphate 6 mmol in dextrose 5 % 250 mL IVPB, PRN   Or  sodium phosphate 12 mmol in dextrose 5 % 250 mL IVPB, PRN   Or  sodium phosphate 18 mmol in dextrose 5 % 500 mL IVPB, PRN   Or  sodium phosphate 24 mmol in dextrose 5 % 500 mL IVPB, PRN  metoprolol tartrate (LOPRESSOR) tablet 50 mg, BID  vasopressin 20 Units in dextrose 5 % 100 mucosa. Trachea midline, no adenopathy. Cardiovascular: Regular rhythm, normal S1, S2. No murmur. No edema in lower extremities  Respiratory: Not using accessory muscles. Good inspiratory effort. Clear to auscultation bilaterally, no wheeze or crackles. GI: Abdomen soft, no tenderness, not distended  Musculoskeletal: No cyanosis in digits, neck supple  Skin: Warm, dry, normal turgor    Labs and Tests:  CBC:   Recent Labs     06/09/21  0459 06/10/21  0606 06/10/21  0812 06/11/21  0600   WBC 15.9* 10.1  --  10.3   HGB 8.9* 9.0* 10.2* 10.0*    155  --  155     BMP:    Recent Labs     06/09/21  2042 06/10/21  0830 06/10/21  1945   * 136 138   K 4.1 4.3 4.4   CL 99 102 102   CO2 24 25 24   BUN 44* 32* 27*   CREATININE 1.3 0.9 0.7*   GLUCOSE 77 80 78     Hepatic:   Recent Labs     06/10/21  0830 06/10/21  1945 06/11/21  0600   * 536* 401*   * 485* 450*   BILITOT 10.2* 10.7* 11.4*   ALKPHOS 253* 256* 251*       ASSESSMENT AND PLAN    Principal Problem:    Coronary artery disease involving native coronary artery of native heart without angina pectoris  Active Problems:    Statin intolerance    Stage 3a chronic kidney disease (HCC)    Post poliomyelitis syndrome    Type 2 diabetes mellitus with diabetic nephropathy, without long-term current use of insulin (HCC)    Hypertension associated with diabetes ([facility] [address]    Mixed hyperlipidemia    S/P coronary artery bypass graft x 4    S/P left atrial appendage ligation    Essential hypertension    Acute renal failure with tubular necrosis (HCC)    Acute diastolic CHF (congestive heart failure) (HCC)    Acute hypoxemic respiratory failure (HCC)  Resolved Problems:    * No resolved hospital problems. *      Thrombocytopenia   - platelet count remains wnl   - He was on arixtra intermittently which was stopped due to renal insufficiency.  - - HIT positive, YESICA negative  - argatroban drip discontinued  - continue prophylactic dose lovenox     Coronary artery

## 2021-06-11 NOTE — PROGRESS NOTES
Hospital Medicine Progress Note     Date:  6/11/2021    PCP: Ben Roque MD (Tel: [phone])    Date of Admission: 5/20/2021      Subjective  Patient with improving oxygen requirement plan for spontaneous breathing trial today still remains on liana and vasopressin. Objective  Physical exam:  Vitals: /67   Pulse 57   Temp 97.4 °F (36.3 °C) (Core)   Resp 18   Ht 5' 9\" (1.753 m)   Wt 240 lb 8.4 oz (109.1 kg)   SpO2 97%   BMI 35.52 kg/m²   Gen: intubated and sedated  Head: Normocephalic. Atraumatic. Eyes: EOMI.   ENT: Oral mucosa moist  Neck: No JVD. No obvious thyromegaly. CVS: Nml S1S2, + murmur, RRR cap refill < 2sec  Pulmomary: coarse breath sounds bilaterally  Gastrointestinal: Soft, NT/ND. Positive bowel sounds. Neuro: ibtubated and sedated  Psychiatry: intubated and sedated  Skin: Warm, dry with normal turgor. No rash      24HR INTAKE/OUTPUT:      Intake/Output Summary (Last 24 hours) at 6/11/2021 1029  Last data filed at 6/11/2021 0701  Gross per 24 hour   Intake 858.84 ml   Output 3680 ml   Net -2821.16 ml     I/O last 3 completed shifts: In: 1773.1 [I.V.:1120.1;  IV Piggyback:652.9]  Out: 4420   I/O this shift:  In: -   Out: 277       Meds:    metroNIDAZOLE  500 mg Intravenous Q8H    pantoprazole  40 mg Intravenous Daily    fluconazole  400 mg Intravenous Q24H    ampicillin-sulbactam  3,000 mg Intravenous Q8H    insulin lispro  0-6 Units Subcutaneous Q4H    [Held by provider] verapamil  40 mg Oral 3 times per day    albuterol  2.5 mg Nebulization 4x daily    psyllium  1 packet Oral BID    polyethylene glycol  17 g Oral Daily    furosemide  40 mg Intravenous Once    metoprolol tartrate  50 mg Oral BID    sodium bicarbonate  50 mEq Intravenous Once    midodrine  15 mg Oral TID WC    fludrocortisone  0.1 mg Oral Daily    lidocaine 1 % injection  5 mL Intradermal Once    sodium chloride flush  5-40 mL Intravenous 2 times per day    aspirin  325 mg Oral Daily    sennosides-docusate sodium  1 tablet Oral BID       Infusions:    phenylephrine (TAN-SYNEPHRINE) 50mg/250mL infusion 20 mcg/min (06/11/21 0558)    fentaNYL 100 mcg/hr (06/11/21 0641)    prismaSol BGK 4/2.5 500 mL/hr at 06/11/21 0353    prismaSATE BGK 4/2.5 500 mL/hr at 06/11/21 0950    prismaSol BGK 4/2.5 500 mL/hr at 06/11/21 0353    vasopressin (Septic Shock) infusion 0.03 Units/min (06/11/21 0352)    dexmedetomidine 1.4 mcg/kg/hr (06/11/21 0934)    DOPamine Stopped (06/11/21 0038)    norepinephrine Stopped (06/08/21 0253)    sodium chloride 25 mL (06/10/21 1734)    dextrose 100 mL/hr (06/05/21 0607)         PRN Meds: midazolam, heparin (porcine), phenol, potassium chloride, magnesium sulfate, calcium gluconate **OR** calcium gluconate **OR** calcium gluconate **OR** calcium gluconate, sodium phosphate IVPB **OR** sodium phosphate IVPB **OR** sodium phosphate IVPB **OR** sodium phosphate IVPB, bisacodyl, sodium chloride flush, sodium chloride, ondansetron, metoclopramide, traMADol **OR** traMADol, morphine, diphenhydrAMINE, zolpidem, magnesium hydroxide, albuterol sulfate HFA, furosemide, glucose, dextrose, glucagon (rDNA), dextrose    Labs/imaging:  CBC:   Recent Labs     06/09/21  0459 06/10/21  0606 06/10/21  0812 06/11/21  0600   WBC 15.9* 10.1  --  10.3   HGB 8.9* 9.0* 10.2* 10.0*    155  --  155         BMP:    Recent Labs     06/10/21  0830 06/10/21  1945 06/11/21  0825    138 136   K 4.3 4.4 4.3    102 100   CO2 25 24 20*   BUN 32* 27* 21*   CREATININE 0.9 0.7* 0.6*   GLUCOSE 80 78 84         Hepatic:   Recent Labs     06/10/21  1945 06/11/21  0600 06/11/21  0825   * 401* 372*   * 450* 430*   BILITOT 10.7* 11.4* 11.0*   ALKPHOS 256* 251* 243*       Troponin: No results for input(s): TROPONINI in the last 72 hours. BNP: No results for input(s): BNP in the last 72 hours.       INR:   Recent Labs     06/08/21  1925 06/09/21  0459 06/11/21  0600   INR 3.03* 3.36*

## 2021-06-11 NOTE — PROGRESS NOTES
CC: s/p CABG / ANDREW clip; acute on CRF -- on CRRT; LFT's improving    Intubated and sedated  SB in 50's (goal)  Sbp 130's on 20 liana and 0.03 vasopressin  60% FIO2  CTAB RRR no murmur sternum stable  As per CC  Vent wean as roddy -- less likely to vagal if not fighting the vent -- doing better on VC rather than PC  CRRT per nephrology  Decrease vasopressin  Contrast in colon has not moved -- laxatives / edema per GI

## 2021-06-11 NOTE — PROGRESS NOTES
in the last 72 hours. Recent Labs     06/08/21  1925 06/09/21  0459 06/11/21  0600   PROTIME 35.5* 39.5* 35.6*   INR 3.03* 3.36* 3.03*     No results for input(s): PTT in the last 72 hours. AXR 6/11/21  FINDINGS:   There is an overall paucity of small bowel gas. There is retained contrast   colon.  No suspicious calcifications are identified. Impression:     Indeterminate intestinal gas pattern secondary to paucity of small bowel gas. ASSESSMENT :    1. Abnormal liver function tests: Given normal liver function tests prior to CABG, suspect abnormal liver function tests are due to ischemic hepatopathy, which correlates with postoperative hypotension requiring vasopressors. US with fatty liver and no gallstones or biliary dilation. Pt was hypotensive and coded 6/7 and liver enzymes and INR increased thereafter c/w ischemic hepatitis. Negative Hepatitis A, B,C. transaminases improved. Bili increased but typically improvement in bili lags behind improvement in transaminases. INR down a little. 2. Coagulopathy: Hem/onc following. PT and PTT prolonged. Could be from argatroban and prolonged clearance per heme notes. INR further elevated d/t ischemic hepatitis and improved some. 3. CAD s/p CABG  4. Hematochezia - constipation, then anal pain with BM with blood Sunday/Monday. Could be anal fissure or hemorrhoidal bleeding. Pt declined rectal exam 6/7. No prior colonoscopy. ddx also includes AVM, diverticular, neoplasm, etc.  hgb stable. No further gross GI bleeding. 5. Hypotension: per ct surg/cardio teams  6. Renal insuff: renal following. On CRRT. PLAN :  - monitor liver enzymes and INR   - fiber when taking PO   - miralax  - recommend future flex sig or colonoscopy once recovered given episode of blood per rectum which has since resolved. Discussed with Dr. Jacy Cazares PA-C  [facility]     I have personally performed a face to face diagnostic evaluation on this patient.   I have interviewed and examined the patient and I agree with the findings and recommended plan of care. In summary, my findings and plan are the following: remains intubated, abd remains soft/benign exam, kub today persistent contrast colon but no distension. hbg remains stable/even improved likely from fluid withdrawal with crrt, transaminases cont improve from ischemic hepatitis and appears bili has now peaked, and dobhoff being placed to initiate tube feeds. Overall heme, liver appears improving and bowel stable, agreed with initiate tube feeds.        Carylon Call, MD  [address]

## 2021-06-11 NOTE — PROGRESS NOTES
06/10/21 2323   Vent Patient Data   Plateau Pressure 21 [id number]   Static Compliance 46 mL/cmH2O   Dynamic Compliance 49 mL/cmH2O

## 2021-06-11 NOTE — PROGRESS NOTES
Pt keeps alarming low tidal volume on vent. spo2 only 92% fio2 @ 65. ABG drawn and co2 increasing and po2 decreasing. Changed vent settings from SIMV 18 PC PS 15. To SIMV 18 VC PS 10. Will draw another ABG in an hour to see if gases get better.  Will adjust fio2 as needed

## 2021-06-11 NOTE — PROGRESS NOTES
Office : [phone]     Fax :[phone]         Renal Progress Note  Subjective:   Admit Date: 5/20/2021     HPI   Ralph Ashton is a 70 y.o. male with h/o DM 2, CKD 3 , DLP who came with complaints of 2-month history of progressive exertional shortness of breath and chest discomfort.   s/p CABG X 4        Interval History:       BP stable on 2 pressors   On CRRT   Tolerating well. Urine output is low  Edema has decreased    On vent support   fio2 60 %     I/O last 3 completed shifts: In: 1773.1 [I.V.:1120.1;  IV Piggyback:652.9]  Out: 0721   I/O this shift:  In: -   Out: 277         DIET Diet NPO Exceptions are: Sips with Meds  Medications:   Scheduled Meds:   metroNIDAZOLE  500 mg Intravenous Q8H    pantoprazole  40 mg Intravenous Daily    fluconazole  400 mg Intravenous Q24H    ampicillin-sulbactam  3,000 mg Intravenous Q8H    insulin lispro  0-6 Units Subcutaneous Q4H    [Held by provider] verapamil  40 mg Oral 3 times per day    albuterol  2.5 mg Nebulization 4x daily    psyllium  1 packet Oral BID    polyethylene glycol  17 g Oral Daily    furosemide  40 mg Intravenous Once    metoprolol tartrate  50 mg Oral BID    sodium bicarbonate  50 mEq Intravenous Once    midodrine  15 mg Oral TID WC    fludrocortisone  0.1 mg Oral Daily    lidocaine 1 % injection  5 mL Intradermal Once    sodium chloride flush  5-40 mL Intravenous 2 times per day    aspirin  325 mg Oral Daily    sennosides-docusate sodium  1 tablet Oral BID     Continuous Infusions:   phenylephrine (TAN-SYNEPHRINE) 50mg/250mL infusion 20 mcg/min (06/11/21 0558)    fentaNYL 100 mcg/hr (06/11/21 0641)    prismaSol BGK 4/2.5 500 mL/hr at 06/11/21 0353    prismaSATE BGK 4/2.5 500 mL/hr at 06/11/21 0950    prismaSol BGK 4/2.5 500 mL/hr at 06/11/21 0353    vasopressin (Septic Shock) infusion 0.03 Units/min (06/11/21 0352)    dexmedetomidine 1.4 mcg/kg/hr (06/11/21 0934)    DOPamine Stopped (06/11/21 0038)    norepinephrine Stopped (06/08/21 0253)    sodium chloride 25 mL (06/10/21 1734)    dextrose 100 mL/hr (06/05/21 0607)       Labs:  CBC:   Recent Labs     06/09/21  0459 06/10/21  0606 06/10/21  0812 06/11/21  0600   WBC 15.9* 10.1  --  10.3   HGB 8.9* 9.0* 10.2* 10.0*    155  --  155     BMP:    Recent Labs     06/10/21  0830 06/10/21  1945 06/11/21  0825    138 136   K 4.3 4.4 4.3    102 100   CO2 25 24 20*   BUN 32* 27* 21*   CREATININE 0.9 0.7* 0.6*   GLUCOSE 80 78 84     Ca/Mg/Phos:   Recent Labs     06/10/21  0830 06/10/21  1945 06/11/21  0825   CALCIUM 8.0* 8.1* 8.2*   MG 2.20 2.20 2.20   PHOS 2.5 2.5 1.6*     Hepatic:   Recent Labs     06/10/21  1945 06/11/21  0600 06/11/21  0825   * 401* 372*   * 450* 430*   BILITOT 10.7* 11.4* 11.0*   ALKPHOS 256* 251* 243*     Troponin: No results for input(s): TROPONINI in the last 72 hours. BNP: No results for input(s): BNP in the last 72 hours. Lipids: No results for input(s): CHOL, TRIG, HDL, LDLCALC, LABVLDL in the last 72 hours. ABGs:   Recent Labs     06/11/21  1104   PHART 7.522*   PO2ART 59.8*   [id number] 24.2*     INR:   Recent Labs     06/08/21  1925 06/09/21  0459 06/11/21  0600   INR 3.03* 3.36* 3.03*     UA:No results for input(s): Lenka Daniel, GLUCOSEU, BILIRUBINUR, True Diesel, BLOODU, PHUR, PROTEINU, UROBILINOGEN, NITRU, LEUKOCYTESUR, Rod Couch in the last 72 hours. Urine Microscopic: No results for input(s): LABCAST, BACTERIA, COMU, HYALCAST, WBCUA, RBCUA, EPIU in the last 72 hours.   Urine Culture:   Recent Labs     06/10/21  1230   LABURIN No growth at 18 to 36 hours XR CHEST PORTABLE   Final Result   Persistent large amount of opacity in the left retrocardiac area either due   to pneumonia or atelectasis. Small left pleural effusion. Mild right   basilar atelectasis. Overall the lungs appear clearer than on the prior   study. US GALLBLADDER RUQ   Final Result   Fatty liver. No cholelithiasis or ancillary evidence of acute cholecystitis. XR CHEST PORTABLE   Final Result   Vascular congestion. Bibasilar small pleural effusions and atelectasis or pulmonary edema,   increased in the interval.  Pneumonia is a differential possibility. XR CHEST PORTABLE   Final Result   Left PICC projects in normal alignment. No pneumothorax. Increased left basilar opacity, atelectasis or airspace disease. There may   be a small component of pleural effusion. Stable cardiomegaly. XR CHEST PORTABLE   Final Result   No pneumothorax following removal of the left chest tube. Development of   moderate opacities within the right lung either atelectasis or pneumonia. No   abnormal pulmonary vascular congestion or sizable pleural effusion. XR ABDOMEN FOR NG/OG/NE TUBE PLACEMENT   Final Result      XR CHEST PORTABLE   Final Result   Satisfactory appearance status post median sternotomy      No pneumothorax         CT CHEST ABDOMEN PELVIS WO CONTRAST   Final Result   No acute abnormality identified in the chest, abdomen, or pelvis. No finding   worrisome for occult malignancy. Moderate enlargement of the prostate. VL PRE OP VEIN MAPPING   Final Result      VL DUP CAROTID BILATERAL   Final Result      XR CHEST (2 VW)   Final Result   No active cardiopulmonary disease             Assessment/Plan     1. Acute kidney injury on CKD stage 3A   NORA 2/2 ATN   Acidosis better. Tolerating CRRT   Decrease the dialysate rate to 500 mL/h  Doing ultrafiltration .  Has increased edema   Tolerated net negative 50 ml/ hr in last 24 hrs Will increase UF to 75 ml/ hr     Monitor electrolytes. Replace calcium as needed. Recommend to dose adjust all medications  based on renal functions  Maintain SBP> 90 mmHg   Daily weights   AVOID NSAIDs  Avoid Nephrotoxins  Monitor Intake/Output    2. Acute respiratory failure. Intubated and vent support   fio2 down to 60% now  Appreciate critical care input. Had discussion . In agreement to start more ultrafiltration if ok with CTS.      3. Metabolic acidosis 2/2 lactic acidosis   Improving with better perfusion/blood pressure    4. DM 2. Needs better control  On insulin    5. S/p CABG.  CTS and cardiology on board                     CC time 40  minutes     Jordi Arango MD , MD  [facility] [address] [address] 18 07

## 2021-06-11 NOTE — PROGRESS NOTES
Continuous  heparin (porcine) injection 5,500 Units, 5,500 Units, Subcutaneous, PRN  pantoprazole (PROTONIX) injection 40 mg, 40 mg, Intravenous, Daily  fluconazole (DIFLUCAN) in 0.9 % sodium chloride IVPB 400 mg, 400 mg, Intravenous, Q24H  ampicillin-sulbactam (UNASYN) 3000 mg ivpb minibag, 3,000 mg, Intravenous, Q8H  insulin lispro (1 Unit Dial) 0-6 Units, 0-6 Units, Subcutaneous, Q4H  phenol 1.4 % mouth spray 1 spray, 1 spray, Mouth/Throat, Q2H PRN  [Held by provider] verapamil (CALAN) tablet 40 mg, 40 mg, Oral, 3 times per day  albuterol (PROVENTIL) nebulizer solution 2.5 mg, 2.5 mg, Nebulization, 4x daily  psyllium (HYDROCIL) 95 % packet 1 packet, 1 packet, Oral, BID  polyethylene glycol (GLYCOLAX) packet 17 g, 17 g, Oral, Daily  furosemide (LASIX) injection 40 mg, 40 mg, Intravenous, Once  prismaSATE BGK 4/2.5 dialysis solution, , Dialysis, Continuous  prismaSol BGK 4/2.5 dialysis solution, , Dialysis, Continuous  potassium chloride 20 mEq/50 mL IVPB (Central Line), 20 mEq, Intravenous, PRN  magnesium sulfate 2000 mg in 50 mL IVPB premix, 2,000 mg, Intravenous, PRN  calcium gluconate 1000 mg in sodium chloride 50 mL, 1,000 mg, Intravenous, PRN **OR** calcium gluconate 2000 mg in sodium chloride 100 mL, 2,000 mg, Intravenous, PRN **OR** calcium gluconate 3,000 mg in dextrose 5 % 100 mL IVPB, 3,000 mg, Intravenous, PRN **OR** calcium gluconate 4,000 mg in dextrose 5 % 100 mL IVPB, 4,000 mg, Intravenous, PRN  sodium phosphate 6 mmol in dextrose 5 % 250 mL IVPB, 6 mmol, Intravenous, PRN **OR** sodium phosphate 12 mmol in dextrose 5 % 250 mL IVPB, 12 mmol, Intravenous, PRN **OR** sodium phosphate 18 mmol in dextrose 5 % 500 mL IVPB, 18 mmol, Intravenous, PRN **OR** sodium phosphate 24 mmol in dextrose 5 % 500 mL IVPB, 24 mmol, Intravenous, PRN  metoprolol tartrate (LOPRESSOR) tablet 50 mg, 50 mg, Oral, BID  vasopressin 20 Units in dextrose 5 % 100 mL infusion, 0.01-0.03 Units/min, Intravenous, Continuous  dexmedetomidine (PRECEDEX) 400 mcg in sodium chloride 0.9 % 100 mL infusion, 0.2-1.4 mcg/kg/hr, Intravenous, Continuous  DOPamine (INTROPIN) 400 mg in dextrose 5 % 250 mL infusion, 2-20 mcg/kg/min, Intravenous, Continuous  norepinephrine (LEVOPHED) 16 mg in dextrose 5% 250 mL infusion, 2-100 mcg/min, Intravenous, Continuous  sodium bicarbonate 8.4 % injection 50 mEq, 50 mEq, Intravenous, Once  bisacodyl (DULCOLAX) EC tablet 5 mg, 5 mg, Oral, Daily PRN  midodrine (PROAMATINE) tablet 15 mg, 15 mg, Oral, TID WC  fludrocortisone (FLORINEF) tablet 0.1 mg, 0.1 mg, Oral, Daily  lidocaine PF 1 % injection 5 mL, 5 mL, Intradermal, Once  sodium chloride flush 0.9 % injection 5-40 mL, 5-40 mL, Intravenous, 2 times per day  sodium chloride flush 0.9 % injection 5-40 mL, 5-40 mL, Intravenous, PRN  0.9 % sodium chloride infusion, 25 mL, Intravenous, PRN  ondansetron (ZOFRAN) injection 4 mg, 4 mg, Intravenous, Q6H PRN  metoclopramide (REGLAN) injection 10 mg, 10 mg, Intravenous, Q6H PRN  aspirin EC tablet 325 mg, 325 mg, Oral, Daily  traMADol (ULTRAM) tablet 50 mg, 50 mg, Oral, Q6H PRN **OR** traMADol (ULTRAM) tablet 100 mg, 100 mg, Oral, Q6H PRN  morphine (PF) injection 2 mg, 2 mg, Intravenous, Q2H PRN  diphenhydrAMINE (BENADRYL) tablet 25 mg, 25 mg, Oral, Nightly PRN  zolpidem (AMBIEN) tablet 5 mg, 5 mg, Oral, Nightly PRN  sennosides-docusate sodium (SENOKOT-S) 8.6-50 MG tablet 1 tablet, 1 tablet, Oral, BID  magnesium hydroxide (MILK OF MAGNESIA) 400 MG/5ML suspension 30 mL, 30 mL, Oral, Daily PRN  albuterol sulfate  (90 Base) MCG/ACT inhaler 2 puff, 2 puff, Inhalation, Q6H PRN  furosemide (LASIX) injection 20 mg, 20 mg, Intravenous, PRN  glucose (GLUTOSE) 40 % oral gel 15 g, 15 g, Oral, PRN  dextrose 50 % IV solution, 12.5 g, Intravenous, PRN  glucagon (rDNA) injection 1 mg, 1 mg, Intramuscular, PRN  dextrose 5 % solution, 100 mL/hr, Intravenous, PRN    Allergies   Allergen Reactions    Atorvastatin Precedex to propofol. If he continues to have dyssynchrony, start Nimbex. Decrease fentanyl to 50 mics. Total critical care time caring for this patient with life threatening illness, including direct patient contact, management of life support systems, review of data including imaging and labs, discussions with other team members and physicians is at least 32 minutes so far today, excluding procedures.

## 2021-06-11 NOTE — PROGRESS NOTES
Message sent to pulmonology about vent settings. >  2907. vent settings are SIMV/PC rate 18 peep 5 pressure support 15. is not tolerating these setting, his tidal volume is consistently <350. ABG is worse also. ph 7.278  co2 52.4  po2 67.6  hco3 24.5  BE -2.2  sao2 90.2%    I changed the vent to SIMV/VC 18 . peep 5 pressure support 10.   please call [phone] if you would like this changed.  I am also rechecking ABG around 0200    Awaiting a call back

## 2021-06-11 NOTE — PROGRESS NOTES
Comprehensive Nutrition Assessment    Type and Reason for Visit:  Reassess    Nutrition Recommendations/Plan:   1. If unable to extubate, consider EN if hemodynamically stable vs PN by day 7 of NPO status. 2. If extubated, recommend swallow eval prior to start of PO diet. 3. If nutrition support initiated consult RD for recommendations. Nutrition Assessment:  Pt remains intubated and sedated . CRRT continues and pt on pressor support x2. Evaluated during critical care team rounds, pt will get SBT today. Day 4 no nutrition. Pt was previously healthy prior to critical illness, no evidence of protein calorie malnutrition. If unable to extubate and remains on multiple pressors, consider parenteral nutrition by day 7 of NPO status.          Malnutrition Assessment:  Malnutrition Status:  No malnutrition        Estimated Daily Nutrient Needs:  Energy (kcal):  1100 - 1400; Weight Used for Energy Requirements:  Admission (100 kg)     Protein (g):  146; Weight Used for Protein Requirements:  Ideal (2g/73kg)        Fluid (ml/day):  2000; Method Used for Fluid Requirements:  1 ml/kcal      Nutrition Related Findings:  6/7 LBM; I/O's: +4L since admission; CRRT; levo 20mcg/min & vaso 0.03 u/min; precedex; edema: RLE+1, LLE+3; +4L      Wounds:  Surgical Incision       Current Nutrition Therapies:    Diet NPO Exceptions are: Sips with Meds    Anthropometric Measures:  · Height: 5' 9\" (175.3 cm)  · Current Body Weight: 240 lb (108.9 kg)   · Admission Body Weight: 221 lb (100.2 kg)    · Ideal Body Weight: 160 lbs; % Ideal Body Weight 150 %   · BMI: 35.4  · BMI Categories: Obese Class 2 (BMI 35.0 -39.9)       Nutrition Diagnosis:   · Inadequate energy intake related to inadequate protein-energy intake, impaired respiratory function as evidenced by intubation, NPO or clear liquid status due to medical condition      Nutrition Interventions:   Food and/or Nutrient Delivery:  Continue NPO  Nutrition Education/Counseling: Education completed (5/28)   Coordination of Nutrition Care:  Continue to monitor while inpatient    Goals:  EN as hemodynamically stable vs PN       Nutrition Monitoring and Evaluation:   Behavioral-Environmental Outcomes:  None Identified   Food/Nutrient Intake Outcomes:  Diet Advancement/Tolerance  Physical Signs/Symptoms Outcomes:  Fluid Status or Edema, Hemodynamic Status, Weight     Discharge Planning:     Too soon to determine     Electronically signed by Sierar Burnett RD, SUMANTH on 6/11/21 at 10:08 AM EDT  Weekend Contact: [phone] leave name and callback number

## 2021-06-11 NOTE — PROGRESS NOTES
Start propofol, wean precedex off, drop fentanyl to 50 mcgs/h per Dr. Bella Gabriel. Will follow orders.

## 2021-06-11 NOTE — PROGRESS NOTES
CRRT filter to be changed. Set changed and CRRT restarted. Patient dropped BP to 50s, HR down to 30s. No intervention needed. Patient recovered by self.

## 2021-06-11 NOTE — PROGRESS NOTES
Pt was waking up and moving extremities. Pt not getting tidal volumes on vent. This nurse suctioned pt. Pt vagal with BP in the 50's. CRRT fluid removal to zero, gtts  Adjusted to assist with hemodynamic stability. BP increased and versed given per MD order. CRRT fluid removal resumed. Will monitor patient.  May need more sedation for better BP control, will discuss with MD/daysyamile this AM.

## 2021-06-12 NOTE — FLOWSHEET NOTE
21 0450   Vitals   Pulse 65   Resp 14   Pain Assessment   Pain Assessment Respiratory Rate >10   Response to Pain Intervention Asleep with RR greater than 10   Art Line   ABP (Arterial line BP) 93/54   ABP mean (Arterial line mean) 69 mmHg   Oxygen Therapy   SpO2 95 %   fluid removal rate on CRRT . Cristian gtt increased. Pt on minimal sedation. Will continue to monitor.

## 2021-06-12 NOTE — PROGRESS NOTES
Chief Complaint:  Post op follow-up    Procedure:   5/25 CABG    Subj:   6/12 Remains intubated and sedated. Wife at bedside. Introduced myself    Obj:    Blood pressure 118/64, pulse 66, temperature 97.6 °F (36.4 °C), temperature source Bladder, resp. rate 14, height 5' 9\" (1.753 m), weight 242 lb 4.6 oz (109.9 kg), SpO2 97 %. Lungs clear   Abdomen soft              Cor RRR currently but was in a fib when I started rounds this am   Sternal incision CDI              Jaundice   Chest tube out              UOP  minimal ml/shift. On CRRT     Cristian 260 mcg/min, Vasopressin 0.04, Levophed 4 mcg/min, propofol. Amiodarone halted for bradycardia to 30s    Diagnostics:     Recent Labs     06/10/21  0606 06/10/21  0812 06/11/21  0600 06/12/21  0540   WBC 10.1  --  10.3 10.8   HGB 9.0* 10.2* 10.0* 10.3*   HCT 28.1*  --  31.3* 32.2*     --  155 150                                                                  Recent Labs     06/11/21  0825 06/11/21 2002 06/12/21  0749    136 135*   K 4.3 4.0 4.2    102 102   CO2 20* 23 19*   BUN 21* 19 16   CREATININE 0.6* 0.6* 0.7*   GLUCOSE 84 100* 122*            Recent Labs     06/11/21  0825 06/11/21 2002 06/12/21  0749   MG 2.20 2.20 2.10      No results for input(s): TROPONINI in the last 72 hours. Recent Labs     06/11/21  0600 06/12/21  0540   INR 3.03* 2.71*     Lactate 4.99    6/11 CXRAY:  Mild increased pulm vascularity     Assess/Plan:   POD #18 critically ill  Wean pressors as tolerated.   Trophic tube feeds per GI    Katja Su MD, MD  FACS  6/12/2021  10:00 AM

## 2021-06-12 NOTE — PROGRESS NOTES
Gastroenterology Progress Note            Joanna Kendall is a 70 y.o. male patient. Principal Problem:    Coronary artery disease involving native coronary artery of native heart without angina pectoris  Active Problems:    Statin intolerance    Stage 3a chronic kidney disease (HCC)    Post poliomyelitis syndrome    Type 2 diabetes mellitus with diabetic nephropathy, without long-term current use of insulin (HCC)    Hypertension associated with diabetes ([facility] [address]    Mixed hyperlipidemia    S/P coronary artery bypass graft x 4    S/P left atrial appendage ligation    Essential hypertension    Acute renal failure with tubular necrosis (HCC)    Acute diastolic CHF (congestive heart failure) ([address]    Acute hypoxemic respiratory failure (HCC)  Resolved Problems:    * No resolved hospital problems. *      SUBJECTIVE:  Sedated on vent and pressors in CVU.    ROS:    Gastrointestinal ROS: no abdominal pain, change in bowel habits, or black or bloody stools. Cardiovascular ROS: hypotensive requiring pressors and CRRT  Respiratory ROS: remains intubated on vent    Physical    VITALS:  /64   Pulse 66   Temp 97.6 °F (36.4 °C) (Bladder)   Resp 14   Ht 5' 9\" (1.753 m)   Wt 242 lb 4.6 oz (109.9 kg)   SpO2 97%   BMI 35.78 kg/m²   TEMPERATURE:  Current - Temp: 97.6 °F (36.4 °C); Max - Temp  Av.8 °F (36 °C)  Min: 96.5 °F (35.8 °C)  Max: 97.6 °F (36.4 °C)    NAD  RRR, Nl s1s2  Lungs CTA Bilaterally, normal effort  Abdomen obese, soft, ND, NT, no HSM, Bowel sounds sparce.     Data    Data Review:    Recent Labs     06/10/21  0606 06/10/21  0812 21  0621  0540   WBC 10.1  --  10.3 10.8   HGB 9.0* 10.2* 10.0* 10.3*   HCT 28.1*  --  31.3* 32.2*   MCV 90.7  --  90.6 91.8     --  155 150     Recent Labs     21  0825 21  0749    136 135*   K 4.3 4.0 4.2    102 102   CO2 20* 23 19*   PHOS 1.6* 2.3* 1.9*   BUN 21* 19 16   CREATININE 0.6* 0.6* 0.7* Recent Labs     06/11/21  0600 06/11/21 2002 06/12/21  0540 06/12/21  0749   * 267* 216* 194*   * 350* 317* 296*   BILIDIR 6.8*  --  7.5*  --    BILITOT 11.4* 10.2* 10.4* 10.2*   ALKPHOS 251* 237* 246* 227*     No results for input(s): LIPASE, AMYLASE in the last 72 hours. Recent Labs     06/11/21  0600 06/12/21  0540   PROTIME 35.6* 31.7*   INR 3.03* 2.71*     No results for input(s): PTT in the last 72 hours. Radiology Review:  Abdomen xray 6/11/2021:   FINDINGS:   Images centered at the diaphragm.  The feeding tube is seen to enter the   stomach and curve in the antrum.  Tube does not appear to enter the duodenum. Heart is mildly enlarged.  Large amount of opacity in the left retrocardiac   area along with small left pleural effusion.  Some hazy opacity in the lower   right lung.  Endotracheal tube and right internal jugular catheter again seen.           Impression   Feeding tube in the stomach with its tip in the antrum vs duod bulb.  Dense atelectasis or   pneumonia in the left retrocardiac area.  Small left pleural effusion.              ASSESSMENT :     1. Abnormal liver function tests: Given normal liver function tests prior to CABG, suspect abnormal liver function tests are due to ischemic hepatopathy, which correlates with postoperative hypotension requiring vasopressors.  US with fatty liver and no gallstones or biliary dilation. Pt was hypotensive and coded 6/7 and liver enzymes and INR increased thereafter c/w ischemic hepatitis. Negative Hepatitis A, B,C. transaminases improved. Bili increased but typically improvement in bili lags behind improvement in transaminases. Now Bili improving and INR continues to decline with liver enzymes. 2. Coagulopathy: Hem/onc following. PT and PTT prolonged. Could be from argatroban and prolonged clearance per heme notes. INR further elevated d/t ischemic hepatitis and improved some. 3. CAD s/p CABG    4.  Hematochezia - constipation,

## 2021-06-12 NOTE — PLAN OF CARE
Problem: Cardiovascular  Goal: No DVT, peripheral vascular complications  Outcome: Ongoing     Problem: Respiratory  Goal: No pulmonary complications  Outcome: Ongoing     Problem: Respiratory  Goal: O2 Sat > 90%  Outcome: Ongoing     Problem: Falls - Risk of:  Goal: Will remain free from falls  Description: Will remain free from falls  Outcome: Ongoing     Problem: Serum Glucose Level - Abnormal:  Goal: Ability to maintain appropriate glucose levels will improve  Description: Ability to maintain appropriate glucose levels will improve  Outcome: Ongoing     Problem: Sensory Perception - Impaired:  Goal: Ability to maintain a stable neurologic state will improve  Description: Ability to maintain a stable neurologic state will improve  Outcome: Ongoing     Problem: Pain:  Description: Pain management should include both nonpharmacologic and pharmacologic interventions.   Goal: Pain level will decrease  Description: Pain level will decrease  Outcome: Ongoing     Problem: Skin Integrity:  Goal: Will show no infection signs and symptoms  Description: Will show no infection signs and symptoms  Outcome: Ongoing

## 2021-06-12 NOTE — ACP (ADVANCE CARE PLANNING)
Advanced Care Planning Note. Purpose of Encounter: Advanced care planning in light of hospitalization  Parties In Attendance: wife Gloria Allen  Decisional Capacity: no  Subjective:  Wife  understand that this conversation is to address long term care goal  Objective: Patient status post CABG now in cardiogenic shock intubated on multiple pressors and CRRT  Goals of Care Determination: Wife states will still want to keep patient full code and do CPR if needed will discuss further with her daughter and family  Code Status: full code  Time spent on Advanced care Plannin minutes  [address] documented patient's wishes, wife Humera Antoine is Yo Soria MD  2021 3:45 PM

## 2021-06-12 NOTE — PROGRESS NOTES
Office : [phone]     Fax :[phone]         Renal Progress Note  Subjective:   Admit Date: 5/20/2021     HPI   Remedios Petersen is a 70 y.o. male with h/o DM 2, CKD 3 , DLP who came with complaints of 2-month history of progressive exertional shortness of breath and chest discomfort.   s/p CABG X 4        Interval History:       BP stable on 2 pressors   On CRRT   Tolerating well. Urine output is low  Edema has decreased    On vent support   fio2 60 %     I/O last 3 completed shifts: In: 2149 [I.V.:1849; NG/GT:150; IV Piggyback:150]  Out: 2638 [Urine:130; Emesis/NG output:375]  No intake/output data recorded.         DIET Diet NPO Exceptions are: Sips with Meds  ADULT TUBE FEEDING; Orogastric; Standard without Fiber; Continuous; 10; No; 30; Q 4 hours  Medications:   Scheduled Meds:   amiodarone  400 mg Oral BID    cefTRIAXone (ROCEPHIN) IV  2,000 mg Intravenous Q24H    polyethylene glycol  17 g Oral BID    metroNIDAZOLE  500 mg Intravenous Q8H    pantoprazole  40 mg Intravenous Daily    fluconazole  400 mg Intravenous Q24H    insulin lispro  0-6 Units Subcutaneous Q4H    [Held by provider] verapamil  40 mg Oral 3 times per day    albuterol  2.5 mg Nebulization 4x daily    psyllium  1 packet Oral BID    furosemide  40 mg Intravenous Once    metoprolol tartrate  50 mg Oral BID    sodium bicarbonate  50 mEq Intravenous Once    midodrine  15 mg Oral TID     fludrocortisone  0.1 mg Oral Daily    lidocaine 1 % injection  5 mL Intradermal Once    sodium chloride flush  5-40 mL Intravenous 2 times per day    aspirin  325 mg Oral Daily    sennosides-docusate sodium  1 tablet Oral BID     Continuous Infusions:   amiodarone      propofol 5 mcg/kg/min (06/12/21 0739)    phenylephrine (TAN-SYNEPHRINE) 50mg/250mL infusion 300 mcg/min (06/12/21 1355)    fentaNYL Stopped (06/12/21 0330)    prismaSol BGK 4/2.5 500 mL/hr at 06/12/21 1100    prismaSATE BGK 4/2.5 500 mL/hr at 06/12/21 1100    prismaSol BGK 4/2.5 500 mL/hr at 06/12/21 1100    vasopressin (Septic Shock) infusion 0.04 Units/min (06/12/21 1215)    dexmedetomidine Stopped (06/11/21 1845)    DOPamine Stopped (06/12/21 0900)    norepinephrine 4 mcg/min (06/12/21 1030)    sodium chloride 250 mL (06/12/21 0737)    dextrose 100 mL/hr (06/05/21 0607)       Labs:  CBC:   Recent Labs     06/10/21  0606 06/10/21  0812 06/11/21  0600 06/12/21  0540   WBC 10.1  --  10.3 10.8   HGB 9.0* 10.2* 10.0* 10.3*     --  155 150     BMP:    Recent Labs     06/11/21  0825 06/11/21 2002 06/12/21  0749    136 135*   K 4.3 4.0 4.2    102 102   CO2 20* 23 19*   BUN 21* 19 16   CREATININE 0.6* 0.6* 0.7*   GLUCOSE 84 100* 122*     Ca/Mg/Phos:   Recent Labs     06/11/21  0825 06/11/21 2002 06/12/21  0749   CALCIUM 8.2* 7.7* 7.7*   MG 2.20 2.20 2.10   PHOS 1.6* 2.3* 1.9*     Hepatic:   Recent Labs     06/11/21 2002 06/12/21  0540 06/12/21  0749   * 216* 194*   * 317* 296*   BILITOT 10.2* 10.4* 10.2*   ALKPHOS 237* 246* 227*     Troponin: No results for input(s): TROPONINI in the last 72 hours. BNP: No results for input(s): BNP in the last 72 hours. Lipids: No results for input(s): CHOL, TRIG, HDL, LDLCALC, LABVLDL in the last 72 hours. ABGs:   Recent Labs     06/12/21  0755   PHART 7.332*   PO2ART 59.5*   AVR4RFA 43.1     INR:   Recent Labs     06/11/21  0600 06/12/21  0540   INR 3.03* 2.71*     UA:No results for input(s): Jody Labs, GLUCOSEU, BILIRUBINUR, Dub Dancer, BLOODU, PHUR, PROTEINU, UROBILINOGEN, NITRU, LEUKOCYTESUR, Homar Manzanares in the last 72 hours. Urine Microscopic: No results for input(s): LABCAST, BACTERIA, COMU, HYALCAST, WBCUA, RBCUA, EPIU in the last 72 hours. Urine Culture:   Recent Labs     06/10/21  1230   LABURIN No growth at 18 to 36 hours     Urine Chemistry: No results for input(s): CLUR, LABCREA, PROTEINUR, NAUR in the last 72 hours. Objective:   Vitals: /64   Pulse (!) 38   Temp 97.6 °F (36.4 °C) (Temporal)   Resp 13   Ht 5' 9\" (1.753 m)   Wt 242 lb 4.6 oz (109.9 kg)   SpO2 (!) 81%   BMI 35.78 kg/m²    Wt Readings from Last 3 Encounters:   06/12/21 242 lb 4.6 oz (109.9 kg)   05/20/21 223 lb 9.6 oz (101.4 kg)   03/12/21 216 lb (98 kg)      24HR INTAKE/OUTPUT:      Intake/Output Summary (Last 24 hours) at 6/12/2021 1359  Last data filed at 6/12/2021 1200  Gross per 24 hour   Intake 2149 ml   Output 2891 ml   Net -742 ml     Constitutional:  Intubated   NECK: supple, no JVD  Cardiovascular:  S1, S2 without m/r/g  Respiratory:  Decreased air entry bases   Abdomen: +bs, soft, nt  Ext:  1 + LE edema. Mainly prominent in thigh area     Assessment and Plan:       IMAGING:  XR CHEST PORTABLE   Final Result   No significant change in appearance of extensive bilateral pulmonary disease   over the past 24 hours. Unchanged pleural effusions as well         XR ABDOMEN FOR NG/OG/NE TUBE PLACEMENT   Final Result   Feeding tube in the stomach with its tip in the antrum. Dense atelectasis or   pneumonia in the left retrocardiac area. Small left pleural effusion. XR ABDOMEN FOR NG/OG/NE TUBE PLACEMENT   Preliminary Result   Status post placement of feeding tube with distal tip located in the region   of the pyloric antrum/proximal duodenum as described above. XR CHEST PORTABLE   Final Result   Pulmonary edema is slightly improved as compared to prior. Persistent small   bilateral pleural effusions.          XR ABDOMEN (KUB) (SINGLE AP VIEW)   Final Result   Indeterminate intestinal gas pattern secondary to paucity of small bowel gas. XR CHEST PORTABLE   Final Result   Worsening CHF/pulmonary edema. XR ABDOMEN (KUB) (SINGLE AP VIEW)   Final Result   Addendum 1 of 1   ADDENDUM:   There is an overall paucity of bowel gas. There is some retained contrast    in   the colon which is collapsed. The bowel gas pattern is nonspecific. Final   Triple-lumen catheter as above. XR CHEST PORTABLE   Final Result   Interval placement of tubes and lines as above. XR ABDOMEN FOR NG/OG/NE TUBE PLACEMENT   Final Result   Status post gastric tube and ET tube placement in good position with no   change in the left PICC line. Stable cardiomegaly with mild central pulmonary congestion and mild discoid   atelectasis or scarring along lung bases. Esophageal probe in place and a left PICC line in place. XR CHEST PORTABLE   Final Result   Persistent large amount of opacity in the left retrocardiac area either due   to pneumonia or atelectasis. Small left pleural effusion. Mild right   basilar atelectasis. Overall the lungs appear clearer than on the prior   study. US GALLBLADDER RUQ   Final Result   Fatty liver. No cholelithiasis or ancillary evidence of acute cholecystitis. XR CHEST PORTABLE   Final Result   Vascular congestion. Bibasilar small pleural effusions and atelectasis or pulmonary edema,   increased in the interval.  Pneumonia is a differential possibility. XR CHEST PORTABLE   Final Result   Left PICC projects in normal alignment. No pneumothorax. Increased left basilar opacity, atelectasis or airspace disease. There may   be a small component of pleural effusion. Stable cardiomegaly. XR CHEST PORTABLE   Final Result   No pneumothorax following removal of the left chest tube. Development of   moderate opacities within the right lung either atelectasis or pneumonia.   No   abnormal pulmonary vascular congestion or sizable pleural effusion. XR ABDOMEN FOR NG/OG/NE TUBE PLACEMENT   Final Result      XR CHEST PORTABLE   Final Result   Satisfactory appearance status post median sternotomy      No pneumothorax         CT CHEST ABDOMEN PELVIS WO CONTRAST   Final Result   No acute abnormality identified in the chest, abdomen, or pelvis. No finding   worrisome for occult malignancy. Moderate enlargement of the prostate. VL PRE OP VEIN MAPPING   Final Result      VL DUP CAROTID BILATERAL   Final Result      XR CHEST (2 VW)   Final Result   No active cardiopulmonary disease             Assessment/Plan     1. Acute kidney injury on CKD stage 3A   NORA 2/2 ATN   Acidosis better. Tolerating CRRT   Not able to tolerate UF today so no UF for now   Overall condition critical     Monitor electrolytes. Replace calcium as needed. Recommend to dose adjust all medications  based on renal functions  Maintain SBP> 90 mmHg   Daily weights   AVOID NSAIDs  Avoid Nephrotoxins  Monitor Intake/Output    2. Acute respiratory failure. Intubated and vent support   fio2 down to 100 % today   Appreciate critical care input.     3. Metabolic acidosis 2/2 lactic acidosis   Improving with better perfusion/blood pressure    4. DM 2. Needs better control  On insulin    5. S/p CABG.  CTS and cardiology on board     Continue CRRT today   D/w wife in detail  Condition very critical   Primary team to address long term goals                     CC time 40  minutes     Kevyn Darden MD , MD  [facility] [address] [phone]

## 2021-06-12 NOTE — PROGRESS NOTES
06/12/21 0757   Vent Patient Data   Plateau Pressure 38 [id number]   Static Compliance 16 mL/cmH2O   Dynamic Compliance 38 mL/cmH2O

## 2021-06-12 NOTE — PROGRESS NOTES
Patient extremely labile, HR dropping down to 34, still NSR, BP also drops to MAP <60. Oxygen saturations are fluctuating as well, dropping down to 80 and consistent. Cristian is maxed at 300 mcgs, vaso @0.04, dopamine on and off per patient status. NO fluid removal since 0700. Waiting for dietary to bring up the tube feeds.

## 2021-06-12 NOTE — PROGRESS NOTES
PULMONARY AND CRITICAL CARE MEDICINE PROGRESS NOTE    Subjective: Patient is seen at bedside. He remains intubated and sedated. Yesterday, sedation was changed to propofol which is a 5 mcg/min. Off Precedex and fentanyl. He is tolerating that well. This morning, patient was having issues with hypoxia with which she required bagging a few times and FiO2 was increased to 100%. No plugging was noted on suctioning. Hypoxia improved after changing the vent settings by me. Patient also went into atrial fibrillation with RVR for which she was initiated on amiodarone drip which caused bradycardia and amnio was stopped. Now he is in atrial fibrillation with heart rate ranging from /min. Pressor requirements increased overnight. Currently on Levophed at 4 mcg/min and Cristian-Synephrine up to 260 mcg/min. Tolerating CRRT. Fluid was being removed yesterday and he is 2 L negative. Fluid removal through CRRT has stopped now with increasing pressor requirements. ROS could not be obtained due to patient factors.      MEDICATIONS:     Scheduled Meds:   amiodarone  400 mg Oral BID    cefTRIAXone (ROCEPHIN) IV  2,000 mg Intravenous Q24H    polyethylene glycol  17 g Oral BID    metroNIDAZOLE  500 mg Intravenous Q8H    pantoprazole  40 mg Intravenous Daily    fluconazole  400 mg Intravenous Q24H    insulin lispro  0-6 Units Subcutaneous Q4H    [Held by provider] verapamil  40 mg Oral 3 times per day    albuterol  2.5 mg Nebulization 4x daily    psyllium  1 packet Oral BID    furosemide  40 mg Intravenous Once    metoprolol tartrate  50 mg Oral BID    sodium bicarbonate  50 mEq Intravenous Once    midodrine  15 mg Oral TID WC    fludrocortisone  0.1 mg Oral Daily    lidocaine 1 % injection  5 mL Intradermal Once    sodium chloride flush  5-40 mL Intravenous 2 times per day    aspirin  325 mg Oral Daily    sennosides-docusate sodium  1 tablet Oral BID       Current Infusions:    amiodarone 1 is quite sedated and is not following any commands. Currently at this moment I do not feel the need to initiate any further sedatives. He is having not ready for vent wean with atrial fibrillation with RVR, increasing FiO2 requirements and increasing vasopressors requirement. I do believe that patient is having increased vasopressors requirement secondary to atrial fibrillation with RVR leading to pulmonary edema and thus leading to increased FiO2. Chest x-ray continues to show pulmonary edema. He is in cardiogenic shock and on Levophed at 4 mcg/min and Cristian-Synephrine at 260 mcg/min. Blood pressure is adequate. Patient is on and off dopamine for intermittent bradycardia. Also on Benadryl 50 mg 3 times daily. Fluid overloaded with cannot tolerate fluid removal anymore because of increasing pressors requirement. 2 L negative in the last 24 hours. Lactic acid is increased. Acute hypoxic respiratory failure secondary to pulmonary edema. I switched him to AC/VC mode which he is tolerating much better and is not having any ventilator asynchrony. I also increase the PEEP to 8 which is helping with hypoxia. FiO2 was increased to 100% this morning. I would recommend to titrate FiO2 as tolerated. Adequate gas exchange on ABG. Start tube feeding. LFTs are downtrending. Acute renal failure requiring CRRT. Is not tolerating fluid removal anymore because of increasing pressor requirement. Even on CRRT. Noted to have Klebsiella oxytoca on sputum culture obtained from 6/9/2021 which is not sensitive to Unasyn. Discontinue Zosyn and start ceftriaxone. Patient is also on Flagyl. I do not see any need for antifungal therapy. Protonix for stress ulcer prophylaxis. Wife was updated by me at bedside.     Critical Care Time: 39 Minutes  Total critical care time caring for this patient with life threatening illness, including direct patient contact, management of life support systems, review of data including imaging and labs, discussions with other team members and physicians excluding procedures. Ry Parish MD  Pulmonary Critical Care and Sleep Medicine  6/12/2021, 12:24 PM    This note was completed using dragon medical speech recognition software. Grammatical errors, random word insertions, pronoun errors and incomplete sentences are occasional consequences of this technology due to software limitations. If there are questions or concerns about the content of this note of information contained within the body of this dictation they should be addressed with the provider for clarification.

## 2021-06-12 NOTE — PROGRESS NOTES
Nutrition Note    RD received TF consult    Pt remains hemodynamically unstable, receiving multiple pressors. Pt remains intubated and sedated with propofol at 3.4 ml/hr, providing 90 cals. Nutrition support to begin, agree with GI service to provide trophic feeds at 10 ml/hr to prevent gut atrophy and supplement with PN for cals and protein needs. Tube feed recommendation- Osmolite 1.2 @ 10 ml/hr.  DO NOT advance rate (provides: 288 cals and 13 gms protein)  TPN recommendation- Clinimix 5/20 @ 50 ml/hr. (provides: 1056 cals and 60 gms protein; total vol 1200mls)    Electronically signed by Misa Terrell RD, CNSC, LD on 6/12/21 at 9:34 AM EDT    Contact: [phone]

## 2021-06-12 NOTE — PROGRESS NOTES
Hospital Medicine Progress Note     Date:  6/12/2021    PCP: Indy Arauz MD (Tel: [phone])    Date of Admission: 5/20/2021      Subjective  Patient with afib started on amiodarone bradycardia to 35, changes from predex to propofol, ,on max phenylephrine, dopaine on and off, on crrt  Objective  Physical exam:  Vitals: /64   Pulse (!) 38   Temp 97.6 °F (36.4 °C) (Temporal)   Resp 13   Ht 5' 9\" (1.753 m)   Wt 242 lb 4.6 oz (109.9 kg)   SpO2 (!) 81%   BMI 35.78 kg/m²   Gen: intubated and sedated  Head: Normocephalic. Atraumatic. Eyes: EOMI.   ENT: Oral mucosa moist  Neck: No JVD. No obvious thyromegaly. CVS: Nml S1S2, + murmur, RRR cap refill > 2sec  Pulmomary: coarse breath sounds bilaterally  Gastrointestinal: Soft, NT/ND. Positive bowel sounds. Neuro: ibtubated and sedated  Psychiatry: intubated and sedated  Skin: Warm, dry with normal turgor. No rash      24HR INTAKE/OUTPUT:      Intake/Output Summary (Last 24 hours) at 6/12/2021 1346  Last data filed at 6/12/2021 1200  Gross per 24 hour   Intake 2149 ml   Output 2891 ml   Net -742 ml     I/O last 3 completed shifts: In: 2149 [I.V.:1849; NG/GT:150; IV Piggyback:150]  Out: 3804 [Urine:130; Emesis/NG output:375]  No intake/output data recorded.       Meds:    amiodarone  400 mg Oral BID    cefTRIAXone (ROCEPHIN) IV  2,000 mg Intravenous Q24H    polyethylene glycol  17 g Oral BID    metroNIDAZOLE  500 mg Intravenous Q8H    pantoprazole  40 mg Intravenous Daily    fluconazole  400 mg Intravenous Q24H    insulin lispro  0-6 Units Subcutaneous Q4H    [Held by provider] verapamil  40 mg Oral 3 times per day    albuterol  2.5 mg Nebulization 4x daily    psyllium  1 packet Oral BID    furosemide  40 mg Intravenous Once    metoprolol tartrate  50 mg Oral BID    sodium bicarbonate  50 mEq Intravenous Once    midodrine  15 mg Oral TID WC    fludrocortisone  0.1 mg Oral Daily    lidocaine 1 % injection  5 mL Intradermal Once    full code, patient will need code status addressed by primary team  Lilian Caballero MD  -------------------------------  Pati hospitalist

## 2021-06-13 NOTE — PROGRESS NOTES
Chief Complaint:  Post op follow-up    Procedure:   5/25 CABG    Subj:   6/12 Remains intubated and sedated. Wife at bedside. Introduced myself  6/13  Remains sedated and intubated. Nurse reporting some tonic/ clonic acitivity. Obj:    Blood pressure 118/64, pulse 75, temperature 98 °F (36.7 °C), temperature source Bladder, resp. rate 20, height 5' 9\" (1.753 m), weight 242 lb 4.6 oz (109.9 kg), SpO2 98 %. Lungs clear   Abdomen soft              Cor RRR    Sternal incision CDI              Neuro  Withdraws to painful stimuli. Tonic clonic movement upper extremities. Pupils reactive              Jaundice   Chest tube out              UOP  minimal ml/shift. On CRRT     Cristian 300 mcg/min, Vasopressin 0.04, Levophed off, propofol. Amiodarone     Diagnostics:     Recent Labs     06/12/21  0540 06/12/21  0757 06/13/21  0555   WBC 10.8 12.2* 8.5   HGB 10.3* 10.0* 8.9*   HCT 32.2* 33.7* 28.4*    159 95*                                                                  Recent Labs     06/12/21  0749 06/12/21  1830 06/13/21  0555   * 131* 131*   K 4.2 4.1 3.9    98* 98*   CO2 19* 19* 20*   BUN 16 15 14   CREATININE 0.7* 0.9 1.0   GLUCOSE 122* 153* 192*            Recent Labs     06/12/21  0749 06/12/21  1830 06/13/21  0555   MG 2.10 2.20 2.10      No results for input(s): TROPONINI in the last 72 hours. Recent Labs     06/12/21  0540 06/12/21  0757 06/13/21  0555   INR 2.71* 2.40* 2.67*     Lactate 4.99    6/11 CXRAY:  Mild increased pulm vascularity     Assess/Plan:   POD #19 critically ill  Wean pressors as tolerated.   Trophic tube feeds per MARLENY Hernandez MD, MD  FACS  6/13/2021  9:19 AM

## 2021-06-13 NOTE — FLOWSHEET NOTE
Perfect serve sent to Dr. Maru Ferrer with pulmonology concerning recent ABG's from 2826 showing metabolic acidosis. Orders received to adjust RR to 20 on vent and ensure I:E ratio 1:2, RT notified of orders.

## 2021-06-13 NOTE — PROGRESS NOTES
Spoke with Dr. Steen Fus no bicarb drip will be start at this time, also confirmed that we are to be going net positive on the CRRT.

## 2021-06-13 NOTE — PROGRESS NOTES
06/12/21 2009   Vent Patient Data   Plateau Pressure 27 [id number]   Static Compliance 17 mL/cmH2O   Dynamic Compliance 36.19 mL/cmH2O

## 2021-06-13 NOTE — PROGRESS NOTES
Attempted to remove 50 ml per hr from patient, unable to tolerate heart rate dropping in to the 30s and pressure dropping into the 70`s. Current fluid removal rate at 0 ml/hr will attempt later to remove lower amount.

## 2021-06-13 NOTE — PROGRESS NOTES
chloride flush  5-40 mL Intravenous 2 times per day    aspirin  325 mg Oral Daily    sennosides-docusate sodium  1 tablet Oral BID       Infusions:    amiodarone 0.5 mg/min (06/13/21 0200)    propofol 5 mcg/kg/min (06/13/21 0747)    phenylephrine (TAN-SYNEPHRINE) 50mg/250mL infusion 300 mcg/min (06/13/21 0951)    fentaNYL Stopped (06/12/21 0330)    prismaSol BGK 4/2.5 500 mL/hr at 06/12/21 2118    prismaSATE BGK 4/2.5 500 mL/hr at 06/12/21 2118    prismaSol BGK 4/2.5 500 mL/hr at 06/12/21 2118    vasopressin (Septic Shock) infusion 0.04 Units/min (06/13/21 0358)    dexmedetomidine Stopped (06/11/21 1845)    DOPamine Stopped (06/12/21 0900)    norepinephrine Stopped (06/13/21 2730)    sodium chloride 250 mL (06/12/21 0737)    dextrose 100 mL/hr (06/05/21 0607)         PRN Meds: midazolam, heparin (porcine), phenol, potassium chloride, magnesium sulfate, calcium gluconate **OR** calcium gluconate **OR** calcium gluconate **OR** calcium gluconate, sodium phosphate IVPB **OR** sodium phosphate IVPB **OR** sodium phosphate IVPB **OR** sodium phosphate IVPB, bisacodyl, sodium chloride flush, sodium chloride, ondansetron, metoclopramide, traMADol **OR** traMADol, morphine, diphenhydrAMINE, zolpidem, magnesium hydroxide, albuterol sulfate HFA, furosemide, glucose, dextrose, glucagon (rDNA), dextrose    Labs/imaging:  CBC:   Recent Labs     06/12/21  0540 06/12/21  0757 06/13/21  0555   WBC 10.8 12.2* 8.5   HGB 10.3* 10.0* 8.9*    159 95*         BMP:    Recent Labs     06/12/21  0749 06/12/21  1830 06/13/21  0555   * 131* 131*   K 4.2 4.1 3.9    98* 98*   CO2 19* 19* 20*   BUN 16 15 14   CREATININE 0.7* 0.9 1.0   GLUCOSE 122* 153* 192*         Hepatic:   Recent Labs     06/12/21  0540 06/12/21  0749 06/13/21  0555   * 194* 127*   * 296* 192*   BILITOT 10.4* 10.2* 9.5*   ALKPHOS 246* 227* 190*       Troponin: No results for input(s): TROPONINI in the last 72 hours.       BNP: No results for input(s): BNP in the last 72 hours. INR:   Recent Labs     06/12/21  0540 06/12/21  0757 06/13/21  0555   INR 2.71* 2.40* 2.67*           Reviewed imaging and reports noted      Assessment:  Principal Problem:    Coronary artery disease involving native coronary artery of native heart without angina pectoris  Active Problems:    Statin intolerance    Stage 3a chronic kidney disease (HCC)    Post poliomyelitis syndrome    Type 2 diabetes mellitus with diabetic nephropathy, without long-term current use of insulin (HCC)    Hypertension associated with diabetes ([facility] [address]    Mixed hyperlipidemia    S/P coronary artery bypass graft x 4    S/P left atrial appendage ligation    Essential hypertension    Acute renal failure with tubular necrosis (HCC)    Acute diastolic CHF (congestive heart failure) ([facility] [address]    Acute hypoxemic respiratory failure (HCC)  Resolved Problems:    * No resolved hospital problems. *        Plan:  Acute hypoxic respiratory failure ?  Secondary to pna and KATHERYN with CHF and CAP  -on vent ,oxygen down to 70%  -cultures seen atbx change  - per cvts and pulm    Septic shock secondary to above vs cardiogenic shock from [facility]  - on pressors, MAP>65    NORA on ckd3: nephro  On CRRT now    Elevated lfts: likely secondary to ischemic hepathopathy secondary to posoperative hypotension  - monitor  - gi on board    Hemotochezia: hgb stable,   - gi on board will need colonscopy in future    Severe multivessel CAD  -Status post CABG x4 on 5/25/2021  -Management per cardiothoracic surgery and cardiolog    Uncontrolled insulin-dependent diabetes mellitus type 2 with circulatory problem: Hyperglycemia now Lantus discontinued just sliding scale insulin monitor closely    PAF: on amio per cards, will leave ac per cards    Diet: Diet NPO Exceptions are: Sips with Meds  ADULT TUBE FEEDING; Orogastric; Standard without Fiber; Continuous; 10; No; 30; Q 4 hours    Activity: Up with assist      Code status: full code, patient will need code status addressed by primary team  Sherry Gautam MD  -------------------------------  Rounding hospitalist

## 2021-06-13 NOTE — PROGRESS NOTES
Dr Be Sandoval at bedside  To start pulling 50 pulling 50 ml and hr and see if patient is able to tolerate it.  Also to see if pharmacy can concentrate the liana in order to decrease the amount of fluid increasing

## 2021-06-13 NOTE — PROGRESS NOTES
PULMONARY AND CRITICAL CARE MEDICINE PROGRESS NOTE    Subjective: Patient was noted to have metabolic acidosis on the blood gas. Ventilator settings were changed by me last evening with improvement in metabolic acidosis today. Platelets are decreasing. Patient is on FiO2 of 80% with PEEP of 8 and saturating 95 to 96%. Also on Tan-Synephrine at 300 mcg/min along with vasopressin at 0.04. Off Levophed and off dopamine. On amiodarone drip with which he is currently on normal sinus rhythm. Although he continues to fluctuate between normal sinus rhythm and atrial fibrillation. On very low-dose sedation of propofol at 5 mcg/min. ROS could not be obtained due to patient factors.         MEDICATIONS:     Scheduled Meds:   amiodarone  400 mg Oral BID    cefTRIAXone (ROCEPHIN) IV  2,000 mg Intravenous Q24H    polyethylene glycol  17 g Oral BID    metroNIDAZOLE  500 mg Intravenous Q8H    pantoprazole  40 mg Intravenous Daily    fluconazole  400 mg Intravenous Q24H    insulin lispro  0-6 Units Subcutaneous Q4H    [Held by provider] verapamil  40 mg Oral 3 times per day    albuterol  2.5 mg Nebulization 4x daily    psyllium  1 packet Oral BID    furosemide  40 mg Intravenous Once    [Held by provider] metoprolol tartrate  50 mg Oral BID    sodium bicarbonate  50 mEq Intravenous Once    midodrine  15 mg Oral TID WC    fludrocortisone  0.1 mg Oral Daily    lidocaine 1 % injection  5 mL Intradermal Once    sodium chloride flush  5-40 mL Intravenous 2 times per day    aspirin  325 mg Oral Daily    sennosides-docusate sodium  1 tablet Oral BID       Current Infusions:    phenylephrine (TAN-SYNEPHRINE) 100 mg/250 mL infusion 300 mcg/min (06/13/21 1305)    amiodarone 0.5 mg/min (06/13/21 0200)    propofol 5 mcg/kg/min (06/13/21 0747)    fentaNYL Stopped (06/12/21 0330)    prismaSol BGK 4/2.5 500 mL/hr at 06/12/21 2118    prismaSATE BGK 4/2.5 500 mL/hr at 06/12/21 2118    prismaSol BGK 4/2.5 500 mL/hr at 06/12/21 2118    vasopressin (Septic Shock) infusion 0.04 Units/min (06/13/21 1350)    dexmedetomidine Stopped (06/11/21 1845)    DOPamine Stopped (06/12/21 0900)    norepinephrine 4 mcg/min (06/13/21 1312)    sodium chloride 250 mL (06/12/21 0737)    dextrose 100 mL/hr (06/05/21 0607)       PRN meds:  midazolam, heparin (porcine), phenol, potassium chloride, magnesium sulfate, calcium gluconate **OR** calcium gluconate **OR** calcium gluconate **OR** calcium gluconate, sodium phosphate IVPB **OR** sodium phosphate IVPB **OR** sodium phosphate IVPB **OR** sodium phosphate IVPB, bisacodyl, sodium chloride flush, sodium chloride, ondansetron, metoclopramide, traMADol **OR** traMADol, morphine, diphenhydrAMINE, zolpidem, magnesium hydroxide, albuterol sulfate HFA, furosemide, glucose, dextrose, glucagon (rDNA), dextrose    PHYSICAL EXAM:  /64   Pulse (!) 43   Temp 97.3 °F (36.3 °C) (Bladder)   Resp 18   Ht 5' 9\" (1.753 m)   Wt 242 lb 4.6 oz (109.9 kg)   SpO2 94%   BMI 35.78 kg/m²  I/O last 3 completed shifts: In: 3362 [I.V.:3596; IV Piggyback:250]  Out: 225 [Other:225] I/O this shift: In: 853 [I.V.:823; NG/GT:30]  Out: 61 [Urine:20]      Intake/Output Summary (Last 24 hours) at 6/13/2021 1402  Last data filed at 6/13/2021 1300  Gross per 24 hour   Intake 4699 ml   Output 286 ml   Net 4413 ml       CONSTITUTIONAL: He is a 70y.o.-year-old who appears his stated age. He is in no acute distress. CARDIOVASCULAR: S1 S2 RRR. Without murmer  RESPIRATORY & CHEST: Lungs are clear to auscultation and percussion. No wheezing, no crackles. Good air movement  GASTROINTESTINAL & ABDOMEN: Soft, nontender, positive bowel sounds in all quadrants, non-distended, without hepatosplenomegaly. GENITOURINARY: Deferred. MUSCULOSKELETAL: No tenderness to palpation of the axial skeleton. There is no clubbing. No cyanosis. No edema of the lower extremities. SKIN OF BODY: No rash or jaundice.    PSYCHIATRIC EVALUATION: Could not be assessed. HEMATOLOGIC/LYMPHATIC/ IMMUNOLOGIC: No palpable lymphadenopathy. NEUROLOGIC: Sedated, does not follow commands. Groslly non-focal.     LABS:    Recent Labs     06/12/21  0540 06/12/21  0757 06/13/21  0555   WBC 10.8 12.2* 8.5   RBC 3.51* 3.50* 3.08*   HGB 10.3* 10.0* 8.9*   HCT 32.2* 33.7* 28.4*   MCH 29.3 28.5 28.9   MCHC 31.9 29.7* 31.3   RDW 20.4* 20.9* 20.3*    159 95*   MPV 9.3 9.8 10.2   NEUTOPHILPCT  --  77.0  --    MONOPCT  --  8.0  --    BASOPCT  --  0.0  --      Recent Labs     06/12/21  0540 06/12/21  0749 06/12/21  1830 06/13/21  0555   NA  --  135* 131* 131*   K  --  4.2 4.1 3.9   CL  --  102 98* 98*   ANIONGAP  --  14 14 13   CO2  --  19* 19* 20*   BUN  --  16 15 14   CREATININE  --  0.7* 0.9 1.0   CALCIUM  --  7.7* 7.8* 7.7*   PROT 5.3* 4.9*  --  4.8*   BILITOT 10.4* 10.2*  --  9.5*   ALKPHOS 246* 227*  --  190*   * 296*  --  192*   * 194*  --  127*   GLUCOSE  --  122* 153* 192*     Recent Labs     06/12/21  0755 06/12/21  1840 06/13/21  0604   PHART 7.332* 7.260* 7.353   WDM1HTO 43.1 45.7* 33.9*   PO2ART 59.5* 111.6* 83.5   RHZ4NPQ 22.9 20.6* 18.9*   Y5CDICAB 89* 98 96   BEART -3 -7* -7*     IMAGING:  I reviewed the chest x-ray from 6/12/2021 and my interpretation is as follows. Bilateral pulmonary edema.       IMPRESSION:  Acute hypoxic respiratory failure  Fluid overload with pulmonary edema  Cardiogenic shock  Atrial fibrillation with RVR  Acute on chronic CKD  Ischemic hepatitis  Klebsiella pneumonia.        PLAN:  Patient is currently sedated with propofol at 5 mcg/min. This seems to be working very well. He is not having any ventilator asynchrony. He is quite sedated and is not following any commands. Currently at this moment I do not feel the need to initiate any further sedatives.       He is in cardiogenic shock and on Cristian-Synephrine at 300 mcg/min and vasopressin at 0.04. Also on midodrine 15 mg 3 times daily.   Fluid overloaded. Did not have any fluid removed through CRRT yesterday. Per nephrology, plan to start fluid removal at 50 cc/h as tolerated today.       Acute hypoxic respiratory failure secondary to pulmonary edema. ABG with improved metabolic acidosis. Ventilator settings were changed by me. Currently on PEEP of 8 with FiO2 of 80%. I dropped the FiO2 to 70%. Titrate FiO2 for saturation of 90 to 92%.      On trophic tube feeding. LFTs are downtrending.     Acute renal failure requiring CRRT. Plan for fluid removal at 50 cc/h per nephrology today if tolerated.     Noted to have Klebsiella oxytoca on sputum culture obtained from 6/9/2021 which is not sensitive to Unasyn. Zosyn discontinued and ceftriaxone started on 6/12/2021. Patient is also on Flagyl. I do not see any need for antifungal therapy. Platelets have down trended to 95. Continue to monitor. No evidence of bleeding.     Protonix for stress ulcer prophylaxis. Critical Care Time: 39 Minutes  Total critical care time caring for this patient with life threatening illness, including direct patient contact, management of life support systems, review of data including imaging and labs, discussions with other team members and physicians excluding procedures. Filippo Fields MD  Pulmonary Critical Care and Sleep Medicine  6/13/2021, 2:02 PM    This note was completed using dragon medical speech recognition software. Grammatical errors, random word insertions, pronoun errors and incomplete sentences are occasional consequences of this technology due to software limitations. If there are questions or concerns about the content of this note of information contained within the body of this dictation they should be addressed with the provider for clarification.

## 2021-06-13 NOTE — PROGRESS NOTES
Gastroenterology Progress Note            Deyvi Dunbar is a 70 y.o. male patient. 1. Chest pain, unspecified type        SUBJECTIVE:  Trouble with bradycardia and hypotension past 12 - 24 hrs. Trophic TF's started. No BM's. Physical    VITALS:  /64   Pulse 66   Temp 97.6 °F (36.4 °C) (Bladder)   Resp 20   Ht 5' 9\" (1.753 m)   Wt 242 lb 4.6 oz (109.9 kg)   SpO2 100%   BMI 35.78 kg/m²   TEMPERATURE:  Current - Temp: 97.6 °F (36.4 °C); Max - Temp  Av.8 °F (36.6 °C)  Min: 97.4 °F (36.3 °C)  Max: 98 °F (36.7 °C)    Remains intubated on vent and pressors in CVU  (+)Anasarca  Abdomen obese, soft, mild distention, NT, no HSM, Bowel sounds not present    Data      Recent Labs     21  0540 21  0757 21  0555   WBC 10.8 12.2* 8.5   HGB 10.3* 10.0* 8.9*   HCT 32.2* 33.7* 28.4*   MCV 91.8 96.1 92.3    159 95*     Recent Labs     21  0749 21  1830 21  0555   * 131* 131*   K 4.2 4.1 3.9    98* 98*   CO2 19* 19* 20*   PHOS 1.9* 2.5 2.3*   BUN 16 15 14   CREATININE 0.7* 0.9 1.0     Recent Labs     21  0600 21  0540 21  0749 21  0555   * 216* 194* 127*   * 317* 296* 192*   BILIDIR 6.8* 7.5*  --  7.1*   BILITOT 11.4* 10.4* 10.2* 9.5*   ALKPHOS 251* 246* 227* 190*     No results for input(s): LIPASE, AMYLASE in the last 72 hours. ASSESSMENT :     1. Abnormal liver function tests: Given normal liver function tests prior to CABG, suspect abnormal liver function tests are due to ischemic hepatopathy, which correlates with postoperative hypotension requiring vasopressors.  US with fatty liver and no gallstones or biliary dilation. Pt was hypotensive and coded 6/7 and liver enzymes and INR increased thereafter c/w ischemic hepatitis.  Negative Hepatitis A, B,C. transaminases improved. Bili improving with liver enzymes. INR up and down.     2. Coagulopathy: Hem/onc following. PT and PTT prolonged. Could be from argatroban and prolonged clearance per heme notes. INR further elevated d/t ischemic hepatitis and improved some.      3. CAD s/p CABG     4. Hematochezia - constipation, then anal pain with BM with blood Sunday/Monday. Could be anal fissure or hemorrhoidal bleeding. Pt declined rectal exam 6/7. No prior colonoscopy. ddx also includes AVM, diverticular, neoplasm, etc.  hgb stable. No further gross GI bleeding.      5. Nutrition - NPO>5 days, will need nutrition support. Trickle TF's started. Consider starting TPN for nutrition support.     6. Hypotension: per ct surg/cardio teams     7. Renal insuff: renal following. On CRRT.        PLAN :  1) Continue trickle TF's (10 mL/hr) from GI standpoint - would not advance given lack of BS. Clement Fly Would plan TPN as fluid status allows, until can get TF rate to meet caloric/nutritional needs. 2) Continue to monitor liver enzymes and INR   3) Start fiber supplement when taking PO   4) Miralax daily when getting enteral feeds. 5) Recommend future flex sig or colonoscopy once recovered given episode of blood per rectum which has since resolved.      Charles Bonner, [address] [place] [address]  6/13/2021

## 2021-06-13 NOTE — FLOWSHEET NOTE
Spoke with Dr. May Peguero concerning metoprolol due with sbp 90's and recent dale to 30's with hypotension on day shift, Cristian at 300mcg, vaso at 0.04units, and levo at 10mcg. Order received for 250cc albumin and 12.5mg metoprolol once. Ok to titrate levo higher if necessary d/t limited option with pressors.

## 2021-06-13 NOTE — PROGRESS NOTES
Office : [phone]     Fax :[phone]         Renal Progress Note  Subjective:   Admit Date: 5/20/2021     HPI   Sigifredo Balderas is a 70 y.o. male with h/o DM 2, CKD 3 , DLP who came with complaints of 2-month history of progressive exertional shortness of breath and chest discomfort.   s/p CABG X 4        Interval History:       on 2 pressors   On CRRT   Tolerating well. Anuric  Edema present    On vent support     Condition critical    I/O last 3 completed shifts: In: 0050 [I.V.:3596; IV Piggyback:250]  Out: 225 [Other:225]  No intake/output data recorded.     Wife at bedside    DIET Diet NPO Exceptions are: Sips with Meds  ADULT TUBE FEEDING; Orogastric; Standard without Fiber; Continuous; 10; No; 30; Q 4 hours  Medications:   Scheduled Meds:   amiodarone  400 mg Oral BID    cefTRIAXone (ROCEPHIN) IV  2,000 mg Intravenous Q24H    polyethylene glycol  17 g Oral BID    metroNIDAZOLE  500 mg Intravenous Q8H    pantoprazole  40 mg Intravenous Daily    fluconazole  400 mg Intravenous Q24H    insulin lispro  0-6 Units Subcutaneous Q4H    [Held by provider] verapamil  40 mg Oral 3 times per day    albuterol  2.5 mg Nebulization 4x daily    psyllium  1 packet Oral BID    furosemide  40 mg Intravenous Once    metoprolol tartrate  50 mg Oral BID    sodium bicarbonate  50 mEq Intravenous Once    midodrine  15 mg Oral TID WC    fludrocortisone  0.1 mg Oral Daily    lidocaine 1 % injection  5 mL Intradermal Once    sodium chloride flush  5-40 mL Intravenous 2 times per day    aspirin  325 mg Oral Daily    sennosides-docusate sodium  1 tablet Oral BID Continuous Infusions:   amiodarone 0.5 mg/min (06/13/21 0200)    propofol 5 mcg/kg/min (06/13/21 0747)    phenylephrine (TAN-SYNEPHRINE) 50mg/250mL infusion 300 mcg/min (06/13/21 0639)    fentaNYL Stopped (06/12/21 0330)    prismaSol BGK 4/2.5 500 mL/hr at 06/12/21 2118    prismaSATE BGK 4/2.5 500 mL/hr at 06/12/21 2118    prismaSol BGK 4/2.5 500 mL/hr at 06/12/21 2118    vasopressin (Septic Shock) infusion 0.04 Units/min (06/13/21 0358)    dexmedetomidine Stopped (06/11/21 1845)    DOPamine Stopped (06/12/21 0900)    norepinephrine Stopped (06/13/21 6228)    sodium chloride 250 mL (06/12/21 0737)    dextrose 100 mL/hr (06/05/21 0607)       Labs:  CBC:   Recent Labs     06/12/21  0540 06/12/21 0757 06/13/21  0555   WBC 10.8 12.2* 8.5   HGB 10.3* 10.0* 8.9*    159 95*     BMP:    Recent Labs     06/12/21  0749 06/12/21 1830 06/13/21  0555   * 131* 131*   K 4.2 4.1 3.9    98* 98*   CO2 19* 19* 20*   BUN 16 15 14   CREATININE 0.7* 0.9 1.0   GLUCOSE 122* 153* 192*     Ca/Mg/Phos:   Recent Labs     06/12/21  0749 06/12/21 1830 06/13/21  0555   CALCIUM 7.7* 7.8* 7.7*   MG 2.10 2.20 2.10   PHOS 1.9* 2.5 2.3*     Hepatic:   Recent Labs     06/12/21  0540 06/12/21 0749 06/13/21  0555   * 194* 127*   * 296* 192*   BILITOT 10.4* 10.2* 9.5*   ALKPHOS 246* 227* 190*     Troponin: No results for input(s): TROPONINI in the last 72 hours. BNP: No results for input(s): BNP in the last 72 hours. Lipids: No results for input(s): CHOL, TRIG, HDL, LDLCALC, LABVLDL in the last 72 hours. ABGs:   Recent Labs     06/13/21  0604   PHART 7.353   PO2ART 83.5   [id number] 33.9*     INR:   Recent Labs     06/12/21  0540 06/12/21  0757 06/13/21  0555   INR 2.71* 2.40* 2.67*     UA:No results for input(s): Mae Keens, GLUCOSEU, BILIRUBINUR, Loreatha Deep, BLOODU, PHUR, PROTEINU, UROBILINOGEN, NITRU, LEUKOCYTESUR, Idelia Imus in the last 72 hours.    Urine Microscopic: No results for input(s): LABCAST, BACTERIA, COMU, HYALCAST, WBCUA, RBCUA, EPIU in the last 72 hours. Urine Culture:   Recent Labs     06/10/21  1230   LABURIN No growth at 18 to 36 hours     Urine Chemistry: No results for input(s): CLUR, LABCREA, PROTEINUR, NAUR in the last 72 hours. Objective:   Vitals: /64   Pulse 72   Temp 97.9 °F (36.6 °C) (Bladder)   Resp 20   Ht 5' 9\" (1.753 m)   Wt 242 lb 4.6 oz (109.9 kg)   SpO2 99%   BMI 35.78 kg/m²    Wt Readings from Last 3 Encounters:   06/12/21 242 lb 4.6 oz (109.9 kg)   05/20/21 223 lb 9.6 oz (101.4 kg)   03/12/21 216 lb (98 kg)      24HR INTAKE/OUTPUT:      Intake/Output Summary (Last 24 hours) at 6/13/2021 5986  Last data filed at 6/13/2021 0800  Gross per 24 hour   Intake 3846 ml   Output 225 ml   Net 3621 ml     Constitutional:  Intubated   NECK: supple, no JVD  Cardiovascular:  S1, S2 without m/r/g  Respiratory:  Decreased air entry bases   Abdomen: +bs, soft, nt  Ext:  1 + LE edema. Mainly prominent in thigh area     Assessment and Plan:       IMAGING:  XR CHEST PORTABLE   Final Result   No significant change in appearance of extensive bilateral pulmonary disease   over the past 24 hours. Unchanged pleural effusions as well         XR ABDOMEN FOR NG/OG/NE TUBE PLACEMENT   Final Result   Feeding tube in the stomach with its tip in the antrum. Dense atelectasis or   pneumonia in the left retrocardiac area. Small left pleural effusion. XR ABDOMEN FOR NG/OG/NE TUBE PLACEMENT   Preliminary Result   Status post placement of feeding tube with distal tip located in the region   of the pyloric antrum/proximal duodenum as described above. XR CHEST PORTABLE   Final Result   Pulmonary edema is slightly improved as compared to prior. Persistent small   bilateral pleural effusions. XR ABDOMEN (KUB) (SINGLE AP VIEW)   Final Result   Indeterminate intestinal gas pattern secondary to paucity of small bowel gas.          XR CHEST PORTABLE   Final Result   Worsening CHF/pulmonary edema. XR ABDOMEN (KUB) (SINGLE AP VIEW)   Final Result   Addendum 1 of 1   ADDENDUM:   There is an overall paucity of bowel gas. There is some retained contrast    in   the colon which is collapsed. The bowel gas pattern is nonspecific. Final   Triple-lumen catheter as above. XR CHEST PORTABLE   Final Result   Interval placement of tubes and lines as above. XR ABDOMEN FOR NG/OG/NE TUBE PLACEMENT   Final Result   Status post gastric tube and ET tube placement in good position with no   change in the left PICC line. Stable cardiomegaly with mild central pulmonary congestion and mild discoid   atelectasis or scarring along lung bases. Esophageal probe in place and a left PICC line in place. XR CHEST PORTABLE   Final Result   Persistent large amount of opacity in the left retrocardiac area either due   to pneumonia or atelectasis. Small left pleural effusion. Mild right   basilar atelectasis. Overall the lungs appear clearer than on the prior   study. US GALLBLADDER RUQ   Final Result   Fatty liver. No cholelithiasis or ancillary evidence of acute cholecystitis. XR CHEST PORTABLE   Final Result   Vascular congestion. Bibasilar small pleural effusions and atelectasis or pulmonary edema,   increased in the interval.  Pneumonia is a differential possibility. XR CHEST PORTABLE   Final Result   Left PICC projects in normal alignment. No pneumothorax. Increased left basilar opacity, atelectasis or airspace disease. There may   be a small component of pleural effusion. Stable cardiomegaly. XR CHEST PORTABLE   Final Result   No pneumothorax following removal of the left chest tube. Development of   moderate opacities within the right lung either atelectasis or pneumonia. No   abnormal pulmonary vascular congestion or sizable pleural effusion.          XR ABDOMEN FOR NG/OG/NE TUBE PLACEMENT   Final Result      XR CHEST PORTABLE   Final Result   Satisfactory appearance status post median sternotomy      No pneumothorax         CT CHEST ABDOMEN PELVIS WO CONTRAST   Final Result   No acute abnormality identified in the chest, abdomen, or pelvis. No finding   worrisome for occult malignancy. Moderate enlargement of the prostate. VL PRE OP VEIN MAPPING   Final Result      VL DUP CAROTID BILATERAL   Final Result      XR CHEST (2 VW)   Final Result   No active cardiopulmonary disease             Assessment/Plan     1. Acute kidney injury on CKD stage 3A   NORA 2/2 ATN   Acidosis better. Tolerating CRRT   Blood pressure better today. Has increased edema. Try to remove 50 mill per hour if tolerates  Overall condition critical     Monitor electrolytes. Replace calcium as needed. Recommend to dose adjust all medications  based on renal functions  Maintain SBP> 90 mmHg   Daily weights   AVOID NSAIDs  Avoid Nephrotoxins  Monitor Intake/Output    2. Acute respiratory failure. Intubated and vent support   fio2 down to 100 % today   Appreciate critical care input.     3. Metabolic acidosis 2/2 lactic acidosis   Improving with better perfusion/blood pressure    4. DM 2. Needs better control  On insulin    5. S/p CABG.  CTS and cardiology on board     Continue CRRT today   D/w wife in detail  Condition very critical   Primary team to address long term goals                       Erick Young MD , MD  [facility] - 18 07

## 2021-06-13 NOTE — FLOWSHEET NOTE
Frequent rhythm changes from NSR with PAC's to afib, rate ranging from 's. Sudden HR drop to 30's SB and maintained, sbp 70's. Amio gtt stopped, and half amp of atropine given. Quick response back to NSR/afib with pac's and pvc's. BP leveled out with titrating levophed as well. This RN remains at bedside to cont to syeda and treat labile hemodynamics.

## 2021-06-14 NOTE — PROGRESS NOTES
Patient  earlier. Family at bedside. All questions answered and sympathy expressed. They can call with any new questions.

## 2021-06-14 NOTE — PROGRESS NOTES
PEA dying heart rhythm noted on monitor. Pastoral care notified of impending death.  Meir gu and 23rd psa read

## 2021-06-14 NOTE — FLOWSHEET NOTE
Wife and daughter at bedside, update and emotional support provided. Family requested that patient become a DNR with no compressions, shock or meds \"so that we would not put him through all that again. \" Dr. Christie Valdovinos paged.

## 2021-06-14 NOTE — FLOWSHEET NOTE
roddy bicarb ivp well, but increase in bp very temporary. CRRT stopped in attempt to maintain bp, blood returned successfully. RT at bedside bagging with peep valve in attempt to increase sats. Spoke with Dr. Kaet Washburn on phone to relay lab results and verify bicarb gtt. Order received to run bicarb gtt at rate of 75ml/hr.

## 2021-06-14 NOTE — DEATH NOTES
Death Pronouncement Note  Patient's Name: Jaylen Hernandez   Patient's YOB: 1950  MRN Number: [id number]    Admitting Provider: Josh Colbert MD  Attending Provider: Naomi Saenz MD    Patient was examined and the following were absent: Blood Pressure, Heart sounds, Breath sounds    I declared the patient dead on 6/14/2021 at 08:10      Electronically signed by Elise Mack MD on 6/14/21 at 9:23 AM EDT

## 2021-06-14 NOTE — FLOWSHEET NOTE
abg resulted worsening acidosis, relayed results to Dr. Chapito Mejia. Orders received for 2 amps bicarb now and start bicarb gtt, but to clarify gtt rate with nephrology.

## 2021-06-14 NOTE — PROGRESS NOTES
Post Mortem care completed. Patient taken to [facility] by patient transport.

## 2021-06-14 NOTE — FLOWSHEET NOTE
As BP drops and ectopy increases, oxygen saturation drops to 80's, despite increase in FiO2 to 100% with peep 8. Spoke with Dr. Jacques Favre via perfect serve, order to increase peep to 10.

## 2021-06-14 NOTE — FLOWSHEET NOTE
Rhythm with lots of ectopy tonight, pac's and pvc's sometimes bigeminal. sbp consistently dipping below 90, titrating levo to maintain pressure, currently at 22mcg/min. Unable to tolerate fluid removal from CRRT, despite attempts at even small amounts of 10-20ml/hr. This RN remains at bedside, will cont to syeda.

## 2021-06-14 NOTE — PROGRESS NOTES
TC from unit requesting spiritual support for family - patient recently . Provided condolences, comfort, support, pastoral presence for gathered family (wife, dtr, son, spouses and granddtr). Family tearful, grieving, supportive of one another. Gathered around patient's bed for prayer, provided prayer shawl for wife. Family expressed gratitude for visit. Wife would like to use [address] located in [facility], [phone]. Update to RN.

## 2021-06-14 NOTE — PROGRESS NOTES
Patient actively dying bilateral wrist restraints are off. Family at bedside. Emotional support provided.

## 2021-06-16 NOTE — PROGRESS NOTES
Physician Progress Note      PATIENT:               Jessica Bradley  CSN #:                  [id number]  :                       1950  ADMIT DATE:       2021 12:16 PM  [address] DATE:        2021 12:00 PM  RESPONDING  PROVIDER #:        Sue Siddiqui MD          QUERY TEXT:    Pt admitted with chest pain. Noted documentation of post-operative respiratory   failure in pulmonary consult note on . Please document in progress notes   and discharge summary if you are evaluating/treating any of the following: The medical record reflects the following:  Risk Factors: CABG, acute heart failure  Clinical Indicators: HCO3 13.1, ph 7.28, increasing SOB required biPAP, became   hypotensive and bradycardiac, intubated  Treatment: intubation  Options provided:  -- Respiratory failure is not a complication of the procedure but was due to,   Please specify. -- Acute Postoperative Pulmonary Insufficiency, Postoperative Respiratory   failure is ruled out  -- Postoperative Respiratory failure is a complication of the procedure and   not routinely expected  -- Postoperative Respiratory failure ruled out after study  -- Other - I will add my own diagnosis  -- Disagree - Not applicable / Not valid  -- Disagree - Clinically unable to determine / Unknown  -- Refer to Clinical Documentation Reviewer    PROVIDER RESPONSE TEXT:    Provider disagreed with this query. Query created by:  Alisia Morales on 2021 3:57 PM      Electronically signed by:  Sue Siddiqui MD 2021 12:47 PM

## 2025-05-26 NOTE — PROGRESS NOTES
Ewelina Rodriguez  6/7/2021  [id number]    Chief Complaint: Coronary artery disease involving native coronary artery of native heart without angina pectoris    Subjective:   No significant weekend events. Weaned from liana today but feeling SOB and having difficulty talking. Wife present in room. Renal function increasing. ROS: No CP.  Positive SOB, dyspnea    Objective:  Patient Vitals for the past 24 hrs:   BP Temp Temp src Pulse Resp SpO2 Weight   06/07/21 0930 -- -- -- 73 -- 92 % --   06/07/21 0916 -- -- -- -- -- 91 % --   06/07/21 0900 126/69 -- -- 76 -- -- --   06/07/21 0800 129/74 97.4 °F (36.3 °C) Temporal 73 18 94 % --   06/07/21 0730 -- 97.4 °F (36.3 °C) -- -- -- -- --   06/07/21 0600 -- -- -- -- -- -- 227 lb 11.8 oz (103.3 kg)   06/07/21 0500 121/65 -- -- 67 -- 96 % --   06/07/21 0400 124/60 97.5 °F (36.4 °C) -- 67 -- 96 % --   06/07/21 0317 -- -- -- -- -- 99 % --   06/07/21 0300 117/72 -- -- 67 -- 97 % --   06/07/21 0200 133/70 -- -- 72 -- 95 % --   06/07/21 0100 116/62 -- -- 70 -- 96 % --   06/07/21 0017 -- -- -- -- -- 95 % --   06/07/21 0000 122/76 -- -- 68 -- 94 % --   06/06/21 2300 103/71 -- -- 70 -- 95 % --   06/06/21 2200 108/66 -- -- 75 -- 95 % --   06/06/21 2137 120/75 -- -- 78 -- 93 % --   06/06/21 2100 (!) 139/97 -- -- 82 -- 92 % --   06/06/21 2045 -- -- -- -- 18 92 % --   06/06/21 2030 128/63 -- -- 78 -- 92 % --   06/06/21 2000 126/77 97.8 °F (36.6 °C) Temporal 77 16 93 % --   06/06/21 1949 -- -- -- -- -- 96 % --   06/06/21 1930 103/79 -- -- 70 16 96 % --   06/06/21 1900 117/70 -- -- 71 -- 95 % --   06/06/21 1830 114/71 -- -- 70 -- 96 % --   06/06/21 1800 105/70 -- -- 70 16 95 % --   06/06/21 1730 118/76 -- -- 65 -- 97 % --   06/06/21 1700 (!) 127/93 -- -- 65 -- 97 % --   06/06/21 1630 119/78 -- -- 65 -- 97 % --   06/06/21 1600 123/77 -- -- 65 -- 96 % --   06/06/21 1530 106/74 -- -- 65 -- 96 % --   06/06/21 1500 111/81 -- -- 66 -- 96 % --   06/06/21 1430 123/82 -- -- 68 -- 95 % --   06/06/21 heart without angina pectoris    Elevated LFTs    Post poliomyelitis syndrome    Gout    Type 2 diabetes mellitus with diabetic nephropathy, without long-term current use of insulin (HCC)    Hypertension associated with diabetes ([facility] [address]    Mixed hyperlipidemia    Basal cell carcinoma (BCC) of skin of face    Chest pain    Angina of effort (HCC)    Statin intolerance    Stage 3a chronic kidney disease ([facility] [address]    S/P coronary artery bypass graft x 4    S/P left atrial appendage ligation    Essential hypertension    Acute renal failure with tubular necrosis (HCC)    Acute diastolic CHF (congestive heart failure) (HCC)       Plan:   Debility: PT/OT  CAD: S/p CABG x4 on 5/20 with Dr. Carnes Factor. Sternal precautions  Post polio syndrome  Diabetes  HTN  HLD  NORA     Dispo: Patient is an appropriate ARU candidate. Not ready for ARU today given dyspnea and quickly worsening renal function. We will continue to follow. Paula Rodas MD 6/7/2021, 10:16 AM    * This document was created using dictation software. While all precautions were taken to ensure accuracy, errors may have occurred. Please disregard any typographical errors. English

## (undated) DEVICE — SOLUTION IV IRRIG 500ML 0.9% SODIUM CHL 2F7123

## (undated) DEVICE — SYSTEM ENDOSCP VES HARV W/ TOOL CANN SEAL SHT PRT BLNT TIP

## (undated) DEVICE — OPTIFOAM GENTLE LIQUITRAP, SACRUM, 7"X7": Brand: MEDLINE

## (undated) DEVICE — BOWL MED L 32OZ PLAS W/ MOLD GRAD EZ OPN PEEL PCH

## (undated) DEVICE — SUTURE SZ 7 L18IN NONABSORBABLE SIL CCS L48MM 1/2 CIR STRNM M655G

## (undated) DEVICE — BANDAGE ADH W4XL13 3 4IN POSTOP DSG PRIMAPORE

## (undated) DEVICE — GAUZE,SPONGE,4"X4",8PLY,STRL,LF,10/TRAY: Brand: MEDLINE

## (undated) DEVICE — DECANTER FLD 9IN ST BG FOR ASEP TRNSF OF FLD

## (undated) DEVICE — FAIRFIELD CABG ACCESSARY PK

## (undated) DEVICE — SYRINGE, LUER LOCK, 10ML: Brand: MEDLINE

## (undated) DEVICE — LEAD PACE L475MM CHNL A OR V MYOCARDIAL STEROID ELUT SIL

## (undated) DEVICE — DRESSING WND SM W2.5XL4IN BRTH W/ CATH SECUREMENT AND

## (undated) DEVICE — SUTURE NONABSORBABLE MONOFILAMENT 6-0 C-1 1X30 IN PROLENE 8706H

## (undated) DEVICE — DRESSING TRNSPAR W FAB BORD SORBAVIEW ULT 475X425IN

## (undated) DEVICE — [HIGH FLOW INSUFFLATOR,  DO NOT USE IF PACKAGE IS DAMAGED,  KEEP DRY,  KEEP AWAY FROM SUNLIGHT,  PROTECT FROM HEAT AND RADIOACTIVE SOURCES.]: Brand: PNEUMOSURE

## (undated) DEVICE — DRESSING GERM DIA1IN CNTR H DIA7MM BLU CHG ANTIMIC PROTCT

## (undated) DEVICE — STERNUM BLADE, OFFSET (31.7 X 0.64 X 6.3MM)

## (undated) DEVICE — TOWEL,OR,DSP,ST,BLUE,STD,4/PK,20PK/CS: Brand: MEDLINE

## (undated) DEVICE — PAD ABSRB W8XL10IN ABD HYDROPHOBIC NONWOVEN THCK LAYR CELOS

## (undated) DEVICE — CONTAINER STOR SURG SLUSH

## (undated) DEVICE — CANNULA ART 24FR OVERALL L105IN VENT CONN 3 8IN MTL TIP W

## (undated) DEVICE — SUTURE VCRL SZ 4-0 L18IN ABSRB UD L19MM PS-2 3/8 CIR PRIM J496H

## (undated) DEVICE — DRAPE EENT SPLIT

## (undated) DEVICE — GLOVE SURG SZ 85 L12IN FNGR ORTHO 126MIL CRM LTX FREE

## (undated) DEVICE — SUTURE PDS II SZ 0 L27IN ABSRB VLT L36MM CT-1 1/2 CIR Z340H

## (undated) DEVICE — SYRINGE MED 10ML TRNSLUC BRL PLUNG BLK MRK POLYPR CTRL

## (undated) DEVICE — AORTIC PNCH 4.0MM 8IN BX 10 DISP

## (undated) DEVICE — SUTURE ETHBND SZ 3-0 L30IN NONABSORBABLE GRN SH L26MM 1/2 X562H

## (undated) DEVICE — SYRINGE MED 5ML STD CLR PLAS LUERLOCK TIP N CTRL DISP

## (undated) DEVICE — SUTURE PERMA-HAND SZ 4-0 L144IN NONABSORBABLE BLK LIGAPAK LA53G

## (undated) DEVICE — DRESSING PETRO W3XL3IN OIL EMUL N ADH GZ KNIT IMPREG CELOS

## (undated) DEVICE — Device: Brand: FABCO

## (undated) DEVICE — COVER,MAYO STAND,XL,STERILE: Brand: MEDLINE

## (undated) DEVICE — HYPODERMIC SAFETY NEEDLE: Brand: MAGELLAN

## (undated) DEVICE — SWAN-GANZ CCOMBO V THERMODILUTION CATHETER: Brand: SWAN-GANZ CCOMBO V

## (undated) DEVICE — SET ADMIN PRIMING 67ML L105IN NVENT 180UM FLTR 3 RLER CLMP

## (undated) DEVICE — PAD THRM M SPL TORSO

## (undated) DEVICE — TTL1LYR 16FR10ML 100%SIL TMPST TR: Brand: MEDLINE

## (undated) DEVICE — SUTURE PROL SZ 7-0 L24IN NONABSORBABLE BLU L8MM BV175-6 3/8 8735H

## (undated) DEVICE — CLIP SM RED INTERN HMOCLP TITAN LIGATING

## (undated) DEVICE — INTENDED FOR TISSUE SEPARATION, AND OTHER PROCEDURES THAT REQUIRE A SHARP SURGICAL BLADE TO PUNCTURE OR CUT.: Brand: BARD-PARKER ® STAINLESS STEEL BLADES

## (undated) DEVICE — TRAY PREP DRY W/ PREM GLV 2 APPL 6 SPNG 2 UNDPD 1 OVERWRAP

## (undated) DEVICE — DRESSING PETRO W3XL8IN N ADH OIL EMUL GZ CURAD

## (undated) DEVICE — VESSEL LOOPS,MAXI, BLUE: Brand: DEVON

## (undated) DEVICE — 3 ML SYRINGE LUER-LOCK TIP: Brand: MONOJECT

## (undated) DEVICE — BANDAGE COMPR W6INXL10YD ST M E WHITE/BEIGE

## (undated) DEVICE — GLOVE SURG SZ 8 L11.77IN FNGR THK9.8MIL STRW LTX POLYMER

## (undated) DEVICE — SUTURE PERMAHAND SZ 3-0 L30IN NONABSORBABLE BLK SH L26MM K832H

## (undated) DEVICE — 3M™ TEGADERM™ TRANSPARENT FILM DRESSING FRAME STYLE, 1626W, 4 IN X 4-3/4 IN (10 CM X 12 CM), 50/CT 4CT/CASE: Brand: 3M™ TEGADERM™

## (undated) DEVICE — 20 ML SYRINGE LUER-LOCK TIP: Brand: MONOJECT

## (undated) DEVICE — STERILE COTTON BALLS LARGE 5/P: Brand: MEDLINE

## (undated) DEVICE — TOWEL 26X17IN 2 PER PACK COTTON DELINTED

## (undated) DEVICE — SYRINGE MED 30ML STD CLR PLAS LUERLOCK TIP N CTRL DISP

## (undated) DEVICE — NEEDLE HYPO 20GA L1.5IN YEL POLYPR HUB S STL REG BVL STR

## (undated) DEVICE — APPLICATOR PREP 26ML 0.7% IOD POVACRYLEX 74% ISO ALC ST

## (undated) DEVICE — APPLICATOR MEDICATED 3 CC SOLUTION CLR STRL CHLORAPREP

## (undated) DEVICE — SUTURE PERMAHAND SZ 2-0 L30IN NONABSORBABLE BLK SILK W/O A305H

## (undated) DEVICE — MERCY FAIRFIELD TURNOVER KIT: Brand: MEDLINE INDUSTRIES, INC.

## (undated) DEVICE — OPEN HRT BASIC PK

## (undated) DEVICE — GLOVE ORANGE PI 7 1/2   MSG9075

## (undated) DEVICE — SUTURE VCRL SZ 3-0 L27IN ABSRB UD L26MM SH 1/2 CIR J416H

## (undated) DEVICE — PAD SURG INSUL AD L CLS CELL FOAM SLT W  TIED RADPQ MRK FOR

## (undated) DEVICE — DRAIN SURG 24FR BLAK SIL HUBLESS 4 CHANNELED RADPQ EXTN TB

## (undated) DEVICE — SUTURE PROL SZ 7 0 L24IN NONABSORBABLE BLU BV175 6 L8MM 3 8 EP8735H

## (undated) DEVICE — GLOVE SURG SZ 75 L12IN FNGR THK94MIL STD WHT LTX FREE

## (undated) DEVICE — GOWN SIRUS NONREIN XL W/TWL: Brand: MEDLINE INDUSTRIES, INC.

## (undated) DEVICE — CANNULA PERF L15IN DIA29FR VEN 3 STG THN WALL DSGN W  VENT

## (undated) DEVICE — MEDI-VAC NON-CONDUCTIVE SUCTION TUBING: Brand: CARDINAL HEALTH

## (undated) DEVICE — PROCEDURE KIT COMP

## (undated) DEVICE — SUTURE VCRL SZ 2-0 L36IN ABSRB UD L36MM CT-1 1/2 CIR J945H

## (undated) DEVICE — BANDAGE,GAUZE,BULKEE II,4.5"X4.1YD,STRL: Brand: MEDLINE

## (undated) DEVICE — DRAPE THER FLUID WARMING 66X44 IN FLAT SLUSH DBL DISC ORS

## (undated) DEVICE — LABEL MED CUST CVR

## (undated) DEVICE — DRAIN SURG SGL COLL PT TB FOR ATS BG OASIS

## (undated) DEVICE — MEDICINE CUP, GRADUATED, STER: Brand: MEDLINE

## (undated) DEVICE — PEN: MARKING STD 100/CS: Brand: MEDICAL ACTION INDUSTRIES

## (undated) DEVICE — PRESSURE MONITORING SET: Brand: TRUWAVE, VAMP PLUS

## (undated) DEVICE — SUTURE ETHBND EXCEL SZ 2-0 L36IN NONABSORBABLE GRN L26MM SH X523H

## (undated) DEVICE — BLANKET WRM CARD FOR MISTRAL AIR WRM SYS

## (undated) DEVICE — KIT ART LN 20GA L12CM FEP RADPQ 0.025X13.75IN SPR GWIRE

## (undated) DEVICE — 12 FOOT DISPOSABLE EXTENSION CABLE WITH SAFE CONNECT / SCREW-DOWN

## (undated) DEVICE — CATH CTR VEN 3LUM INTRLNK INJ 9FRX11CM

## (undated) DEVICE — NEEDLE HYPO 18GA L1.5IN THN WALL PIVOTING SHLD BVL ORIENTED

## (undated) DEVICE — 3M™ TEGADERM™ TRANSPARENT FILM DRESSING FRAME STYLE, 1624W, 2-3/8 IN X 2-3/4 IN (6 CM X 7 CM), 100/CT 4CT/CASE: Brand: 3M™ TEGADERM™

## (undated) DEVICE — 3M™ STERI-STRIP™ REINFORCED ADHESIVE SKIN CLOSURES, R1547, 1/2 IN X 4 IN (12 MM X 100 MM), 6 STRIPS/ENVELOPE: Brand: 3M™ STERI-STRIP™

## (undated) DEVICE — SHEET,DRAPE,53X77,STERILE: Brand: MEDLINE

## (undated) DEVICE — EVERGRIP INSERT SET 61MM: Brand: FOGARTY EVERGRIP

## (undated) DEVICE — SUTURE NONABSORBABLE MONOFILAMENT 4-0 RB-1 36 IN BLU PROLENE 8557H

## (undated) DEVICE — EZE-BAND LF ST 4X5.5 36/CS: Brand: EZE-BAND LF ST 4X5.5 36/CS

## (undated) DEVICE — MASC TURNOVER KIT: Brand: MEDLINE INDUSTRIES, INC.

## (undated) DEVICE — SUTURE ETHLN SZ 4-0 L18IN NONABSORBABLE BLK L19MM PS-2 3/8 1667H

## (undated) DEVICE — CANNULA AORT ROOT STD INTRO SLT TIP 7FR OVERALL LEN 575IN DL

## (undated) DEVICE — SUTURE ETHBND EXCEL SZ 0 L18IN NONABSORBABLE GRN L36MM CT-1 CX21D

## (undated) DEVICE — PACKING,VAGINAL,XR,ST,4"X36",100EA: Brand: MEDLINE

## (undated) DEVICE — WAX SURG 2.5GM HEMSTAT BNE BEESWAX PARAFFIN ISO PALMITATE

## (undated) DEVICE — ELECTRODE PT RET AD L9FT HI MOIST COND ADH HYDRGEL CORDED

## (undated) DEVICE — INDICATED FOR SURGICAL CLAMPING DURING CARDIOVASCULAR PERIPHERAL VASCULAR, AND GENERAL SURGERY.: Brand: SOFT/FIBRA® SPRING CLIP

## (undated) DEVICE — PACK PROCEDURE SURG EXTREMITY MFFOP CUST

## (undated) DEVICE — CONNECTOR PERF W3/8XH0.25XL0.25IN BASE UNEQUAL Y SHP W/O

## (undated) DEVICE — GAUZE,SPONGE,4"X4",16PLY,XRAY,STRL,LF: Brand: MEDLINE

## (undated) DEVICE — BASIC SINGLE BASIN 1-LF: Brand: MEDLINE INDUSTRIES, INC.

## (undated) DEVICE — Z DISCONTINUED NO SUB IDED SUTURE ETHLN SZ 5-0 L18IN NONABSORBABLE BLK PC-3 L16MM 3/8